# Patient Record
Sex: MALE | Race: OTHER | HISPANIC OR LATINO | Employment: UNEMPLOYED | URBAN - METROPOLITAN AREA
[De-identification: names, ages, dates, MRNs, and addresses within clinical notes are randomized per-mention and may not be internally consistent; named-entity substitution may affect disease eponyms.]

---

## 2023-08-18 ENCOUNTER — APPOINTMENT (OUTPATIENT)
Dept: RADIOLOGY | Facility: MEDICAL CENTER | Age: 73
DRG: 894 | End: 2023-08-18
Attending: EMERGENCY MEDICINE

## 2023-08-18 ENCOUNTER — HOSPITAL ENCOUNTER (INPATIENT)
Facility: MEDICAL CENTER | Age: 73
LOS: 1 days | DRG: 894 | End: 2023-08-18
Attending: EMERGENCY MEDICINE | Admitting: INTERNAL MEDICINE

## 2023-08-18 VITALS
DIASTOLIC BLOOD PRESSURE: 88 MMHG | RESPIRATION RATE: 16 BRPM | WEIGHT: 140 LBS | HEIGHT: 64 IN | SYSTOLIC BLOOD PRESSURE: 128 MMHG | OXYGEN SATURATION: 96 % | TEMPERATURE: 97.5 F | HEART RATE: 149 BPM | BODY MASS INDEX: 23.9 KG/M2

## 2023-08-18 DIAGNOSIS — R00.0 SINUS TACHYCARDIA: Primary | ICD-10-CM

## 2023-08-18 DIAGNOSIS — J18.9 PNEUMONIA OF BOTH LUNGS DUE TO INFECTIOUS ORGANISM, UNSPECIFIED PART OF LUNG: ICD-10-CM

## 2023-08-18 DIAGNOSIS — F10.920 ALCOHOLIC INTOXICATION WITHOUT COMPLICATION (HCC): ICD-10-CM

## 2023-08-18 PROBLEM — R94.31 PROLONGED Q-T INTERVAL ON ECG: Status: ACTIVE | Noted: 2023-08-18

## 2023-08-18 PROBLEM — E87.20 LACTIC ACIDOSIS: Status: ACTIVE | Noted: 2023-08-18

## 2023-08-18 PROBLEM — J96.01 ACUTE RESPIRATORY FAILURE WITH HYPOXIA (HCC): Status: ACTIVE | Noted: 2023-08-18

## 2023-08-18 PROBLEM — R74.01 TRANSAMINITIS: Status: ACTIVE | Noted: 2023-08-18

## 2023-08-18 PROBLEM — A41.9 SEPSIS (HCC): Status: ACTIVE | Noted: 2023-08-18

## 2023-08-18 PROBLEM — R79.89 ELEVATED D-DIMER: Status: ACTIVE | Noted: 2023-08-18

## 2023-08-18 PROBLEM — F10.929 ACUTE ALCOHOL INTOXICATION (HCC): Status: ACTIVE | Noted: 2023-08-18

## 2023-08-18 PROBLEM — R65.10 SIRS (SYSTEMIC INFLAMMATORY RESPONSE SYNDROME) (HCC): Status: ACTIVE | Noted: 2023-08-18

## 2023-08-18 PROBLEM — E83.51 HYPOCALCEMIA: Status: ACTIVE | Noted: 2023-08-18

## 2023-08-18 PROBLEM — E87.6 HYPOKALEMIA: Status: ACTIVE | Noted: 2023-08-18

## 2023-08-18 LAB
ALBUMIN SERPL BCP-MCNC: 3.9 G/DL (ref 3.2–4.9)
ALBUMIN/GLOB SERPL: 1.4 G/DL
ALP SERPL-CCNC: 134 U/L (ref 30–99)
ALT SERPL-CCNC: 116 U/L (ref 2–50)
ANION GAP SERPL CALC-SCNC: 15 MMOL/L (ref 7–16)
AST SERPL-CCNC: 66 U/L (ref 12–45)
BASOPHILS # BLD AUTO: 1.3 % (ref 0–1.8)
BASOPHILS # BLD: 0.09 K/UL (ref 0–0.12)
BILIRUB SERPL-MCNC: 0.2 MG/DL (ref 0.1–1.5)
BUN SERPL-MCNC: 15 MG/DL (ref 8–22)
CALCIUM ALBUM COR SERPL-MCNC: 8 MG/DL (ref 8.5–10.5)
CALCIUM SERPL-MCNC: 7.9 MG/DL (ref 8.5–10.5)
CHLORIDE SERPL-SCNC: 102 MMOL/L (ref 96–112)
CO2 SERPL-SCNC: 21 MMOL/L (ref 20–33)
CREAT SERPL-MCNC: 0.78 MG/DL (ref 0.5–1.4)
D DIMER PPP IA.FEU-MCNC: 0.53 UG/ML (FEU) (ref 0–0.5)
EKG IMPRESSION: NORMAL
EOSINOPHIL # BLD AUTO: 0.36 K/UL (ref 0–0.51)
EOSINOPHIL NFR BLD: 5.2 % (ref 0–6.9)
ERYTHROCYTE [DISTWIDTH] IN BLOOD BY AUTOMATED COUNT: 60.8 FL (ref 35.9–50)
ETHANOL BLD-MCNC: 254.9 MG/DL
FLUAV RNA SPEC QL NAA+PROBE: NEGATIVE
FLUBV RNA SPEC QL NAA+PROBE: NEGATIVE
GFR SERPLBLD CREATININE-BSD FMLA CKD-EPI: 93 ML/MIN/1.73 M 2
GLOBULIN SER CALC-MCNC: 2.8 G/DL (ref 1.9–3.5)
GLUCOSE SERPL-MCNC: 97 MG/DL (ref 65–99)
HCT VFR BLD AUTO: 34.6 % (ref 42–52)
HGB BLD-MCNC: 11.2 G/DL (ref 14–18)
IMM GRANULOCYTES # BLD AUTO: 0.03 K/UL (ref 0–0.11)
IMM GRANULOCYTES NFR BLD AUTO: 0.4 % (ref 0–0.9)
INR PPP: 1.04 (ref 0.87–1.13)
LACTATE SERPL-SCNC: 1.6 MMOL/L (ref 0.5–2)
LACTATE SERPL-SCNC: 2.4 MMOL/L (ref 0.5–2)
LIPASE SERPL-CCNC: 42 U/L (ref 11–82)
LYMPHOCYTES # BLD AUTO: 2.81 K/UL (ref 1–4.8)
LYMPHOCYTES NFR BLD: 40.7 % (ref 22–41)
MCH RBC QN AUTO: 31.1 PG (ref 27–33)
MCHC RBC AUTO-ENTMCNC: 32.4 G/DL (ref 32.3–36.5)
MCV RBC AUTO: 96.1 FL (ref 81.4–97.8)
MONOCYTES # BLD AUTO: 0.5 K/UL (ref 0–0.85)
MONOCYTES NFR BLD AUTO: 7.2 % (ref 0–13.4)
NEUTROPHILS # BLD AUTO: 3.12 K/UL (ref 1.82–7.42)
NEUTROPHILS NFR BLD: 45.2 % (ref 44–72)
NRBC # BLD AUTO: 0 K/UL
NRBC BLD-RTO: 0 /100 WBC (ref 0–0.2)
PLATELET # BLD AUTO: 261 K/UL (ref 164–446)
PMV BLD AUTO: 9.2 FL (ref 9–12.9)
POC BREATHALIZER: 0.16 PERCENT (ref 0–0.01)
POTASSIUM SERPL-SCNC: 3.4 MMOL/L (ref 3.6–5.5)
PROCALCITONIN SERPL-MCNC: 0.32 NG/ML
PROT SERPL-MCNC: 6.7 G/DL (ref 6–8.2)
PROTHROMBIN TIME: 13.5 SEC (ref 12–14.6)
RBC # BLD AUTO: 3.6 M/UL (ref 4.7–6.1)
RSV RNA SPEC QL NAA+PROBE: NEGATIVE
SARS-COV-2 RNA RESP QL NAA+PROBE: NOTDETECTED
SODIUM SERPL-SCNC: 138 MMOL/L (ref 135–145)
SPECIMEN SOURCE: NORMAL
TSH SERPL DL<=0.005 MIU/L-ACNC: 2.73 UIU/ML (ref 0.38–5.33)
WBC # BLD AUTO: 6.9 K/UL (ref 4.8–10.8)

## 2023-08-18 PROCEDURE — 302970 POC BREATHALIZER

## 2023-08-18 PROCEDURE — 85610 PROTHROMBIN TIME: CPT

## 2023-08-18 PROCEDURE — 700105 HCHG RX REV CODE 258: Mod: JZ | Performed by: EMERGENCY MEDICINE

## 2023-08-18 PROCEDURE — 99223 1ST HOSP IP/OBS HIGH 75: CPT | Performed by: INTERNAL MEDICINE

## 2023-08-18 PROCEDURE — 700105 HCHG RX REV CODE 258: Performed by: EMERGENCY MEDICINE

## 2023-08-18 PROCEDURE — 700117 HCHG RX CONTRAST REV CODE 255: Performed by: EMERGENCY MEDICINE

## 2023-08-18 PROCEDURE — 0241U HCHG SARS-COV-2 COVID-19 NFCT DS RESP RNA 4 TRGT MIC: CPT

## 2023-08-18 PROCEDURE — 87040 BLOOD CULTURE FOR BACTERIA: CPT | Mod: 91

## 2023-08-18 PROCEDURE — 96375 TX/PRO/DX INJ NEW DRUG ADDON: CPT

## 2023-08-18 PROCEDURE — 700111 HCHG RX REV CODE 636 W/ 250 OVERRIDE (IP): Performed by: EMERGENCY MEDICINE

## 2023-08-18 PROCEDURE — 84145 PROCALCITONIN (PCT): CPT

## 2023-08-18 PROCEDURE — 770020 HCHG ROOM/CARE - TELE (206)

## 2023-08-18 PROCEDURE — 36415 COLL VENOUS BLD VENIPUNCTURE: CPT

## 2023-08-18 PROCEDURE — 700111 HCHG RX REV CODE 636 W/ 250 OVERRIDE (IP): Mod: JZ | Performed by: EMERGENCY MEDICINE

## 2023-08-18 PROCEDURE — 96376 TX/PRO/DX INJ SAME DRUG ADON: CPT

## 2023-08-18 PROCEDURE — 80053 COMPREHEN METABOLIC PANEL: CPT

## 2023-08-18 PROCEDURE — 84443 ASSAY THYROID STIM HORMONE: CPT

## 2023-08-18 PROCEDURE — 85025 COMPLETE CBC W/AUTO DIFF WBC: CPT

## 2023-08-18 PROCEDURE — 82077 ASSAY SPEC XCP UR&BREATH IA: CPT

## 2023-08-18 PROCEDURE — 71275 CT ANGIOGRAPHY CHEST: CPT

## 2023-08-18 PROCEDURE — 85379 FIBRIN DEGRADATION QUANT: CPT

## 2023-08-18 PROCEDURE — 93005 ELECTROCARDIOGRAM TRACING: CPT | Performed by: EMERGENCY MEDICINE

## 2023-08-18 PROCEDURE — 700101 HCHG RX REV CODE 250: Performed by: INTERNAL MEDICINE

## 2023-08-18 PROCEDURE — 700102 HCHG RX REV CODE 250 W/ 637 OVERRIDE(OP): Performed by: EMERGENCY MEDICINE

## 2023-08-18 PROCEDURE — 83690 ASSAY OF LIPASE: CPT

## 2023-08-18 PROCEDURE — 96365 THER/PROPH/DIAG IV INF INIT: CPT

## 2023-08-18 PROCEDURE — 70450 CT HEAD/BRAIN W/O DYE: CPT

## 2023-08-18 PROCEDURE — 302970 POC BREATHALIZER: Performed by: EMERGENCY MEDICINE

## 2023-08-18 PROCEDURE — 99285 EMERGENCY DEPT VISIT HI MDM: CPT

## 2023-08-18 PROCEDURE — 83605 ASSAY OF LACTIC ACID: CPT

## 2023-08-18 PROCEDURE — C9803 HOPD COVID-19 SPEC COLLECT: HCPCS | Performed by: INTERNAL MEDICINE

## 2023-08-18 PROCEDURE — HZ2ZZZZ DETOXIFICATION SERVICES FOR SUBSTANCE ABUSE TREATMENT: ICD-10-PCS | Performed by: INTERNAL MEDICINE

## 2023-08-18 PROCEDURE — A9270 NON-COVERED ITEM OR SERVICE: HCPCS | Performed by: EMERGENCY MEDICINE

## 2023-08-18 PROCEDURE — 71045 X-RAY EXAM CHEST 1 VIEW: CPT

## 2023-08-18 RX ORDER — DOXYCYCLINE 100 MG/1
100 TABLET ORAL EVERY 12 HOURS
Status: DISCONTINUED | OUTPATIENT
Start: 2023-08-18 | End: 2023-08-18 | Stop reason: HOSPADM

## 2023-08-18 RX ORDER — ONDANSETRON 4 MG/1
4 TABLET, ORALLY DISINTEGRATING ORAL EVERY 4 HOURS PRN
Status: DISCONTINUED | OUTPATIENT
Start: 2023-08-18 | End: 2023-08-18 | Stop reason: HOSPADM

## 2023-08-18 RX ORDER — SODIUM CHLORIDE, SODIUM LACTATE, POTASSIUM CHLORIDE, CALCIUM CHLORIDE 600; 310; 30; 20 MG/100ML; MG/100ML; MG/100ML; MG/100ML
INJECTION, SOLUTION INTRAVENOUS CONTINUOUS
Status: DISCONTINUED | OUTPATIENT
Start: 2023-08-18 | End: 2023-08-18

## 2023-08-18 RX ORDER — LORAZEPAM 2 MG/ML
1 INJECTION INTRAMUSCULAR ONCE
Status: COMPLETED | OUTPATIENT
Start: 2023-08-18 | End: 2023-08-18

## 2023-08-18 RX ORDER — DOXYCYCLINE 100 MG/1
100 TABLET ORAL ONCE
Status: COMPLETED | OUTPATIENT
Start: 2023-08-18 | End: 2023-08-18

## 2023-08-18 RX ORDER — LORAZEPAM 2 MG/ML
1.5 INJECTION INTRAMUSCULAR
Status: DISCONTINUED | OUTPATIENT
Start: 2023-08-18 | End: 2023-08-18 | Stop reason: HOSPADM

## 2023-08-18 RX ORDER — LORAZEPAM 2 MG/ML
0.5 INJECTION INTRAMUSCULAR EVERY 4 HOURS PRN
Status: DISCONTINUED | OUTPATIENT
Start: 2023-08-18 | End: 2023-08-18 | Stop reason: HOSPADM

## 2023-08-18 RX ORDER — ENOXAPARIN SODIUM 100 MG/ML
40 INJECTION SUBCUTANEOUS DAILY
Status: DISCONTINUED | OUTPATIENT
Start: 2023-08-18 | End: 2023-08-18 | Stop reason: HOSPADM

## 2023-08-18 RX ORDER — POLYETHYLENE GLYCOL 3350 17 G/17G
1 POWDER, FOR SOLUTION ORAL
Status: DISCONTINUED | OUTPATIENT
Start: 2023-08-18 | End: 2023-08-18 | Stop reason: HOSPADM

## 2023-08-18 RX ORDER — CALCIUM CARBONATE 500 MG/1
500 TABLET, CHEWABLE ORAL
Status: DISCONTINUED | OUTPATIENT
Start: 2023-08-18 | End: 2023-08-18 | Stop reason: HOSPADM

## 2023-08-18 RX ORDER — AZITHROMYCIN 250 MG/1
500 TABLET, FILM COATED ORAL ONCE
Status: DISCONTINUED | OUTPATIENT
Start: 2023-08-18 | End: 2023-08-18

## 2023-08-18 RX ORDER — LORAZEPAM 2 MG/ML
2 INJECTION INTRAMUSCULAR
Status: DISCONTINUED | OUTPATIENT
Start: 2023-08-18 | End: 2023-08-18 | Stop reason: HOSPADM

## 2023-08-18 RX ORDER — AMOXICILLIN 250 MG
2 CAPSULE ORAL 2 TIMES DAILY PRN
Status: DISCONTINUED | OUTPATIENT
Start: 2023-08-18 | End: 2023-08-18 | Stop reason: HOSPADM

## 2023-08-18 RX ORDER — LORAZEPAM 1 MG/1
1 TABLET ORAL EVERY 4 HOURS PRN
Status: DISCONTINUED | OUTPATIENT
Start: 2023-08-18 | End: 2023-08-18 | Stop reason: HOSPADM

## 2023-08-18 RX ORDER — BISACODYL 10 MG
10 SUPPOSITORY, RECTAL RECTAL
Status: DISCONTINUED | OUTPATIENT
Start: 2023-08-18 | End: 2023-08-18 | Stop reason: HOSPADM

## 2023-08-18 RX ORDER — FOLIC ACID 1 MG/1
1 TABLET ORAL DAILY
Status: DISCONTINUED | OUTPATIENT
Start: 2023-08-19 | End: 2023-08-18 | Stop reason: HOSPADM

## 2023-08-18 RX ORDER — POTASSIUM CHLORIDE 20 MEQ/1
40 TABLET, EXTENDED RELEASE ORAL ONCE
Status: DISCONTINUED | OUTPATIENT
Start: 2023-08-18 | End: 2023-08-18 | Stop reason: HOSPADM

## 2023-08-18 RX ORDER — LORAZEPAM 2 MG/ML
1 INJECTION INTRAMUSCULAR
Status: DISCONTINUED | OUTPATIENT
Start: 2023-08-18 | End: 2023-08-18 | Stop reason: HOSPADM

## 2023-08-18 RX ORDER — ACETAMINOPHEN 325 MG/1
650 TABLET ORAL EVERY 6 HOURS PRN
Status: DISCONTINUED | OUTPATIENT
Start: 2023-08-18 | End: 2023-08-18 | Stop reason: HOSPADM

## 2023-08-18 RX ORDER — SODIUM CHLORIDE, SODIUM LACTATE, POTASSIUM CHLORIDE, AND CALCIUM CHLORIDE .6; .31; .03; .02 G/100ML; G/100ML; G/100ML; G/100ML
2000 INJECTION, SOLUTION INTRAVENOUS ONCE
Status: COMPLETED | OUTPATIENT
Start: 2023-08-18 | End: 2023-08-18

## 2023-08-18 RX ORDER — GAUZE BANDAGE 2" X 2"
100 BANDAGE TOPICAL DAILY
Status: DISCONTINUED | OUTPATIENT
Start: 2023-08-19 | End: 2023-08-18 | Stop reason: HOSPADM

## 2023-08-18 RX ORDER — LORAZEPAM 1 MG/1
0.5 TABLET ORAL EVERY 4 HOURS PRN
Status: DISCONTINUED | OUTPATIENT
Start: 2023-08-18 | End: 2023-08-18 | Stop reason: HOSPADM

## 2023-08-18 RX ORDER — LORAZEPAM 2 MG/1
2 TABLET ORAL
Status: DISCONTINUED | OUTPATIENT
Start: 2023-08-18 | End: 2023-08-18 | Stop reason: HOSPADM

## 2023-08-18 RX ORDER — ONDANSETRON 2 MG/ML
4 INJECTION INTRAMUSCULAR; INTRAVENOUS EVERY 4 HOURS PRN
Status: DISCONTINUED | OUTPATIENT
Start: 2023-08-18 | End: 2023-08-18 | Stop reason: HOSPADM

## 2023-08-18 RX ORDER — LORAZEPAM 2 MG/1
4 TABLET ORAL
Status: DISCONTINUED | OUTPATIENT
Start: 2023-08-18 | End: 2023-08-18 | Stop reason: HOSPADM

## 2023-08-18 RX ORDER — LABETALOL HYDROCHLORIDE 5 MG/ML
10 INJECTION, SOLUTION INTRAVENOUS EVERY 4 HOURS PRN
Status: DISCONTINUED | OUTPATIENT
Start: 2023-08-18 | End: 2023-08-18 | Stop reason: HOSPADM

## 2023-08-18 RX ADMIN — LORAZEPAM 1 MG: 2 INJECTION INTRAMUSCULAR; INTRAVENOUS at 07:35

## 2023-08-18 RX ADMIN — DOXYCYCLINE 100 MG: 100 TABLET, FILM COATED ORAL at 11:59

## 2023-08-18 RX ADMIN — AMPICILLIN AND SULBACTAM 3 G: 1; 2 INJECTION, POWDER, FOR SOLUTION INTRAMUSCULAR; INTRAVENOUS at 11:59

## 2023-08-18 RX ADMIN — SODIUM CHLORIDE, POTASSIUM CHLORIDE, SODIUM LACTATE AND CALCIUM CHLORIDE 2000 ML: 600; 310; 30; 20 INJECTION, SOLUTION INTRAVENOUS at 04:04

## 2023-08-18 RX ADMIN — LORAZEPAM 1 MG: 2 INJECTION INTRAMUSCULAR; INTRAVENOUS at 06:12

## 2023-08-18 RX ADMIN — THIAMINE HYDROCHLORIDE: 100 INJECTION, SOLUTION INTRAMUSCULAR; INTRAVENOUS at 12:00

## 2023-08-18 RX ADMIN — IOHEXOL 90 ML: 350 INJECTION, SOLUTION INTRAVENOUS at 10:22

## 2023-08-18 ASSESSMENT — ENCOUNTER SYMPTOMS
VOMITING: 0
MYALGIAS: 0
COUGH: 1
DEPRESSION: 0
BLURRED VISION: 0
NAUSEA: 0
SHORTNESS OF BREATH: 0
FEVER: 0
CHILLS: 0
HEADACHES: 0
DIZZINESS: 0
NERVOUS/ANXIOUS: 1
ABDOMINAL PAIN: 0

## 2023-08-18 ASSESSMENT — LIFESTYLE VARIABLES: SUBSTANCE_ABUSE: 1

## 2023-08-18 NOTE — ED NOTES
Pt medicated per MAR.  Covid swab collected and sent to lab.     Bedside report given to DANIEL South.  Pt on 2 liters NC and on monitors.    Call light in reach.

## 2023-08-18 NOTE — PROGRESS NOTES
Pt arrived to unit from ED. Pt HR sustaining 150s. Pt is not having symptoms of withdrawal at this time. Pt DO aware. Pt is refusing care at this time and asked to leave AMA. Pt oriented and has capacity and states he understands he has pneumonia and would not like to be treated for that at this facility. Will provide patient AMA resources.

## 2023-08-18 NOTE — ED NOTES
Noted oxygen saturation of 85-88% on room air while pt is sleeping  Applied oxygen inhalation at 2 LPM  Saturation Noted: 92%  ERP notified

## 2023-08-18 NOTE — ASSESSMENT & PLAN NOTE
Alcohol level 254 in the ER  Patient reports he drinks a pint of vodka daily  Patient reports he has been to multiple centers to help with alcohol cessation however none of his inpatient stays have helped he continues to start drinking after he leaves  High risk for severe alcohol withdrawal  Monitor on CIWA  Detox bag ordered, start MVI, thiamine and folate tomorrow  Telemetry monitoring

## 2023-08-18 NOTE — PROGRESS NOTES
Jaime Rangel has chosen to leave the hospital against medical advice. The attending physician has not discharged the patient. The provider is aware that the patient is leaving against medical advice. Patient expresses understanding of the risks of leaving the hospital and benefits of admission including but not limited to, the availability and proximity of nurses, physicians, monitoring, diagnostic testing, treatment, and a safe discharge plan. The patient had the opportunity to ask questions about their medical condition and recommended treatment.  Patient is aware that they may return for care at any time.

## 2023-08-18 NOTE — ASSESSMENT & PLAN NOTE
COVID/flu/RSV pending  Continue IV Unasyn and doxycycline  Respiratory culture pending  Supplemental oxygen as needed

## 2023-08-18 NOTE — ED NOTES
Bedside report given to the next shift RN Jerry for continuity of care and management.  Provided opportunity to asks questions.  Pt connected to monitor  All pt belongings at bedside     On oxygen inhalation at 2 LPM  High risk for fall - intoxicated

## 2023-08-18 NOTE — ED NOTES
Bedside report rec'd from DANIEL Edwards.  Pt on monitor, 2 liters NC, and bed alarm in place.   Call light in reach.  All needs met at this time.

## 2023-08-18 NOTE — ED NOTES
Spoke to ERP concerning pt.  Pt HR remains 148-150 per monitor.  ERP aware.   [place] complaints.  Call light in reach and bed alarm in place.

## 2023-08-18 NOTE — ED TRIAGE NOTES
"Jaime Rangel  63 y.o.  Male  Chief Complaint   Patient presents with    Alcohol Intoxication     Brought in by [facility] ambulance picked up from [facility] in [place]. C/o alcohol intoxication. Unable to ambulate. Patient states \" I drink pint of vodka everyday \"        "

## 2023-08-18 NOTE — ED PROVIDER NOTES
"ED Provider Note    CHIEF COMPLAINT  Chief Complaint   Patient presents with    Alcohol Intoxication     Brought in by [facility] ambulance picked up from [facility] in [place]. C/o alcohol intoxication. Unable to ambulate. Patient states \" I drink pint of vodka everyday \"            MADI/CHELE      Jaime Rangel is a 63 y.o. male who presents via EMS after found intoxicated outside of convenience store. Unable to ambulate. Drinks daily. He denies pain. History limited because of intoxication    PAST MEDICAL HISTORY   He cannot remember name of his medications. \"Little blue pill\"    SURGICAL HISTORY  Unknown, he denies    FAMILY HISTORY  unknown    SOCIAL HISTORY  Social History     Tobacco Use    Smoking status: Never    Smokeless tobacco: Never   Vaping Use    Vaping Use: Never used   Substance and Sexual Activity    Alcohol use: Yes     Comment: Daily vodka    Drug use: Never    Sexual activity: Not on file       CURRENT MEDICATIONS  Home Medications       Reviewed by Casa Carrion (Pharmacy Tech) on 08/18/23 at 0851  Med List Status: Complete     Medication Last Dose Status        Patient Bienvenido Taking any Medications                           ALLERGIES  No Known Allergies    PHYSICAL EXAM  VITAL SIGNS: BP 97/67   Pulse (!) 145   Temp 36.4 °C (97.5 °F) (Oral)   Resp 18   Ht 1.626 m (5' 4\")   Wt 63.5 kg (140 lb)   SpO2 97%   BMI 24.03 kg/m²    Physical Exam  Vitals and nursing note reviewed.   Constitutional:       Comments: Disheveled 63 year old man.    HENT:      Head: Normocephalic.      Comments: Old healing wound at right eyebrow     Mouth/Throat:      Mouth: Mucous membranes are dry.   Cardiovascular:      Rate and Rhythm: Regular rhythm. Tachycardia present.   Pulmonary:      Effort: Pulmonary effort is normal.      Breath sounds: Normal breath sounds.   Abdominal:      Tenderness: There is no abdominal tenderness.   Musculoskeletal:         General: No swelling or deformity.   Neurological:      " Comments: Awake, oriented, speech slurred.            DIAGNOSTIC STUDIES / PROCEDURES  EKG  I have independently interpreted this EKG  Results for orders placed or performed during the hospital encounter of 23   EKG   Result Value Ref Range    Report       [facility] Emergency Dept.    Test Date:  2023  Pt Name:    JENNIFER PAINTING            Department: ER  MRN:        [id number]                      Room:       [facility]  Gender:     Male                         Technician: [id number]  :        1960                   Requested By:MIGUEL MERCER II  Order #:    [id number]                    Reading MD: Miguel Mercer II, MD    Measurements  Intervals                                Axis  Rate:       143                          P:          96  SC:         82                           QRS:        -24  QRSD:       82                           T:          -26  QT:         371  QTc:        572    Interpretive Statements  Sinus tachycardia  Borderline left axis deviation  Normal intervals  No ST elevation or depression. Normal twaves.  Impression: Sinus tachycardia  Baseline wander in lead(s) V4  No previous ECG available for comparison  Electronically Signed On 2023 07:14:45 PDT by Miguel Mercer II, MD            LABS  Results for orders placed or performed during the hospital encounter of 23   CBC WITH DIFFERENTIAL   Result Value Ref Range    WBC 6.9 4.8 - 10.8 K/uL    RBC 3.60 (L) 4.70 - 6.10 M/uL    Hemoglobin 11.2 (L) 14.0 - 18.0 g/dL    Hematocrit 34.6 (L) 42.0 - 52.0 %    MCV 96.1 81.4 - 97.8 fL    MCH 31.1 27.0 - 33.0 pg    MCHC 32.4 32.3 - 36.5 g/dL    RDW 60.8 (H) 35.9 - 50.0 fL    Platelet Count 261 164 - 446 K/uL    MPV 9.2 9.0 - 12.9 fL    Neutrophils-Polys 45.20 44.00 - 72.00 %    Lymphocytes 40.70 22.00 - 41.00 %    Monocytes 7.20 0.00 - 13.40 %    Eosinophils 5.20 0.00 - 6.90 %    Basophils 1.30 0.00 - 1.80 %    Immature Granulocytes 0.40 0.00 - 0.90 %     Nucleated RBC 0.00 0.00 - 0.20 /100 WBC    Neutrophils (Absolute) 3.12 1.82 - 7.42 K/uL    Lymphs (Absolute) 2.81 1.00 - 4.80 K/uL    Monos (Absolute) 0.50 0.00 - 0.85 K/uL    Eos (Absolute) 0.36 0.00 - 0.51 K/uL    Baso (Absolute) 0.09 0.00 - 0.12 K/uL    Immature Granulocytes (abs) 0.03 0.00 - 0.11 K/uL    NRBC (Absolute) 0.00 K/uL   COMP METABOLIC PANEL   Result Value Ref Range    Sodium 138 135 - 145 mmol/L    Potassium 3.4 (L) 3.6 - 5.5 mmol/L    Chloride 102 96 - 112 mmol/L    Co2 21 20 - 33 mmol/L    Anion Gap 15.0 7.0 - 16.0    Glucose 97 65 - 99 mg/dL    Bun 15 8 - 22 mg/dL    Creatinine 0.78 0.50 - 1.40 mg/dL    Calcium 7.9 (L) 8.5 - 10.5 mg/dL    Correct Calcium 8.0 (L) 8.5 - 10.5 mg/dL    AST(SGOT) 66 (H) 12 - 45 U/L    ALT(SGPT) 116 (H) 2 - 50 U/L    Alkaline Phosphatase 134 (H) 30 - 99 U/L    Total Bilirubin 0.2 0.1 - 1.5 mg/dL    Albumin 3.9 3.2 - 4.9 g/dL    Total Protein 6.7 6.0 - 8.2 g/dL    Globulin 2.8 1.9 - 3.5 g/dL    A-G Ratio 1.4 g/dL   DIAGNOSTIC ALCOHOL   Result Value Ref Range    Diagnostic Alcohol 254.9 (H) <10.1 mg/dL   LIPASE   Result Value Ref Range    Lipase 42 11 - 82 U/L   ESTIMATED GFR   Result Value Ref Range    GFR (CKD-EPI) 93 >60 mL/min/1.73 m 2   D-DIMER   Result Value Ref Range    D-Dimer 0.53 (H) 0.00 - 0.50 ug/mL (FEU)   LACTIC ACID   Result Value Ref Range    Lactic Acid 2.4 (H) 0.5 - 2.0 mmol/L   TSH WITH REFLEX TO FT4   Result Value Ref Range    TSH 2.730 0.380 - 5.330 uIU/mL   POC BREATHALIZER   Result Value Ref Range    POC Breathalizer 0.156 (A) 0.00 - 0.01 Percent   EKG   Result Value Ref Range    Report       [facility] Emergency Dept.    Test Date:  2023  Pt Name:    JENNIFER PAINTING            Department: ER  MRN:        [id number]                      Room:       [facility]  Gender:     Male                         Technician: [id number]  :        1960                   Requested By:YOLANDA MERCER II  Order #:    [id number]                     Reading MD: Miguel Tolentino II, MD    Measurements  Intervals                                Axis  Rate:       143                          P:          96  NJ:         82                           QRS:        -24  QRSD:       82                           T:          -26  QT:         371  QTc:        572    Interpretive Statements  Sinus tachycardia  Borderline left axis deviation  Normal intervals  No ST elevation or depression. Normal twaves.  Impression: Sinus tachycardia  Baseline wander in lead(s) V4  No previous ECG available for comparison  Electronically Signed On 08- 07:14:45 PDT by Miguel Tolentino II, MD            RADIOLOGY  Imaging pending    COURSE & MEDICAL DECISION MAKING    ED Observation Status? Yes; I am placing the patient in to an observation status due to a diagnostic uncertainty as well as therapeutic intensity. Patient placed in observation status at 3:54 AM, 8/18/2023.     Observation plan is as follows: serial exams for sobriety, IV fluids for hydration, labs    Remain in observation. Dr. Abrams has assumed care.     INITIAL ASSESSMENT, COURSE AND PLAN  Care Narrative:    3:54 AM   This is an emergent evaluation of a 73-year-old man who was found outside at a local convenience store intoxicated and unable to ambulate.  He was brought by EMS.  On arrival his speech is slurred and he provides minimal history.  Heart rate is noticeably elevated, 145.      6:00 AM  After 2L LR, blood pressure normal but heart rate now in 140s. Will be moved from hallway into a room, placed on monitors, completely undressed. Labs show WBC 6.9. hemoglobin 11.2. AST/ALT/Alkphos mildly elevated. No guarding on abdominal exam.  Lipase normal. Alcohol level 254. Sleeping but will awaken and answer a few questions. Could be alcohol withdrawal and will be given ativan. Will order EKG, lactic acid, TSH.     6:25 AM  He is more alert. EKG sinus tachycardia. He says he was hospitalized recently for 4 days  because he was found dead.     Now  TSH normal. Lactic acid 2.5. Ddimer positive. WIll pursue CTA PE study. Tachycardia still unexplained. He has no tremor or hypertension to further support alcohol withdrawal. Clinically still with slurred speech and ataxia. CT head will also be done.  If underlying injury found, could be old. Signed out to Dr. Abrams.     PROBLEM LIST  #Alcohol intoxication    #Tachycardia      DISPOSITION AND DISCUSSIONS  I have discussed management of the patient with the following physicians and ZACH's:  Aliza (ER physician)    Barriers to care at this time, including but not limited to: Patient does not have established PCP and Patient lacks transportation .       FINAL DIAGNOSIS  1. Sinus tachycardia    2. Alcoholic intoxication without complication (HCC)           Electronically signed by: Miguel Tolentino II, M.D., 8/18/2023 3:49 AM

## 2023-08-18 NOTE — ED NOTES
Pt resting in [facility].  No needs voiced at this time.  Awaiting CT.  Call light in reach.

## 2023-08-18 NOTE — ED NOTES
Break rn note: pt resting, chest rise/fall noted, opens eyes to voice.'s, nad.   2L nc attached to wall oxygen.   Fall risk precaution with bed alarm in place. Provided with blanket for comfort.

## 2023-08-18 NOTE — ED NOTES
Taken patient from triage waiting room, via wheelchair, pt has unsteady gait and drunk, alert.   Verified patient identification.  Assumed patient care.   Placed on patient assigned bed. Connected to cardiac monitor.   Bed on lowest position, side rails up, breaks locked. Awaiting for ERP.

## 2023-08-18 NOTE — ASSESSMENT & PLAN NOTE
Currently requiring 2 L nasal cannula in the ER  Due to multifocal pneumonia  Continue IV antibiotics  Supplemental O2 as needed

## 2023-08-18 NOTE — ASSESSMENT & PLAN NOTE
This is Sepsis Present on admission  SIRS criteria identified on my evaluation include: Tachycardia, with heart rate greater than 90 BPM and Tachypnea, with respirations greater than 20 per minute  Clinical indicators of end organ dysfunction include Lactic Acid greater than 2  Source is pulmonary  Sepsis protocol initiated  Crystalloid Fluid Administration: Fluid resuscitation ordered per standard protocol - 30 mL/kg per current or ideal body weight  IV antibiotics as appropriate for source of sepsis  Reassessment: I have reassessed the patient's hemodynamic status

## 2023-08-18 NOTE — ED NOTES
Checked on bed, connected to monitor,  with unlabored respirations. Vital signs is stable.   Sleeping. No current needs identified.  Gurney in low position, side rail up for pt safety. Call light within reach.

## 2023-08-18 NOTE — H&P
"Hospital Medicine History & Physical Note    Date of Service  8/18/2023    Primary Care Physician  No primary care provider on file.    Consultants  N/A    Code Status  Full Code    Chief Complaint  Chief Complaint   Patient presents with    Alcohol Intoxication     Brought in by [facility] ambulance picked up from [facility] in [place]. C/o alcohol intoxication. Unable to ambulate. Patient states \" I drink pint of vodka everyday \"        History of Presenting Illness  Jaime Rangel is a 73 y.o. male who presented 8/18/2023 with alcohol intoxication.  Patient was found by EMS intoxicated outside of a convenience store unable to ambulate so they brought him to the ER.  At this time patient is alert and oriented he is complaining of cough.  He denies any fevers, chills, chest pain or shortness of breath.  Patient reports that he drinks 1 pint of vodka every day, he is tried multiple inpatient rehabs in the past to help with alcohol cessation however none of these have been effective as he continues to drink.  He reports he becomes extremely tremulous when he attempts to stop drinking and this starts drinking alcohol again.  He denies any previous alcohol withdrawal seizures.  Patient reports that he is visiting [place] and staying with a friend.    In the ER patient persistently tachycardic in the 140s with hypoxia requiring 2 L.  Labs significant for hemoglobin 11.2, potassium 3.4, calcium 8, AST 66, , alkaline phosphatase 134, D-dimer 0.53, lactic 2.4 and alcohol level 254.  CT of the head showed no acute abnormality.  EKG shows sinus tachycardia.  CTA showed no pulmonary embolism however did show multifocal pneumonia with lymphadenopathy.  Patient's main concern is paying for hospitalization, requesting to speak to social work otherwise states he might leave AGAINST MEDICAL ADVICE. He is alert and oriented x3 at time of my evaluation and has been counseled on risks of leaving including worsening respiratory " failure and death.    I discussed the plan of care with patient, bedside RN, and ERP .    Review of Systems  Review of Systems   Constitutional:  Positive for malaise/fatigue. Negative for chills and fever.   HENT:  Negative for congestion.    Eyes:  Negative for blurred vision.   Respiratory:  Positive for cough. Negative for shortness of breath.    Cardiovascular:  Negative for chest pain.   Gastrointestinal:  Negative for abdominal pain, nausea and vomiting.   Genitourinary:  Negative for dysuria.   Musculoskeletal:  Negative for myalgias.   Skin:  Negative for rash.   Neurological:  Negative for dizziness and headaches.   Psychiatric/Behavioral:  Positive for substance abuse. Negative for depression. The patient is nervous/anxious.    All other systems reviewed and are negative.      Past Medical History  Alcoholism     Surgical History  Denies     Family History  Denies   Family history reviewed with patient. There is no family history that is pertinent to the chief complaint.     Social History   reports that he has never smoked. He has never used smokeless tobacco. He reports current alcohol use. He reports that he does not use drugs.    Allergies  No Known Allergies    Medications  None       Physical Exam  Temp:  [36.4 °C (97.5 °F)-36.4 °C (97.6 °F)] 36.4 °C (97.5 °F)  Pulse:  [138-150] 150  Resp:  [14-19] 18  BP: ()/(57-98) 108/71  SpO2:  [91 %-98 %] 95 %  Blood Pressure : (!) 142/98   Temperature: 36.4 °C (97.5 °F)   Pulse: (!) 149   Respiration: 14   Pulse Oximetry: 98 %       Physical Exam  Vitals and nursing note reviewed.   Constitutional:       General: He is not in acute distress.     Appearance: He is ill-appearing (Chronically).      Comments: Disheveled   HENT:      Head: Normocephalic and atraumatic.      Right Ear: External ear normal.      Left Ear: External ear normal.      Nose: Nose normal.      Mouth/Throat:      Mouth: Mucous membranes are dry.      Pharynx: Oropharynx is clear.  "  Eyes:      Conjunctiva/sclera: Conjunctivae normal.      Pupils: Pupils are equal, round, and reactive to light.   Cardiovascular:      Rate and Rhythm: Regular rhythm. Tachycardia present.   Pulmonary:      Effort: Pulmonary effort is normal. No respiratory distress.      Breath sounds: Rhonchi present. No wheezing.      Comments: On NC   Abdominal:      General: Abdomen is flat. There is no distension.      Palpations: Abdomen is soft.      Tenderness: There is no abdominal tenderness.   Musculoskeletal:         General: Normal range of motion.      Cervical back: Normal range of motion and neck supple.      Right lower leg: No edema.      Left lower leg: No edema.   Skin:     General: Skin is warm and dry.      Findings: No rash.   Neurological:      General: No focal deficit present.      Mental Status: He is alert and oriented to person, place, and time.      Cranial Nerves: No cranial nerve deficit.   Psychiatric:         Mood and Affect: Mood is anxious.         Behavior: Behavior normal.         Cognition and Memory: Cognition normal.         Laboratory:  Recent Labs     08/18/23  0359   WBC 6.9   RBC 3.60*   HEMOGLOBIN 11.2*   HEMATOCRIT 34.6*   MCV 96.1   MCH 31.1   MCHC 32.4   RDW 60.8*   PLATELETCT 261   MPV 9.2     Recent Labs     08/18/23  0359   SODIUM 138   POTASSIUM 3.4*   CHLORIDE 102   CO2 21   GLUCOSE 97   BUN 15   CREATININE 0.78   CALCIUM 7.9*     Recent Labs     08/18/23  0359   ALTSGPT 116*   ASTSGOT 66*   ALKPHOSPHAT 134*   TBILIRUBIN 0.2   LIPASE 42   GLUCOSE 97         No results for input(s): \"NTPROBNP\" in the last 72 hours.      No results for input(s): \"TROPONINT\" in the last 72 hours.    Imaging:  DX-CHEST-PORTABLE (1 VIEW)   Final Result      Interstitial pulmonary edema, less likely chronic interstitial lung disease or atypical pneumonia      CT-CTA CHEST PULMONARY ARTERY W/ RECONS   Final Result      1.  No evidence of pulmonary embolism.   2.  Right greater left perihilar " groundglass opacities, likely multifocal pneumonia. Follow-up recommendations below.   3.  Likely reactive multistation mediastinal lymphadenopathy. Attention on follow-up.   4.  Cardiomegaly. Small pericardial effusion.   5.  Hepatic steatosis.   6.  Moderate hiatal hernia.         Less than 6 mm: CT at 3-6 months. If stable, consider CT at 2 and 4 years.      6 mm or greater: CT at 3-6 months. Subsequent management based on the most suspicious nodule(s).      Comments: Multiple less than 6 mm pure ground-glass nodules are usually benign, but consider follow-up in selected patients at high risk at 2 and 4 years.      Low Risk - Minimal or absent history of smoking and of other known risk factors.      High Risk - History of smoking or of other known risk factors.      Note: These recommendations do not apply to lung cancer screening, patients with immunosuppression, or patients with known primary cancer.      Fleischner Society 2017 Guidelines for Management of Incidentally Detected Pulmonary Nodules in Adults      CT-HEAD W/O   Final Result      No acute intracranial abnormality.             X-Ray:  I have personally reviewed the images and compared with prior images.  EKG:  I have personally reviewed the images and compared with prior images.    Assessment/Plan:  Justification for Admission Status  I anticipate this patient will require at least two midnights for appropriate medical management, necessitating inpatient admission because acute hypoxic respiratory failure from multifocal pneumonia needs IV antibiotics and telemetry monitoring.  Patient also with acute alcohol intoxication, likely will have severe withdrawal symptoms monitor on CIWA.      * Acute respiratory failure with hypoxia (HCC)- (present on admission)  Assessment & Plan  Currently requiring 2 L nasal cannula in the ER  Due to multifocal pneumonia  Continue IV antibiotics  Supplemental O2 as needed    Multifocal pneumonia- (present on  admission)  Assessment & Plan  COVID/flu/RSV pending  Continue IV Unasyn and doxycycline  Respiratory culture pending  Supplemental oxygen as needed    Acute alcohol intoxication (HCC)- (present on admission)  Assessment & Plan  Alcohol level 254 in the ER  Patient reports he drinks a pint of vodka daily  Patient reports he has been to multiple centers to help with alcohol cessation however none of his inpatient stays have helped he continues to start drinking after he leaves  High risk for severe alcohol withdrawal  Monitor on CIWA  Detox bag ordered, start MVI, thiamine and folate tomorrow  Telemetry monitoring    Elevated d-dimer- (present on admission)  Assessment & Plan  CTA negative for PE   No clinical signs of DVT on exam     Prolonged Q-T interval on ECG- (present on admission)  Assessment & Plan  EKG on admission QTc 572  Avoid qt prolonging medications     Hypocalcemia- (present on admission)  Assessment & Plan  On admit corrected Ca 8  Start calcium carbonate TID  Repeat in am     Hypokalemia- (present on admission)  Assessment & Plan  Replace as needed    Lactic acidosis- (present on admission)  Assessment & Plan  IVF   trend    Transaminitis- (present on admission)  Assessment & Plan  Due to alcohol abuse  Check hepatitis panel  Trend CMP        VTE prophylaxis: enoxaparin ppx

## 2023-08-18 NOTE — ED NOTES
Pt moved to R6.  Pt attached to cardiac monitor, 3L O2.  Pt tachycardic, MD aware.  Call light in reach, bed in lowest position.  Fall precautions in place including bed alarm and fall risk sign.

## 2023-08-18 NOTE — DISCHARGE SUMMARY
ED Observation Discharge Summary    Patient:Jaime Rangel  Patient : 1950  Patient MRN: [id number]  Patient PCP: No primary care provider on file.    Admit Date: 2023  Discharge Date and Time: 23 7:29 AM  Discharge Diagnosis:   1. Sinus tachycardia        2. Alcoholic intoxication without complication (HCC)        3. Pneumonia of both lungs due to infectious organism, unspecified part of lung          Discharge Attending: Yamilka Abrams D.O.  Discharge Service: ED Observation    ED Course  0729AM Jaime is a 73 y.o. male who was evaluated at [facility], initially by my colleague.  Patient is homeless.  He was found outside of a local business.  He was tachycardic but did not respond well to IV fluid resuscitation.  He had an elevated D-dimer and subsequently, a CTA of his chest and head was ordered which are currently pending.  Patient was intoxicated but has been increasingly more awake, aware and appropriate.  Disposition once imaging is resulted.    11:20 AM patient's evaluated at the bedside.  He remains tachycardic.  He is not tremulous.  Clinically, I do not have suspicion for detox.  He is not significantly hypertensive.  He initially, was somewhat resistant to the hospital admission but I have encouraged him to stay for further evaluation given the fact that he is still on oxygen, still very tachycardic and his CT of the chest does not show PE but does show concerning signs of multifocal pneumonia.  He is started on antibiotics and is agreeable to admission.    11:32 AM discussed with Dr. Ingram, hospitalist regarding patient's presentation, work-up over multiple ED shifts.  Patient remains tachycardic.  CT imaging shows multifocal pneumonia.  He has been started on antibiotics.  After discussion with pharmacy, they are recommending doxycycline as additional coverage.  Patient is on 2 L of nasal cannula oxygen.  He was not requiring oxygen until he was treated prior to  "my involvement, with Ativan.  Though patient somewhat reluctant, he is agreeable.    Discharge Exam:  /88   Pulse (!) 149   Temp 36.4 °C (97.5 °F) (Oral)   Resp 16   Ht 1.626 m (5' 4\")   Wt 63.5 kg (140 lb)   SpO2 96%   BMI 24.03 kg/m² .    Constitutional: Awake and alert. Nontoxic  HENT:  Grossly normal  Eyes: Grossly normal  Neck: Normal range of motion  Cardiovascular: Normal heart rate   Thorax & Lungs: No respiratory distress  Abdomen: Nontender  Skin:  No pathologic rash.   Extremities: Well perfused  Psychiatric: Affect normal    Labs  Results for orders placed or performed during the hospital encounter of 08/18/23   CBC WITH DIFFERENTIAL   Result Value Ref Range    WBC 6.9 4.8 - 10.8 K/uL    RBC 3.60 (L) 4.70 - 6.10 M/uL    Hemoglobin 11.2 (L) 14.0 - 18.0 g/dL    Hematocrit 34.6 (L) 42.0 - 52.0 %    MCV 96.1 81.4 - 97.8 fL    MCH 31.1 27.0 - 33.0 pg    MCHC 32.4 32.3 - 36.5 g/dL    RDW 60.8 (H) 35.9 - 50.0 fL    Platelet Count 261 164 - 446 K/uL    MPV 9.2 9.0 - 12.9 fL    Neutrophils-Polys 45.20 44.00 - 72.00 %    Lymphocytes 40.70 22.00 - 41.00 %    Monocytes 7.20 0.00 - 13.40 %    Eosinophils 5.20 0.00 - 6.90 %    Basophils 1.30 0.00 - 1.80 %    Immature Granulocytes 0.40 0.00 - 0.90 %    Nucleated RBC 0.00 0.00 - 0.20 /100 WBC    Neutrophils (Absolute) 3.12 1.82 - 7.42 K/uL    Lymphs (Absolute) 2.81 1.00 - 4.80 K/uL    Monos (Absolute) 0.50 0.00 - 0.85 K/uL    Eos (Absolute) 0.36 0.00 - 0.51 K/uL    Baso (Absolute) 0.09 0.00 - 0.12 K/uL    Immature Granulocytes (abs) 0.03 0.00 - 0.11 K/uL    NRBC (Absolute) 0.00 K/uL   COMP METABOLIC PANEL   Result Value Ref Range    Sodium 138 135 - 145 mmol/L    Potassium 3.4 (L) 3.6 - 5.5 mmol/L    Chloride 102 96 - 112 mmol/L    Co2 21 20 - 33 mmol/L    Anion Gap 15.0 7.0 - 16.0    Glucose 97 65 - 99 mg/dL    Bun 15 8 - 22 mg/dL    Creatinine 0.78 0.50 - 1.40 mg/dL    Calcium 7.9 (L) 8.5 - 10.5 mg/dL    Correct Calcium 8.0 (L) 8.5 - 10.5 mg/dL    " AST(SGOT) 66 (H) 12 - 45 U/L    ALT(SGPT) 116 (H) 2 - 50 U/L    Alkaline Phosphatase 134 (H) 30 - 99 U/L    Total Bilirubin 0.2 0.1 - 1.5 mg/dL    Albumin 3.9 3.2 - 4.9 g/dL    Total Protein 6.7 6.0 - 8.2 g/dL    Globulin 2.8 1.9 - 3.5 g/dL    A-G Ratio 1.4 g/dL   DIAGNOSTIC ALCOHOL   Result Value Ref Range    Diagnostic Alcohol 254.9 (H) <10.1 mg/dL   LIPASE   Result Value Ref Range    Lipase 42 11 - 82 U/L   ESTIMATED GFR   Result Value Ref Range    GFR (CKD-EPI) 93 >60 mL/min/1.73 m 2   D-DIMER   Result Value Ref Range    D-Dimer 0.53 (H) 0.00 - 0.50 ug/mL (FEU)   LACTIC ACID   Result Value Ref Range    Lactic Acid 2.4 (H) 0.5 - 2.0 mmol/L   TSH WITH REFLEX TO FT4   Result Value Ref Range    TSH 2.730 0.380 - 5.330 uIU/mL   LACTIC ACID   Result Value Ref Range    Lactic Acid 1.6 0.5 - 2.0 mmol/L   CoV-2, Flu A/B, And RSV by PCR ([place])    Specimen: Nasopharyngeal; Respirate   Result Value Ref Range    Influenza virus A RNA Negative Negative    Influenza virus B, PCR Negative Negative    RSV, PCR Negative Negative    SARS-CoV-2 by PCR NotDetected     SARS-CoV-2 Source NP Swab    PROCALCITONIN   Result Value Ref Range    Procalcitonin 0.32 (H) <0.25 ng/mL   Prothrombin Time   Result Value Ref Range    PT 13.5 12.0 - 14.6 sec    INR 1.04 0.87 - 1.13   POC BREATHALIZER   Result Value Ref Range    POC Breathalizer 0.156 (A) 0.00 - 0.01 Percent   EKG   Result Value Ref Range    Report       [facility] Emergency Dept.    Test Date:  2023  Pt Name:    JENNIFER PAINTING            Department: ER  MRN:        [id number]                      Room:       [facility]  Gender:     Male                         Technician: [id number]  :        1960                   Requested By:MIGUEL MERCER II  Order #:    [id number]                    Reading MD: Miguel Mercer II, MD    Measurements  Intervals                                Axis  Rate:       143                          P:          96  HI:          82                           QRS:        -24  QRSD:       82                           T:          -26  QT:         371  QTc:        572    Interpretive Statements  Sinus tachycardia  Borderline left axis deviation  Normal intervals  No ST elevation or depression. Normal twaves.  Impression: Sinus tachycardia  Baseline wander in lead(s) V4  No previous ECG available for comparison  Electronically Signed On 08- 07:14:45 PDT by Miguel Tolentino II, MD          Radiology  DX-CHEST-PORTABLE (1 VIEW)   Final Result      Interstitial pulmonary edema, less likely chronic interstitial lung disease or atypical pneumonia      CT-CTA CHEST PULMONARY ARTERY W/ RECONS   Final Result      1.  No evidence of pulmonary embolism.   2.  Right greater left perihilar groundglass opacities, likely multifocal pneumonia. Follow-up recommendations below.   3.  Likely reactive multistation mediastinal lymphadenopathy. Attention on follow-up.   4.  Cardiomegaly. Small pericardial effusion.   5.  Hepatic steatosis.   6.  Moderate hiatal hernia.         Less than 6 mm: CT at 3-6 months. If stable, consider CT at 2 and 4 years.      6 mm or greater: CT at 3-6 months. Subsequent management based on the most suspicious nodule(s).      Comments: Multiple less than 6 mm pure ground-glass nodules are usually benign, but consider follow-up in selected patients at high risk at 2 and 4 years.      Low Risk - Minimal or absent history of smoking and of other known risk factors.      High Risk - History of smoking or of other known risk factors.      Note: These recommendations do not apply to lung cancer screening, patients with immunosuppression, or patients with known primary cancer.      Fleischner Society 2017 Guidelines for Management of Incidentally Detected Pulmonary Nodules in Adults      CT-HEAD W/O   Final Result      No acute intracranial abnormality.             Medications:   There are no discharge medications for this  patient.      My final assessment includes   1. Sinus tachycardia        2. Alcoholic intoxication without complication (HCC)        3. Pneumonia of both lungs due to infectious organism, unspecified part of lung            Upon Reevaluation, the patient's condition has: not improved; and will be escalated to hospitalization.    Patient discharged from ED Observation status at 1135AM (Time) 8/18/2023 (Date).     Total time spent on this ED Observation discharge encounter is > 30 Minutes    Electronically signed by: Yamilka Abrams D.O., 8/18/2023 7:29 AM

## 2023-08-19 NOTE — DISCHARGE SUMMARY
"Discharge Summary    CHIEF COMPLAINT ON ADMISSION  Chief Complaint   Patient presents with    Alcohol Intoxication     Brought in by [facility] ambulance picked up from [facility] in [place]. C/o alcohol intoxication. Unable to ambulate. Patient states \" I drink pint of vodka everyday \"        Reason for Admission  ETOH     Admission Date  8/18/2023    CODE STATUS  Prior    HPI & HOSPITAL COURSE  This is a 73 y.o. male here with alcohol intoxication    Please see H&P for admission details.  Briefly patient here for alcohol withdrawal, multifocal pneumonia and acute hypoxic respiratory failure.  Patient is alert and oriented x4 and has been told the risks of leaving multiple times.  [facility] stated understanding [facility].  Patient left AGAINST MEDICAL ADVICE.    Therefore, he is discharged in guarded and stable condition against medcial advice.    The patient recovered much more quickly than anticipated on admission.    Discharge Date  8/18/2023    FOLLOW UP ITEMS POST DISCHARGE  As needed    DISCHARGE DIAGNOSES  Principal Problem:    Acute respiratory failure with hypoxia (HCC) (POA: Yes)  Active Problems:    Acute alcohol intoxication (HCC) (POA: Yes)    Multifocal pneumonia (POA: Yes)    Transaminitis (POA: Yes)    Lactic acidosis (POA: Yes)    Hypokalemia (POA: Yes)    Hypocalcemia (POA: Yes)    Prolonged Q-T interval on ECG (POA: Yes)    Elevated d-dimer (POA: Yes)  Resolved Problems:    * No resolved hospital problems. *      FOLLOW UP  No future appointments.  No follow-up provider specified.    MEDICATIONS ON DISCHARGE     Medication List      You have not been prescribed any medications.         Allergies  No Known Allergies    DIET  No orders of the defined types were placed in this encounter.      ACTIVITY  As tolerated.  Weight bearing as tolerated    CONSULTATIONS  N/A    PROCEDURES  N/A    LABORATORY  Lab Results   Component Value Date    " SODIUM 138 08/18/2023    POTASSIUM 3.4 (L) 08/18/2023    CHLORIDE 102 08/18/2023    CO2 21 08/18/2023    GLUCOSE 97 08/18/2023    BUN 15 08/18/2023    CREATININE 0.78 08/18/2023        Lab Results   Component Value Date    WBC 6.9 08/18/2023    HEMOGLOBIN 11.2 (L) 08/18/2023    HEMATOCRIT 34.6 (L) 08/18/2023    PLATELETCT 261 08/18/2023        Total time of the discharge process exceeds 32 minutes.

## 2023-08-23 LAB
BACTERIA BLD CULT: NORMAL
BACTERIA BLD CULT: NORMAL
SIGNIFICANT IND 70042: NORMAL
SIGNIFICANT IND 70042: NORMAL
SITE SITE: NORMAL
SITE SITE: NORMAL
SOURCE SOURCE: NORMAL
SOURCE SOURCE: NORMAL

## 2023-08-29 ENCOUNTER — HOSPITAL ENCOUNTER (INPATIENT)
Facility: MEDICAL CENTER | Age: 73
LOS: 5 days | DRG: 897 | End: 2023-09-03
Attending: EMERGENCY MEDICINE | Admitting: HOSPITALIST

## 2023-08-29 ENCOUNTER — APPOINTMENT (OUTPATIENT)
Dept: RADIOLOGY | Facility: MEDICAL CENTER | Age: 73
DRG: 897 | End: 2023-08-29
Attending: EMERGENCY MEDICINE

## 2023-08-29 DIAGNOSIS — I48.92 ATRIAL FLUTTER WITH RAPID VENTRICULAR RESPONSE (HCC): ICD-10-CM

## 2023-08-29 DIAGNOSIS — F10.930 ALCOHOL WITHDRAWAL SYNDROME WITHOUT COMPLICATION (HCC): ICD-10-CM

## 2023-08-29 DIAGNOSIS — I48.91 ATRIAL FIBRILLATION WITH RVR (HCC): ICD-10-CM

## 2023-08-29 DIAGNOSIS — I48.92 ATRIAL FLUTTER, UNSPECIFIED TYPE (HCC): ICD-10-CM

## 2023-08-29 PROBLEM — F10.239 ALCOHOL DEPENDENCE WITH WITHDRAWAL (HCC): Status: ACTIVE | Noted: 2023-08-29

## 2023-08-29 LAB
ALBUMIN SERPL BCP-MCNC: 4.3 G/DL (ref 3.2–4.9)
ALBUMIN/GLOB SERPL: 1.3 G/DL
ALP SERPL-CCNC: 245 U/L (ref 30–99)
ALT SERPL-CCNC: 176 U/L (ref 2–50)
AMPHET UR QL SCN: NEGATIVE
ANION GAP SERPL CALC-SCNC: 18 MMOL/L (ref 7–16)
AST SERPL-CCNC: 336 U/L (ref 12–45)
BARBITURATES UR QL SCN: NEGATIVE
BASOPHILS # BLD AUTO: 1.2 % (ref 0–1.8)
BASOPHILS # BLD: 0.06 K/UL (ref 0–0.12)
BENZODIAZ UR QL SCN: NEGATIVE
BILIRUB SERPL-MCNC: 0.9 MG/DL (ref 0.1–1.5)
BUN SERPL-MCNC: 15 MG/DL (ref 8–22)
BZE UR QL SCN: NEGATIVE
CALCIUM ALBUM COR SERPL-MCNC: 8.4 MG/DL (ref 8.5–10.5)
CALCIUM SERPL-MCNC: 8.6 MG/DL (ref 8.4–10.2)
CANNABINOIDS UR QL SCN: NEGATIVE
CHLORIDE SERPL-SCNC: 101 MMOL/L (ref 96–112)
CO2 SERPL-SCNC: 22 MMOL/L (ref 20–33)
CREAT SERPL-MCNC: 0.85 MG/DL (ref 0.5–1.4)
EKG IMPRESSION: NORMAL
EOSINOPHIL # BLD AUTO: 0.05 K/UL (ref 0–0.51)
EOSINOPHIL NFR BLD: 1 % (ref 0–6.9)
ERYTHROCYTE [DISTWIDTH] IN BLOOD BY AUTOMATED COUNT: 56.3 FL (ref 35.9–50)
ETHANOL BLD-MCNC: 239 MG/DL
FENTANYL UR QL: NEGATIVE
GFR SERPLBLD CREATININE-BSD FMLA CKD-EPI: 91 ML/MIN/1.73 M 2
GLOBULIN SER CALC-MCNC: 3.3 G/DL (ref 1.9–3.5)
GLUCOSE SERPL-MCNC: 90 MG/DL (ref 65–99)
HCT VFR BLD AUTO: 39.3 % (ref 42–52)
HGB BLD-MCNC: 12.9 G/DL (ref 14–18)
IMM GRANULOCYTES # BLD AUTO: 0.01 K/UL (ref 0–0.11)
IMM GRANULOCYTES NFR BLD AUTO: 0.2 % (ref 0–0.9)
LIPASE SERPL-CCNC: 38 U/L (ref 11–82)
LYMPHOCYTES # BLD AUTO: 1.86 K/UL (ref 1–4.8)
LYMPHOCYTES NFR BLD: 36.3 % (ref 22–41)
MCH RBC QN AUTO: 31.1 PG (ref 27–33)
MCHC RBC AUTO-ENTMCNC: 32.8 G/DL (ref 32.3–36.5)
MCV RBC AUTO: 94.7 FL (ref 81.4–97.8)
METHADONE UR QL SCN: NEGATIVE
MONOCYTES # BLD AUTO: 0.28 K/UL (ref 0–0.85)
MONOCYTES NFR BLD AUTO: 5.5 % (ref 0–13.4)
NEUTROPHILS # BLD AUTO: 2.87 K/UL (ref 1.82–7.42)
NEUTROPHILS NFR BLD: 55.8 % (ref 44–72)
NRBC # BLD AUTO: 0 K/UL
NRBC BLD-RTO: 0 /100 WBC (ref 0–0.2)
OPIATES UR QL SCN: NEGATIVE
OXYCODONE UR QL SCN: NEGATIVE
PCP UR QL SCN: NEGATIVE
PLATELET # BLD AUTO: 148 K/UL (ref 164–446)
PMV BLD AUTO: 9.8 FL (ref 9–12.9)
POTASSIUM SERPL-SCNC: 3.6 MMOL/L (ref 3.6–5.5)
PROPOXYPH UR QL SCN: NEGATIVE
PROT SERPL-MCNC: 7.6 G/DL (ref 6–8.2)
RBC # BLD AUTO: 4.15 M/UL (ref 4.7–6.1)
SODIUM SERPL-SCNC: 141 MMOL/L (ref 135–145)
WBC # BLD AUTO: 5.1 K/UL (ref 4.8–10.8)

## 2023-08-29 PROCEDURE — 700105 HCHG RX REV CODE 258: Performed by: EMERGENCY MEDICINE

## 2023-08-29 PROCEDURE — 82077 ASSAY SPEC XCP UR&BREATH IA: CPT

## 2023-08-29 PROCEDURE — 80053 COMPREHEN METABOLIC PANEL: CPT

## 2023-08-29 PROCEDURE — 93005 ELECTROCARDIOGRAM TRACING: CPT | Performed by: EMERGENCY MEDICINE

## 2023-08-29 PROCEDURE — 770022 HCHG ROOM/CARE - ICU (200)

## 2023-08-29 PROCEDURE — 99291 CRITICAL CARE FIRST HOUR: CPT | Performed by: HOSPITALIST

## 2023-08-29 PROCEDURE — 94760 N-INVAS EAR/PLS OXIMETRY 1: CPT

## 2023-08-29 PROCEDURE — 700102 HCHG RX REV CODE 250 W/ 637 OVERRIDE(OP): Performed by: HOSPITALIST

## 2023-08-29 PROCEDURE — 99291 CRITICAL CARE FIRST HOUR: CPT

## 2023-08-29 PROCEDURE — 700111 HCHG RX REV CODE 636 W/ 250 OVERRIDE (IP): Mod: JZ | Performed by: EMERGENCY MEDICINE

## 2023-08-29 PROCEDURE — 700101 HCHG RX REV CODE 250: Performed by: EMERGENCY MEDICINE

## 2023-08-29 PROCEDURE — 71045 X-RAY EXAM CHEST 1 VIEW: CPT

## 2023-08-29 PROCEDURE — 700105 HCHG RX REV CODE 258: Performed by: HOSPITALIST

## 2023-08-29 PROCEDURE — 96375 TX/PRO/DX INJ NEW DRUG ADDON: CPT

## 2023-08-29 PROCEDURE — 96376 TX/PRO/DX INJ SAME DRUG ADON: CPT

## 2023-08-29 PROCEDURE — A9270 NON-COVERED ITEM OR SERVICE: HCPCS | Performed by: HOSPITALIST

## 2023-08-29 PROCEDURE — 96365 THER/PROPH/DIAG IV INF INIT: CPT

## 2023-08-29 PROCEDURE — 83690 ASSAY OF LIPASE: CPT

## 2023-08-29 PROCEDURE — 85025 COMPLETE CBC W/AUTO DIFF WBC: CPT

## 2023-08-29 PROCEDURE — 96367 TX/PROPH/DG ADDL SEQ IV INF: CPT

## 2023-08-29 PROCEDURE — 36415 COLL VENOUS BLD VENIPUNCTURE: CPT

## 2023-08-29 PROCEDURE — HZ2ZZZZ DETOXIFICATION SERVICES FOR SUBSTANCE ABUSE TREATMENT: ICD-10-PCS | Performed by: HOSPITALIST

## 2023-08-29 PROCEDURE — 80307 DRUG TEST PRSMV CHEM ANLYZR: CPT

## 2023-08-29 PROCEDURE — 700111 HCHG RX REV CODE 636 W/ 250 OVERRIDE (IP): Performed by: HOSPITALIST

## 2023-08-29 RX ORDER — LORAZEPAM 2 MG/ML
1.5 INJECTION INTRAMUSCULAR
Status: DISCONTINUED | OUTPATIENT
Start: 2023-08-29 | End: 2023-09-03 | Stop reason: HOSPADM

## 2023-08-29 RX ORDER — BISACODYL 10 MG
10 SUPPOSITORY, RECTAL RECTAL
Status: DISCONTINUED | OUTPATIENT
Start: 2023-08-29 | End: 2023-09-03 | Stop reason: HOSPADM

## 2023-08-29 RX ORDER — DILTIAZEM HYDROCHLORIDE 5 MG/ML
0.25 INJECTION INTRAVENOUS ONCE
Status: COMPLETED | OUTPATIENT
Start: 2023-08-29 | End: 2023-08-29

## 2023-08-29 RX ORDER — LORAZEPAM 2 MG/ML
1 INJECTION INTRAMUSCULAR ONCE
Status: COMPLETED | OUTPATIENT
Start: 2023-08-29 | End: 2023-08-29

## 2023-08-29 RX ORDER — SODIUM CHLORIDE 9 MG/ML
1000 INJECTION, SOLUTION INTRAVENOUS ONCE
Status: COMPLETED | OUTPATIENT
Start: 2023-08-29 | End: 2023-08-29

## 2023-08-29 RX ORDER — LORAZEPAM 2 MG/ML
1 INJECTION INTRAMUSCULAR
Status: DISCONTINUED | OUTPATIENT
Start: 2023-08-29 | End: 2023-09-03 | Stop reason: HOSPADM

## 2023-08-29 RX ORDER — ADENOSINE 3 MG/ML
12 INJECTION, SOLUTION INTRAVENOUS ONCE
Status: COMPLETED | OUTPATIENT
Start: 2023-08-29 | End: 2023-08-29

## 2023-08-29 RX ORDER — GAUZE BANDAGE 2" X 2"
100 BANDAGE TOPICAL DAILY
Status: DISPENSED | OUTPATIENT
Start: 2023-08-30 | End: 2023-09-03

## 2023-08-29 RX ORDER — SODIUM CHLORIDE 9 MG/ML
1000 INJECTION, SOLUTION INTRAVENOUS ONCE
Status: COMPLETED | OUTPATIENT
Start: 2023-08-29 | End: 2023-08-30

## 2023-08-29 RX ORDER — ADENOSINE 3 MG/ML
6 INJECTION, SOLUTION INTRAVENOUS ONCE
Status: COMPLETED | OUTPATIENT
Start: 2023-08-29 | End: 2023-08-29

## 2023-08-29 RX ORDER — ACETAMINOPHEN 325 MG/1
650 TABLET ORAL EVERY 6 HOURS PRN
Status: DISCONTINUED | OUTPATIENT
Start: 2023-08-29 | End: 2023-09-03 | Stop reason: HOSPADM

## 2023-08-29 RX ORDER — ONDANSETRON 2 MG/ML
4 INJECTION INTRAMUSCULAR; INTRAVENOUS ONCE
Status: COMPLETED | OUTPATIENT
Start: 2023-08-29 | End: 2023-08-29

## 2023-08-29 RX ORDER — LORAZEPAM 1 MG/1
4 TABLET ORAL
Status: DISCONTINUED | OUTPATIENT
Start: 2023-08-29 | End: 2023-09-03 | Stop reason: HOSPADM

## 2023-08-29 RX ORDER — POLYETHYLENE GLYCOL 3350 17 G/17G
1 POWDER, FOR SOLUTION ORAL
Status: DISCONTINUED | OUTPATIENT
Start: 2023-08-29 | End: 2023-09-03 | Stop reason: HOSPADM

## 2023-08-29 RX ORDER — SODIUM CHLORIDE 9 MG/ML
INJECTION, SOLUTION INTRAVENOUS CONTINUOUS
Status: DISCONTINUED | OUTPATIENT
Start: 2023-08-29 | End: 2023-08-30

## 2023-08-29 RX ORDER — HYDROMORPHONE HYDROCHLORIDE 1 MG/ML
0.5 INJECTION, SOLUTION INTRAMUSCULAR; INTRAVENOUS; SUBCUTANEOUS
Status: DISCONTINUED | OUTPATIENT
Start: 2023-08-29 | End: 2023-09-02

## 2023-08-29 RX ORDER — AMOXICILLIN 250 MG
2 CAPSULE ORAL 2 TIMES DAILY
Status: DISCONTINUED | OUTPATIENT
Start: 2023-08-29 | End: 2023-09-03 | Stop reason: HOSPADM

## 2023-08-29 RX ORDER — LORAZEPAM 1 MG/1
1 TABLET ORAL EVERY 4 HOURS PRN
Status: DISCONTINUED | OUTPATIENT
Start: 2023-08-29 | End: 2023-09-03 | Stop reason: HOSPADM

## 2023-08-29 RX ORDER — LORAZEPAM 0.5 MG/1
0.5 TABLET ORAL EVERY 4 HOURS PRN
Status: DISCONTINUED | OUTPATIENT
Start: 2023-08-29 | End: 2023-09-03 | Stop reason: HOSPADM

## 2023-08-29 RX ORDER — LORAZEPAM 2 MG/ML
2 INJECTION INTRAMUSCULAR
Status: DISCONTINUED | OUTPATIENT
Start: 2023-08-29 | End: 2023-09-03 | Stop reason: HOSPADM

## 2023-08-29 RX ORDER — LORAZEPAM 1 MG/1
2 TABLET ORAL
Status: DISCONTINUED | OUTPATIENT
Start: 2023-08-29 | End: 2023-09-03 | Stop reason: HOSPADM

## 2023-08-29 RX ORDER — METOPROLOL TARTRATE 1 MG/ML
5 INJECTION, SOLUTION INTRAVENOUS ONCE
Status: COMPLETED | OUTPATIENT
Start: 2023-08-29 | End: 2023-08-29

## 2023-08-29 RX ORDER — FOLIC ACID 1 MG/1
1 TABLET ORAL DAILY
Status: DISPENSED | OUTPATIENT
Start: 2023-08-30 | End: 2023-09-03

## 2023-08-29 RX ORDER — OXYCODONE HYDROCHLORIDE 10 MG/1
10 TABLET ORAL
Status: DISCONTINUED | OUTPATIENT
Start: 2023-08-29 | End: 2023-09-03 | Stop reason: HOSPADM

## 2023-08-29 RX ORDER — ADENOSINE 3 MG/ML
6 INJECTION, SOLUTION INTRAVENOUS ONCE
Status: DISCONTINUED | OUTPATIENT
Start: 2023-08-29 | End: 2023-08-29

## 2023-08-29 RX ORDER — LORAZEPAM 2 MG/ML
0.5 INJECTION INTRAMUSCULAR EVERY 4 HOURS PRN
Status: DISCONTINUED | OUTPATIENT
Start: 2023-08-29 | End: 2023-09-03 | Stop reason: HOSPADM

## 2023-08-29 RX ORDER — OXYCODONE HYDROCHLORIDE 5 MG/1
5 TABLET ORAL
Status: DISCONTINUED | OUTPATIENT
Start: 2023-08-29 | End: 2023-09-03 | Stop reason: HOSPADM

## 2023-08-29 RX ORDER — LORAZEPAM 1 MG/1
3 TABLET ORAL
Status: DISCONTINUED | OUTPATIENT
Start: 2023-08-29 | End: 2023-09-03 | Stop reason: HOSPADM

## 2023-08-29 RX ORDER — DILTIAZEM HYDROCHLORIDE 5 MG/ML
10 INJECTION INTRAVENOUS ONCE
Status: COMPLETED | OUTPATIENT
Start: 2023-08-29 | End: 2023-08-29

## 2023-08-29 RX ORDER — LORAZEPAM 2 MG/ML
1 INJECTION INTRAMUSCULAR ONCE
Status: DISCONTINUED | OUTPATIENT
Start: 2023-08-29 | End: 2023-08-29

## 2023-08-29 RX ADMIN — LORAZEPAM 2 MG: 1 TABLET ORAL at 21:31

## 2023-08-29 RX ADMIN — SODIUM CHLORIDE 1000 ML: 9 INJECTION, SOLUTION INTRAVENOUS at 21:23

## 2023-08-29 RX ADMIN — RIVAROXABAN 10 MG: 10 TABLET, FILM COATED ORAL at 19:30

## 2023-08-29 RX ADMIN — SODIUM CHLORIDE 1000 ML: 9 INJECTION, SOLUTION INTRAVENOUS at 16:55

## 2023-08-29 RX ADMIN — DILTIAZEM HYDROCHLORIDE 15.65 MG: 5 INJECTION INTRAVENOUS at 17:52

## 2023-08-29 RX ADMIN — SODIUM CHLORIDE: 9 INJECTION, SOLUTION INTRAVENOUS at 19:36

## 2023-08-29 RX ADMIN — DILTIAZEM HYDROCHLORIDE 30 MG: 30 TABLET, FILM COATED ORAL at 23:49

## 2023-08-29 RX ADMIN — ADENOSINE 12 MG: 3 INJECTION INTRAVENOUS at 19:08

## 2023-08-29 RX ADMIN — METOPROLOL TARTRATE 5 MG: 5 INJECTION INTRAVENOUS at 16:55

## 2023-08-29 RX ADMIN — LORAZEPAM 1 MG: 2 INJECTION INTRAMUSCULAR; INTRAVENOUS at 16:16

## 2023-08-29 RX ADMIN — ONDANSETRON 4 MG: 2 INJECTION INTRAMUSCULAR; INTRAVENOUS at 15:21

## 2023-08-29 RX ADMIN — SODIUM CHLORIDE: 9 INJECTION, SOLUTION INTRAVENOUS at 22:58

## 2023-08-29 RX ADMIN — DILTIAZEM HYDROCHLORIDE 10 MG: 5 INJECTION INTRAVENOUS at 18:44

## 2023-08-29 RX ADMIN — DILTIAZEM HYDROCHLORIDE 30 MG: 30 TABLET, FILM COATED ORAL at 20:15

## 2023-08-29 RX ADMIN — FOLIC ACID: 5 INJECTION, SOLUTION INTRAMUSCULAR; INTRAVENOUS; SUBCUTANEOUS at 15:21

## 2023-08-29 RX ADMIN — DILTIAZEM HYDROCHLORIDE 5 MG/HR: 5 INJECTION INTRAVENOUS at 19:39

## 2023-08-29 RX ADMIN — ADENOSINE 6 MG: 3 INJECTION INTRAVENOUS at 18:59

## 2023-08-29 ASSESSMENT — ENCOUNTER SYMPTOMS
PALPITATIONS: 1
EYE DISCHARGE: 0
TREMORS: 1
STRIDOR: 0
MYALGIAS: 0
HEADACHES: 1
NERVOUS/ANXIOUS: 0
VOMITING: 0
ABDOMINAL PAIN: 0
FOCAL WEAKNESS: 0
EYE REDNESS: 0
COUGH: 0
BRUISES/BLEEDS EASILY: 0
DIZZINESS: 1
SHORTNESS OF BREATH: 0
FLANK PAIN: 0
FEVER: 0
CHILLS: 0

## 2023-08-29 ASSESSMENT — PATIENT HEALTH QUESTIONNAIRE - PHQ9
2. FEELING DOWN, DEPRESSED, IRRITABLE, OR HOPELESS: NOT AT ALL
1. LITTLE INTEREST OR PLEASURE IN DOING THINGS: NOT AT ALL
SUM OF ALL RESPONSES TO PHQ9 QUESTIONS 1 AND 2: 0

## 2023-08-29 ASSESSMENT — LIFESTYLE VARIABLES
AGITATION: NORMAL ACTIVITY
TOTAL SCORE: VERY MILD ITCHING, PINS AND NEEDLES SENSATION, BURNING OR NUMBNESS
TREMOR: TREMOR NOT VISIBLE BUT CAN BE FELT, FINGERTIP TO FINGERTIP
HEADACHE, FULLNESS IN HEAD: NOT PRESENT
VISUAL DISTURBANCES: NOT PRESENT
ANXIETY: NO ANXIETY (AT EASE)
PAROXYSMAL SWEATS: BARELY PERCEPTIBLE SWEATING. PALMS MOIST
NAUSEA AND VOMITING: INTERMITTENT NAUSEA WITH DRY HEAVES
HEADACHE, FULLNESS IN HEAD: NOT PRESENT
PAROXYSMAL SWEATS: NO SWEAT VISIBLE
TREMOR: *
VISUAL DISTURBANCES: NOT PRESENT
ANXIETY: *
AUDITORY DISTURBANCES: NOT PRESENT
ORIENTATION AND CLOUDING OF SENSORIUM: ORIENTED AND CAN DO SERIAL ADDITIONS
ORIENTATION AND CLOUDING OF SENSORIUM: ORIENTED AND CAN DO SERIAL ADDITIONS
TOTAL SCORE: 11
TOTAL SCORE: 2
AGITATION: NORMAL ACTIVITY
AUDITORY DISTURBANCES: NOT PRESENT
NAUSEA AND VOMITING: MILD NAUSEA WITH NO VOMITING

## 2023-08-29 ASSESSMENT — FIBROSIS 4 INDEX
FIB4 SCORE: 1.74
FIB4 SCORE: 12.36

## 2023-08-29 ASSESSMENT — PAIN DESCRIPTION - PAIN TYPE
TYPE: ACUTE PAIN
TYPE: ACUTE PAIN

## 2023-08-29 NOTE — ED PROVIDER NOTES
"ED Provider Note    CHIEF COMPLAINT  Chief Complaint   Patient presents with    ETOH Withdrawal    Sent by MD       EXTERNAL RECORDS REVIEWED  Inpatient Notes discharge summary from August 18, 2023: Patient hospitalized secondary to persistent tachycardia, had negative CT scan of the chest for pulmonary embolism, treated at the time for pneumonia, ultimately left AGAINST MEDICAL ADVICE    HPI/ROS  LIMITATION TO HISTORY   Select: Intoxication  OUTSIDE HISTORIAN(S):  EMS gave report, run sheet of presentation and transport    Jaime Rangel is a 73 y.o. male who presents with no acute complaints, [facility] and was told he was too drunk to stay for other detox program.  He last went through detox 4 months ago at [facility].  He denies suicidal ideation.  He states he normally drinks a pint of vodka a day, stating \"today I had 2 pints\".  No chest pain or abdominal pain.  He denies feeling shaky.  Mild nausea this morning now resolved.  He denies difficulty breathing.  Patient states his sister told him to get sober prompting his visit to [facility] today.  Patient had CT angiogram of his chest last admission which was negative for pulmonary embolism.  Patient denies history of cardiac problems    PAST MEDICAL HISTORY   has a past medical history of Alcoholism (HCC).    SURGICAL HISTORY  patient denies any surgical history    FAMILY HISTORY  History reviewed. No pertinent family history.    SOCIAL HISTORY  Social History     Tobacco Use    Smoking status: Never    Smokeless tobacco: Never   Vaping Use    Vaping Use: Never used   Substance and Sexual Activity    Alcohol use: Yes     Comment: Daily vodka    Drug use: Never    Sexual activity: Not on file       CURRENT MEDICATIONS  Home Medications       Reviewed by Casa Muhammad (Pharmacy Tech) on 08/29/23 at 1433  Med List Status: Complete     Medication Last Dose Status   Acetaminophen (TYLENOL PO) > 1 " week Active                    ALLERGIES  No Known Allergies    PHYSICAL EXAM  VITAL SIGNS: BP (!) 123/91   Pulse (!) 130   Temp 36.7 °C (98 °F) (Temporal)   Resp 18   Wt 62.6 kg (138 lb)   SpO2 96%   BMI 23.69 kg/m²    Constitutional: No acute distress  Cardiac: Tachycardic, regular rhythm  Respiratory: Clear lung sounds  GI: Abdomen soft, nontender.  No distention.  Eyes: No scleral icterus  Neurologic: Mildly slurred speech.  Strength and sensation are normal  Psychiatric: Calm and cooperative      DIAGNOSTIC STUDIES / PROCEDURES  EKG  I have independently interpreted this EKG  See below    LABS  Results for orders placed or performed during the hospital encounter of 08/29/23   CBC WITH DIFFERENTIAL   Result Value Ref Range    WBC 5.1 4.8 - 10.8 K/uL    RBC 4.15 (L) 4.70 - 6.10 M/uL    Hemoglobin 12.9 (L) 14.0 - 18.0 g/dL    Hematocrit 39.3 (L) 42.0 - 52.0 %    MCV 94.7 81.4 - 97.8 fL    MCH 31.1 27.0 - 33.0 pg    MCHC 32.8 32.3 - 36.5 g/dL    RDW 56.3 (H) 35.9 - 50.0 fL    Platelet Count 148 (L) 164 - 446 K/uL    MPV 9.8 9.0 - 12.9 fL    Neutrophils-Polys 55.80 44.00 - 72.00 %    Lymphocytes 36.30 22.00 - 41.00 %    Monocytes 5.50 0.00 - 13.40 %    Eosinophils 1.00 0.00 - 6.90 %    Basophils 1.20 0.00 - 1.80 %    Immature Granulocytes 0.20 0.00 - 0.90 %    Nucleated RBC 0.00 0.00 - 0.20 /100 WBC    Neutrophils (Absolute) 2.87 1.82 - 7.42 K/uL    Lymphs (Absolute) 1.86 1.00 - 4.80 K/uL    Monos (Absolute) 0.28 0.00 - 0.85 K/uL    Eos (Absolute) 0.05 0.00 - 0.51 K/uL    Baso (Absolute) 0.06 0.00 - 0.12 K/uL    Immature Granulocytes (abs) 0.01 0.00 - 0.11 K/uL    NRBC (Absolute) 0.00 K/uL   COMP METABOLIC PANEL   Result Value Ref Range    Sodium 141 135 - 145 mmol/L    Potassium 3.6 3.6 - 5.5 mmol/L    Chloride 101 96 - 112 mmol/L    Co2 22 20 - 33 mmol/L    Anion Gap 18.0 (H) 7.0 - 16.0    Glucose 90 65 - 99 mg/dL    Bun 15 8 - 22 mg/dL    Creatinine 0.85 0.50 - 1.40 mg/dL    Calcium 8.6 8.4 - 10.2 mg/dL     Correct Calcium 8.4 (L) 8.5 - 10.5 mg/dL    AST(SGOT) 336 (H) 12 - 45 U/L    ALT(SGPT) 176 (H) 2 - 50 U/L    Alkaline Phosphatase 245 (H) 30 - 99 U/L    Total Bilirubin 0.9 0.1 - 1.5 mg/dL    Albumin 4.3 3.2 - 4.9 g/dL    Total Protein 7.6 6.0 - 8.2 g/dL    Globulin 3.3 1.9 - 3.5 g/dL    A-G Ratio 1.3 g/dL   LIPASE   Result Value Ref Range    Lipase 38 11 - 82 U/L   ETHYL ALCOHOL (BLOOD)   Result Value Ref Range    Diagnostic Alcohol 239.0 (H) <10.1 mg/dL   ESTIMATED GFR   Result Value Ref Range    GFR (CKD-EPI) 91 >60 mL/min/1.73 m 2     EKG Interpretation    Interpreted by emergency department physician    Rhythm: atrial flutter  Rate: 140-150  Axis: normal  Ectopy: none  Conduction: normal  ST Segments: Rate related ST segment depression throughout  T Waves: no acute change  Q Waves: nonspecific    Clinical Impression: atrial flutter (new onset)      RADIOLOGY  I have independently interpreted the diagnostic imaging associated with this visit and am waiting the final reading from the radiologist.   My preliminary interpretation is as follows: Chest x-ray negative for pneumonia  Radiologist interpretation:   DX-CHEST-PORTABLE (1 VIEW)   Final Result      1.  There is no acute cardiopulmonary process.   2.  Stable mildly enlarged cardiac silhouette.            COURSE & MEDICAL DECISION MAKING    ED Observation Status?  No    INITIAL ASSESSMENT, COURSE AND PLAN  Care Narrative: Patient presents tachycardic, last alcoholic drink this morning.  Initial blood alcohol level 230, possibly down from his usual level indicating some degree of alcohol withdrawal.  He was treated with IV detoxification fluid, IV Ativan with no improvement of heart rate.  Patient then given more IV fluid, beta-blocker, additional dose of Ativan without relief of the tachycardia.  EKG obtained appears to demonstrate atrial flutter with 2 1 block, heart rate of 140.  Patient has been given IV diltiazem twice with no change in rhythm.  Patient is  chest pain-free at this time, looks comfortable.  Maintaining good pulse oximetry with nasal cannula oxygen.  Chest x-ray was negative for acute CHF.  Mild anemia likely secondary to chronic alcoholism.  Patient is agreeable to admission to the hospital, he is admitted for ongoing work-up of tachyarrhythmia and treatment of alcohol withdrawal  HYDRATION: Based on the patient's presentation of Tachycardia and Other alcohol withdrawal the patient was given IV fluids. IV Hydration was used because oral hydration was not adequate alone. Upon recheck following hydration, the patient was stable.      ADDITIONAL PROBLEM LIST  Tachycardia: Appears to be new onset atrial flutter with 2 1 block    Alcoholism: Moderately intoxicated at this time, calm and cooperative.  We will continue withdrawal medications, treated with Ativan in the ER    DISPOSITION AND DISCUSSIONS  I have discussed management of the patient with the following physicians and ZACH's: [facility] service      FINAL DIAGNOSIS  1. Alcohol withdrawal syndrome without complication (HCC)    2. Atrial flutter, unspecified type (HCC)         CRITICAL CARE  The very real possibilty of a deterioration of this patient's condition required the highest level of my preparedness for sudden, emergent intervention.  I provided critical care services, which included medication orders, frequent reevaluations of the patient's condition and response to treatment, ordering and reviewing test results, and discussing the case with various consultants.  The critical care time associated with the care of the patient was 35 minutes. Review chart for interventions. This time is exclusive of any other billable procedures.      Electronically signed by: Kiko Sutherland M.D., 8/29/2023 2:37 PM

## 2023-08-29 NOTE — ED TRIAGE NOTES
Edi ELIZONDO from [facility]. Pt went there for ETOH detox and pt was sent here for med clearance because his DELFINO was .354.

## 2023-08-30 ENCOUNTER — APPOINTMENT (OUTPATIENT)
Dept: CARDIOLOGY | Facility: MEDICAL CENTER | Age: 73
DRG: 897 | End: 2023-08-30
Attending: HOSPITALIST

## 2023-08-30 PROBLEM — F10.221 ACUTE ALCOHOL INTOXICATION DELIRIUM WITH MODERATE OR SEVERE USE DISORDER (HCC): Status: ACTIVE | Noted: 2023-08-29

## 2023-08-30 LAB
ALBUMIN SERPL BCP-MCNC: 3.7 G/DL (ref 3.2–4.9)
ALBUMIN/GLOB SERPL: 1.4 G/DL
ALP SERPL-CCNC: 202 U/L (ref 30–99)
ALT SERPL-CCNC: 136 U/L (ref 2–50)
ANION GAP SERPL CALC-SCNC: 13 MMOL/L (ref 7–16)
AST SERPL-CCNC: 224 U/L (ref 12–45)
BILIRUB SERPL-MCNC: 1.3 MG/DL (ref 0.1–1.5)
BUN SERPL-MCNC: 10 MG/DL (ref 8–22)
CALCIUM ALBUM COR SERPL-MCNC: 7.5 MG/DL (ref 8.5–10.5)
CALCIUM SERPL-MCNC: 7.3 MG/DL (ref 8.4–10.2)
CHLORIDE SERPL-SCNC: 99 MMOL/L (ref 96–112)
CO2 SERPL-SCNC: 23 MMOL/L (ref 20–33)
CREAT SERPL-MCNC: 0.64 MG/DL (ref 0.5–1.4)
DIGOXIN SERPL-MCNC: 0.7 NG/ML (ref 0.8–2)
ERYTHROCYTE [DISTWIDTH] IN BLOOD BY AUTOMATED COUNT: 54.2 FL (ref 35.9–50)
GFR SERPLBLD CREATININE-BSD FMLA CKD-EPI: 99 ML/MIN/1.73 M 2
GLOBULIN SER CALC-MCNC: 2.7 G/DL (ref 1.9–3.5)
GLUCOSE SERPL-MCNC: 105 MG/DL (ref 65–99)
HCT VFR BLD AUTO: 34.3 % (ref 42–52)
HGB BLD-MCNC: 11.3 G/DL (ref 14–18)
LV EJECT FRACT  99904: 60
LV EJECT FRACT MOD 2C 99903: 58.16
LV EJECT FRACT MOD 4C 99902: 34.72
LV EJECT FRACT MOD BP 99901: 48.25
MAGNESIUM SERPL-MCNC: 1.6 MG/DL (ref 1.5–2.5)
MCH RBC QN AUTO: 31 PG (ref 27–33)
MCHC RBC AUTO-ENTMCNC: 32.9 G/DL (ref 32.3–36.5)
MCV RBC AUTO: 94.2 FL (ref 81.4–97.8)
PLATELET # BLD AUTO: 115 K/UL (ref 164–446)
PMV BLD AUTO: 9.6 FL (ref 9–12.9)
POTASSIUM SERPL-SCNC: 3.6 MMOL/L (ref 3.6–5.5)
PROT SERPL-MCNC: 6.4 G/DL (ref 6–8.2)
RBC # BLD AUTO: 3.64 M/UL (ref 4.7–6.1)
SODIUM SERPL-SCNC: 135 MMOL/L (ref 135–145)
TSH SERPL DL<=0.005 MIU/L-ACNC: 3.28 UIU/ML (ref 0.38–5.33)
WBC # BLD AUTO: 5.3 K/UL (ref 4.8–10.8)

## 2023-08-30 PROCEDURE — 93306 TTE W/DOPPLER COMPLETE: CPT | Mod: 26 | Performed by: INTERNAL MEDICINE

## 2023-08-30 PROCEDURE — 770022 HCHG ROOM/CARE - ICU (200)

## 2023-08-30 PROCEDURE — 700105 HCHG RX REV CODE 258: Performed by: INTERNAL MEDICINE

## 2023-08-30 PROCEDURE — 84443 ASSAY THYROID STIM HORMONE: CPT

## 2023-08-30 PROCEDURE — A9270 NON-COVERED ITEM OR SERVICE: HCPCS | Performed by: HOSPITALIST

## 2023-08-30 PROCEDURE — 700111 HCHG RX REV CODE 636 W/ 250 OVERRIDE (IP): Mod: JZ | Performed by: INTERNAL MEDICINE

## 2023-08-30 PROCEDURE — 80162 ASSAY OF DIGOXIN TOTAL: CPT

## 2023-08-30 PROCEDURE — 80053 COMPREHEN METABOLIC PANEL: CPT

## 2023-08-30 PROCEDURE — 85027 COMPLETE CBC AUTOMATED: CPT

## 2023-08-30 PROCEDURE — 700111 HCHG RX REV CODE 636 W/ 250 OVERRIDE (IP): Mod: JZ

## 2023-08-30 PROCEDURE — 83735 ASSAY OF MAGNESIUM: CPT

## 2023-08-30 PROCEDURE — 700111 HCHG RX REV CODE 636 W/ 250 OVERRIDE (IP): Performed by: HOSPITALIST

## 2023-08-30 PROCEDURE — 93306 TTE W/DOPPLER COMPLETE: CPT

## 2023-08-30 PROCEDURE — 700105 HCHG RX REV CODE 258: Performed by: HOSPITALIST

## 2023-08-30 PROCEDURE — 700102 HCHG RX REV CODE 250 W/ 637 OVERRIDE(OP): Performed by: HOSPITALIST

## 2023-08-30 PROCEDURE — 99291 CRITICAL CARE FIRST HOUR: CPT | Performed by: INTERNAL MEDICINE

## 2023-08-30 RX ORDER — ONDANSETRON 2 MG/ML
4 INJECTION INTRAMUSCULAR; INTRAVENOUS EVERY 4 HOURS PRN
Status: DISCONTINUED | OUTPATIENT
Start: 2023-08-30 | End: 2023-09-01

## 2023-08-30 RX ORDER — DILTIAZEM HYDROCHLORIDE 5 MG/ML
INJECTION INTRAVENOUS
Status: ACTIVE
Start: 2023-08-30 | End: 2023-08-30

## 2023-08-30 RX ORDER — CALCIUM GLUCONATE 20 MG/ML
1 INJECTION, SOLUTION INTRAVENOUS ONCE
Status: COMPLETED | OUTPATIENT
Start: 2023-08-30 | End: 2023-08-30

## 2023-08-30 RX ORDER — MAGNESIUM SULFATE HEPTAHYDRATE 40 MG/ML
4 INJECTION, SOLUTION INTRAVENOUS ONCE
Status: COMPLETED | OUTPATIENT
Start: 2023-08-30 | End: 2023-08-30

## 2023-08-30 RX ORDER — ENOXAPARIN SODIUM 100 MG/ML
40 INJECTION SUBCUTANEOUS DAILY
Status: DISCONTINUED | OUTPATIENT
Start: 2023-08-30 | End: 2023-08-31

## 2023-08-30 RX ORDER — POTASSIUM CHLORIDE 7.45 MG/ML
10 INJECTION INTRAVENOUS
Status: COMPLETED | OUTPATIENT
Start: 2023-08-30 | End: 2023-08-30

## 2023-08-30 RX ORDER — DIGOXIN 0.25 MG/ML
250 INJECTION INTRAMUSCULAR; INTRAVENOUS ONCE
Status: COMPLETED | OUTPATIENT
Start: 2023-08-30 | End: 2023-08-30

## 2023-08-30 RX ORDER — DEXTROSE MONOHYDRATE 50 MG/ML
INJECTION, SOLUTION INTRAVENOUS CONTINUOUS
Status: DISCONTINUED | OUTPATIENT
Start: 2023-08-30 | End: 2023-08-31

## 2023-08-30 RX ORDER — ONDANSETRON 2 MG/ML
INJECTION INTRAMUSCULAR; INTRAVENOUS
Status: COMPLETED
Start: 2023-08-30 | End: 2023-08-30

## 2023-08-30 RX ORDER — ESMOLOL HYDROCHLORIDE 10 MG/ML
0-200 INJECTION, SOLUTION INTRAVENOUS CONTINUOUS
Status: DISCONTINUED | OUTPATIENT
Start: 2023-08-30 | End: 2023-08-31

## 2023-08-30 RX ADMIN — LORAZEPAM 2 MG: 1 TABLET ORAL at 04:41

## 2023-08-30 RX ADMIN — POTASSIUM CHLORIDE 10 MEQ: 7.46 INJECTION, SOLUTION INTRAVENOUS at 16:32

## 2023-08-30 RX ADMIN — DILTIAZEM HYDROCHLORIDE 20 MG/HR: 5 INJECTION INTRAVENOUS at 01:48

## 2023-08-30 RX ADMIN — DILTIAZEM HYDROCHLORIDE 20 MG/HR: 5 INJECTION INTRAVENOUS at 16:31

## 2023-08-30 RX ADMIN — POTASSIUM CHLORIDE 10 MEQ: 7.46 INJECTION, SOLUTION INTRAVENOUS at 17:33

## 2023-08-30 RX ADMIN — DEXTROSE MONOHYDRATE: 50 INJECTION, SOLUTION INTRAVENOUS at 14:09

## 2023-08-30 RX ADMIN — MAGNESIUM SULFATE HEPTAHYDRATE 4 G: 40 INJECTION, SOLUTION INTRAVENOUS at 14:15

## 2023-08-30 RX ADMIN — ONDANSETRON 4 MG: 2 INJECTION INTRAMUSCULAR; INTRAVENOUS at 18:53

## 2023-08-30 RX ADMIN — SODIUM CHLORIDE: 9 INJECTION, SOLUTION INTRAVENOUS at 05:42

## 2023-08-30 RX ADMIN — AMIODARONE HYDROCHLORIDE 0.5 MG/MIN: 1.8 INJECTION, SOLUTION INTRAVENOUS at 20:19

## 2023-08-30 RX ADMIN — ESMOLOL HYDROCHLORIDE 100 MCG/KG/MIN: 10 INJECTION INTRAVENOUS at 17:11

## 2023-08-30 RX ADMIN — DIGOXIN 250 MCG: 0.25 INJECTION INTRAMUSCULAR; INTRAVENOUS at 02:13

## 2023-08-30 RX ADMIN — OXYCODONE HYDROCHLORIDE 5 MG: 5 TABLET ORAL at 12:24

## 2023-08-30 RX ADMIN — LORAZEPAM 1.5 MG: 2 INJECTION INTRAMUSCULAR; INTRAVENOUS at 17:20

## 2023-08-30 RX ADMIN — LORAZEPAM 0.5 MG: 0.5 TABLET ORAL at 09:43

## 2023-08-30 RX ADMIN — SODIUM CHLORIDE: 9 INJECTION, SOLUTION INTRAVENOUS at 11:56

## 2023-08-30 RX ADMIN — DILTIAZEM HYDROCHLORIDE 30 MG: 30 TABLET, FILM COATED ORAL at 12:21

## 2023-08-30 RX ADMIN — CALCIUM GLUCONATE 1 G: 20 INJECTION, SOLUTION INTRAVENOUS at 14:13

## 2023-08-30 RX ADMIN — ESMOLOL HYDROCHLORIDE 100 MCG/KG/MIN: 10 INJECTION INTRAVENOUS at 17:15

## 2023-08-30 RX ADMIN — THERA TABS 1 TABLET: TAB at 05:27

## 2023-08-30 RX ADMIN — AMIODARONE HYDROCHLORIDE 150 MG: 1.5 INJECTION, SOLUTION INTRAVENOUS at 13:53

## 2023-08-30 RX ADMIN — AMIODARONE HYDROCHLORIDE 150 MG: 1.5 INJECTION, SOLUTION INTRAVENOUS at 17:04

## 2023-08-30 RX ADMIN — POTASSIUM CHLORIDE 10 MEQ: 7.46 INJECTION, SOLUTION INTRAVENOUS at 15:31

## 2023-08-30 RX ADMIN — AMIODARONE HYDROCHLORIDE 1 MG/MIN: 1.8 INJECTION, SOLUTION INTRAVENOUS at 14:03

## 2023-08-30 RX ADMIN — DILTIAZEM HYDROCHLORIDE 5 MG/HR: 5 INJECTION INTRAVENOUS at 09:44

## 2023-08-30 RX ADMIN — FOLIC ACID 1 MG: 1 TABLET ORAL at 05:27

## 2023-08-30 RX ADMIN — ENOXAPARIN SODIUM 40 MG: 100 INJECTION SUBCUTANEOUS at 18:35

## 2023-08-30 RX ADMIN — THIAMINE HCL TAB 100 MG 100 MG: 100 TAB at 05:27

## 2023-08-30 RX ADMIN — POTASSIUM CHLORIDE 10 MEQ: 7.46 INJECTION, SOLUTION INTRAVENOUS at 18:35

## 2023-08-30 RX ADMIN — DILTIAZEM HYDROCHLORIDE 30 MG: 30 TABLET, FILM COATED ORAL at 05:31

## 2023-08-30 ASSESSMENT — LIFESTYLE VARIABLES
AUDITORY DISTURBANCES: NOT PRESENT
AGITATION: NORMAL ACTIVITY
TREMOR: *
ANXIETY: NO ANXIETY (AT EASE)
ANXIETY: NO ANXIETY (AT EASE)
HEADACHE, FULLNESS IN HEAD: NOT PRESENT
TOTAL SCORE: 4
AUDITORY DISTURBANCES: NOT PRESENT
HEADACHE, FULLNESS IN HEAD: MODERATE
ANXIETY: *
NAUSEA AND VOMITING: CONSTANT NAUSEA, FREQUENT DRY HEAVES AND VOMITING
AUDITORY DISTURBANCES: NOT PRESENT
TOTAL SCORE: 9
AGITATION: *
TOTAL SCORE: 5
PAROXYSMAL SWEATS: BARELY PERCEPTIBLE SWEATING. PALMS MOIST
ORIENTATION AND CLOUDING OF SENSORIUM: CANNOT DO SERIAL ADDITIONS OR IS UNCERTAIN ABOUT DATE
TREMOR: *
TOTAL SCORE: 11
PAROXYSMAL SWEATS: BARELY PERCEPTIBLE SWEATING. PALMS MOIST
HAVE PEOPLE ANNOYED YOU BY CRITICIZING YOUR DRINKING: YES
TREMOR: NO TREMOR
VISUAL DISTURBANCES: NOT PRESENT
TOTAL SCORE: MILD ITCHING, PINS AND NEEDLES SENSATION, BURNING OR NUMBNESS
TOTAL SCORE: 2
TOTAL SCORE: 4
VISUAL DISTURBANCES: MODERATELY SEVERE HALLUCINATIONS
ON A TYPICAL DAY WHEN YOU DRINK ALCOHOL HOW MANY DRINKS DO YOU HAVE: 8
HEADACHE, FULLNESS IN HEAD: NOT PRESENT
AGITATION: NORMAL ACTIVITY
PAROXYSMAL SWEATS: NO SWEAT VISIBLE
PAROXYSMAL SWEATS: NO SWEAT VISIBLE
AGITATION: NORMAL ACTIVITY
ANXIETY: NO ANXIETY (AT EASE)
AVERAGE NUMBER OF DAYS PER WEEK YOU HAVE A DRINK CONTAINING ALCOHOL: 7
HEADACHE, FULLNESS IN HEAD: NOT PRESENT
VISUAL DISTURBANCES: NOT PRESENT
TREMOR: *
AUDITORY DISTURBANCES: NOT PRESENT
NAUSEA AND VOMITING: *
PAROXYSMAL SWEATS: NO SWEAT VISIBLE
ANXIETY: MILDLY ANXIOUS
HEADACHE, FULLNESS IN HEAD: NOT PRESENT
ORIENTATION AND CLOUDING OF SENSORIUM: ORIENTED AND CAN DO SERIAL ADDITIONS
VISUAL DISTURBANCES: NOT PRESENT
AGITATION: NORMAL ACTIVITY
PAROXYSMAL SWEATS: BARELY PERCEPTIBLE SWEATING. PALMS MOIST
AUDITORY DISTURBANCES: NOT PRESENT
VISUAL DISTURBANCES: NOT PRESENT
ORIENTATION AND CLOUDING OF SENSORIUM: ORIENTED AND CAN DO SERIAL ADDITIONS
HEADACHE, FULLNESS IN HEAD: NOT PRESENT
TREMOR: *
TREMOR: TREMOR NOT VISIBLE BUT CAN BE FELT, FINGERTIP TO FINGERTIP
ANXIETY: NO ANXIETY (AT EASE)
TOTAL SCORE: 5
AGITATION: NORMAL ACTIVITY
TOTAL SCORE: 4
AUDITORY DISTURBANCES: NOT PRESENT
ALCOHOL_USE: NO
PAROXYSMAL SWEATS: NO SWEAT VISIBLE
ORIENTATION AND CLOUDING OF SENSORIUM: ORIENTED AND CAN DO SERIAL ADDITIONS
ANXIETY: MILDLY ANXIOUS
EVER HAD A DRINK FIRST THING IN THE MORNING TO STEADY YOUR NERVES TO GET RID OF A HANGOVER: YES
AGITATION: NORMAL ACTIVITY
ORIENTATION AND CLOUDING OF SENSORIUM: ORIENTED AND CAN DO SERIAL ADDITIONS
AGITATION: NORMAL ACTIVITY
VISUAL DISTURBANCES: NOT PRESENT
NAUSEA AND VOMITING: *
HEADACHE, FULLNESS IN HEAD: MODERATE
HEADACHE, FULLNESS IN HEAD: NOT PRESENT
EVER FELT BAD OR GUILTY ABOUT YOUR DRINKING: YES
ORIENTATION AND CLOUDING OF SENSORIUM: ORIENTED AND CAN DO SERIAL ADDITIONS
ORIENTATION AND CLOUDING OF SENSORIUM: CANNOT DO SERIAL ADDITIONS OR IS UNCERTAIN ABOUT DATE
VISUAL DISTURBANCES: NOT PRESENT
TREMOR: SEVERE TREMOR, EVEN WITH ARMS NOT EXTENDED
TOTAL SCORE: 0
ORIENTATION AND CLOUDING OF SENSORIUM: ORIENTED AND CAN DO SERIAL ADDITIONS
NAUSEA AND VOMITING: NO NAUSEA AND NO VOMITING
AUDITORY DISTURBANCES: NOT PRESENT
TOTAL SCORE: 1
VISUAL DISTURBANCES: NOT PRESENT
NAUSEA AND VOMITING: NO NAUSEA AND NO VOMITING
PAROXYSMAL SWEATS: BARELY PERCEPTIBLE SWEATING. PALMS MOIST
TREMOR: TREMOR NOT VISIBLE BUT CAN BE FELT, FINGERTIP TO FINGERTIP
ORIENTATION AND CLOUDING OF SENSORIUM: ORIENTED AND CAN DO SERIAL ADDITIONS
HOW MANY TIMES IN THE PAST YEAR HAVE YOU HAD 5 OR MORE DRINKS IN A DAY: 0
NAUSEA AND VOMITING: *
HAVE YOU EVER FELT YOU SHOULD CUT DOWN ON YOUR DRINKING: YES
AUDITORY DISTURBANCES: NOT PRESENT
NAUSEA AND VOMITING: NO NAUSEA AND NO VOMITING
PAROXYSMAL SWEATS: NO SWEAT VISIBLE
ANXIETY: NO ANXIETY (AT EASE)
HEADACHE, FULLNESS IN HEAD: NOT PRESENT
NAUSEA AND VOMITING: NO NAUSEA AND NO VOMITING
VISUAL DISTURBANCES: NOT PRESENT
TREMOR: NO TREMOR
ANXIETY: MILDLY ANXIOUS
NAUSEA AND VOMITING: INTERMITTENT NAUSEA WITH DRY HEAVES
AGITATION: NORMAL ACTIVITY
TOTAL SCORE: 14
CONSUMPTION TOTAL: POSITIVE
TOTAL SCORE: 20
AUDITORY DISTURBANCES: NOT PRESENT

## 2023-08-30 ASSESSMENT — PAIN DESCRIPTION - PAIN TYPE
TYPE: ACUTE PAIN

## 2023-08-30 ASSESSMENT — PATIENT HEALTH QUESTIONNAIRE - PHQ9
SUM OF ALL RESPONSES TO PHQ9 QUESTIONS 1 AND 2: 0
1. LITTLE INTEREST OR PLEASURE IN DOING THINGS: NOT AT ALL
2. FEELING DOWN, DEPRESSED, IRRITABLE, OR HOPELESS: NOT AT ALL

## 2023-08-30 ASSESSMENT — COGNITIVE AND FUNCTIONAL STATUS - GENERAL
DAILY ACTIVITIY SCORE: 23
DRESSING REGULAR LOWER BODY CLOTHING: A LITTLE
HELP NEEDED FOR BATHING: A LITTLE
SUGGESTED CMS G CODE MODIFIER MOBILITY: CH
CLIMB 3 TO 5 STEPS WITH RAILING: A LITTLE
DRESSING REGULAR UPPER BODY CLOTHING: A LITTLE
DRESSING REGULAR UPPER BODY CLOTHING: A LITTLE
MOBILITY SCORE: 24
SUGGESTED CMS G CODE MODIFIER DAILY ACTIVITY: CI
STANDING UP FROM CHAIR USING ARMS: A LITTLE

## 2023-08-30 ASSESSMENT — ENCOUNTER SYMPTOMS
HEADACHES: 1
FEVER: 0
LOSS OF CONSCIOUSNESS: 0
PALPITATIONS: 1

## 2023-08-30 NOTE — WOUND TEAM
[facility]  Inpatient Services  Wound and Skin Care Brief Evaluation    Admission Date: 8/29/2023     Last order of IP CONSULT TO WOUND CARE was found on 8/30/2023 from Hospital Encounter on 8/29/2023     HPI, PMH, SH: Reviewed    Chief Complaint   Patient presents with    ETOH Withdrawal    Sent by MD     Diagnosis: Alcohol dependence with withdrawal (HCC) [F10.239]    Unit where seen by Wound Team: 3319/00     Wound consult placed regarding bilateral feet. Chart and images reviewed. This discussed with bedside RN Charlene. This clinician in to assess patient. Patient pleasant and agreeable. Patient with dirty feet. Non-selectively debrided with No rinse foam soap and Moist warm washcloth.     No pressure injuries or advanced wound care needs identified. Wound consult completed. No further follow up unless indicated and consulted.          PREVENTATIVE INTERVENTIONS:    Q shift Oliverio - performed per nursing policy  Q shift pressure point assessments - performed per nursing policy    .

## 2023-08-30 NOTE — H&P
Hospital Medicine History & Physical Note    Date of Service  8/29/2023    Primary Care Physician  Pcp Pt States None    Consultants  None     Code Status  Full Code    Chief Complaint  Chief Complaint   Patient presents with    ETOH Withdrawal    Sent by MD     History of Presenting Illness  Jaime Rangel is a 73 y.o. male with a past medical history of alcohol dependence who presented 8/29/2023 with generalized weakness, palpitations, headache and tremor.  Patient drinks heavily, about 2 pints of vodka daily.  In the emergency room he was found to be tachycardic with a heart rate of 150.        I discussed the plan of care with emergency department physician, and the patient.    Review of Systems  Review of Systems   Constitutional:  Positive for malaise/fatigue. Negative for chills and fever.   Eyes:  Negative for discharge and redness.   Respiratory:  Negative for cough, shortness of breath and stridor.    Cardiovascular:  Positive for palpitations. Negative for chest pain and leg swelling.   Gastrointestinal:  Negative for abdominal pain and vomiting.   Genitourinary:  Negative for flank pain.   Musculoskeletal:  Negative for myalgias.   Skin: Negative.    Neurological:  Positive for dizziness, tremors and headaches. Negative for focal weakness.   Endo/Heme/Allergies:  Does not bruise/bleed easily.   Psychiatric/Behavioral:  The patient is not nervous/anxious.      Past Medical History   has a past medical history of Alcoholism ([facility]).    Surgical History   has no past surgical history on file.     Family History  family history is not on file.      Social History   reports that he has never smoked. He has never used smokeless tobacco. He reports current alcohol use. He reports that he does not use drugs.    Allergies  No Known Allergies    Medications  Prior to Admission Medications   Prescriptions Last Dose Informant Patient Reported? Taking?   Acetaminophen (TYLENOL PO) > 1 week at Unknown Patient Yes Yes    Sig: Take 2 Tablets by mouth 2 times a day as needed (For headache).      Facility-Administered Medications: None     Physical Exam  Temp:  [36.3 °C (97.4 °F)-36.7 °C (98 °F)] 36.4 °C (97.5 °F)  Pulse:  [130-157] 157  Resp:  [12-21] 17  BP: (107-132)/(65-91) 126/88  SpO2:  [92 %-96 %] 94 %  Blood Pressure : 111/72   Temperature: 36.3 °C (97.4 °F)   Pulse: (!) 151   Respiration: 19   Pulse Oximetry: 96 %     Physical Exam  Constitutional:       General: He is not in acute distress.     Appearance: He is not ill-appearing or diaphoretic.   HENT:      Head: Atraumatic.      Right Ear: External ear normal.      Left Ear: External ear normal.      Nose: No congestion or rhinorrhea.      Mouth/Throat:      Mouth: Mucous membranes are dry.   Eyes:      General: No scleral icterus.        Right eye: No discharge.         Left eye: No discharge.      Pupils: Pupils are equal, round, and reactive to light.   Cardiovascular:      Rate and Rhythm: Regular rhythm. Tachycardia present.   Pulmonary:      Effort: Pulmonary effort is normal.   Abdominal:      General: There is no distension.   Musculoskeletal:      Cervical back: Neck supple. No rigidity. No muscular tenderness.      Right lower leg: No edema.      Left lower leg: No edema.   Skin:     General: Skin is dry.      Capillary Refill: Capillary refill takes 2 to 3 seconds.      Coloration: Skin is not jaundiced or pale.   Neurological:      Mental Status: He is alert and oriented to person, place, and time.      Coordination: Coordination normal.   Psychiatric:         Mood and Affect: Mood normal.         Behavior: Behavior normal.       Laboratory:  Recent Labs     08/29/23  1513   WBC 5.1   RBC 4.15*   HEMOGLOBIN 12.9*   HEMATOCRIT 39.3*   MCV 94.7   MCH 31.1   MCHC 32.8   RDW 56.3*   PLATELETCT 148*   MPV 9.8     Recent Labs     08/29/23  1513   SODIUM 141   POTASSIUM 3.6   CHLORIDE 101   CO2 22   GLUCOSE 90   BUN 15   CREATININE 0.85   CALCIUM 8.6     Recent Labs  "    08/29/23  1513   ALTSGPT 176*   ASTSGOT 336*   ALKPHOSPHAT 245*   TBILIRUBIN 0.9   LIPASE 38   GLUCOSE 90         No results for input(s): \"NTPROBNP\" in the last 72 hours.      No results for input(s): \"TROPONINT\" in the last 72 hours.    Imaging:  DX-CHEST-PORTABLE (1 VIEW)   Final Result      1.  There is no acute cardiopulmonary process.   2.  Stable mildly enlarged cardiac silhouette.      EC-ECHOCARDIOGRAM COMPLETE W/O CONT    (Results Pending)     I personally reviewed patient EKG, it shows atrial flutter with a rate of 150.    Assessment/Plan:  Justification for Admission Status  I anticipate this patient will require at least two midnights for appropriate medical management, necessitating inpatient admission because the patient has alcohol withdrawal in addition to atrial flutter with rapid ventricular response.    Patient will need a ICU (Adult and Pediatrics) bed on CARDIOLOGY service.  The patient needs to be on a titratable diltiazem drip to control his heart rate.    * Alcohol dependence with withdrawal (HCC)- (present on admission)  Assessment & Plan  I will start on CIWA protocol   I will place on continuous cardiac monitoring  I will check K, Mg, Na, Ca. Monitor electrolytes and replace accordingly, particularly magnesium, potassium and phosphorus  I will order fall, seizure, & aspiration precautions.  I will order thiamine folic acid and multivitamin.  Counseling      Atrial flutter with rapid ventricular response (HCC)- (present on admission)  Assessment & Plan  I will start diltiazem drip, titrate to a HR < 120   I will start scheduled diltiazem consider switching to long-acting form according to clinical course.  I will place on continuous cardiac monitoring.     Atrial flutter (HCC)- (present on admission)  Assessment & Plan  Likely related to alcohol   I will check echocardiography.  I will check thyroid function.  I will check Urine drug screen.  I will start diltiazem   I will start " diltiazem drip for sustained HR > 120   I will place on continuous cardiac monitoring.   The patient is refusing therapeutic anticoagulation.  He understands and accepts the risks of his decision    Transaminitis- (present on admission)  Assessment & Plan  Appears to be chronic likely related to alcohol dependence.  Continue to monitor, avoid/minimize hepatotoxins as much as possible.     VTE prophylaxis: SCDs/TEDs and Xarelto 10 mg daily as prophylaxis    Patient is critically ill.   The patient continues to have: Atrial flutter with rapid ventricular rate of 140-150  The vital organ system that is affected is the: Cardiovascular system  If untreated there is a high chance of deterioration into: Shock, multiorgan failure and eventually death.   The critical care that I am providing today is: Attempting slowing the rate with adenosine 6 mg, then adenosine 12 mg, finally I started titratable diltiazem drip.   The critical that has been undertaken is medically complex.   There has been no overlap in critical care time.   Critical Care Time not including procedures: 40 minutes  Critical care start time : 8:48 PM    Critical care end time : 9:28 PM

## 2023-08-30 NOTE — CONSULTS
Critical Care Consultation    Date of consult: 8/30/2023    Referring Physician  Lexi Gómez M.D.    Reason for Consultation  Atrial fibrillation with RVR in the setting of acute alcohol intoxication    History of Presenting Illness  73 y.o. male who [facility].  He initially presented there for alcohol detoxification and was sent to the ER for medical clearance as blood alcohol concentration was 0.354.  In the emergency room he was found to be in SVT versus a flutter with rates in the 150s.  Drinks about 2 pints of vodka a day.  Symptomatically reporting generalized weakness, palpitations, headache and tremor.  No loss of consciousness.  Normotensive in the ED.  Is started on a diltiazem drip and CIWA protocol and admitted to the ICU.  Electrolytes unremarkable.  Expected elevated liver function tests in the setting of alcoholism.  CXR showing slightly enlarged cardiac silhouette, otherwise clear lungs.    Code Status  Full Code    Review of Systems  Review of Systems   Constitutional:  Positive for malaise/fatigue. Negative for fever.   Cardiovascular:  Positive for palpitations. Negative for chest pain.   Neurological:  Positive for headaches. Negative for loss of consciousness.   All other systems reviewed and are negative.    Past Medical History   has a past medical history of Alcoholism (HCC).    Surgical History  Patient denies any prior surgical history    Family History  Family history reviewed and no significant conditions or diseases relevant to the chief complaint were identified    Social History   reports that he has never smoked. He has never used smokeless tobacco. He reports current alcohol use. He reports that he does not use drugs.    Medications  Home Medications       Reviewed by Yamilka Menchaca R.N. (Registered Nurse) on 08/30/23 at 0115  Med List Status: Complete     Medication Last Dose Status   Acetaminophen (TYLENOL PO) > 1 week Active                   Current Facility-Administered Medications   Medication Dose Route Frequency Provider Last Rate Last Admin    DILTIAZEM HCL 25 MG/5ML IV SOLN             enoxaparin (Lovenox) inj 40 mg  40 mg Subcutaneous DAILY AT 1800 Samy Altamirano M.D.        dextrose 5% infusion   Intravenous Continuous Samy Altamirano M.D.        amiodarone (Nexterone) 360 mg/200 mL infusion  1 mg/min Intravenous Once Samy Altamirano M.D.        Followed by    amiodarone (Nexterone) 360 mg/200 mL infusion  0.5 mg/min Intravenous Continuous Samy Altamirano M.D.        amiodarone (Nexterone) IVPB 150 mg  150 mg Intravenous Once Samy Altamirano M.D.        magnesium sulfate IVPB premix 4 g  4 g Intravenous Once Samy Altamirano M.D.        calcium GLUConate 1 g in NaCl IVPB premix  1 g Intravenous Once Samy Altamirano M.D. MD Alert...ICU Electrolyte Replacement per Pharmacy   Other PHARMACY TO DOSE Samy Altamirano M.D.        acetaminophen (Tylenol) tablet 650 mg  650 mg Oral Q6HRS PRN Lexi Gómez M.D.        senna-docusate (Pericolace Or Senokot S) 8.6-50 MG per tablet 2 Tablet  2 Tablet Oral BID Lexi Gómez M.D.        And    polyethylene glycol/lytes (Miralax) PACKET 1 Packet  1 Packet Oral QDAY PRN Lexi Gómez M.D.        And    magnesium hydroxide (Milk Of Magnesia) suspension 30 mL  30 mL Oral QDAY PRN Lexi Gómez M.D.        And    bisacodyl (Dulcolax) suppository 10 mg  10 mg Rectal QDAY PRN Lexi Gómez M.D.        NS infusion   Intravenous Continuous Lexi Gómez M.D. 150 mL/hr at 08/30/23 1156 New Bag at 08/30/23 1156    Pharmacy Consult Request ...Pain Management Review 1 Each  1 Each Other PHARMACY TO DOSE Lexi Gómez M.D.        oxyCODONE immediate-release (Roxicodone) tablet 5 mg  5 mg Oral Q3HRS PRN Lexi Gómez M.D.   5 mg at 08/30/23 1224    Or    oxyCODONE immediate release (Roxicodone) tablet 10 mg  10 mg Oral Q3HRS PRN Lexi Gómez M.D.        Or     HYDROmorphone (Dilaudid) injection 0.5 mg  0.5 mg Intravenous Q3HRS PRN Asem SANGEETA Gómez M.D.        LORazepam (Ativan) tablet 0.5 mg  0.5 mg Oral Q4HRS PRN Aseligia Gómez M.D.   0.5 mg at 08/30/23 0943    LORazepam (Ativan) tablet 1 mg  1 mg Oral Q4HRS PRN Lexi Gómez M.D.        Or    LORazepam (Ativan) injection 0.5 mg  0.5 mg Intravenous Q4HRS PRN Asem SANGEETA Gómez M.D.        LORazepam (Ativan) tablet 2 mg  2 mg Oral Q2HRS PRN Lexi Gómez M.D.   2 mg at 08/30/23 0441    Or    LORazepam (Ativan) injection 1 mg  1 mg Intravenous Q2HRS PRN Asem SANGEETA Gómez M.D.        LORazepam (Ativan) tablet 3 mg  3 mg Oral Q HOUR PRN Lexi Gómez M.D.        Or    LORazepam (Ativan) injection 1.5 mg  1.5 mg Intravenous Q HOUR PRN Asem SANGEETA Gómez M.D.        LORazepam (Ativan) tablet 4 mg  4 mg Oral Q15 MIN PRN Lexi Gómez M.D.        Or    LORazepam (Ativan) injection 2 mg  2 mg Intravenous Q15 MIN PRN Aseligia Gómez M.D.        thiamine (Vitamin B-1) tablet 100 mg  100 mg Oral DAILY Aseligia Gómez M.D.   100 mg at 08/30/23 0527    And    multivitamin tablet 1 Tablet  1 Tablet Oral DAILY Aseligia Gómez M.D.   1 Tablet at 08/30/23 0527    And    folic acid (Folvite) tablet 1 mg  1 mg Oral DAILY Aseligia Gómez M.D.   1 mg at 08/30/23 0527    dilTIAZem (Cardizem) 100 mg in dextrose 5% 100 mL Infusion  0-20 mg/hr Intravenous Continuous Asem SANGEETA Gómez M.D. 20 mL/hr at 08/30/23 1139 20 mg/hr at 08/30/23 1139    dilTIAZem (Cardizem) tablet 30 mg  30 mg Oral Q6HRS Asem A Mutasher, M.D.   30 mg at 08/30/23 1221     Allergies  No Known Allergies    Vital Signs last 24 hours  Temp:  [36.3 °C (97.4 °F)-36.8 °C (98.2 °F)] 36.6 °C (97.8 °F)  Pulse:  [] 71  Resp:  [0-51] 22  BP: ()/(51-91) 102/61  SpO2:  [84 %-100 %] 91 %    Physical Exam  Physical Exam  Vitals and nursing note reviewed.   Constitutional:       General: He is not in acute distress.     Appearance: He is well-developed. He is not  diaphoretic.      Comments: Disheveled  Very pleasant   HENT:      Head: Normocephalic.      Nose: Nose normal.      Mouth/Throat:      Pharynx: No oropharyngeal exudate.   Eyes:      General: No scleral icterus.        Right eye: No discharge.         Left eye: No discharge.      Conjunctiva/sclera: Conjunctivae normal.      Pupils: Pupils are equal, round, and reactive to light.   Neck:      Thyroid: No thyromegaly.      Vascular: No JVD.      Trachea: No tracheal deviation.   Cardiovascular:      Rate and Rhythm: Tachycardia present. Rhythm irregular.      Heart sounds: Normal heart sounds. No murmur heard.  Pulmonary:      Effort: Pulmonary effort is normal. No respiratory distress.      Breath sounds: Normal breath sounds. No stridor. No wheezing or rales.   Abdominal:      General: There is no distension.      Palpations: Abdomen is soft.      Tenderness: There is no abdominal tenderness. There is no guarding.   Musculoskeletal:         General: No tenderness. Normal range of motion.      Cervical back: Neck supple.   Lymphadenopathy:      Cervical: No cervical adenopathy.   Skin:     General: Skin is warm and dry.      Capillary Refill: Capillary refill takes less than 2 seconds.      Coloration: Skin is not pale.      Findings: No erythema.   Neurological:      Mental Status: He is alert and oriented to person, place, and time.      Sensory: No sensory deficit.      Motor: No abnormal muscle tone.      Coordination: Coordination normal.      Deep Tendon Reflexes: Reflexes normal.   Psychiatric:         Behavior: Behavior normal.         Thought Content: Thought content normal.         Judgment: Judgment normal.       Fluids    Intake/Output Summary (Last 24 hours) at 8/30/2023 1323  Last data filed at 8/30/2023 0800  Gross per 24 hour   Intake 186.29 ml   Output 1050 ml   Net -863.71 ml       Laboratory  Recent Results (from the past 48 hour(s))   CBC WITH DIFFERENTIAL    Collection Time: 08/29/23  3:13 PM    Result Value Ref Range    WBC 5.1 4.8 - 10.8 K/uL    RBC 4.15 (L) 4.70 - 6.10 M/uL    Hemoglobin 12.9 (L) 14.0 - 18.0 g/dL    Hematocrit 39.3 (L) 42.0 - 52.0 %    MCV 94.7 81.4 - 97.8 fL    MCH 31.1 27.0 - 33.0 pg    MCHC 32.8 32.3 - 36.5 g/dL    RDW 56.3 (H) 35.9 - 50.0 fL    Platelet Count 148 (L) 164 - 446 K/uL    MPV 9.8 9.0 - 12.9 fL    Neutrophils-Polys 55.80 44.00 - 72.00 %    Lymphocytes 36.30 22.00 - 41.00 %    Monocytes 5.50 0.00 - 13.40 %    Eosinophils 1.00 0.00 - 6.90 %    Basophils 1.20 0.00 - 1.80 %    Immature Granulocytes 0.20 0.00 - 0.90 %    Nucleated RBC 0.00 0.00 - 0.20 /100 WBC    Neutrophils (Absolute) 2.87 1.82 - 7.42 K/uL    Lymphs (Absolute) 1.86 1.00 - 4.80 K/uL    Monos (Absolute) 0.28 0.00 - 0.85 K/uL    Eos (Absolute) 0.05 0.00 - 0.51 K/uL    Baso (Absolute) 0.06 0.00 - 0.12 K/uL    Immature Granulocytes (abs) 0.01 0.00 - 0.11 K/uL    NRBC (Absolute) 0.00 K/uL   COMP METABOLIC PANEL    Collection Time: 08/29/23  3:13 PM   Result Value Ref Range    Sodium 141 135 - 145 mmol/L    Potassium 3.6 3.6 - 5.5 mmol/L    Chloride 101 96 - 112 mmol/L    Co2 22 20 - 33 mmol/L    Anion Gap 18.0 (H) 7.0 - 16.0    Glucose 90 65 - 99 mg/dL    Bun 15 8 - 22 mg/dL    Creatinine 0.85 0.50 - 1.40 mg/dL    Calcium 8.6 8.4 - 10.2 mg/dL    Correct Calcium 8.4 (L) 8.5 - 10.5 mg/dL    AST(SGOT) 336 (H) 12 - 45 U/L    ALT(SGPT) 176 (H) 2 - 50 U/L    Alkaline Phosphatase 245 (H) 30 - 99 U/L    Total Bilirubin 0.9 0.1 - 1.5 mg/dL    Albumin 4.3 3.2 - 4.9 g/dL    Total Protein 7.6 6.0 - 8.2 g/dL    Globulin 3.3 1.9 - 3.5 g/dL    A-G Ratio 1.3 g/dL   LIPASE    Collection Time: 08/29/23  3:13 PM   Result Value Ref Range    Lipase 38 11 - 82 U/L   ETHYL ALCOHOL (BLOOD)    Collection Time: 08/29/23  3:13 PM   Result Value Ref Range    Diagnostic Alcohol 239.0 (H) <10.1 mg/dL   ESTIMATED GFR    Collection Time: 08/29/23  3:13 PM   Result Value Ref Range    GFR (CKD-EPI) 91 >60 mL/min/1.73 m 2   EKG (NOW)    Collection  Time: 23  5:20 PM   Result Value Ref Range    Report       [facility] Emergency Dept[facility]    Test Date:  2023  Pt Name:    JENNIFER PAINTING            Department: [facility]  MRN:        [id number]                      [facility]  Gender:     Male                         Technician: [id number]  :        1950                   Requested By:ROBBY GARY  Order #:    [id number]                    Reading MD:    Measurements  Intervals                                Axis  Rate:       144                          P:          224  AZ:         139                          QRS:        -23  QRSD:       84                           T:          30  QT:         327  QTc:        506    Interpretive Statements  Ectopic atrial tachycardia, unifocal  Borderline left axis deviation  RSR' in V1 or V2, probably normal variant  ST depression, probably rate related  Prolonged QT interval  Compared to ECG 2023 06:19:45  RSR' in V1 or V2 now present  Prolonged QT interval now present  Sinus tachycardia no longer present  S T (T wave) deviation still present     URINE DRUG SCREEN    Collection Time: 23  6:15 PM   Result Value Ref Range    Amphetamines Urine Negative Negative    Barbiturates Negative Negative    Benzodiazepines Negative Negative    Cocaine Metabolite Negative Negative    Fentanyl, Urine Negative Negative    Methadone Negative Negative    Opiates Negative Negative    Oxycodone Negative Negative    Phencyclidine -Pcp Negative Negative    Propoxyphene Negative Negative    Cannabinoid Metab Negative Negative   CBC without Differential    Collection Time: 23  2:11 AM   Result Value Ref Range    WBC 5.3 4.8 - 10.8 K/uL    RBC 3.64 (L) 4.70 - 6.10 M/uL    Hemoglobin 11.3 (L) 14.0 - 18.0 g/dL    Hematocrit 34.3 (L) 42.0 - 52.0 %    MCV 94.2 81.4 - 97.8 fL    MCH 31.0 27.0 - 33.0 pg    MCHC 32.9 32.3 - 36.5 g/dL    RDW 54.2 (H) 35.9 - 50.0 fL    Platelet Count 115 (L) 164  - 446 K/uL    MPV 9.6 9.0 - 12.9 fL   Comp Metabolic Panel (CMP)    Collection Time: 08/30/23  2:11 AM   Result Value Ref Range    Sodium 135 135 - 145 mmol/L    Potassium 3.6 3.6 - 5.5 mmol/L    Chloride 99 96 - 112 mmol/L    Co2 23 20 - 33 mmol/L    Anion Gap 13.0 7.0 - 16.0    Glucose 105 (H) 65 - 99 mg/dL    Bun 10 8 - 22 mg/dL    Creatinine 0.64 0.50 - 1.40 mg/dL    Calcium 7.3 (L) 8.4 - 10.2 mg/dL    Correct Calcium 7.5 (L) 8.5 - 10.5 mg/dL    AST(SGOT) 224 (H) 12 - 45 U/L    ALT(SGPT) 136 (H) 2 - 50 U/L    Alkaline Phosphatase 202 (H) 30 - 99 U/L    Total Bilirubin 1.3 0.1 - 1.5 mg/dL    Albumin 3.7 3.2 - 4.9 g/dL    Total Protein 6.4 6.0 - 8.2 g/dL    Globulin 2.7 1.9 - 3.5 g/dL    A-G Ratio 1.4 g/dL   Magnesium    Collection Time: 08/30/23  2:11 AM   Result Value Ref Range    Magnesium 1.6 1.5 - 2.5 mg/dL   ESTIMATED GFR    Collection Time: 08/30/23  2:11 AM   Result Value Ref Range    GFR (CKD-EPI) 99 >60 mL/min/1.73 m 2   EC-ECHOCARDIOGRAM COMPLETE W/O CONT    Collection Time: 08/30/23  9:34 AM   Result Value Ref Range    Eject.Frac. MOD BP 48.25     Eject.Frac. MOD 4C 34.72     Eject.Frac. MOD 2C 58.16     Left Ventrical Ejection Fraction 60      Imaging    EC-ECHOCARDIOGRAM COMPLETE W/O CONT   Final Result      DX-CHEST-PORTABLE (1 VIEW)   Final Result      1.  There is no acute cardiopulmonary process.   2.  Stable mildly enlarged cardiac silhouette.        TTE 8/30:   Normal LVSF  The left ventricular ejection fraction is visually estimated to be 60%.  Normal inferior vena cava size and inspiratory collapse.  Right ventricular systolic pressure is estimated to be 27 mmHg    Assessment/Plan     * Atrial flutter with rapid ventricular response (HCC)- (present on admission)  Assessment & Plan  Sinus tach < 2 weeks ago on prior hospitalization  No hx of AF, presumably acute symptoms < 2-3 days PTA  Amio load/gtt for rate/rhythm control  Cont dilt IV/PO with holding parameters  Echo reassuring  Electrolyte  replacement, Ca, Mg, K  UDS neg  CIWA for etoh withdrawal  Lovenox for dvt ppx, pt refused therapeutic anticoagulation on admission  Telemetry    Acute alcohol intoxication delirium with moderate or severe use disorder (HCC)- (present on admission)  Assessment & Plan  Last drink 8/28  CIWA  Seizure precautions  cont thiamine, MVI, folic acid    Transaminitis- (present on admission)  Assessment & Plan  From alcoholic hepatitis  Supportive care, CMP and INR daily      Discussed patient condition and risk of morbidity and/or mortality with RN, RT, Pharmacy, and Patient.      The patient remains critically ill.  Critical care time = 50 minutes in directly providing and coordinating critical care and extensive data review.  No time overlap and excludes procedures.    This note was generated using voice recognition software which has a chance of producing errors of grammar and content.  I have made every reasonable attempt to find and correct any errors, but it should be expected that some may not be found prior to finalization of this note.  __________  Samy Altamirano MD  Pulmonary and Critical Care Medicine  [facility]

## 2023-08-30 NOTE — PROGRESS NOTES
4 Eyes Skin Assessment Completed by DANIEL Prather and DANIEL Mejia.    Head WDL  Ears WDL  Nose WDL  Mouth WDL  Neck WDL  Breast/Chest WDL  Shoulder Blades WDL  Spine WDL  (R) Arm/Elbow/Hand WDL  (L) Arm/Elbow/Hand WDL  Abdomen WDL  Groin WDL  Scrotum/Coccyx/Buttocks WDL  (R) Leg WDL  (L) Leg WDL  (R) Heel/Foot/Toe Redness, Discoloration, Ulcer(s), Bruising, and Scab  (L) Heel/Foot/Toe Redness, Discoloration, Ulcer(s), Bruising, and Scab        Devices In Places ECG, Blood Pressure Cuff, Pulse Ox, and Nasal Cannula      Interventions In Place NC W/Ear Foams, Pillows, and Low Air Loss Mattress    Possible Skin Injury Yes    Pictures Uploaded Into Epic No, needs to be completed  Wound Consult Placed Yes  RN Wound Prevention Protocol Ordered Yes    Addended by: EVELIN CHIANG on: 11/14/2022 11:09 AM     Modules accepted: Orders

## 2023-08-30 NOTE — ASSESSMENT & PLAN NOTE
Sinus tach < 2 weeks ago on prior hospitalization  No hx of AF, presumably acute symptoms < 2-3 days PTA  Refractory to amio load/gtt, digoxin, PO and IV dilt, hypotensive with esmolol  Echo reassuring  Electrolyte replacement, Ca, Mg, K  UDS neg  CIWA for etoh withdrawal  Lovenox 60 BID, pt agreeable now  Telemetry  Cardiology consuted for BENJA/cardioversion

## 2023-08-30 NOTE — ASSESSMENT & PLAN NOTE
Appears to be chronic likely related to alcohol dependence.  Continue to monitor, avoid/minimize hepatotoxins as much as possible.

## 2023-08-30 NOTE — ED NOTES
Pt ate 100% of warm meal tray  Pt denies CP and SOB  Pt reports his HR is always elevated after he drinks whiskey  Pt denies drugs

## 2023-08-30 NOTE — PROGRESS NOTES
12-hour chart check complete.    Monitor Summary  Rhythm: a flutter  Rate: 109-155  Ectopy: PVC  Measurements: /0.08/

## 2023-08-30 NOTE — ASSESSMENT & PLAN NOTE
The patient is continued to require CIWA protocol  Ativan will be administered based on serial assessment of symptoms and vital signs, close monitoring for side effects

## 2023-08-30 NOTE — ASSESSMENT & PLAN NOTE
I will start diltiazem drip, titrate to a HR < 120   I will start scheduled diltiazem consider switching to long-acting form according to clinical course.  I will place on continuous cardiac monitoring.

## 2023-08-30 NOTE — CARE PLAN
The patient is Watcher - Medium risk of patient condition declining or worsening    Shift Goals  Clinical Goals: CIWA below 8, hemodynamic stability  Patient Goals: get soem sleep tonight  Family Goals: kahlil    Progress made toward(s) clinical / shift goals:    Problem: Knowledge Deficit - Standard  Goal: Patient and family/care givers will demonstrate understanding of plan of care, disease process/condition, diagnostic tests and medications  Description: Target End Date:  1-3 days or as soon as patient condition allows    Document in Patient Education    1.  Patient and family/caregiver oriented to unit, equipment, visitation policy and means for communicating concern  2.  Complete/review Learning Assessment  3.  Assess knowledge level of disease process/condition, treatment plan, diagnostic tests and medications  4.  Explain disease process/condition, treatment plan, diagnostic tests and medications  Outcome: Progressing     Problem: Optimal Care for Alcohol Withdrawal  Goal: Optimal Care for the alcohol withdrawal patient  Description: Target End Date:  1 to 3 days    1.  Alcohol history screening done on admission  2.  CIWA-AR score assessment (includes assessment of nausea/vomiting, tremor, sweats, anxiety, agitation, tactile, visual and auditory disturbances, headache and orientation/sensorium).  Document on CIWA flowsheet.  3.  Follow CIWA-AR score protocol  4.  Frequent reorientation  5.  Monitor for fluid and electrolyte imbalance.  6.  Assess for respiratory depression due to sedation (pulse ox)  7.  Consider thiamine, multivitamins, folic acid and magnesium sulfate per provider order  8.  Collaborate with Social Workers/Case Management  9.  Collaborate with mental health  Outcome: Progressing     Problem: Seizure Precautions  Goal: Implementation of seizure precautions  Description: Target End Date:  Prior to discharge or change in level of care    1.  Padded side rails up at all times  2.  Suction  equipment and oxygen delivery system at bedside  3.  Continuous pulse oximeter in use  4.  Implement fall precautions, bed alarm on, bed in lowest position  5.  IV access (per order)  6.  Provide low stimulus environment, avoid exposure to triggers  7.  Instruct patient to use call light/seizure button if having warning signs of impending seizure  Outcome: Progressing     Problem: Lifestyle Changes  Goal: Patient's ability to identify lifestyle changes and available resources to help reduce recurrence of condition will improve  Description: Target End Date:  1 to 3 days    1.  Discuss recommended lifestyle changes  2.  Identify available resources and support systems  3.  Consider referral to substance abuse program  Outcome: Progressing     Problem: Psychosocial  Goal: Patient's level of anxiety will decrease  Description: Target End Date:  1-3 days or as soon as patient condition allows    1.  Collaborate with patient and family/caregiver to identify triggers and develop strategies to cope with anxiety  2.  Implement stimuli reduction, calming techniques  3.  Pharmacologic management per provider order  4.  Encourage patient/family/care giver participation  5.  Collaborate with interdisciplinary team including Psychologist or Behavioral Health Team as needed  Outcome: Progressing  Goal: Spiritual and cultural needs incorporated into hospitalization  Description: Target End Date:  End of day 1    1.  Encourage verbalization of feelings, concerns, expectations and needs  2.  Collaborate with Case Management/  3.  Collaborate with Pastoral Care to meet spiritual needs  Outcome: Progressing     Problem: Risk for Aspiration  Goal: Patient's risk for aspiration will be absent or decrease  Description: Target End Date:  Prior to discharge or change in level of care    1.   Complete dysphagia screening on admission  2.   NPO until dysphagia screening complete or medically cleared  3.   Collaborate with  Speech Therapy, Clinical Dietitian and interdisciplinary team  4.   Implement aspiration precautions  5.   Assist patient up to chair for meals  6.   Elevate head of bed 90 degrees if patient is unable to get out of bed  7.   Encourage small bites  8.   Ensure foods/liquids are of appropriate consistency  9.   Assess for any signs/symptoms of aspiration  10. Assess breath sounds and vital signs after oral intake  Outcome: Progressing       Patient is not progressing towards the following goals:

## 2023-08-31 PROBLEM — I48.91 ATRIAL FIBRILLATION WITH RVR (HCC): Status: ACTIVE | Noted: 2023-08-29

## 2023-08-31 LAB
ALBUMIN SERPL BCP-MCNC: 3.4 G/DL (ref 3.2–4.9)
ALBUMIN/GLOB SERPL: 1.2 G/DL
ALP SERPL-CCNC: 176 U/L (ref 30–99)
ALT SERPL-CCNC: 120 U/L (ref 2–50)
ANION GAP SERPL CALC-SCNC: 10 MMOL/L (ref 7–16)
AST SERPL-CCNC: 165 U/L (ref 12–45)
BILIRUB SERPL-MCNC: 0.6 MG/DL (ref 0.1–1.5)
BUN SERPL-MCNC: 11 MG/DL (ref 8–22)
CALCIUM ALBUM COR SERPL-MCNC: 7.8 MG/DL (ref 8.5–10.5)
CALCIUM SERPL-MCNC: 7.3 MG/DL (ref 8.4–10.2)
CHLORIDE SERPL-SCNC: 101 MMOL/L (ref 96–112)
CO2 SERPL-SCNC: 22 MMOL/L (ref 20–33)
CREAT SERPL-MCNC: 0.6 MG/DL (ref 0.5–1.4)
GFR SERPLBLD CREATININE-BSD FMLA CKD-EPI: 101 ML/MIN/1.73 M 2
GLOBULIN SER CALC-MCNC: 2.9 G/DL (ref 1.9–3.5)
GLUCOSE SERPL-MCNC: 141 MG/DL (ref 65–99)
INR PPP: 1.06 (ref 0.87–1.13)
MAGNESIUM SERPL-MCNC: 2.4 MG/DL (ref 1.5–2.5)
PHOSPHATE SERPL-MCNC: 2.4 MG/DL (ref 2.5–4.5)
POTASSIUM SERPL-SCNC: 4 MMOL/L (ref 3.6–5.5)
PROT SERPL-MCNC: 6.3 G/DL (ref 6–8.2)
PROTHROMBIN TIME: 14.3 SEC (ref 12–14.6)
SODIUM SERPL-SCNC: 133 MMOL/L (ref 135–145)

## 2023-08-31 PROCEDURE — 700102 HCHG RX REV CODE 250 W/ 637 OVERRIDE(OP): Performed by: INTERNAL MEDICINE

## 2023-08-31 PROCEDURE — 80053 COMPREHEN METABOLIC PANEL: CPT

## 2023-08-31 PROCEDURE — 700101 HCHG RX REV CODE 250: Performed by: INTERNAL MEDICINE

## 2023-08-31 PROCEDURE — 700111 HCHG RX REV CODE 636 W/ 250 OVERRIDE (IP): Mod: JZ | Performed by: INTERNAL MEDICINE

## 2023-08-31 PROCEDURE — 84100 ASSAY OF PHOSPHORUS: CPT

## 2023-08-31 PROCEDURE — 83735 ASSAY OF MAGNESIUM: CPT

## 2023-08-31 PROCEDURE — 700105 HCHG RX REV CODE 258: Performed by: INTERNAL MEDICINE

## 2023-08-31 PROCEDURE — 700102 HCHG RX REV CODE 250 W/ 637 OVERRIDE(OP): Performed by: HOSPITALIST

## 2023-08-31 PROCEDURE — A9270 NON-COVERED ITEM OR SERVICE: HCPCS | Performed by: HOSPITALIST

## 2023-08-31 PROCEDURE — 99222 1ST HOSP IP/OBS MODERATE 55: CPT | Performed by: INTERNAL MEDICINE

## 2023-08-31 PROCEDURE — A9270 NON-COVERED ITEM OR SERVICE: HCPCS | Performed by: INTERNAL MEDICINE

## 2023-08-31 PROCEDURE — 99291 CRITICAL CARE FIRST HOUR: CPT | Performed by: INTERNAL MEDICINE

## 2023-08-31 PROCEDURE — 700111 HCHG RX REV CODE 636 W/ 250 OVERRIDE (IP): Mod: JZ | Performed by: HOSPITALIST

## 2023-08-31 PROCEDURE — 770022 HCHG ROOM/CARE - ICU (200)

## 2023-08-31 PROCEDURE — 85610 PROTHROMBIN TIME: CPT

## 2023-08-31 RX ORDER — SODIUM CHLORIDE 9 MG/ML
1000 INJECTION, SOLUTION INTRAVENOUS ONCE
Status: COMPLETED | OUTPATIENT
Start: 2023-08-31 | End: 2023-08-31

## 2023-08-31 RX ORDER — DILTIAZEM HYDROCHLORIDE 5 MG/ML
20 INJECTION INTRAVENOUS EVERY 4 HOURS PRN
Status: DISCONTINUED | OUTPATIENT
Start: 2023-08-31 | End: 2023-09-03 | Stop reason: HOSPADM

## 2023-08-31 RX ORDER — ENOXAPARIN SODIUM 100 MG/ML
1 INJECTION SUBCUTANEOUS EVERY 12 HOURS
Status: DISCONTINUED | OUTPATIENT
Start: 2023-08-31 | End: 2023-09-01

## 2023-08-31 RX ADMIN — DEXTROSE MONOHYDRATE 500 ML: 50 INJECTION, SOLUTION INTRAVENOUS at 03:19

## 2023-08-31 RX ADMIN — DILTIAZEM HYDROCHLORIDE 30 MG: 30 TABLET, FILM COATED ORAL at 11:59

## 2023-08-31 RX ADMIN — DILTIAZEM HYDROCHLORIDE 30 MG: 30 TABLET, FILM COATED ORAL at 00:21

## 2023-08-31 RX ADMIN — AMIODARONE HYDROCHLORIDE 0.5 MG/MIN: 1.8 INJECTION, SOLUTION INTRAVENOUS at 06:44

## 2023-08-31 RX ADMIN — SODIUM PHOSPHATE, MONOBASIC, MONOHYDRATE AND SODIUM PHOSPHATE, DIBASIC, ANHYDROUS 15 MMOL: 142; 276 INJECTION, SOLUTION INTRAVENOUS at 10:12

## 2023-08-31 RX ADMIN — THERA TABS 1 TABLET: TAB at 05:39

## 2023-08-31 RX ADMIN — DILTIAZEM HYDROCHLORIDE 90 MG: 30 TABLET, FILM COATED ORAL at 23:38

## 2023-08-31 RX ADMIN — DILTIAZEM HYDROCHLORIDE 30 MG: 30 TABLET, FILM COATED ORAL at 05:39

## 2023-08-31 RX ADMIN — SODIUM CHLORIDE 1000 ML: 9 INJECTION, SOLUTION INTRAVENOUS at 02:33

## 2023-08-31 RX ADMIN — ENOXAPARIN SODIUM 60 MG: 100 INJECTION SUBCUTANEOUS at 20:42

## 2023-08-31 RX ADMIN — DILTIAZEM HYDROCHLORIDE 20 MG: 5 INJECTION INTRAVENOUS at 23:25

## 2023-08-31 RX ADMIN — THIAMINE HCL TAB 100 MG 100 MG: 100 TAB at 05:39

## 2023-08-31 RX ADMIN — LORAZEPAM 0.5 MG: 2 INJECTION INTRAMUSCULAR; INTRAVENOUS at 00:22

## 2023-08-31 RX ADMIN — ENOXAPARIN SODIUM 60 MG: 100 INJECTION SUBCUTANEOUS at 10:13

## 2023-08-31 RX ADMIN — FOLIC ACID 1 MG: 1 TABLET ORAL at 05:39

## 2023-08-31 RX ADMIN — DILTIAZEM HYDROCHLORIDE 30 MG: 30 TABLET, FILM COATED ORAL at 18:41

## 2023-08-31 RX ADMIN — DILTIAZEM HYDROCHLORIDE 60 MG: 30 TABLET, FILM COATED ORAL at 17:04

## 2023-08-31 ASSESSMENT — LIFESTYLE VARIABLES
AUDITORY DISTURBANCES: NOT PRESENT
PAROXYSMAL SWEATS: NO SWEAT VISIBLE
ANXIETY: NO ANXIETY (AT EASE)
ANXIETY: NO ANXIETY (AT EASE)
TREMOR: *
AGITATION: NORMAL ACTIVITY
NAUSEA AND VOMITING: NO NAUSEA AND NO VOMITING
PAROXYSMAL SWEATS: *
NAUSEA AND VOMITING: NO NAUSEA AND NO VOMITING
TREMOR: *
HEADACHE, FULLNESS IN HEAD: NOT PRESENT
ORIENTATION AND CLOUDING OF SENSORIUM: CANNOT DO SERIAL ADDITIONS OR IS UNCERTAIN ABOUT DATE
PAROXYSMAL SWEATS: NO SWEAT VISIBLE
HEADACHE, FULLNESS IN HEAD: NOT PRESENT
NAUSEA AND VOMITING: NO NAUSEA AND NO VOMITING
AUDITORY DISTURBANCES: NOT PRESENT
PAROXYSMAL SWEATS: NO SWEAT VISIBLE
PAROXYSMAL SWEATS: BARELY PERCEPTIBLE SWEATING. PALMS MOIST
NAUSEA AND VOMITING: NO NAUSEA AND NO VOMITING
NAUSEA AND VOMITING: MILD NAUSEA WITH NO VOMITING
HEADACHE, FULLNESS IN HEAD: NOT PRESENT
ANXIETY: NO ANXIETY (AT EASE)
TOTAL SCORE: 4
PAROXYSMAL SWEATS: BARELY PERCEPTIBLE SWEATING. PALMS MOIST
AUDITORY DISTURBANCES: NOT PRESENT
ORIENTATION AND CLOUDING OF SENSORIUM: CANNOT DO SERIAL ADDITIONS OR IS UNCERTAIN ABOUT DATE
AGITATION: NORMAL ACTIVITY
TOTAL SCORE: VERY MILD ITCHING, PINS AND NEEDLES SENSATION, BURNING OR NUMBNESS
ANXIETY: NO ANXIETY (AT EASE)
AUDITORY DISTURBANCES: NOT PRESENT
VISUAL DISTURBANCES: NOT PRESENT
ORIENTATION AND CLOUDING OF SENSORIUM: CANNOT DO SERIAL ADDITIONS OR IS UNCERTAIN ABOUT DATE
TOTAL SCORE: 2
AUDITORY DISTURBANCES: NOT PRESENT
AUDITORY DISTURBANCES: NOT PRESENT
AGITATION: NORMAL ACTIVITY
TREMOR: TREMOR NOT VISIBLE BUT CAN BE FELT, FINGERTIP TO FINGERTIP
AGITATION: NORMAL ACTIVITY
ORIENTATION AND CLOUDING OF SENSORIUM: CANNOT DO SERIAL ADDITIONS OR IS UNCERTAIN ABOUT DATE
AGITATION: NORMAL ACTIVITY
ANXIETY: NO ANXIETY (AT EASE)
VISUAL DISTURBANCES: NOT PRESENT
HEADACHE, FULLNESS IN HEAD: NOT PRESENT
NAUSEA AND VOMITING: *
AGITATION: NORMAL ACTIVITY
VISUAL DISTURBANCES: NOT PRESENT
AUDITORY DISTURBANCES: NOT PRESENT
ORIENTATION AND CLOUDING OF SENSORIUM: CANNOT DO SERIAL ADDITIONS OR IS UNCERTAIN ABOUT DATE
HEADACHE, FULLNESS IN HEAD: NOT PRESENT
TOTAL SCORE: 4
VISUAL DISTURBANCES: NOT PRESENT
HEADACHE, FULLNESS IN HEAD: NOT PRESENT
TOTAL SCORE: 5
ANXIETY: NO ANXIETY (AT EASE)
TOTAL SCORE: 8
ANXIETY: NO ANXIETY (AT EASE)
TREMOR: *
VISUAL DISTURBANCES: NOT PRESENT
AGITATION: NORMAL ACTIVITY
ORIENTATION AND CLOUDING OF SENSORIUM: CANNOT DO SERIAL ADDITIONS OR IS UNCERTAIN ABOUT DATE
VISUAL DISTURBANCES: NOT PRESENT
TREMOR: TREMOR NOT VISIBLE BUT CAN BE FELT, FINGERTIP TO FINGERTIP
TREMOR: *
HEADACHE, FULLNESS IN HEAD: NOT PRESENT
TOTAL SCORE: 4
TOTAL SCORE: 4
VISUAL DISTURBANCES: NOT PRESENT
NAUSEA AND VOMITING: NO NAUSEA AND NO VOMITING
TREMOR: *
ORIENTATION AND CLOUDING OF SENSORIUM: CANNOT DO SERIAL ADDITIONS OR IS UNCERTAIN ABOUT DATE
PAROXYSMAL SWEATS: NO SWEAT VISIBLE

## 2023-08-31 ASSESSMENT — ENCOUNTER SYMPTOMS
PALPITATIONS: 0
WEAKNESS: 1
FATIGUE: 1
CHEST TIGHTNESS: 0
LOSS OF CONSCIOUSNESS: 0
ABDOMINAL PAIN: 0
DIZZINESS: 0
HEADACHES: 1
FEVER: 0
HEADACHES: 0
SHORTNESS OF BREATH: 0
PALPITATIONS: 1
NAUSEA: 0

## 2023-08-31 ASSESSMENT — PAIN DESCRIPTION - PAIN TYPE
TYPE: ACUTE PAIN

## 2023-08-31 NOTE — PROGRESS NOTES
12-hour chart check complete.    Monitor Summary  Rhythm: Afib/flutter  Rate: 103-133  Ectopy: Rpvc  Measurements: -/0.08/-

## 2023-08-31 NOTE — CARE PLAN
Problem: Hemodynamics  Goal: Patient's hemodynamics, fluid balance and neurologic status will be stable or improve  Outcome: Not Progressing     Problem: Optimal Care for Alcohol Withdrawal  Goal: Optimal Care for the alcohol withdrawal patient  Outcome: Progressing   The patient is Watcher - Medium risk of patient condition declining or worsening    Shift Goals  Clinical Goals:   Patient Goals: to stop vomiting  Family Goals: VERO    Progress made toward(s) clinical / shift goals:  Pt not adequately rate controlled, pt refusing cardioversion. Still on amio drip, bps stable. CIWA wdl    Patient is not progressing towards the following goals:      Problem: Hemodynamics  Goal: Patient's hemodynamics, fluid balance and neurologic status will be stable or improve  Outcome: Not Progressing

## 2023-08-31 NOTE — PROGRESS NOTES
Unable to give PO dose of ativan at 1800 for CIWA of 5. Patient is vomiting. IV ativan is not ordered until CIWA of 8. Score will be reassessed at shift change.

## 2023-08-31 NOTE — PROGRESS NOTES
12-hour chart check complete.    Monitor Summary  Rhythm: AFIB / AFlutter  Rate: 80's - 150's  Ectopy: frequent PVC's  Measurements: na / .10 / na

## 2023-08-31 NOTE — CONSULTS
Cardiology Initial Consultation    Date of Service  8/31/2023    Referring Physician  Samy Altamirano M.D.    Reason for Consultation  Management of atrial flutter    History of Presenting Illness  Jaime Rangel is a 73 y.o. male with a past medical history of severe alcohol dependence currently drinking up to 2 pints of vodka a day who was brought in by EMS 8/29/2023 with generalized weakness found to have elevated heart rate with EKG showing atrial flutter rate 150 bpm.    Admitted to the CCU.  Attempts at rhythm control included IV esmolol which resulted in hypotension, digoxin and IV diltiazem infusion all of which were unsuccessful.  Ultimately the patient required IV amiodarone for rate control.  Anticoagulation with subcutaneous Lovenox was started.    Currently is comfortable in his bedside chair though he is groggy he is communicative and is oriented.  States he has not had any prior heart disease.  Currently denies any chest pain or shortness of breath palpitations or lightheadedness.  He admits his alcohol dependence.  Denies any other drug use.  States that he has fallen and hit his head.  Head CT scan on admission was negative.  Chest CTA showed no PE.  Showed normal LVEF.    Review of Systems  Review of Systems   Constitutional:  Positive for fatigue.   Respiratory:  Negative for chest tightness and shortness of breath.    Cardiovascular:  Negative for palpitations.   Gastrointestinal:  Negative for abdominal pain and nausea.   Neurological:  Positive for weakness. Negative for dizziness and headaches.       Past Medical History   has a past medical history of Alcoholism ([facility]).    Surgical History   has no past surgical history on file.    Family History  family history is not on file.    Social History   reports that he has never smoked. He has never used smokeless tobacco. He reports current alcohol use. He reports that he does not use drugs.    Medications  Prior to Admission Medications    Prescriptions Last Dose Informant Patient Reported? Taking?   Acetaminophen (TYLENOL PO) > 1 week at Unknown Patient Yes Yes   Sig: Take 2 Tablets by mouth 2 times a day as needed (For headache).      Facility-Administered Medications: None       Allergies  No Known Allergies    Vital signs in last 24 hours  Temp:  [36 °C (96.8 °F)-37.1 °C (98.7 °F)] 37.1 °C (98.7 °F)  Pulse:  [] 133  Resp:  [9-80] 19  BP: ()/(46-78) 104/62  SpO2:  [85 %-98 %] 92 %    Physical Exam  Physical Exam  Constitutional:       General: He is not in acute distress.     Comments: Disheveled.   HENT:      Head: Normocephalic.   Eyes:      General: No scleral icterus.  Neck:      Comments: Normal jugular venous pressure.  Cardiovascular:      Rate and Rhythm: Normal rate. Rhythm irregular.      Pulses:           Carotid pulses are 1+ on the right side and 1+ on the left side.       Radial pulses are 1+ on the right side and 1+ on the left side.        Posterior tibial pulses are 1+ on the right side.      Heart sounds: S1 normal and S2 normal. No murmur heard.     No friction rub. No gallop.   Pulmonary:      Effort: Pulmonary effort is normal.      Breath sounds: Normal breath sounds. No wheezing, rhonchi or rales.   Abdominal:      General: Bowel sounds are normal.      Palpations: Abdomen is soft.      Tenderness: There is no abdominal tenderness.   Musculoskeletal:      Right lower leg: No edema.      Left lower leg: No edema.   Skin:     General: Skin is warm and dry.      Nails: There is no clubbing.   Neurological:      General: No focal deficit present.      Mental Status: He is alert.      Comments: Oriented to person, place and time   Psychiatric:         Behavior: Behavior normal.         Lab Review  Lab Results   Component Value Date/Time    WBC 5.3 08/30/2023 02:11 AM    RBC 3.64 (L) 08/30/2023 02:11 AM    HEMOGLOBIN 11.3 (L) 08/30/2023 02:11 AM    HEMATOCRIT 34.3 (L) 08/30/2023 02:11 AM    MCV 94.2 08/30/2023 02:11  "AM    MCH 31.0 08/30/2023 02:11 AM    MCHC 32.9 08/30/2023 02:11 AM    MPV 9.6 08/30/2023 02:11 AM      Lab Results   Component Value Date/Time    SODIUM 133 (L) 08/31/2023 04:40 AM    POTASSIUM 4.0 08/31/2023 04:40 AM    CHLORIDE 101 08/31/2023 04:40 AM    CO2 22 08/31/2023 04:40 AM    GLUCOSE 141 (H) 08/31/2023 04:40 AM    BUN 11 08/31/2023 04:40 AM    CREATININE 0.60 08/31/2023 04:40 AM      Lab Results   Component Value Date/Time    ASTSGOT 165 (H) 08/31/2023 04:40 AM    ALTSGPT 120 (H) 08/31/2023 04:40 AM     No results found for: \"CHOLSTRLTOT\", \"LDL\", \"HDL\", \"TRIGLYCERIDE\", \"TROPONINT\"    No results for input(s): \"NTPROBNP\" in the last 72 hours.    Cardiac Imaging and Procedures Review  EKG:  My personal interpretation of the EKG dated 8/29/2023 is atrial flutter, rate 150    Rhythm: My personal interpretation the rhythm dated 8/31/2023 is probable atrial flutter rate 107    Echocardiogram: 8/30/2023  No prior study is available for comparison.   Normal left ventricular chamber size.  The left ventricular ejection fraction is visually estimated to be 60%.  Normal inferior vena cava size and inspiratory collapse.  Right ventricular systolic pressure is estimated to be 27 mmHg.       Imaging  Chest X-Ray: 8/29/2023  1.  There is no acute cardiopulmonary process.  2.  Stable mildly enlarged cardiac silhouette.    CHEST CTA 8/18/2023  1.  No evidence of pulmonary embolism.  2.  Right greater left perihilar groundglass opacities, likely multifocal pneumonia. Follow-up recommendations below.  3.  Likely reactive multistation mediastinal lymphadenopathy. Attention on follow-up.  4.  Cardiomegaly. Small pericardial effusion.  5.  Hepatic steatosis.  6.  Moderate hiatal hernia.      Assessment  Atrial flutter with rapid ventricular response  Alcohol dependence  Alcohol withdrawal syndrome  Alcohol hepatitis  Anemia  Thrombocytopenia  Hypocalcemia    Recommendation Discussion  Atrial flutter, new onset with rapid " ventricular response resistant to standard AV aruna blocking agents requiring IV amiodarone for rate control.  Currently hemodynamically stable with normal LV function.  Under the circumstances the patient would benefit more immediate restoration of sinus rhythm.  I repeatedly reviewed with the patient the nature of his current cardiac condition and recommended proceeding with a BENJA guided DCCV explaining the procedure to the patient over again however after further discussion the patient declined.  Would recommend attempting to transition to standard AV aruna blocking agents and if possible stop IV amiodarone to avoid risk of postconversion thromboembolic event in the meantime continue systemic anticoagulation currently on Lovenox.  Will continue to follow the patient in an discuss with him again concerning BENJA cardioversion  Discussed and reviewed management plan with Samy Altamirano MD, intensivist    Thank you for allowing me to participate in the care of this patient.      Please contact me with any questions.    Tang Rivera M.D.   Cardiologist, [facility]  [phone]

## 2023-08-31 NOTE — PROGRESS NOTES
Critical Care Progress Note    Date of admission  8/29/2023    Chief Complaint  73 y.o. male admitted 8/29/2023 with SVT/AF with RVR in the setting of acute alcohol intoxication    Hospital Course  73 y.o. male who [facility]  He initially presented there for alcohol detoxification and was sent to the ER for medical clearance as blood alcohol concentration was 0.354.  In the emergency room he was found to be in SVT versus a flutter with rates in the 150s.  Drinks about 2 pints of vodka a day.  Symptomatically reporting generalized weakness, palpitations, headache and tremor.  No loss of consciousness.  Normotensive in the ED.  Is started on a diltiazem drip and CIWA protocol and admitted to the ICU.  Electrolytes unremarkable.  Expected elevated liver function tests in the setting of alcoholism.  CXR showing slightly enlarged cardiac silhouette, otherwise clear lungs.    Interval Problem Update  Chart review from the past 24 hours includes imaging, laboratory studies, vital signs and notes available.  Pertinent data for today's visit includes    Emesis overnight refractory to ativan, zofran administered ()  1L bolus administered for hypotension  Otherwise no acute events overnight     Cardiac: remains in AF/Aflutter, rates 100-130s, amio load x 2, refactory to dilt gtt, esmolol dc'ed due to hypotension. Electrolytes corrected. TSH wnl. TTE reassuring  Pulm: oxymask 6L  Neuro: CIWA < 4, 6mg ativan/24', last drink 8/28  Heme: no changes  Renal/volume status: +3.8L.  ID:  no changes, afebrile   GI/endo: regular diet  Labs/Imaging: reviewed  Lines: PIV, no white    Review of Systems  Review of Systems   Constitutional:  Positive for malaise/fatigue. Negative for fever.   Cardiovascular:  Positive for palpitations. Negative for chest pain.   Neurological:  Positive for headaches. Negative for loss of consciousness.   All other systems reviewed and are negative.     Vital  Signs for last 24 hours   Temp:  [36 °C (96.8 °F)-36.8 °C (98.2 °F)] 36.6 °C (97.8 °F)  Pulse:  [] 117  Resp:  [9-80] 15  BP: ()/(46-78) 99/72  SpO2:  [85 %-98 %] 93 %    Physical Exam   Physical Exam  Vitals and nursing note reviewed.   Constitutional:       General: He is not in acute distress.     Appearance: He is well-developed. He is not diaphoretic.      Comments: Disheveled  Very pleasant   HENT:      Head: Normocephalic.      Nose: Nose normal.      Mouth/Throat:      Pharynx: No oropharyngeal exudate.   Eyes:      General: No scleral icterus.        Right eye: No discharge.         Left eye: No discharge.      Conjunctiva/sclera: Conjunctivae normal.      Pupils: Pupils are equal, round, and reactive to light.   Neck:      Thyroid: No thyromegaly.      Vascular: No JVD.      Trachea: No tracheal deviation.   Cardiovascular:      Rate and Rhythm: Tachycardia present. Rhythm irregular.      Heart sounds: Normal heart sounds. No murmur heard.  Pulmonary:      Effort: Pulmonary effort is normal. No respiratory distress.      Breath sounds: Normal breath sounds. No stridor. No wheezing or rales.   Abdominal:      General: There is no distension.      Palpations: Abdomen is soft.      Tenderness: There is no abdominal tenderness. There is no guarding.   Musculoskeletal:         General: No tenderness. Normal range of motion.      Cervical back: Neck supple.   Lymphadenopathy:      Cervical: No cervical adenopathy.   Skin:     General: Skin is warm and dry.      Capillary Refill: Capillary refill takes less than 2 seconds.      Coloration: Skin is not pale.      Findings: No erythema.   Neurological:      Mental Status: He is alert and oriented to person, place, and time.      Sensory: No sensory deficit.      Motor: No abnormal muscle tone.      Coordination: Coordination normal.      Deep Tendon Reflexes: Reflexes normal.   Psychiatric:         Behavior: Behavior normal.         Thought Content:  Thought content normal.         Judgment: Judgment normal.         Medications  Current Facility-Administered Medications   Medication Dose Route Frequency Provider Last Rate Last Admin    sodium phosphate 15 mmol in dextrose 5% 250 mL ivpb  15 mmol Intravenous Once Samy Altamirano M.D.        enoxaparin (Lovenox) inj 40 mg  40 mg Subcutaneous DAILY AT 1800 Samy Altamirano M.D.   40 mg at 08/30/23 1835    dextrose 5% infusion   Intravenous Continuous Samy Altamirano M.D. 30 mL/hr at 08/31/23 0319 500 mL at 08/31/23 0319    amiodarone (Nexterone) 360 mg/200 mL infusion  0.5 mg/min Intravenous Continuous Samy Altamirano M.D. 16.7 mL/hr at 08/31/23 0644 0.5 mg/min at 08/31/23 0644    MD Alert...ICU Electrolyte Replacement per Pharmacy   Other PHARMACY TO DOSE Samy Altamirano M.D.        esmolol (BREVIBLOC) 2.5 g/250 mL NS infusion (PREMIX)  0-200 mcg/kg/min Intravenous Continuous Samy Altamirano M.D.   Paused at 08/30/23 1937    ondansetron (Zofran) syringe/vial injection 4 mg  4 mg Intravenous Q4HRS PRN Samy Altamirano M.D.        acetaminophen (Tylenol) tablet 650 mg  650 mg Oral Q6HRS PRN Lexi Gómez M.D.        senna-docusate (Pericolace Or Senokot S) 8.6-50 MG per tablet 2 Tablet  2 Tablet Oral BID Lexi Gómez M.D.        And    polyethylene glycol/lytes (Miralax) PACKET 1 Packet  1 Packet Oral QDAY PRN Lexi Gómez M.D.        And    magnesium hydroxide (Milk Of Magnesia) suspension 30 mL  30 mL Oral QDAY PRN Lexi Gómez M.D.        And    bisacodyl (Dulcolax) suppository 10 mg  10 mg Rectal QDAY PRN Lexi Gómez M.D.        Pharmacy Consult Request ...Pain Management Review 1 Each  1 Each Other PHARMACY TO DOSE Lexi Gómez M.D.        oxyCODONE immediate-release (Roxicodone) tablet 5 mg  5 mg Oral Q3HRS PRN Lexi Gómez M.D.   5 mg at 08/30/23 1224    Or    oxyCODONE immediate release (Roxicodone) tablet 10 mg  10 mg Oral Q3HRS PRN Lexi Gómez M.D.        Or     HYDROmorphone (Dilaudid) injection 0.5 mg  0.5 mg Intravenous Q3HRS PRN Aseligia Gómez M.D.        LORazepam (Ativan) tablet 0.5 mg  0.5 mg Oral Q4HRS PRN Aseligia Gómez M.D.   0.5 mg at 08/30/23 0943    LORazepam (Ativan) tablet 1 mg  1 mg Oral Q4HRS PRN Lexi Gómez M.D.        Or    LORazepam (Ativan) injection 0.5 mg  0.5 mg Intravenous Q4HRS PRN Aseligia Gómez M.D.   0.5 mg at 08/31/23 0022    LORazepam (Ativan) tablet 2 mg  2 mg Oral Q2HRS PRN Lexi Gómez M.D.   2 mg at 08/30/23 0441    Or    LORazepam (Ativan) injection 1 mg  1 mg Intravenous Q2HRS PRN Lexi Gómez M.D.   1.5 mg at 08/30/23 1720    LORazepam (Ativan) tablet 3 mg  3 mg Oral Q HOUR PRN Lexi Gómez M.D.        Or    LORazepam (Ativan) injection 1.5 mg  1.5 mg Intravenous Q HOUR PRN Aseligia Gómez M.D.        LORazepam (Ativan) tablet 4 mg  4 mg Oral Q15 MIN PRN Lexi Gómez M.D.        Or    LORazepam (Ativan) injection 2 mg  2 mg Intravenous Q15 MIN PRN Aseligia Gómez M.D.        thiamine (Vitamin B-1) tablet 100 mg  100 mg Oral DAILY Lexi Gómez M.D.   100 mg at 08/31/23 0539    And    multivitamin tablet 1 Tablet  1 Tablet Oral DAILY Lexi Gómez M.D.   1 Tablet at 08/31/23 0539    And    folic acid (Folvite) tablet 1 mg  1 mg Oral DAILY Lexi Gómez M.D.   1 mg at 08/31/23 0539    dilTIAZem (Cardizem) tablet 30 mg  30 mg Oral Q6HRS Aseligia Gómez M.D.   30 mg at 08/31/23 0539       Fluids    Intake/Output Summary (Last 24 hours) at 8/31/2023 0843  Last data filed at 8/31/2023 0800  Gross per 24 hour   Intake 4906.97 ml   Output 150 ml   Net 4756.97 ml       Laboratory          Recent Labs     08/29/23  1513 08/30/23  0211 08/31/23  0440   SODIUM 141 135 133*   POTASSIUM 3.6 3.6 4.0   CHLORIDE 101 99 101   CO2 22 23 22   BUN 15 10 11   CREATININE 0.85 0.64 0.60   MAGNESIUM  --  1.6 2.4   PHOSPHORUS  --   --  2.4*   CALCIUM 8.6 7.3* 7.3*     Recent Labs     08/29/23  1513 08/30/23 0211  08/31/23  0440   ALTSGPT 176* 136* 120*   ASTSGOT 336* 224* 165*   ALKPHOSPHAT 245* 202* 176*   TBILIRUBIN 0.9 1.3 0.6   LIPASE 38  --   --    GLUCOSE 90 105* 141*     Recent Labs     08/29/23  1513 08/30/23  0211 08/31/23  0440   WBC 5.1 5.3  --    NEUTSPOLYS 55.80  --   --    LYMPHOCYTES 36.30  --   --    MONOCYTES 5.50  --   --    EOSINOPHILS 1.00  --   --    BASOPHILS 1.20  --   --    ASTSGOT 336* 224* 165*   ALTSGPT 176* 136* 120*   ALKPHOSPHAT 245* 202* 176*   TBILIRUBIN 0.9 1.3 0.6     Recent Labs     08/29/23  1513 08/30/23 0211 08/31/23 0440   RBC 4.15* 3.64*  --    HEMOGLOBIN 12.9* 11.3*  --    HEMATOCRIT 39.3* 34.3*  --    PLATELETCT 148* 115*  --    PROTHROMBTM  --   --  14.3   INR  --   --  1.06     Imaging  X-Ray:  No film today  EKG:  I have personally reviewed the images and compared with prior images.  Echo:   Reviewed  Echo reassuring  ECG with flutter    Assessment/Plan    * Atrial fibrillation with RVR (HCC)- (present on admission)  Assessment & Plan  Sinus tach < 2 weeks ago on prior hospitalization  No hx of AF, presumably acute symptoms < 2-3 days PTA  Refractory to amio load/gtt, digoxin, PO and IV dilt, hypotensive with esmolol  Echo reassuring  Electrolyte replacement, Ca, Mg, K  UDS neg  CIWA for etoh withdrawal  Lovenox 60 BID, pt agreeable now  Telemetry  Cardiology consuted for BENJA/cardioversion    Acute alcohol intoxication delirium with moderate or severe use disorder (HCC)- (present on admission)  Assessment & Plan  Last drink 8/28  CIWA  Seizure precautions  cont thiamine, MVI, folic acid    Transaminitis- (present on admission)  Assessment & Plan  From alcoholic hepatitis  Supportive care, CMP and INR daily       VTE:  Lovenox  Ulcer: Not Indicated  Lines: None    I have performed a physical exam and reviewed and updated ROS and Plan today (8/31/2023). In review of yesterday's note (8/30/2023), there are no changes except as documented above.     Discussed patient condition and  risk of morbidity and/or mortality with RN, Pharmacy, Charge nurse / hot rounds, Patient, and cardiology    The patient remains critically ill.  Critical care time = 50 minutes in directly providing and coordinating critical care and extensive data review.  No time overlap and excludes procedures.    This note was generated using voice recognition software which has a chance of producing errors of grammar and content.  I have made every reasonable attempt to find and correct any errors, but it should be expected that some may not be found prior to finalization of this note.  __________  Samy Altamirano MD  Pulmonary and Critical Care Medicine  [facility]

## 2023-08-31 NOTE — CARE PLAN
The patient is Watcher - Medium risk of patient condition declining or worsening    Shift Goals  Clinical Goals: CIWA, MAP >65, HR <120, control N/V  Patient Goals: to stop vomiting  Family Goals: VERO    Progress made toward(s) clinical / shift goals:    Problem: Seizure Precautions  Goal: Implementation of seizure precautions  Outcome: Progressing     Problem: Psychosocial  Goal: Patient's level of anxiety will decrease  Outcome: Progressing     Problem: Risk for Aspiration  Goal: Patient's risk for aspiration will be absent or decrease  Outcome: Progressing     Problem: Pain - Standard  Goal: Alleviation of pain or a reduction in pain to the patient’s comfort goal  Outcome: Progressing     Problem: Fall Risk  Goal: Patient will remain free from falls  Outcome: Progressing     Problem: Skin Integrity  Goal: Skin integrity is maintained or improved  Outcome: Progressing       Patient is not progressing towards the following goals:

## 2023-09-01 ENCOUNTER — APPOINTMENT (OUTPATIENT)
Dept: CARDIOLOGY | Facility: MEDICAL CENTER | Age: 73
DRG: 897 | End: 2023-09-01
Attending: INTERNAL MEDICINE

## 2023-09-01 LAB
ALBUMIN SERPL BCP-MCNC: 3.5 G/DL (ref 3.2–4.9)
ALBUMIN/GLOB SERPL: 1.2 G/DL
ALP SERPL-CCNC: 179 U/L (ref 30–99)
ALT SERPL-CCNC: 103 U/L (ref 2–50)
ANION GAP SERPL CALC-SCNC: 12 MMOL/L (ref 7–16)
AST SERPL-CCNC: 117 U/L (ref 12–45)
BASOPHILS # BLD AUTO: 0.7 % (ref 0–1.8)
BASOPHILS # BLD: 0.04 K/UL (ref 0–0.12)
BILIRUB SERPL-MCNC: 0.8 MG/DL (ref 0.1–1.5)
BUN SERPL-MCNC: 9 MG/DL (ref 8–22)
CA-I SERPL-SCNC: 1.05 MMOL/L (ref 1.1–1.3)
CALCIUM ALBUM COR SERPL-MCNC: 8.5 MG/DL (ref 8.5–10.5)
CALCIUM SERPL-MCNC: 8.1 MG/DL (ref 8.4–10.2)
CHLORIDE SERPL-SCNC: 100 MMOL/L (ref 96–112)
CO2 SERPL-SCNC: 24 MMOL/L (ref 20–33)
CREAT SERPL-MCNC: 0.57 MG/DL (ref 0.5–1.4)
EKG IMPRESSION: NORMAL
EOSINOPHIL # BLD AUTO: 0.22 K/UL (ref 0–0.51)
EOSINOPHIL NFR BLD: 3.8 % (ref 0–6.9)
ERYTHROCYTE [DISTWIDTH] IN BLOOD BY AUTOMATED COUNT: 53.1 FL (ref 35.9–50)
GFR SERPLBLD CREATININE-BSD FMLA CKD-EPI: 103 ML/MIN/1.73 M 2
GLOBULIN SER CALC-MCNC: 3 G/DL (ref 1.9–3.5)
GLUCOSE SERPL-MCNC: 110 MG/DL (ref 65–99)
HCT VFR BLD AUTO: 35.7 % (ref 42–52)
HGB BLD-MCNC: 11.6 G/DL (ref 14–18)
IMM GRANULOCYTES # BLD AUTO: 0.02 K/UL (ref 0–0.11)
IMM GRANULOCYTES NFR BLD AUTO: 0.3 % (ref 0–0.9)
LYMPHOCYTES # BLD AUTO: 1.38 K/UL (ref 1–4.8)
LYMPHOCYTES NFR BLD: 23.7 % (ref 22–41)
MAGNESIUM SERPL-MCNC: 1.9 MG/DL (ref 1.5–2.5)
MCH RBC QN AUTO: 30.7 PG (ref 27–33)
MCHC RBC AUTO-ENTMCNC: 32.5 G/DL (ref 32.3–36.5)
MCV RBC AUTO: 94.4 FL (ref 81.4–97.8)
MONOCYTES # BLD AUTO: 0.5 K/UL (ref 0–0.85)
MONOCYTES NFR BLD AUTO: 8.6 % (ref 0–13.4)
NEUTROPHILS # BLD AUTO: 3.67 K/UL (ref 1.82–7.42)
NEUTROPHILS NFR BLD: 62.9 % (ref 44–72)
NRBC # BLD AUTO: 0 K/UL
NRBC BLD-RTO: 0 /100 WBC (ref 0–0.2)
PHOSPHATE SERPL-MCNC: 2.7 MG/DL (ref 2.5–4.5)
PLATELET # BLD AUTO: 116 K/UL (ref 164–446)
PMV BLD AUTO: 10.8 FL (ref 9–12.9)
POTASSIUM SERPL-SCNC: 3.9 MMOL/L (ref 3.6–5.5)
PROT SERPL-MCNC: 6.5 G/DL (ref 6–8.2)
RBC # BLD AUTO: 3.78 M/UL (ref 4.7–6.1)
SODIUM SERPL-SCNC: 136 MMOL/L (ref 135–145)
WBC # BLD AUTO: 5.8 K/UL (ref 4.8–10.8)

## 2023-09-01 PROCEDURE — 700111 HCHG RX REV CODE 636 W/ 250 OVERRIDE (IP): Mod: JZ

## 2023-09-01 PROCEDURE — A9270 NON-COVERED ITEM OR SERVICE: HCPCS | Performed by: INTERNAL MEDICINE

## 2023-09-01 PROCEDURE — 700101 HCHG RX REV CODE 250

## 2023-09-01 PROCEDURE — 99232 SBSQ HOSP IP/OBS MODERATE 35: CPT | Mod: 25 | Performed by: INTERNAL MEDICINE

## 2023-09-01 PROCEDURE — 92960 CARDIOVERSION ELECTRIC EXT: CPT | Performed by: INTERNAL MEDICINE

## 2023-09-01 PROCEDURE — 94760 N-INVAS EAR/PLS OXIMETRY 1: CPT

## 2023-09-01 PROCEDURE — 770020 HCHG ROOM/CARE - TELE (206)

## 2023-09-01 PROCEDURE — 93005 ELECTROCARDIOGRAM TRACING: CPT | Performed by: INTERNAL MEDICINE

## 2023-09-01 PROCEDURE — B245ZZ4 ULTRASONOGRAPHY OF LEFT HEART, TRANSESOPHAGEAL: ICD-10-PCS | Performed by: INTERNAL MEDICINE

## 2023-09-01 PROCEDURE — 5A2204Z RESTORATION OF CARDIAC RHYTHM, SINGLE: ICD-10-PCS | Performed by: INTERNAL MEDICINE

## 2023-09-01 PROCEDURE — 80053 COMPREHEN METABOLIC PANEL: CPT

## 2023-09-01 PROCEDURE — 84100 ASSAY OF PHOSPHORUS: CPT

## 2023-09-01 PROCEDURE — 99156 MOD SED OTH PHYS/QHP 5/>YRS: CPT | Performed by: INTERNAL MEDICINE

## 2023-09-01 PROCEDURE — 93312 ECHO TRANSESOPHAGEAL: CPT | Mod: 26 | Performed by: INTERNAL MEDICINE

## 2023-09-01 PROCEDURE — 700102 HCHG RX REV CODE 250 W/ 637 OVERRIDE(OP): Performed by: INTERNAL MEDICINE

## 2023-09-01 PROCEDURE — A9270 NON-COVERED ITEM OR SERVICE: HCPCS | Performed by: HOSPITALIST

## 2023-09-01 PROCEDURE — 82330 ASSAY OF CALCIUM: CPT

## 2023-09-01 PROCEDURE — 93325 DOPPLER ECHO COLOR FLOW MAPG: CPT

## 2023-09-01 PROCEDURE — 99291 CRITICAL CARE FIRST HOUR: CPT | Mod: 25 | Performed by: INTERNAL MEDICINE

## 2023-09-01 PROCEDURE — 85025 COMPLETE CBC W/AUTO DIFF WBC: CPT

## 2023-09-01 PROCEDURE — 83735 ASSAY OF MAGNESIUM: CPT

## 2023-09-01 PROCEDURE — 700102 HCHG RX REV CODE 250 W/ 637 OVERRIDE(OP): Performed by: HOSPITALIST

## 2023-09-01 PROCEDURE — 700111 HCHG RX REV CODE 636 W/ 250 OVERRIDE (IP): Performed by: INTERNAL MEDICINE

## 2023-09-01 PROCEDURE — 93325 DOPPLER ECHO COLOR FLOW MAPG: CPT | Mod: 26 | Performed by: INTERNAL MEDICINE

## 2023-09-01 PROCEDURE — 93010 ELECTROCARDIOGRAM REPORT: CPT | Performed by: STUDENT IN AN ORGANIZED HEALTH CARE EDUCATION/TRAINING PROGRAM

## 2023-09-01 RX ORDER — MIDAZOLAM HYDROCHLORIDE 1 MG/ML
INJECTION INTRAMUSCULAR; INTRAVENOUS
Status: COMPLETED
Start: 2023-09-01 | End: 2023-09-01

## 2023-09-01 RX ORDER — MIDAZOLAM HYDROCHLORIDE 1 MG/ML
2 INJECTION INTRAMUSCULAR; INTRAVENOUS
Status: DISCONTINUED | OUTPATIENT
Start: 2023-09-02 | End: 2023-09-01

## 2023-09-01 RX ORDER — POTASSIUM CHLORIDE 20 MEQ/1
40 TABLET, EXTENDED RELEASE ORAL ONCE
Status: COMPLETED | OUTPATIENT
Start: 2023-09-01 | End: 2023-09-01

## 2023-09-01 RX ORDER — CALCIUM GLUCONATE 20 MG/ML
1 INJECTION, SOLUTION INTRAVENOUS ONCE
Status: COMPLETED | OUTPATIENT
Start: 2023-09-01 | End: 2023-09-01

## 2023-09-01 RX ORDER — MIDAZOLAM HYDROCHLORIDE 1 MG/ML
2 INJECTION INTRAMUSCULAR; INTRAVENOUS
Status: COMPLETED | OUTPATIENT
Start: 2023-09-01 | End: 2023-09-01

## 2023-09-01 RX ORDER — MAGNESIUM SULFATE HEPTAHYDRATE 40 MG/ML
2 INJECTION, SOLUTION INTRAVENOUS ONCE
Status: COMPLETED | OUTPATIENT
Start: 2023-09-01 | End: 2023-09-01

## 2023-09-01 RX ORDER — PROCHLORPERAZINE EDISYLATE 5 MG/ML
10 INJECTION INTRAMUSCULAR; INTRAVENOUS EVERY 6 HOURS PRN
Status: DISCONTINUED | OUTPATIENT
Start: 2023-09-01 | End: 2023-09-03 | Stop reason: HOSPADM

## 2023-09-01 RX ORDER — MIDAZOLAM HYDROCHLORIDE 1 MG/ML
2 INJECTION INTRAMUSCULAR; INTRAVENOUS ONCE
Status: COMPLETED | OUTPATIENT
Start: 2023-09-01 | End: 2023-09-01

## 2023-09-01 RX ORDER — ETOMIDATE 2 MG/ML
20 INJECTION INTRAVENOUS ONCE
Status: COMPLETED | OUTPATIENT
Start: 2023-09-01 | End: 2023-09-01

## 2023-09-01 RX ADMIN — POTASSIUM CHLORIDE 40 MEQ: 1500 TABLET, EXTENDED RELEASE ORAL at 07:43

## 2023-09-01 RX ADMIN — ENOXAPARIN SODIUM 60 MG: 100 INJECTION SUBCUTANEOUS at 16:51

## 2023-09-01 RX ADMIN — MIDAZOLAM HYDROCHLORIDE 2 MG: 1 INJECTION INTRAMUSCULAR; INTRAVENOUS at 12:11

## 2023-09-01 RX ADMIN — CALCIUM GLUCONATE 1 G: 20 INJECTION, SOLUTION INTRAVENOUS at 07:42

## 2023-09-01 RX ADMIN — KETAMINE HYDROCHLORIDE 75 MG: 50 INJECTION INTRAMUSCULAR; INTRAVENOUS at 12:11

## 2023-09-01 RX ADMIN — MIDAZOLAM HYDROCHLORIDE 2 MG: 1 INJECTION INTRAMUSCULAR; INTRAVENOUS at 12:16

## 2023-09-01 RX ADMIN — MIDAZOLAM 2 MG: 1 INJECTION, SOLUTION INTRAMUSCULAR; INTRAVENOUS at 12:16

## 2023-09-01 RX ADMIN — MIDAZOLAM 2 MG: 1 INJECTION, SOLUTION INTRAMUSCULAR; INTRAVENOUS at 12:11

## 2023-09-01 RX ADMIN — FENTANYL CITRATE 100 MCG: 50 INJECTION, SOLUTION INTRAMUSCULAR; INTRAVENOUS at 12:15

## 2023-09-01 RX ADMIN — DILTIAZEM HYDROCHLORIDE 90 MG: 30 TABLET, FILM COATED ORAL at 05:39

## 2023-09-01 RX ADMIN — ETOMIDATE 20 MG: 2 INJECTION INTRAVENOUS at 12:20

## 2023-09-01 RX ADMIN — ENOXAPARIN SODIUM 60 MG: 100 INJECTION SUBCUTANEOUS at 05:39

## 2023-09-01 RX ADMIN — MAGNESIUM SULFATE HEPTAHYDRATE 2 G: 2 INJECTION, SOLUTION INTRAVENOUS at 07:42

## 2023-09-01 RX ADMIN — OXYCODONE HYDROCHLORIDE 5 MG: 5 TABLET ORAL at 20:19

## 2023-09-01 RX ADMIN — DILTIAZEM HYDROCHLORIDE 90 MG: 30 TABLET, FILM COATED ORAL at 16:50

## 2023-09-01 RX ADMIN — FENTANYL CITRATE 50 MCG: 50 INJECTION, SOLUTION INTRAMUSCULAR; INTRAVENOUS at 12:32

## 2023-09-01 ASSESSMENT — LIFESTYLE VARIABLES
ORIENTATION AND CLOUDING OF SENSORIUM: ORIENTED AND CAN DO SERIAL ADDITIONS
HEADACHE, FULLNESS IN HEAD: NOT PRESENT
NAUSEA AND VOMITING: NO NAUSEA AND NO VOMITING
PAROXYSMAL SWEATS: NO SWEAT VISIBLE
VISUAL DISTURBANCES: NOT PRESENT
AUDITORY DISTURBANCES: NOT PRESENT
AUDITORY DISTURBANCES: NOT PRESENT
TREMOR: NO TREMOR
TREMOR: *
TOTAL SCORE: 2
PAROXYSMAL SWEATS: NO SWEAT VISIBLE
ANXIETY: NO ANXIETY (AT EASE)
AGITATION: NORMAL ACTIVITY
HEADACHE, FULLNESS IN HEAD: NOT PRESENT
HEADACHE, FULLNESS IN HEAD: MILD
ANXIETY: NO ANXIETY (AT EASE)
VISUAL DISTURBANCES: NOT PRESENT
ANXIETY: NO ANXIETY (AT EASE)
ORIENTATION AND CLOUDING OF SENSORIUM: CANNOT DO SERIAL ADDITIONS OR IS UNCERTAIN ABOUT DATE
NAUSEA AND VOMITING: *
TOTAL SCORE: 3
ANXIETY: NO ANXIETY (AT EASE)
AGITATION: NORMAL ACTIVITY
ORIENTATION AND CLOUDING OF SENSORIUM: ORIENTED AND CAN DO SERIAL ADDITIONS
TREMOR: NO TREMOR
ORIENTATION AND CLOUDING OF SENSORIUM: ORIENTED AND CAN DO SERIAL ADDITIONS
VISUAL DISTURBANCES: NOT PRESENT
NAUSEA AND VOMITING: NO NAUSEA AND NO VOMITING
ORIENTATION AND CLOUDING OF SENSORIUM: ORIENTED AND CAN DO SERIAL ADDITIONS
HEADACHE, FULLNESS IN HEAD: NOT PRESENT
AGITATION: NORMAL ACTIVITY
TOTAL SCORE: 0
PAROXYSMAL SWEATS: NO SWEAT VISIBLE
TREMOR: *
TREMOR: *
HEADACHE, FULLNESS IN HEAD: NOT PRESENT
TREMOR: *
PAROXYSMAL SWEATS: NO SWEAT VISIBLE
AUDITORY DISTURBANCES: NOT PRESENT
VISUAL DISTURBANCES: NOT PRESENT
TOTAL SCORE: 4
NAUSEA AND VOMITING: NO NAUSEA AND NO VOMITING
ANXIETY: NO ANXIETY (AT EASE)
NAUSEA AND VOMITING: NO NAUSEA AND NO VOMITING
NAUSEA AND VOMITING: NO NAUSEA AND NO VOMITING
TOTAL SCORE: 4
TOTAL SCORE: 3
AGITATION: NORMAL ACTIVITY
AUDITORY DISTURBANCES: NOT PRESENT
AGITATION: NORMAL ACTIVITY
AUDITORY DISTURBANCES: NOT PRESENT
ANXIETY: NO ANXIETY (AT EASE)
ORIENTATION AND CLOUDING OF SENSORIUM: ORIENTED AND CAN DO SERIAL ADDITIONS
AUDITORY DISTURBANCES: NOT PRESENT
HEADACHE, FULLNESS IN HEAD: NOT PRESENT
PAROXYSMAL SWEATS: NO SWEAT VISIBLE
PAROXYSMAL SWEATS: NO SWEAT VISIBLE
AGITATION: NORMAL ACTIVITY
VISUAL DISTURBANCES: NOT PRESENT
VISUAL DISTURBANCES: NOT PRESENT

## 2023-09-01 ASSESSMENT — COGNITIVE AND FUNCTIONAL STATUS - GENERAL
DRESSING REGULAR UPPER BODY CLOTHING: A LITTLE
DAILY ACTIVITIY SCORE: 23
MOBILITY SCORE: 24
SUGGESTED CMS G CODE MODIFIER MOBILITY: CH
SUGGESTED CMS G CODE MODIFIER DAILY ACTIVITY: CI

## 2023-09-01 ASSESSMENT — PAIN DESCRIPTION - PAIN TYPE
TYPE: ACUTE PAIN

## 2023-09-01 ASSESSMENT — ENCOUNTER SYMPTOMS
CHEST TIGHTNESS: 0
DIZZINESS: 0
SHORTNESS OF BREATH: 0
HEADACHES: 0
FEVER: 0
PALPITATIONS: 1
PALPITATIONS: 0
HEADACHES: 1
ABDOMINAL PAIN: 0
NAUSEA: 0
LOSS OF CONSCIOUSNESS: 0

## 2023-09-01 ASSESSMENT — FIBROSIS 4 INDEX: FIB4 SCORE: 7.35

## 2023-09-01 NOTE — PROCEDURES
Preprocedural Diagnosis: Atrial flutter with rapid ventricular response  Postprocedural Diagnosis: Sinus rhythm     Procedure performed: External DC Cardioversion and transesophageal echocardiogram     : Tang Rivera MD     Assistant: None     Anesthesia: Per Samy Altamirano MD, intensivist     Description of procedure:  Informed consent had been obtained in which the indications, risks and benefits of the procedure had been discussed with the patient who expressed understanding and wished to proceed.  The patient was properly prepared for the procedure per protocol.  Defibrillator pads were placed in the anterior and posterior position.   A preprocedure timeout was performed. Adequate sedation provided by Samy Altamirano MD.  A transesophageal probe was passed into the mid esophagus with findings showing no evidence of left atrial or left atrial appendage thrombus.  The transesophageal probe was removed.  The patient received a single synchronized shock of 100 J with restoration of sinus rhythm.  The patient had no immediate post-procedure complications.     Conclusion: Successful DC cardioversion

## 2023-09-01 NOTE — PROGRESS NOTES
Critical Care Progress Note    Date of admission  8/29/2023    Chief Complaint  73 y.o. male admitted 8/29/2023 with SVT/AF with RVR in the setting of acute alcohol intoxication    Hospital Course  73 y.o. male who [facility]  He initially presented there for alcohol detoxification and was sent to the ER for medical clearance as blood alcohol concentration was 0.354.  In the emergency room he was found to be in SVT versus a flutter with rates in the 150s.  Drinks about 2 pints of vodka a day.  Symptomatically reporting generalized weakness, palpitations, headache and tremor.  No loss of consciousness.  Normotensive in the ED.  Is started on a diltiazem drip and CIWA protocol and admitted to the ICU.  Electrolytes unremarkable.  Expected elevated liver function tests in the setting of alcoholism.  CXR showing slightly enlarged cardiac silhouette, otherwise clear lungs.    Interval Problem Update  Chart review from the past 24 hours includes imaging, laboratory studies, vital signs and notes available.  Pertinent data for today's visit includes    Cardiology consulted, pt initially refused cardioversion, however after discussing with family now is in agreement, plan for BENJA/cardioversion today at noon    Cardiac: remains in AF/Aflutter, rates 80-140s, refractory to amio, dilt gtt, esmolol. Electrolytes corrected. On PO dilt 90mg Q6H. TSH wnl. TTE reassuring  Pulm: oxymask 6L -> 3L  Neuro: CIWA < 4, 1mg ativan/24', last drink 8/28  Heme: reviewed, no changes  Renal/volume status: +3.8L.  ID:  no changes, afebrile   GI/endo: NPO for cardioversion, LFTs stabilizing  Labs/Imaging: reviewed  Lines: PIV, no white    Review of Systems  Review of Systems   Constitutional:  Positive for malaise/fatigue. Negative for fever.   Cardiovascular:  Positive for palpitations. Negative for chest pain.   Neurological:  Positive for headaches. Negative for loss of consciousness.   All other systems  reviewed and are negative.     Vital Signs for last 24 hours   Temp:  [36.3 °C (97.4 °F)-37.2 °C (98.9 °F)] 36.7 °C (98 °F)  Pulse:  [] 93  Resp:  [5-22] 5  BP: ()/(54-92) 104/61  SpO2:  [91 %-100 %] 92 %    Physical Exam   Physical Exam  Vitals and nursing note reviewed.   Constitutional:       General: He is not in acute distress.     Appearance: He is well-developed. He is not diaphoretic.      Comments: Disheveled  Very pleasant   HENT:      Head: Normocephalic.      Nose: Nose normal.      Mouth/Throat:      Pharynx: No oropharyngeal exudate.   Eyes:      General: No scleral icterus.        Right eye: No discharge.         Left eye: No discharge.      Conjunctiva/sclera: Conjunctivae normal.      Pupils: Pupils are equal, round, and reactive to light.   Neck:      Thyroid: No thyromegaly.      Vascular: No JVD.      Trachea: No tracheal deviation.   Cardiovascular:      Rate and Rhythm: Tachycardia present. Rhythm irregular.      Heart sounds: Normal heart sounds. No murmur heard.  Pulmonary:      Effort: Pulmonary effort is normal. No respiratory distress.      Breath sounds: Normal breath sounds. No stridor. No wheezing or rales.   Abdominal:      General: There is no distension.      Palpations: Abdomen is soft.      Tenderness: There is no abdominal tenderness. There is no guarding.   Musculoskeletal:         General: No tenderness. Normal range of motion.      Cervical back: Neck supple.   Lymphadenopathy:      Cervical: No cervical adenopathy.   Skin:     General: Skin is warm and dry.      Capillary Refill: Capillary refill takes less than 2 seconds.      Coloration: Skin is not pale.      Findings: No erythema.   Neurological:      Mental Status: He is alert and oriented to person, place, and time.      Sensory: No sensory deficit.      Motor: No abnormal muscle tone.      Coordination: Coordination normal.      Deep Tendon Reflexes: Reflexes normal.   Psychiatric:         Behavior: Behavior  normal.         Thought Content: Thought content normal.         Judgment: Judgment normal.         Medications  Current Facility-Administered Medications   Medication Dose Route Frequency Provider Last Rate Last Admin    magnesium sulfate IVPB premix 2 g  2 g Intravenous Once Samy Altamirano M.D.   Stopped at 09/01/23 0942    enoxaparin (Lovenox) inj 60 mg  1 mg/kg Subcutaneous Q12HRS Samy Altamirano M.D.   60 mg at 09/01/23 0539    dilTIAZem (Cardizem) tablet 90 mg  90 mg Oral Q6HRS Samy Altamirano M.D.   90 mg at 09/01/23 0539    dilTIAZem (Cardizem) injection 20 mg  20 mg Intravenous Q4HRS PRN Samy Altamirano M.D.   20 mg at 08/31/23 9135    MD Alert...ICU Electrolyte Replacement per Pharmacy   Other PHARMACY TO DOSE Samy Altamirano M.D.        ondansetron (Zofran) syringe/vial injection 4 mg  4 mg Intravenous Q4HRS PRN Samy Altamirano M.D.        acetaminophen (Tylenol) tablet 650 mg  650 mg Oral Q6HRS PRN Lexi Gómez M.D.        senna-docusate (Pericolace Or Senokot S) 8.6-50 MG per tablet 2 Tablet  2 Tablet Oral BID Lexi Gómez M.D.        And    polyethylene glycol/lytes (Miralax) PACKET 1 Packet  1 Packet Oral QDAY PRN Lexi Gómez M.D.        And    magnesium hydroxide (Milk Of Magnesia) suspension 30 mL  30 mL Oral QDAY PRN Lexi Gómez M.D.        And    bisacodyl (Dulcolax) suppository 10 mg  10 mg Rectal QDAY PRN Lexi Gómez M.D.        Pharmacy Consult Request ...Pain Management Review 1 Each  1 Each Other PHARMACY TO DOSE Lexi Gómez M.D.        oxyCODONE immediate-release (Roxicodone) tablet 5 mg  5 mg Oral Q3HRS PRN Lexi Gómez M.D.   5 mg at 08/30/23 1224    Or    oxyCODONE immediate release (Roxicodone) tablet 10 mg  10 mg Oral Q3HRS PRN Lexi Gómez M.D.        Or    HYDROmorphone (Dilaudid) injection 0.5 mg  0.5 mg Intravenous Q3HRS PRN Lexi Gómez M.D.        LORazepam (Ativan) tablet 0.5 mg  0.5 mg Oral Q4HRS PRN Lexi Gómez M.D.    0.5 mg at 08/30/23 0943    LORazepam (Ativan) tablet 1 mg  1 mg Oral Q4HRS PRN Lexi Gómez M.D.        Or    LORazepam (Ativan) injection 0.5 mg  0.5 mg Intravenous Q4HRS PRN Lexi Gómez M.D.   0.5 mg at 08/31/23 0022    LORazepam (Ativan) tablet 2 mg  2 mg Oral Q2HRS PRN Lexi Gómez M.D.   2 mg at 08/30/23 0441    Or    LORazepam (Ativan) injection 1 mg  1 mg Intravenous Q2HRS PRN Lexi Gómez M.D.   1.5 mg at 08/30/23 1720    LORazepam (Ativan) tablet 3 mg  3 mg Oral Q HOUR PRN Lexi Gómez M.D.        Or    LORazepam (Ativan) injection 1.5 mg  1.5 mg Intravenous Q HOUR PRN Lexi Gómez M.D.        LORazepam (Ativan) tablet 4 mg  4 mg Oral Q15 MIN PRN Lexi Gómez M.D.        Or    LORazepam (Ativan) injection 2 mg  2 mg Intravenous Q15 MIN PRN Lexi Gómez M.D.        thiamine (Vitamin B-1) tablet 100 mg  100 mg Oral DAILY Lexi Gómez M.D.   100 mg at 08/31/23 0539    And    multivitamin tablet 1 Tablet  1 Tablet Oral DAILY Lexi Gómez M.D.   1 Tablet at 08/31/23 0539    And    folic acid (Folvite) tablet 1 mg  1 mg Oral DAILY Lexi Gómez M.D.   1 mg at 08/31/23 0539       Fluids    Intake/Output Summary (Last 24 hours) at 9/1/2023 0857  Last data filed at 9/1/2023 0800  Gross per 24 hour   Intake 1510.65 ml   Output 1865 ml   Net -354.35 ml         Laboratory          Recent Labs     08/30/23  0211 08/31/23  0440 09/01/23  0352   SODIUM 135 133* 136   POTASSIUM 3.6 4.0 3.9   CHLORIDE 99 101 100   CO2 23 22 24   BUN 10 11 9   CREATININE 0.64 0.60 0.57   MAGNESIUM 1.6 2.4 1.9   PHOSPHORUS  --  2.4* 2.7   CALCIUM 7.3* 7.3* 8.1*       Recent Labs     08/29/23  1513 08/30/23  0211 08/31/23  0440 09/01/23  0352   ALTSGPT 176* 136* 120* 103*   ASTSGOT 336* 224* 165* 117*   ALKPHOSPHAT 245* 202* 176* 179*   TBILIRUBIN 0.9 1.3 0.6 0.8   LIPASE 38  --   --   --    GLUCOSE 90 105* 141* 110*       Recent Labs     08/29/23  1513 08/30/23  0211 08/31/23 0440 09/01/23  0352    WBC 5.1 5.3  --  5.8   NEUTSPOLYS 55.80  --   --  62.90   LYMPHOCYTES 36.30  --   --  23.70   MONOCYTES 5.50  --   --  8.60   EOSINOPHILS 1.00  --   --  3.80   BASOPHILS 1.20  --   --  0.70   ASTSGOT 336* 224* 165* 117*   ALTSGPT 176* 136* 120* 103*   ALKPHOSPHAT 245* 202* 176* 179*   TBILIRUBIN 0.9 1.3 0.6 0.8       Recent Labs     08/29/23  1513 08/30/23  0211 08/31/23  0440 09/01/23  0352   RBC 4.15* 3.64*  --  3.78*   HEMOGLOBIN 12.9* 11.3*  --  11.6*   HEMATOCRIT 39.3* 34.3*  --  35.7*   PLATELETCT 148* 115*  --  116*   PROTHROMBTM  --   --  14.3  --    INR  --   --  1.06  --        Imaging  X-Ray:  No film today      Assessment/Plan    * Atrial fibrillation with RVR (HCC)- (present on admission)  Assessment & Plan  Sinus tach < 2 weeks ago on prior hospitalization  No hx of AF, presumably acute symptoms < 2-3 days PTA  Refractory to amio load/gtt, digoxin, PO and IV dilt, hypotensive with esmolol  Echo reassuring  Electrolyte replacement, Ca, Mg, K  UDS neg  CIWA for etoh withdrawal  Lovenox 60 BID, pt agreeable now  Telemetry  Cardiology consuted for BENJA/cardioversion    Acute alcohol intoxication delirium with moderate or severe use disorder (HCC)- (present on admission)  Assessment & Plan  Last drink 8/28  CIWA  Seizure precautions  cont thiamine, MVI, folic acid    Transaminitis- (present on admission)  Assessment & Plan  From alcoholic hepatitis  Supportive care, CMP and INR daily       VTE:  Lovenox  Ulcer: Not Indicated  Lines: None    I have performed a physical exam and reviewed and updated ROS and Plan today (9/1/2023). In review of yesterday's note (8/31/2023), there are no changes except as documented above.     Discussed patient condition and risk of morbidity and/or mortality with RN, Pharmacy, Charge nurse / hot rounds, Patient, and cardiology    The patient remains critically ill.  Critical care time = 55 minutes in directly providing and coordinating critical care and extensive data review.   No time overlap and excludes procedures.    This note was generated using voice recognition software which has a chance of producing errors of grammar and content.  I have made every reasonable attempt to find and correct any errors, but it should be expected that some may not be found prior to finalization of this note.  __________  Samy Altamirano MD  Pulmonary and Critical Care Medicine  [facility]

## 2023-09-01 NOTE — PROGRESS NOTES
12-hour chart check complete.    Monitor Summary  Rhythm: Afib  Rate:   Ectopy: r-oPVC, rTrigem  Measurements: -/0.08/-

## 2023-09-01 NOTE — PROGRESS NOTES
Cardiology Follow Up Progress Note    Date of Service  9/1/2023    Attending Physician  Samy Altamirano M.D.      MADI Rangel is a 73 y.o. male with a past medical history of severe alcohol dependence currently drinking up to 2 pints of vodka a day who was brought in by EMS 8/29/2023 with generalized weakness found to have elevated heart rate with EKG showing atrial flutter rate 150 bpm.  Neurology consultation was requested.    Interim Events  9/1: /63.  HR 69.  Atrial flutter.  Feeling better.  No specific cardiac symptoms.  Off IV amiodarone now on higher doses of diltiazem.  No overnight cardiac events per RN.    Review of Systems  Review of Systems   Respiratory:  Negative for chest tightness and shortness of breath.    Cardiovascular:  Negative for palpitations.   Gastrointestinal:  Negative for abdominal pain and nausea.   Neurological:  Negative for dizziness and headaches.       Vital signs in last 24 hours  Temp:  [36.3 °C (97.4 °F)-37.2 °C (98.9 °F)] 36.7 °C (98.1 °F)  Pulse:  [] 69  Resp:  [5-22] 15  BP: ()/(55-92) 102/63  SpO2:  [91 %-100 %] 94 %    Physical Exam  Physical Exam  Constitutional:       General: He is not in acute distress.  HENT:      Head: Normocephalic.   Eyes:      General: No scleral icterus.     Extraocular Movements: Extraocular movements intact.   Cardiovascular:      Rate and Rhythm: Normal rate. Rhythm irregular.      Heart sounds: Normal heart sounds, S1 normal and S2 normal. No murmur heard.     No friction rub. No gallop.   Pulmonary:      Effort: Pulmonary effort is normal.      Breath sounds: Normal breath sounds. No wheezing, rhonchi or rales.   Musculoskeletal:      Right lower leg: No edema.      Left lower leg: No edema.   Skin:     General: Skin is warm and dry.   Neurological:      Mental Status: He is alert and oriented to person, place, and time.   Psychiatric:         Behavior: Behavior normal.         Lab Review  Lab Results  "  Component Value Date/Time    WBC 5.8 09/01/2023 03:52 AM    RBC 3.78 (L) 09/01/2023 03:52 AM    HEMOGLOBIN 11.6 (L) 09/01/2023 03:52 AM    HEMATOCRIT 35.7 (L) 09/01/2023 03:52 AM    MCV 94.4 09/01/2023 03:52 AM    MCH 30.7 09/01/2023 03:52 AM    MCHC 32.5 09/01/2023 03:52 AM    MPV 10.8 09/01/2023 03:52 AM      Lab Results   Component Value Date/Time    SODIUM 136 09/01/2023 03:52 AM    POTASSIUM 3.9 09/01/2023 03:52 AM    CHLORIDE 100 09/01/2023 03:52 AM    CO2 24 09/01/2023 03:52 AM    GLUCOSE 110 (H) 09/01/2023 03:52 AM    BUN 9 09/01/2023 03:52 AM    CREATININE 0.57 09/01/2023 03:52 AM      Lab Results   Component Value Date/Time    ASTSGOT 117 (H) 09/01/2023 03:52 AM    ALTSGPT 103 (H) 09/01/2023 03:52 AM     No results found for: \"CHOLSTRLTOT\", \"LDL\", \"HDL\", \"TRIGLYCERIDE\", \"TROPONINT\"    No results for input(s): \"NTPROBNP\" in the last 72 hours.    Cardiac Imaging and Procedures Review  EKG:  My personal interpretation of the EKG dated 8/29/2023 is atrial flutter, rate 150     Rhythm: My personal interpretation the rhythm dated 9/1/2023 is atrial flutter, rate 70s-90s.    Echocardiogram: 8/30/2023  No prior study is available for comparison.   Normal left ventricular chamber size.  The left ventricular ejection fraction is visually estimated to be 60%.  Normal inferior vena cava size and inspiratory collapse.  Right ventricular systolic pressure is estimated to be 27 mmHg.        Imaging  Chest X-Ray: 8/29/2023  1.  There is no acute cardiopulmonary process.  2.  Stable mildly enlarged cardiac silhouette.     CHEST CTA 8/18/2023  1.  No evidence of pulmonary embolism.  2.  Right greater left perihilar groundglass opacities, likely multifocal pneumonia. Follow-up recommendations below.  3.  Likely reactive multistation mediastinal lymphadenopathy. Attention on follow-up.  4.  Cardiomegaly. Small pericardial effusion.  5.  Hepatic steatosis.  6.  Moderate hiatal hernia.     Assessment  Atrial flutter with " rapid ventricular response  Alcohol dependence  Alcohol withdrawal syndrome  Alcohol hepatitis  Anemia  Thrombocytopenia  Hypocalcemia     Recommendation Discussion  Feeling better.  Atrial flutter now rate controlled on higher doses of Cardizem, off IV amiodarone, remains on Lovenox  BENJA/DCCV procedure was again discussed with the patient later yesterday with his sister and he is agreeable to proceed at this point.     Thank you for allowing me to participate in the care of this patient.     Please contact me with any questions.     Tang Rivera M.D.   Cardiologist, [facility]  [phone]

## 2023-09-01 NOTE — CARE PLAN
Problem: Hemodynamics  Goal: Patient's hemodynamics, fluid balance and neurologic status will be stable or improve  Outcome: Progressing   The patient is Stable - Low risk of patient condition declining or worsening    Shift Goals  Clinical Goals: CIWA, HR <140, monitor labs, cardioversion  Patient Goals: Go home  Family Goals: VERO    Progress made toward(s) clinical / shift goals:  Pt was successfully cardioverted to SR 80s this afternoon. Pt tolerated procedure well, still recovering from sedation. Hemodynamically stable.     Patient is not progressing towards the following goals:

## 2023-09-01 NOTE — CARE PLAN
The patient is Watcher - Medium risk of patient condition declining or worsening    Shift Goals  Clinical Goals: CIWA, HR <140, monitor labs, cardioversion  Patient Goals: Go home  Family Goals: VERO    Progress made toward(s) clinical / shift goals:    Problem: Knowledge Deficit - Standard  Goal: Patient and family/care givers will demonstrate understanding of plan of care, disease process/condition, diagnostic tests and medications  Outcome: Progressing     Problem: Optimal Care for Alcohol Withdrawal  Goal: Optimal Care for the alcohol withdrawal patient  Outcome: Progressing     Problem: Psychosocial  Goal: Spiritual and cultural needs incorporated into hospitalization  Outcome: Progressing     Problem: Pain - Standard  Goal: Alleviation of pain or a reduction in pain to the patient’s comfort goal  Outcome: Progressing     Problem: Fall Risk  Goal: Patient will remain free from falls  Outcome: Progressing     Problem: Hemodynamics  Goal: Patient's hemodynamics, fluid balance and neurologic status will be stable or improve  Outcome: Progressing       Patient is not progressing towards the following goals:

## 2023-09-01 NOTE — PROGRESS NOTES
BENJA and synchronized cardioversion at 100 suzie performed in pts room. Dr. Altamirano, Dr. Rivera, this RN, RRT, and RT at bedside. Pt tolerated well still recovering to baseline. See procedure navigator for details.

## 2023-09-01 NOTE — PROGRESS NOTES
Dr. Altamirano notified of pt sustaining HR between 130s-140s. Pt asymptomatic, BP stable.  New order received.

## 2023-09-01 NOTE — PROCEDURES
Procedure note, Procedural Sedation:    Indication for the sedation was BENJA with cardioversion.  Informed consent was obtained prior to the sedation.    The patient's NPO status is confirmed.  The patient has no prior history of complications with anesthesia and no family history of complications with anesthesia.  The patient's ASA class is 2.  The patient's Malampati score is III.  The patient has no apparent difficulty moving the neck or opening the mouth.    The patient was placed on cardiac monitoring and continuous pulse oximetry monitoring.  End tidal capnometry monitoring was utilized.  A nurse was continuously at the patient's bedside.  Respiratory therapy was at the bedside.  The patient was placed on supplemental oxygen.    The patient was given versed, fentanyl, ketamine and etomidate for sedation.  Adequate sedation was achieved.    There were no adverse events.  No apnea, hypoxia, hypotension, or aspiration.    The patient tolerated the procedure well with no apparent complications.    Total bedside time was 20 minutes. Start time 12:04, end time 12:24    __________  Samy Altamirano MD  Pulmonary and Critical Care Medicine  [facility]

## 2023-09-02 LAB
ALBUMIN SERPL BCP-MCNC: 3.5 G/DL (ref 3.2–4.9)
ALBUMIN/GLOB SERPL: 1.1 G/DL
ALP SERPL-CCNC: 176 U/L (ref 30–99)
ALT SERPL-CCNC: 84 U/L (ref 2–50)
ANION GAP SERPL CALC-SCNC: 9 MMOL/L (ref 7–16)
AST SERPL-CCNC: 75 U/L (ref 12–45)
BASOPHILS # BLD AUTO: 0.5 % (ref 0–1.8)
BASOPHILS # BLD: 0.03 K/UL (ref 0–0.12)
BILIRUB SERPL-MCNC: 0.7 MG/DL (ref 0.1–1.5)
BUN SERPL-MCNC: 11 MG/DL (ref 8–22)
CA-I SERPL-SCNC: 1.1 MMOL/L (ref 1.1–1.3)
CALCIUM ALBUM COR SERPL-MCNC: 8.7 MG/DL (ref 8.5–10.5)
CALCIUM SERPL-MCNC: 8.3 MG/DL (ref 8.4–10.2)
CHLORIDE SERPL-SCNC: 97 MMOL/L (ref 96–112)
CO2 SERPL-SCNC: 26 MMOL/L (ref 20–33)
CREAT SERPL-MCNC: 0.59 MG/DL (ref 0.5–1.4)
EOSINOPHIL # BLD AUTO: 0.22 K/UL (ref 0–0.51)
EOSINOPHIL NFR BLD: 3.8 % (ref 0–6.9)
ERYTHROCYTE [DISTWIDTH] IN BLOOD BY AUTOMATED COUNT: 53.7 FL (ref 35.9–50)
GFR SERPLBLD CREATININE-BSD FMLA CKD-EPI: 102 ML/MIN/1.73 M 2
GLOBULIN SER CALC-MCNC: 3.2 G/DL (ref 1.9–3.5)
GLUCOSE SERPL-MCNC: 103 MG/DL (ref 65–99)
HCT VFR BLD AUTO: 35 % (ref 42–52)
HGB BLD-MCNC: 11.3 G/DL (ref 14–18)
IMM GRANULOCYTES # BLD AUTO: 0.01 K/UL (ref 0–0.11)
IMM GRANULOCYTES NFR BLD AUTO: 0.2 % (ref 0–0.9)
LYMPHOCYTES # BLD AUTO: 1.7 K/UL (ref 1–4.8)
LYMPHOCYTES NFR BLD: 29.3 % (ref 22–41)
MAGNESIUM SERPL-MCNC: 1.9 MG/DL (ref 1.5–2.5)
MCH RBC QN AUTO: 30.9 PG (ref 27–33)
MCHC RBC AUTO-ENTMCNC: 32.3 G/DL (ref 32.3–36.5)
MCV RBC AUTO: 95.6 FL (ref 81.4–97.8)
MONOCYTES # BLD AUTO: 0.61 K/UL (ref 0–0.85)
MONOCYTES NFR BLD AUTO: 10.5 % (ref 0–13.4)
NEUTROPHILS # BLD AUTO: 3.24 K/UL (ref 1.82–7.42)
NEUTROPHILS NFR BLD: 55.7 % (ref 44–72)
NRBC # BLD AUTO: 0 K/UL
NRBC BLD-RTO: 0 /100 WBC (ref 0–0.2)
PHOSPHATE SERPL-MCNC: 3.2 MG/DL (ref 2.5–4.5)
PLATELET # BLD AUTO: 120 K/UL (ref 164–446)
PMV BLD AUTO: 10.4 FL (ref 9–12.9)
POTASSIUM SERPL-SCNC: 4.1 MMOL/L (ref 3.6–5.5)
PROT SERPL-MCNC: 6.7 G/DL (ref 6–8.2)
RBC # BLD AUTO: 3.66 M/UL (ref 4.7–6.1)
SODIUM SERPL-SCNC: 132 MMOL/L (ref 135–145)
WBC # BLD AUTO: 5.8 K/UL (ref 4.8–10.8)

## 2023-09-02 PROCEDURE — 700102 HCHG RX REV CODE 250 W/ 637 OVERRIDE(OP): Performed by: INTERNAL MEDICINE

## 2023-09-02 PROCEDURE — 83735 ASSAY OF MAGNESIUM: CPT

## 2023-09-02 PROCEDURE — 700102 HCHG RX REV CODE 250 W/ 637 OVERRIDE(OP): Performed by: HOSPITALIST

## 2023-09-02 PROCEDURE — 84100 ASSAY OF PHOSPHORUS: CPT

## 2023-09-02 PROCEDURE — 770020 HCHG ROOM/CARE - TELE (206)

## 2023-09-02 PROCEDURE — A9270 NON-COVERED ITEM OR SERVICE: HCPCS | Performed by: HOSPITALIST

## 2023-09-02 PROCEDURE — 94760 N-INVAS EAR/PLS OXIMETRY 1: CPT

## 2023-09-02 PROCEDURE — 36415 COLL VENOUS BLD VENIPUNCTURE: CPT

## 2023-09-02 PROCEDURE — A9270 NON-COVERED ITEM OR SERVICE: HCPCS | Performed by: INTERNAL MEDICINE

## 2023-09-02 PROCEDURE — 700111 HCHG RX REV CODE 636 W/ 250 OVERRIDE (IP): Performed by: INTERNAL MEDICINE

## 2023-09-02 PROCEDURE — 80053 COMPREHEN METABOLIC PANEL: CPT

## 2023-09-02 PROCEDURE — 82330 ASSAY OF CALCIUM: CPT

## 2023-09-02 PROCEDURE — 99232 SBSQ HOSP IP/OBS MODERATE 35: CPT | Performed by: HOSPITALIST

## 2023-09-02 PROCEDURE — 85025 COMPLETE CBC W/AUTO DIFF WBC: CPT

## 2023-09-02 RX ORDER — DILTIAZEM HYDROCHLORIDE 120 MG/1
240 CAPSULE, COATED, EXTENDED RELEASE ORAL
Status: DISCONTINUED | OUTPATIENT
Start: 2023-09-02 | End: 2023-09-03 | Stop reason: HOSPADM

## 2023-09-02 RX ORDER — GUAIFENESIN 200 MG/10ML
10 LIQUID ORAL EVERY 4 HOURS PRN
Status: DISCONTINUED | OUTPATIENT
Start: 2023-09-02 | End: 2023-09-03 | Stop reason: HOSPADM

## 2023-09-02 RX ADMIN — DILTIAZEM HYDROCHLORIDE 90 MG: 30 TABLET, FILM COATED ORAL at 05:18

## 2023-09-02 RX ADMIN — DILTIAZEM HYDROCHLORIDE 90 MG: 30 TABLET, FILM COATED ORAL at 11:42

## 2023-09-02 RX ADMIN — THIAMINE HCL TAB 100 MG 100 MG: 100 TAB at 05:18

## 2023-09-02 RX ADMIN — SENNOSIDES AND DOCUSATE SODIUM 2 TABLET: 50; 8.6 TABLET ORAL at 17:44

## 2023-09-02 RX ADMIN — APIXABAN 5 MG: 5 TABLET, FILM COATED ORAL at 17:44

## 2023-09-02 RX ADMIN — PROCHLORPERAZINE EDISYLATE 10 MG: 5 INJECTION INTRAMUSCULAR; INTRAVENOUS at 07:47

## 2023-09-02 RX ADMIN — APIXABAN 5 MG: 5 TABLET, FILM COATED ORAL at 05:18

## 2023-09-02 RX ADMIN — LORAZEPAM 0.5 MG: 0.5 TABLET ORAL at 07:47

## 2023-09-02 RX ADMIN — THERA TABS 1 TABLET: TAB at 05:18

## 2023-09-02 RX ADMIN — DILTIAZEM HYDROCHLORIDE 90 MG: 30 TABLET, FILM COATED ORAL at 00:07

## 2023-09-02 RX ADMIN — FOLIC ACID 1 MG: 1 TABLET ORAL at 05:18

## 2023-09-02 RX ADMIN — DILTIAZEM HYDROCHLORIDE 240 MG: 120 CAPSULE, COATED, EXTENDED RELEASE ORAL at 17:48

## 2023-09-02 RX ADMIN — GUAIFENESIN 200 MG: 100 SOLUTION ORAL at 14:48

## 2023-09-02 RX ADMIN — OXYCODONE HYDROCHLORIDE 10 MG: 10 TABLET ORAL at 05:19

## 2023-09-02 ASSESSMENT — LIFESTYLE VARIABLES
AUDITORY DISTURBANCES: NOT PRESENT
ANXIETY: MILDLY ANXIOUS
TOTAL SCORE: 1
AGITATION: NORMAL ACTIVITY
ORIENTATION AND CLOUDING OF SENSORIUM: ORIENTED AND CAN DO SERIAL ADDITIONS
SUBSTANCE_ABUSE: 1
VISUAL DISTURBANCES: NOT PRESENT
AGITATION: NORMAL ACTIVITY
VISUAL DISTURBANCES: NOT PRESENT
TOTAL SCORE: 5
AGITATION: NORMAL ACTIVITY
TREMOR: *
VISUAL DISTURBANCES: NOT PRESENT
NAUSEA AND VOMITING: NO NAUSEA AND NO VOMITING
ANXIETY: NO ANXIETY (AT EASE)
AGITATION: NORMAL ACTIVITY
AGITATION: NORMAL ACTIVITY
AUDITORY DISTURBANCES: NOT PRESENT
PAROXYSMAL SWEATS: NO SWEAT VISIBLE
ORIENTATION AND CLOUDING OF SENSORIUM: ORIENTED AND CAN DO SERIAL ADDITIONS
VISUAL DISTURBANCES: NOT PRESENT
HEADACHE, FULLNESS IN HEAD: NOT PRESENT
AGITATION: NORMAL ACTIVITY
TOTAL SCORE: 0
PAROXYSMAL SWEATS: NO SWEAT VISIBLE
TREMOR: NO TREMOR
AUDITORY DISTURBANCES: NOT PRESENT
AUDITORY DISTURBANCES: NOT PRESENT
NAUSEA AND VOMITING: *
ORIENTATION AND CLOUDING OF SENSORIUM: ORIENTED AND CAN DO SERIAL ADDITIONS
ANXIETY: NO ANXIETY (AT EASE)
ANXIETY: NO ANXIETY (AT EASE)
TOTAL SCORE: 0
ORIENTATION AND CLOUDING OF SENSORIUM: ORIENTED AND CAN DO SERIAL ADDITIONS
PAROXYSMAL SWEATS: NO SWEAT VISIBLE
NAUSEA AND VOMITING: NO NAUSEA AND NO VOMITING
TREMOR: NO TREMOR
PAROXYSMAL SWEATS: NO SWEAT VISIBLE
HEADACHE, FULLNESS IN HEAD: NOT PRESENT
TOTAL SCORE: 3
AUDITORY DISTURBANCES: NOT PRESENT
TREMOR: NO TREMOR
VISUAL DISTURBANCES: NOT PRESENT
TREMOR: NO TREMOR
NAUSEA AND VOMITING: NO NAUSEA AND NO VOMITING
HEADACHE, FULLNESS IN HEAD: NOT PRESENT
TREMOR: NO TREMOR
ANXIETY: NO ANXIETY (AT EASE)
TOTAL SCORE: 0
HEADACHE, FULLNESS IN HEAD: NOT PRESENT
NAUSEA AND VOMITING: NO NAUSEA AND NO VOMITING
NAUSEA AND VOMITING: NO NAUSEA AND NO VOMITING
PAROXYSMAL SWEATS: NO SWEAT VISIBLE
AUDITORY DISTURBANCES: NOT PRESENT
VISUAL DISTURBANCES: NOT PRESENT
ORIENTATION AND CLOUDING OF SENSORIUM: ORIENTED AND CAN DO SERIAL ADDITIONS
HEADACHE, FULLNESS IN HEAD: MODERATE
ANXIETY: MILDLY ANXIOUS
ORIENTATION AND CLOUDING OF SENSORIUM: ORIENTED AND CAN DO SERIAL ADDITIONS
PAROXYSMAL SWEATS: NO SWEAT VISIBLE
HEADACHE, FULLNESS IN HEAD: NOT PRESENT

## 2023-09-02 ASSESSMENT — ENCOUNTER SYMPTOMS
HEMOPTYSIS: 0
SPUTUM PRODUCTION: 0
INSOMNIA: 1
DIZZINESS: 0
ORTHOPNEA: 0
NAUSEA: 0
PALPITATIONS: 0
COUGH: 0
NERVOUS/ANXIOUS: 1
VOMITING: 0
CHILLS: 0

## 2023-09-02 ASSESSMENT — CHA2DS2 SCORE
CHF OR LEFT VENTRICULAR DYSFUNCTION: NO
CHA2DS2 VASC SCORE: 1
DIABETES: NO
VASCULAR DISEASE: NO
HYPERTENSION: NO
AGE 75 OR GREATER: NO
PRIOR STROKE OR TIA OR THROMBOEMBOLISM: NO
AGE 65 TO 74: YES

## 2023-09-02 ASSESSMENT — PAIN DESCRIPTION - PAIN TYPE
TYPE: ACUTE PAIN

## 2023-09-02 NOTE — HOSPITAL COURSE
Jaime Rangel has past medical history of alcohol abuse, Adex about 2 pints of vodka daily.  He presented to the emergency room on 8/29/2023 with generalized weakness, palpitations, headache and tremor.  Patient was found to be in alcohol withdrawal and in a flutter.  He required a diltiazem drip for rate control and was admitted to the ICU.  On 9/1/2023 he underwent electrical cardioversion..

## 2023-09-02 NOTE — PROGRESS NOTES
4 Eyes Skin Assessment Completed by Divya SANCHEZ and DANIEL Engel.     Head WDL  Ears WDL  Nose WDL  Mouth WDL  Neck WDL  Breast/Chest WDL  Shoulder Blades WDL  Spine WDL  (R) Arm/Elbow/Hand WDL  (L) Arm/Elbow/Hand WDL  Abdomen WDL  Groin WDL  Scrotum/Coccyx/Buttocks WDL  (R) Leg WDL  (L) Leg WDL  (R) Heel/Foot/Toe Redness, Discoloration, Ulcer(s), Bruising, and Scab  (L) Heel/Foot/Toe Redness, Discoloration, Ulcer(s), Bruising, and Scab           Devices In Places ECG, Blood Pressure Cuff, Pulse Ox, and Nasal Cannula        Interventions In Place NC W/Ear Foams, Pillows, and Low Air Loss Mattress     Possible Skin Injury Yes     Pictures Uploaded Into Epic Yes  Wound Consult Placed Yes  RN Wound Prevention Protocol Ordered Yes

## 2023-09-02 NOTE — PROGRESS NOTES
Hospital Medicine Daily Progress Note    Date of Service  9/2/2023    Chief Complaint  Jaime Rangel is a 73 y.o. male admitted 8/29/2023 with alcohol withdrawal    Hospital Course  Jaime Rangel has past medical history of alcohol abuse, Adex about 2 pints of vodka daily.  He presented to the emergency room on 8/29/2023 with generalized weakness, palpitations, headache and tremor.  Patient was found to be in alcohol withdrawal and in a flutter.  He required a diltiazem drip for rate control and was admitted to the ICU.  On 9/1/2023 he underwent electrical cardioversion..    Interval Problem Update  Continues to require ativan per CIWA, complains of anxiety  No seizures  In SR, blood pressure is stable  Tolerating eliquis, no epistaxis, no signs of overt GI bleed    I have discussed this patient's plan of care and discharge plan at IDT rounds today with Case Management, Nursing, Nursing leadership, and other members of the IDT team.    Consultants/Specialty  cardiology and critical care    Code Status  Full Code    Disposition  The patient is not medically cleared for discharge to home or a post-acute facility.  Anticipate discharge to: home with close outpatient follow-up    I have placed the appropriate orders for post-discharge needs.    Review of Systems  Review of Systems   Constitutional:  Negative for chills and malaise/fatigue.   Respiratory:  Negative for cough, hemoptysis and sputum production.    Cardiovascular:  Negative for chest pain, palpitations and orthopnea.   Gastrointestinal:  Negative for nausea and vomiting.   Skin:  Negative for itching and rash.   Neurological:  Negative for dizziness.   Psychiatric/Behavioral:  Positive for substance abuse. The patient is nervous/anxious and has insomnia.    All other systems reviewed and are negative.       Physical Exam  Temp:  [36.5 °C (97.7 °F)-37 °C (98.6 °F)] 36.9 °C (98.4 °F)  Pulse:  [] 82  Resp:  [8-32] 18  BP: ()/()  118/59  SpO2:  [87 %-99 %] 94 %    Physical Exam  Constitutional:       General: He is not in acute distress.     Appearance: He is normal weight.      Comments: Disheveled   HENT:      Head: Normocephalic and atraumatic.      Right Ear: External ear normal.      Left Ear: External ear normal.      Nose: Nose normal.      Mouth/Throat:      Mouth: Mucous membranes are moist.      Pharynx: Oropharynx is clear.   Eyes:      Extraocular Movements: Extraocular movements intact.      Conjunctiva/sclera: Conjunctivae normal.      Pupils: Pupils are equal, round, and reactive to light.   Cardiovascular:      Rate and Rhythm: Normal rate and regular rhythm.      Pulses: Normal pulses.   Pulmonary:      Effort: Pulmonary effort is normal.      Breath sounds: Normal breath sounds.   Abdominal:      General: Abdomen is flat. Bowel sounds are normal.      Palpations: Abdomen is soft.   Musculoskeletal:         General: Normal range of motion.      Cervical back: Normal range of motion and neck supple.   Skin:     General: Skin is warm and dry.      Capillary Refill: Capillary refill takes less than 2 seconds.      Coloration: Skin is not jaundiced.   Neurological:      General: No focal deficit present.      Mental Status: He is alert and oriented to person, place, and time.      Cranial Nerves: No cranial nerve deficit.      Gait: Gait normal.   Psychiatric:         Mood and Affect: Mood normal.         Behavior: Behavior normal.         Fluids    Intake/Output Summary (Last 24 hours) at 9/2/2023 1155  Last data filed at 9/2/2023 0453  Gross per 24 hour   Intake 240 ml   Output 1100 ml   Net -860 ml       Laboratory  Recent Labs     09/01/23  0352 09/02/23  0016   WBC 5.8 5.8   RBC 3.78* 3.66*   HEMOGLOBIN 11.6* 11.3*   HEMATOCRIT 35.7* 35.0*   MCV 94.4 95.6   MCH 30.7 30.9   MCHC 32.5 32.3   RDW 53.1* 53.7*   PLATELETCT 116* 120*   MPV 10.8 10.4     Recent Labs     08/31/23  0440 09/01/23  0352 09/02/23  0016   SODIUM 133*  136 132*   POTASSIUM 4.0 3.9 4.1   CHLORIDE 101 100 97   CO2 22 24 26   GLUCOSE 141* 110* 103*   BUN 11 9 11   CREATININE 0.60 0.57 0.59   CALCIUM 7.3* 8.1* 8.3*     Recent Labs     08/31/23  0440   INR 1.06                 Assessment/Plan  * Acute alcohol intoxication delirium with moderate or severe use disorder (HCC)- (present on admission)  Assessment & Plan  The patient is continued to require CIWA protocol  Ativan will be administered based on serial assessment of symptoms and vital signs, close monitoring for side effects      Atrial flutter (HCC)- (present on admission)  Assessment & Plan  As above    Atrial fibrillation with RVR (HCC)- (present on admission)  Assessment & Plan  Status post cardioversion  Continue diltiazem  Continuous hemodynamic monitoring telemetry  Continue apixaban    Transaminitis- (present on admission)  Assessment & Plan  Appears to be chronic likely related to alcohol dependence.  Continue to monitor, avoid/minimize hepatotoxins as much as possible.          VTE prophylaxis:    therapeutic anticoagulation with eliquis 5 mg BID      I have performed a physical exam and reviewed and updated ROS and Plan today (9/2/2023). In review of yesterday's note (9/1/2023), there are no changes except as documented above.

## 2023-09-02 NOTE — PROGRESS NOTES
Telemetry Shift Summary    Rhythm SR  HR Range 71-95  Ectopy O-F PVC; R-Trig/Big/Coup/PAC  Measurements 0.16/0.08/0.40        Normal Values  Rhythm SR  HR Range    Measurements 0.12-0.20 / 0.06-0.10  / 0.30-0.52

## 2023-09-02 NOTE — PROGRESS NOTES
Assumed care of patient at bedside report from day shift RN. Updated on POC. Patient currently A & O x 4; on 3 L O2 91% via nasal cannula; up in bed; with complaints of acute pain. Patient medicated per MAR. Assessment completed. Call light within reach. Whiteboard updated. Fall precautions in place. Bed locked and in lowest position. All questions answered. No other needs indicated at this time.

## 2023-09-02 NOTE — CARE PLAN
The patient is Stable - Low risk of patient condition declining or worsening    Shift Goals  Clinical Goals: CIWA  Patient Goals: Manage nausea, rest  Family Goals: VERO    Progress made toward(s) clinical / shift goals:    Problem: Optimal Care for Alcohol Withdrawal  Goal: Optimal Care for the alcohol withdrawal patient  Outcome: Progressing     Problem: Lifestyle Changes  Goal: Patient's ability to identify lifestyle changes and available resources to help reduce recurrence of condition will improve  Outcome: Progressing  Note: Would like to return to [facility] for continued ETOH treatment.       Patient is not progressing towards the following goals:

## 2023-09-02 NOTE — CARE PLAN
The patient is Stable - Low risk of patient condition declining or worsening    Shift Goals  Clinical Goals: Monitoring heart rhythm and CIWA  Patient Goals: Managing headache  Family Goals: VERO    Progress made toward(s) clinical / shift goals:    Problem: Knowledge Deficit - Standard  Goal: Patient and family/care givers will demonstrate understanding of plan of care, disease process/condition, diagnostic tests and medications  Outcome: Progressing   Pt verbalizes an understanding of POC and medications ordered. Pt calls to inform when O2 is off and monitoring of I&O's.    Problem: Optimal Care for Alcohol Withdrawal  Goal: Optimal Care for the alcohol withdrawal patient  Outcome: Progressing   Pt CIWA score has improved. CIWA scoring is a 0. No signs or symptoms of declining.     Problem: Pain - Standard  Goal: Alleviation of pain or a reduction in pain to the patient’s comfort goal  Outcome: Progressing   Pt pain was managed by medication per MAR. Reassessment of pain level was scored 0 on a 0-10 scale.

## 2023-09-02 NOTE — PROGRESS NOTES
Telemetry Shift Summary    Rhythm SR-ST  HR Range 80s-110s  Ectopy F PVC/R PAC  Measurements 0.18/0.08/0.40        Normal Values  Rhythm SR  HR Range    Measurements 0.12-0.20 / 0.06-0.10  / 0.30-0.52

## 2023-09-02 NOTE — ASSESSMENT & PLAN NOTE
Status post cardioversion  Continue diltiazem  Continuous hemodynamic monitoring telemetry  Continue apixaban

## 2023-09-02 NOTE — PROGRESS NOTES
Report received from NOC shift RN . Pt alert and oriented x4 at the time of report. No signs of respiratory distress. Chest rise and fall observed.  Plan of care assumed. Safety precautions in place and call light/belongings within reach. Hourly and prn rounding in place. Patient educated to call for assistance.

## 2023-09-03 ENCOUNTER — PHARMACY VISIT (OUTPATIENT)
Dept: PHARMACY | Facility: MEDICAL CENTER | Age: 73
End: 2023-09-03
Payer: COMMERCIAL

## 2023-09-03 VITALS
DIASTOLIC BLOOD PRESSURE: 71 MMHG | BODY MASS INDEX: 23.88 KG/M2 | TEMPERATURE: 98.4 F | HEIGHT: 65 IN | SYSTOLIC BLOOD PRESSURE: 119 MMHG | WEIGHT: 143.3 LBS | RESPIRATION RATE: 18 BRPM | OXYGEN SATURATION: 91 % | HEART RATE: 86 BPM

## 2023-09-03 PROBLEM — F10.221 ACUTE ALCOHOL INTOXICATION DELIRIUM WITH MODERATE OR SEVERE USE DISORDER (HCC): Status: RESOLVED | Noted: 2023-08-29 | Resolved: 2023-09-03

## 2023-09-03 PROBLEM — I48.91 ATRIAL FIBRILLATION WITH RVR (HCC): Status: RESOLVED | Noted: 2023-08-29 | Resolved: 2023-09-03

## 2023-09-03 PROBLEM — I48.92 ATRIAL FLUTTER (HCC): Status: RESOLVED | Noted: 2023-08-29 | Resolved: 2023-09-03

## 2023-09-03 LAB
ALBUMIN SERPL BCP-MCNC: 3.6 G/DL (ref 3.2–4.9)
ALBUMIN/GLOB SERPL: 1.1 G/DL
ALP SERPL-CCNC: 195 U/L (ref 30–99)
ALT SERPL-CCNC: 63 U/L (ref 2–50)
ANION GAP SERPL CALC-SCNC: 15 MMOL/L (ref 7–16)
AST SERPL-CCNC: 45 U/L (ref 12–45)
BASOPHILS # BLD AUTO: 0.8 % (ref 0–1.8)
BASOPHILS # BLD: 0.05 K/UL (ref 0–0.12)
BILIRUB SERPL-MCNC: 0.6 MG/DL (ref 0.1–1.5)
BUN SERPL-MCNC: 12 MG/DL (ref 8–22)
CALCIUM ALBUM COR SERPL-MCNC: 8.9 MG/DL (ref 8.5–10.5)
CALCIUM SERPL-MCNC: 8.6 MG/DL (ref 8.4–10.2)
CHLORIDE SERPL-SCNC: 97 MMOL/L (ref 96–112)
CO2 SERPL-SCNC: 23 MMOL/L (ref 20–33)
CREAT SERPL-MCNC: 0.62 MG/DL (ref 0.5–1.4)
EOSINOPHIL # BLD AUTO: 0.3 K/UL (ref 0–0.51)
EOSINOPHIL NFR BLD: 4.9 % (ref 0–6.9)
ERYTHROCYTE [DISTWIDTH] IN BLOOD BY AUTOMATED COUNT: 52.3 FL (ref 35.9–50)
GFR SERPLBLD CREATININE-BSD FMLA CKD-EPI: 100 ML/MIN/1.73 M 2
GLOBULIN SER CALC-MCNC: 3.3 G/DL (ref 1.9–3.5)
GLUCOSE SERPL-MCNC: 109 MG/DL (ref 65–99)
HCT VFR BLD AUTO: 37.4 % (ref 42–52)
HGB BLD-MCNC: 12.4 G/DL (ref 14–18)
IMM GRANULOCYTES # BLD AUTO: 0.02 K/UL (ref 0–0.11)
IMM GRANULOCYTES NFR BLD AUTO: 0.3 % (ref 0–0.9)
LYMPHOCYTES # BLD AUTO: 2.11 K/UL (ref 1–4.8)
LYMPHOCYTES NFR BLD: 34.6 % (ref 22–41)
MAGNESIUM SERPL-MCNC: 1.8 MG/DL (ref 1.5–2.5)
MCH RBC QN AUTO: 31.2 PG (ref 27–33)
MCHC RBC AUTO-ENTMCNC: 33.2 G/DL (ref 32.3–36.5)
MCV RBC AUTO: 94.2 FL (ref 81.4–97.8)
MONOCYTES # BLD AUTO: 0.76 K/UL (ref 0–0.85)
MONOCYTES NFR BLD AUTO: 12.5 % (ref 0–13.4)
NEUTROPHILS # BLD AUTO: 2.85 K/UL (ref 1.82–7.42)
NEUTROPHILS NFR BLD: 46.9 % (ref 44–72)
NRBC # BLD AUTO: 0 K/UL
NRBC BLD-RTO: 0 /100 WBC (ref 0–0.2)
PLATELET # BLD AUTO: 156 K/UL (ref 164–446)
PMV BLD AUTO: 10.9 FL (ref 9–12.9)
POTASSIUM SERPL-SCNC: 4.1 MMOL/L (ref 3.6–5.5)
PROT SERPL-MCNC: 6.9 G/DL (ref 6–8.2)
RBC # BLD AUTO: 3.97 M/UL (ref 4.7–6.1)
SODIUM SERPL-SCNC: 135 MMOL/L (ref 135–145)
WBC # BLD AUTO: 6.1 K/UL (ref 4.8–10.8)

## 2023-09-03 PROCEDURE — A9270 NON-COVERED ITEM OR SERVICE: HCPCS | Performed by: HOSPITALIST

## 2023-09-03 PROCEDURE — 700102 HCHG RX REV CODE 250 W/ 637 OVERRIDE(OP): Performed by: HOSPITALIST

## 2023-09-03 PROCEDURE — 85025 COMPLETE CBC W/AUTO DIFF WBC: CPT

## 2023-09-03 PROCEDURE — A9270 NON-COVERED ITEM OR SERVICE: HCPCS | Performed by: INTERNAL MEDICINE

## 2023-09-03 PROCEDURE — 36415 COLL VENOUS BLD VENIPUNCTURE: CPT

## 2023-09-03 PROCEDURE — 700102 HCHG RX REV CODE 250 W/ 637 OVERRIDE(OP): Performed by: INTERNAL MEDICINE

## 2023-09-03 PROCEDURE — 80053 COMPREHEN METABOLIC PANEL: CPT

## 2023-09-03 PROCEDURE — RXMED WILLOW AMBULATORY MEDICATION CHARGE: Performed by: HOSPITALIST

## 2023-09-03 PROCEDURE — 83735 ASSAY OF MAGNESIUM: CPT

## 2023-09-03 PROCEDURE — 99239 HOSP IP/OBS DSCHRG MGMT >30: CPT | Performed by: HOSPITALIST

## 2023-09-03 RX ORDER — DILTIAZEM HYDROCHLORIDE 240 MG/1
240 CAPSULE, COATED, EXTENDED RELEASE ORAL DAILY
Qty: 30 CAPSULE | Refills: 0 | Status: SHIPPED | OUTPATIENT
Start: 2023-09-04

## 2023-09-03 RX ADMIN — DILTIAZEM HYDROCHLORIDE 240 MG: 120 CAPSULE, COATED, EXTENDED RELEASE ORAL at 11:40

## 2023-09-03 RX ADMIN — APIXABAN 5 MG: 5 TABLET, FILM COATED ORAL at 04:37

## 2023-09-03 RX ADMIN — OXYCODONE HYDROCHLORIDE 5 MG: 5 TABLET ORAL at 04:47

## 2023-09-03 ASSESSMENT — LIFESTYLE VARIABLES
AGITATION: NORMAL ACTIVITY
TREMOR: NO TREMOR
ORIENTATION AND CLOUDING OF SENSORIUM: ORIENTED AND CAN DO SERIAL ADDITIONS
NAUSEA AND VOMITING: NO NAUSEA AND NO VOMITING
ANXIETY: NO ANXIETY (AT EASE)
TREMOR: NO TREMOR
VISUAL DISTURBANCES: NOT PRESENT
ORIENTATION AND CLOUDING OF SENSORIUM: ORIENTED AND CAN DO SERIAL ADDITIONS
VISUAL DISTURBANCES: NOT PRESENT
AUDITORY DISTURBANCES: NOT PRESENT
HEADACHE, FULLNESS IN HEAD: NOT PRESENT
HEADACHE, FULLNESS IN HEAD: NOT PRESENT
ANXIETY: NO ANXIETY (AT EASE)
NAUSEA AND VOMITING: NO NAUSEA AND NO VOMITING
TREMOR: NO TREMOR
TOTAL SCORE: 0
PAROXYSMAL SWEATS: NO SWEAT VISIBLE
AUDITORY DISTURBANCES: NOT PRESENT
AGITATION: NORMAL ACTIVITY
AUDITORY DISTURBANCES: NOT PRESENT
AGITATION: NORMAL ACTIVITY
VISUAL DISTURBANCES: NOT PRESENT
VISUAL DISTURBANCES: NOT PRESENT
HEADACHE, FULLNESS IN HEAD: NOT PRESENT
TOTAL SCORE: 0
ORIENTATION AND CLOUDING OF SENSORIUM: ORIENTED AND CAN DO SERIAL ADDITIONS
AUDITORY DISTURBANCES: NOT PRESENT
TREMOR: NO TREMOR
PAROXYSMAL SWEATS: NO SWEAT VISIBLE
TOTAL SCORE: 0
HEADACHE, FULLNESS IN HEAD: NOT PRESENT
ANXIETY: NO ANXIETY (AT EASE)
PAROXYSMAL SWEATS: NO SWEAT VISIBLE
TOTAL SCORE: 0
ANXIETY: NO ANXIETY (AT EASE)
NAUSEA AND VOMITING: NO NAUSEA AND NO VOMITING
ORIENTATION AND CLOUDING OF SENSORIUM: ORIENTED AND CAN DO SERIAL ADDITIONS
NAUSEA AND VOMITING: NO NAUSEA AND NO VOMITING
AGITATION: NORMAL ACTIVITY
PAROXYSMAL SWEATS: NO SWEAT VISIBLE

## 2023-09-03 ASSESSMENT — PAIN DESCRIPTION - PAIN TYPE
TYPE: ACUTE PAIN

## 2023-09-03 NOTE — PROGRESS NOTES
Assumed care of patient at bedside report from day shift RN. Updated on POC. Patient currently A & O x 4; on 2 L O2 94%; up in bed; without complaints of acute pain. Assessment completed. Call light within reach. Whiteboard updated. Fall precautions in place. Bed locked and in lowest position. All questions answered. No other needs indicated at this time.

## 2023-09-03 NOTE — CARE PLAN
The patient is Stable - Low risk of patient condition declining or worsening    Shift Goals  Clinical Goals: CIWA, monitor heart rate and rhythm.  Patient Goals: Discharge  Family Goals: Discharge (Sister, Maile called)    Progress made toward(s) clinical / shift goals:    Problem: Knowledge Deficit - Standard  Goal: Patient and family/care givers will demonstrate understanding of plan of care, disease process/condition, diagnostic tests and medications  Outcome: Progressing  Note: Discussed alcohol abuse and psychosocial issues with patient. Patient would like to go spend time with sister prior to returning to [facility] for alcohol treatment.     Problem: Optimal Care for Alcohol Withdrawal  Goal: Optimal Care for the alcohol withdrawal patient  Outcome: Progressing  Note: CIWA = 0 for 24 hours now.       Patient is not progressing towards the following goals:

## 2023-09-03 NOTE — PROGRESS NOTES
Telemetry Shift Summary    Rhythm SR  HR Range 70s-90s  Ectopy R PVC  Measurements 0.16/0.08/0.42        Normal Values  Rhythm SR  HR Range    Measurements 0.12-0.20 / 0.06-0.10  / 0.30-0.52

## 2023-09-03 NOTE — DISCHARGE SUMMARY
Discharge Summary    CHIEF COMPLAINT ON ADMISSION  Chief Complaint   Patient presents with    ETOH Withdrawal    Sent by MD       Reason for Admission  EMS     Admission Date  8/29/2023    CODE STATUS  Full Code    HPI & HOSPITAL COURSE  This is a 73 y.o. male here with alcohol withdrawal     Jaime Rangel has past medical history of alcohol abuse, reports drinking about 2 pints of vodka daily.  He presented to the emergency room on 8/29/2023 with generalized weakness, palpitations, headache and tremor.  Patient was found to be in alcohol withdrawal and in a flutter.  He required a diltiazem drip for rate control and was admitted to the ICU.  On 9/1/2023 he underwent BENJA/cardioversion.    His alcohol withdrawal remains relatively well controlled, he can be discharged from the hospital.  He is in sinus rhythm.  I prescribed diltiazem and apixaban.  Patient plans to follow-up with [facility] for ongoing treatment of his psychiatric and addiction issues    Therefore, he is discharged in fair and stable condition to home with close outpatient follow-up.    The patient met 2-midnight criteria for an inpatient stay at the time of discharge.    Discharge Date  9/3/2023    FOLLOW UP ITEMS POST DISCHARGE  Follow-up with community [facility]  Follow-up with [facility] DISCHARGE DIAGNOSES  Principal Problem (Resolved):    Acute alcohol intoxication delirium with moderate or severe use disorder (HCC) (POA: Yes)  Active Problems:    Transaminitis (POA: Yes)  Resolved Problems:    Atrial fibrillation with RVR (HCC) (POA: Yes)    Atrial flutter (HCC) (POA: Yes)      FOLLOW UP  No future appointments.  No follow-up provider specified.    MEDICATIONS ON DISCHARGE     Medication List        START taking these medications        Instructions   apixaban 5mg Tabs  Commonly known as: Eliquis   Take 1 Tablet by mouth 2 times a day.  Dose: 5 mg     dilTIAZem  MG Cp24  Start taking on: September 4,  2023  Commonly known as: Cardizem CD   Take 1 Capsule by mouth every day.  Dose: 240 mg            CONTINUE taking these medications        Instructions   TYLENOL PO   Take 2 Tablets by mouth 2 times a day as needed (For headache).  Dose: 2 Tablet              Allergies  No Known Allergies    DIET  Orders Placed This Encounter   Procedures    Diet Order Diet: Regular     Standing Status:   Standing     Number of Occurrences:   1     Order Specific Question:   Diet:     Answer:   Regular [1]       ACTIVITY  As tolerated.  Weight bearing as tolerated    CONSULTATIONS  Critical Care  Cardiology    PROCEDURES    Transesophageal Echocardiogram 9/1/2023:  Echocardiography Laboratory  CONCLUSIONS  No thrombus detected in the left atrial appendage.    Transthoracic echocardiogram 8/30/2023:  CONCLUSIONS  No prior study is available for comparison.   Normal left ventricular chamber size.  The left ventricular ejection fraction is visually estimated to be 60%.  Normal inferior vena cava size and inspiratory collapse.  Right ventricular systolic pressure is estimated to be 27 mmHg.       LABORATORY  Lab Results   Component Value Date    SODIUM 135 09/03/2023    POTASSIUM 4.1 09/03/2023    CHLORIDE 97 09/03/2023    CO2 23 09/03/2023    GLUCOSE 109 (H) 09/03/2023    BUN 12 09/03/2023    CREATININE 0.62 09/03/2023        Lab Results   Component Value Date    WBC 6.1 09/03/2023    HEMOGLOBIN 12.4 (L) 09/03/2023    HEMATOCRIT 37.4 (L) 09/03/2023    PLATELETCT 156 (L) 09/03/2023        Total time of the discharge process exceeds 40 minutes.

## 2023-09-03 NOTE — PROGRESS NOTES
Discharge instructions given and discussed, signed copy in chart. Pt verbalized understanding and all questions answered. Home prescriptions delivered by [email or web address]. Pt discharged home with sister, taking the [employer] bus, in stable condition escorted by self/walking. Personal belongings with patient. IV removed and tolerated well. Tele box removed, monitor tech notified.

## 2023-09-03 NOTE — PROGRESS NOTES
Patient's sister, Maile, called for an update on her brother. Sister was updated with plan of care, all questions answered. Pt was informed about sister calling for an update.

## 2023-09-03 NOTE — CARE PLAN
The patient is Stable - Low risk of patient condition declining or worsening    Shift Goals  Clinical Goals: Continue to monitor CIWA and discharge  Patient Goals: Rest and discharge  Family Goals: Discharge (Sister, Maile called)    Progress made toward(s) clinical / shift goals:    Problem: Knowledge Deficit - Standard  Goal: Patient and family/care givers will demonstrate understanding of plan of care, disease process/condition, diagnostic tests and medications  Outcome: Progressing  Pt verbalizes an understanding of POC.      Problem: Optimal Care for Alcohol Withdrawal  Goal: Optimal Care for the alcohol withdrawal patient  Outcome: Progressing   Pt CIWA scores have remained consistent, see flow sheets for scorings.     Problem: Pain - Standard  Goal: Alleviation of pain or a reduction in pain to the patient’s comfort goal  Outcome: Progressing   Pt pain alleviated by medication per MAR.     Problem: Fall Risk  Goal: Patient will remain free from falls  Outcome: Progressing   Pt understands the importance of using the call light for assistance when needed to prevent a fall. All fall precautions are in place.

## 2023-09-07 ENCOUNTER — TELEPHONE (OUTPATIENT)
Dept: SCHEDULING | Facility: IMAGING CENTER | Age: 73
End: 2023-09-07

## 2024-01-19 ENCOUNTER — APPOINTMENT (OUTPATIENT)
Dept: RADIOLOGY | Facility: MEDICAL CENTER | Age: 74
End: 2024-01-19
Attending: EMERGENCY MEDICINE

## 2024-01-19 ENCOUNTER — HOSPITAL ENCOUNTER (EMERGENCY)
Facility: MEDICAL CENTER | Age: 74
End: 2024-01-19
Attending: EMERGENCY MEDICINE

## 2024-01-19 VITALS
HEART RATE: 98 BPM | SYSTOLIC BLOOD PRESSURE: 111 MMHG | RESPIRATION RATE: 18 BRPM | BODY MASS INDEX: 23.82 KG/M2 | OXYGEN SATURATION: 94 % | DIASTOLIC BLOOD PRESSURE: 70 MMHG | HEIGHT: 65 IN | TEMPERATURE: 97 F | WEIGHT: 143 LBS

## 2024-01-19 DIAGNOSIS — F10.920 ALCOHOLIC INTOXICATION WITHOUT COMPLICATION (HCC): ICD-10-CM

## 2024-01-19 DIAGNOSIS — S62.514A NONDISPLACED FRACTURE OF PROXIMAL PHALANX OF RIGHT THUMB, INITIAL ENCOUNTER FOR CLOSED FRACTURE: ICD-10-CM

## 2024-01-19 LAB
ALBUMIN SERPL BCP-MCNC: 3.9 G/DL (ref 3.2–4.9)
ALBUMIN/GLOB SERPL: 1.1 G/DL
ALP SERPL-CCNC: 103 U/L (ref 30–99)
ALT SERPL-CCNC: 31 U/L (ref 2–50)
ANION GAP SERPL CALC-SCNC: 15 MMOL/L (ref 7–16)
AST SERPL-CCNC: 48 U/L (ref 12–45)
BASOPHILS # BLD AUTO: 0.8 % (ref 0–1.8)
BASOPHILS # BLD: 0.06 K/UL (ref 0–0.12)
BILIRUB SERPL-MCNC: 0.2 MG/DL (ref 0.1–1.5)
BUN SERPL-MCNC: 8 MG/DL (ref 8–22)
CALCIUM ALBUM COR SERPL-MCNC: 8.6 MG/DL (ref 8.5–10.5)
CALCIUM SERPL-MCNC: 8.5 MG/DL (ref 8.5–10.5)
CHLORIDE SERPL-SCNC: 103 MMOL/L (ref 96–112)
CO2 SERPL-SCNC: 27 MMOL/L (ref 20–33)
CREAT SERPL-MCNC: 0.79 MG/DL (ref 0.5–1.4)
EOSINOPHIL # BLD AUTO: 0.11 K/UL (ref 0–0.51)
EOSINOPHIL NFR BLD: 1.5 % (ref 0–6.9)
ERYTHROCYTE [DISTWIDTH] IN BLOOD BY AUTOMATED COUNT: 53 FL (ref 35.9–50)
ETHANOL BLD-MCNC: 339.6 MG/DL
GFR SERPLBLD CREATININE-BSD FMLA CKD-EPI: 93 ML/MIN/1.73 M 2
GLOBULIN SER CALC-MCNC: 3.4 G/DL (ref 1.9–3.5)
GLUCOSE SERPL-MCNC: 90 MG/DL (ref 65–99)
HCT VFR BLD AUTO: 40 % (ref 42–52)
HGB BLD-MCNC: 13 G/DL (ref 14–18)
IMM GRANULOCYTES # BLD AUTO: 0.02 K/UL (ref 0–0.11)
IMM GRANULOCYTES NFR BLD AUTO: 0.3 % (ref 0–0.9)
LYMPHOCYTES # BLD AUTO: 2.56 K/UL (ref 1–4.8)
LYMPHOCYTES NFR BLD: 34 % (ref 22–41)
MCH RBC QN AUTO: 30 PG (ref 27–33)
MCHC RBC AUTO-ENTMCNC: 32.5 G/DL (ref 32.3–36.5)
MCV RBC AUTO: 92.4 FL (ref 81.4–97.8)
MONOCYTES # BLD AUTO: 0.27 K/UL (ref 0–0.85)
MONOCYTES NFR BLD AUTO: 3.6 % (ref 0–13.4)
NEUTROPHILS # BLD AUTO: 4.5 K/UL (ref 1.82–7.42)
NEUTROPHILS NFR BLD: 59.8 % (ref 44–72)
NRBC # BLD AUTO: 0 K/UL
NRBC BLD-RTO: 0 /100 WBC (ref 0–0.2)
PLATELET # BLD AUTO: 253 K/UL (ref 164–446)
PMV BLD AUTO: 9.1 FL (ref 9–12.9)
POTASSIUM SERPL-SCNC: 3.7 MMOL/L (ref 3.6–5.5)
PROT SERPL-MCNC: 7.3 G/DL (ref 6–8.2)
RBC # BLD AUTO: 4.33 M/UL (ref 4.7–6.1)
SODIUM SERPL-SCNC: 145 MMOL/L (ref 135–145)
WBC # BLD AUTO: 7.5 K/UL (ref 4.8–10.8)

## 2024-01-19 PROCEDURE — 85025 COMPLETE CBC W/AUTO DIFF WBC: CPT

## 2024-01-19 PROCEDURE — 73130 X-RAY EXAM OF HAND: CPT | Mod: RT

## 2024-01-19 PROCEDURE — 99285 EMERGENCY DEPT VISIT HI MDM: CPT

## 2024-01-19 PROCEDURE — 82077 ASSAY SPEC XCP UR&BREATH IA: CPT

## 2024-01-19 PROCEDURE — 80053 COMPREHEN METABOLIC PANEL: CPT

## 2024-01-19 PROCEDURE — 36415 COLL VENOUS BLD VENIPUNCTURE: CPT

## 2024-01-19 ASSESSMENT — FIBROSIS 4 INDEX: FIB4 SCORE: 2.69

## 2024-01-19 NOTE — ED NOTES
Patient attempting to leave. ERP at bedside. Redirected back to  bed.    Received patient in bed resting with eyes closed. RNCM and spouse Jessica Painting went to kitchen to discuss symptom relief medications and when to use each. Migue Masker understanding and will encourage patient to use as needed for pain or shortness of breath. Upon assessment, patient remains alert and oriented to self, place and situation. Recognizes familiar people. Patient with no complaint of pain at this time. Does endorse a little more shortness of breath with increased ascites to abdomen, measuring 90 cm today. Discussed that ascites is likely to continue to collect and instructed that it will likely cause an increase in shortness of breath and discomfort. Encouraged patient to try the liquid dilaudid even just a half dose to see if it helps with symptoms. Patient is concerned about nausea and vomiting. Encouraged patient to take zofran prior to taking pain medication to prevent nausea. Patient verbalizes understanding and spouse will reinforce. Patient doesn't really want to have paracentesis, and is open to trying medications to help with symptoms. Encouraged family to call hospice if this changes. No refills or supplies requested at this time.

## 2024-01-19 NOTE — ED TRIAGE NOTES
Pt bib ems from a restaurant in [place].  Chief Complaint   Patient presents with    Alcohol Intoxication    Digit Pain     Right thumb +swelling     Pt admits to drinking today. +unsteady gait. Unable to follow a line of questioning.   Pleasant and cooperative.

## 2024-01-19 NOTE — ED PROVIDER NOTES
"ED Provider Note    CHIEF COMPLAINT  Chief Complaint   Patient presents with    Alcohol Intoxication    Digit Pain     Right thumb +swelling       EXTERNAL RECORDS REVIEWED  Reviewed hospitalization records from [facility] in August 2023    HPI/ROS  LIMITATION TO HISTORY   Patient is a poor historian  OUTSIDE HISTORIAN(S):  EMS provided additional history    Jaime Rangel is a 73 y.o. male who presents for evaluation of alcohol intoxication with right hand injury.  The patient was apparently [place] of [place] at a local establishment.  He was determined to be too intoxicated to be released.  He denies any injury to the head neck chest or abdomen or pelvis.  He reported right hand pain after mechanical ground-level fall earlier today.  He was unstable on his feet and was transferred here for higher level of care.    PAST MEDICAL HISTORY   has a past medical history of Alcoholism (HCC).    SURGICAL HISTORY  patient denies any surgical history    FAMILY HISTORY  No family history on file.    SOCIAL HISTORY  Social History     Tobacco Use    Smoking status: Never    Smokeless tobacco: Never   Vaping Use    Vaping Use: Never used   Substance and Sexual Activity    Alcohol use: Yes     Comment: Daily vodka    Drug use: Never    Sexual activity: Not on file       CURRENT MEDICATIONS  Home Medications       Reviewed by Johanna Gomez R.N. (Registered Nurse) on 01/19/24 at [facility]  Med List Status: Partial     Medication Last Dose Status   Acetaminophen (TYLENOL PO)  Active   apixaban (ELIQUIS) 5mg Tab  Active   dilTIAZem CD (CARDIZEM CD) 240 MG CAPSULE SR 24 HR  Active                    ALLERGIES  No Known Allergies    PHYSICAL EXAM  VITAL SIGNS: /78   Pulse (!) 104   Temp 36.1 °C (97 °F) (Temporal)   Resp 16   Ht 1.651 m (5' 5\")   Wt 64.9 kg (143 lb)   SpO2 93%   BMI 23.80 kg/m²    Pulse ox interpretation: I interpret this pulse ox as normal.  Constitutional: Oriented x 4, slightly slurred " speech disheveled smells of alcohol   HEENT: Atraumatic normocephalic, pupils are equal round reactive to light extraocular movements are intact. The nares is clear, external ears are normal, mouth shows moist mucous membranes normal dentition for age  Neck: Supple, no JVD no tracheal deviation  Cardiovascular: Regular rate and rhythm no murmur rub or gallop 2+ pulses peripherally x4  Thorax & Lungs: No respiratory distress, no wheezes rales or rhonchi, No chest tenderness.   GI: Soft nontender nondistended positive bowel sounds, no peritoneal signs  Skin: Warm dry no acute rash or lesion  Musculoskeletal: Right upper extremity is notable for tenderness at the IP joint of the right thumb without gross deformity.  No evidence of open fracture no laceration.    Neurologic: Cranial nerves III through XII are grossly intact no sensory deficit no cerebellar dysfunction   Psychiatric: Appropriate affect for situation at this time          DIAGNOSTIC STUDIES / PROCEDURES    LABS  Results for orders placed or performed during the hospital encounter of 01/19/24   DIAGNOSTIC ALCOHOL   Result Value Ref Range    Diagnostic Alcohol 339.6 (H) <10.1 mg/dL   CBC WITH DIFFERENTIAL   Result Value Ref Range    WBC 7.5 4.8 - 10.8 K/uL    RBC 4.33 (L) 4.70 - 6.10 M/uL    Hemoglobin 13.0 (L) 14.0 - 18.0 g/dL    Hematocrit 40.0 (L) 42.0 - 52.0 %    MCV 92.4 81.4 - 97.8 fL    MCH 30.0 27.0 - 33.0 pg    MCHC 32.5 32.3 - 36.5 g/dL    RDW 53.0 (H) 35.9 - 50.0 fL    Platelet Count 253 164 - 446 K/uL    MPV 9.1 9.0 - 12.9 fL    Neutrophils-Polys 59.80 44.00 - 72.00 %    Lymphocytes 34.00 22.00 - 41.00 %    Monocytes 3.60 0.00 - 13.40 %    Eosinophils 1.50 0.00 - 6.90 %    Basophils 0.80 0.00 - 1.80 %    Immature Granulocytes 0.30 0.00 - 0.90 %    Nucleated RBC 0.00 0.00 - 0.20 /100 WBC    Neutrophils (Absolute) 4.50 1.82 - 7.42 K/uL    Lymphs (Absolute) 2.56 1.00 - 4.80 K/uL    Monos (Absolute) 0.27 0.00 - 0.85 K/uL    Eos (Absolute) 0.11 0.00  - 0.51 K/uL    Baso (Absolute) 0.06 0.00 - 0.12 K/uL    Immature Granulocytes (abs) 0.02 0.00 - 0.11 K/uL    NRBC (Absolute) 0.00 K/uL   Comp Metabolic Panel   Result Value Ref Range    Sodium 145 135 - 145 mmol/L    Potassium 3.7 3.6 - 5.5 mmol/L    Chloride 103 96 - 112 mmol/L    Co2 27 20 - 33 mmol/L    Anion Gap 15.0 7.0 - 16.0    Glucose 90 65 - 99 mg/dL    Bun 8 8 - 22 mg/dL    Creatinine 0.79 0.50 - 1.40 mg/dL    Calcium 8.5 8.5 - 10.5 mg/dL    Correct Calcium 8.6 8.5 - 10.5 mg/dL    AST(SGOT) 48 (H) 12 - 45 U/L    ALT(SGPT) 31 2 - 50 U/L    Alkaline Phosphatase 103 (H) 30 - 99 U/L    Total Bilirubin 0.2 0.1 - 1.5 mg/dL    Albumin 3.9 3.2 - 4.9 g/dL    Total Protein 7.3 6.0 - 8.2 g/dL    Globulin 3.4 1.9 - 3.5 g/dL    A-G Ratio 1.1 g/dL   ESTIMATED GFR   Result Value Ref Range    GFR (CKD-EPI) 93 >60 mL/min/1.73 m 2        RADIOLOGY  I have independently interpreted the diagnostic imaging associated with this visit and am waiting the final reading from the radiologist.   My preliminary interpretation is as follows: Fracture of the right distal phalanx of the thumb  Radiologist interpretation:   DX-HAND 3+ RIGHT   Final Result      Fracture of the distal phalanx of the thumb, possibly intra-articular          COURSE & MEDICAL DECISION MAKING    ED Observation Status? No; Patient does not meet criteria for ED Observation.     INITIAL ASSESSMENT, COURSE AND PLAN  Care Narrative:     This is a very challenging 73-year-old gentleman who presented here for alcohol intoxication and complaint of right thumb injury.  He cannot clearly recall when he actually fell onto his right thumb.  There is no objective evidence of trauma to the head chest abdomen or pelvis.  His laboratory studies demonstrate profound alcohol intoxication.  We placed him in a thumb spica and will refer him to orthopedics.  Patient was still too intoxicated at the time of this dictation will be signed out to the 4 PM physician Dr. LOERA pending sober  reevaluation    ADDITIONAL PROBLEM LIST    DISPOSITION AND DISCUSSIONS  I have discussed management of the patient with the following physicians and ZACH's: None    Discussion of management with other QHP or appropriate source(s): None    Escalation of care considered, and ultimately not performed:blood analysis, diagnostic imaging, and acute inpatient care management, however at this time, the patient is most appropriate for outpatient management    Barriers to care at this time, including but not limited to: Patient does not have established PCP.     Decision tools and prescription drugs considered including, but not limited to: Considered opioid prescription but with patient's poor compliance and alcoholism would not be a good choice.    FINAL DIAGNOSIS  Acute alcohol intoxication  Right thumb closed minimally displaced distal phalanx fracture       Electronically signed by: Darren Landis M.D., 1/19/2024 1:06 PM

## 2024-01-30 ENCOUNTER — HOSPITAL ENCOUNTER (EMERGENCY)
Facility: MEDICAL CENTER | Age: 74
End: 2024-01-30
Attending: EMERGENCY MEDICINE

## 2024-01-30 VITALS
OXYGEN SATURATION: 95 % | WEIGHT: 135 LBS | RESPIRATION RATE: 18 BRPM | DIASTOLIC BLOOD PRESSURE: 80 MMHG | HEART RATE: 100 BPM | HEIGHT: 65 IN | BODY MASS INDEX: 22.49 KG/M2 | TEMPERATURE: 98.2 F | SYSTOLIC BLOOD PRESSURE: 113 MMHG

## 2024-01-30 DIAGNOSIS — F10.921 ALCOHOL INTOXICATION WITH DELIRIUM (HCC): ICD-10-CM

## 2024-01-30 DIAGNOSIS — S62.524A CLOSED NONDISPLACED FRACTURE OF DISTAL PHALANX OF RIGHT THUMB, INITIAL ENCOUNTER: ICD-10-CM

## 2024-01-30 LAB — GLUCOSE BLD STRIP.AUTO-MCNC: 89 MG/DL (ref 65–99)

## 2024-01-30 PROCEDURE — 82962 GLUCOSE BLOOD TEST: CPT

## 2024-01-30 PROCEDURE — 99285 EMERGENCY DEPT VISIT HI MDM: CPT

## 2024-01-30 PROCEDURE — 29125 APPL SHORT ARM SPLINT STATIC: CPT

## 2024-01-30 PROCEDURE — 302874 HCHG BANDAGE ACE 2 OR 3""

## 2024-01-30 ASSESSMENT — FIBROSIS 4 INDEX: FIB4 SCORE: 2.52

## 2024-01-30 NOTE — ED NOTES
Pt discharged, all appropriate hospital equipment removed (IV, monitor, pulse ox, etc.). Pt left unit via walking with slow but stable gait to ED lobby for hospital discharge. Personal belongings with pt when leaving unit. Pt given discharge instructions prior to leaving unit including where to  prescriptions and when to follow-up; verbalizes understanding. Pt informed to return to ED if symptoms worsen/return or altered status develop. Copy of discharge instructions signed and turned into DC basket and copy sent with pt.

## 2024-01-30 NOTE — ED TRIAGE NOTES
Chief Complaint   Patient presents with    Alcohol Intoxication     Patient brought in by . Per report patient was an unwanted subject [place] a local market due to ETOH intoxication. Police tried to return him to his home but family denied to accept him. Patient was also denied admission to local group home due to high blood alcohol level.      Patient is ambulatory with slow and unsteady gait. He is calm and cooperative at this time. [place] and [place] alarm.

## 2024-01-30 NOTE — ED PROVIDER NOTES
"ED Provider Note    CHIEF COMPLAINT  Chief Complaint   Patient presents with    Alcohol Intoxication     Patient brought in by . Per report patient was an unwanted subject at a local market due to ETOH intoxication. Police tried to return him to his home but family denied to accept him. Patient was also denied admission to local [facility] due to high blood alcohol level.        EXTERNAL RECORDS REVIEWED  Reviewed recent ED visit.    HPI/ROS  LIMITATION TO HISTORY   Select: : None  OUTSIDE HISTORIAN(S):  None    Jaime Rangel is a 73 y.o. male who presents to the emergency department for evaluation of alcohol intoxication.  Apparently the patient was found wandering too intoxicated for [facility] brought here.  Patient is assessed when he first got here.  He is resting comfortably denies any complaints.  He states has been drinking.  Denies any injuries to his head neck chest abdomen or pelvis.    =    PAST MEDICAL HISTORY   has a past medical history of Alcoholism (HCC).    SURGICAL HISTORY  patient denies any surgical history    FAMILY HISTORY  No family history on file.    SOCIAL HISTORY  Social History     Tobacco Use    Smoking status: Never    Smokeless tobacco: Never   Vaping Use    Vaping Use: Never used   Substance and Sexual Activity    Alcohol use: Yes     Comment: Daily vodka    Drug use: Never    Sexual activity: Not on file       CURRENT MEDICATIONS  Home Medications       Reviewed by Johnny Lawson R.N. (Registered Nurse) on 01/30/24 at 1301  Med List Status: Partial     Medication Last Dose Status   Acetaminophen (TYLENOL PO)  Active   apixaban (ELIQUIS) 5mg Tab  Active   dilTIAZem CD (CARDIZEM CD) 240 MG CAPSULE SR 24 HR  Active                    ALLERGIES  No Known Allergies    PHYSICAL EXAM  VITAL SIGNS: /80   Pulse 100   Temp 36.8 °C (98.2 °F) (Temporal)   Resp 18   Ht 1.651 m (5' 5\")   Wt 61.2 kg (135 lb)   SpO2 95%   BMI 22.47 kg/m²    Constitutional: Unkempt but in no acute " distress.  No signs of trauma  HENT: Normocephalic, Atraumatic, no signs of head trauma  Eyes: PERRL, EOMI, Conjunctiva normal, No discharge.   Neck: Normal range of motion,  Cardiovascular: Normal heart rate, Normal rhythm, No murmurs, No rubs, No gallops.   Thorax & Lungs: Normal breath sounds, No respiratory distress, No wheezing,  Abdomen: Soft, No tenderness  Musculoskeletal: Good range of motion in all major joints.  Right thumb is swollen compared to the left but neurovascular Arley is normal no signs of infection  Neurologic: Alert, No focal deficits noted.   Psychiatric: Affect normal      DIAGNOSTIC STUDIES / PROCEDURES    Results for orders placed or performed during the hospital encounter of 01/30/24   POCT glucose device results   Result Value Ref Range    POC Glucose, Blood 89 65 - 99 mg/dL        RADIOLOGY  I have independently interpreted the diagnostic imaging associated with this visit and am waiting the final reading from the radiologist.     Radiologist interpretation:   None indicated    COURSE & MEDICAL DECISION MAKING        INITIAL ASSESSMENT, COURSE AND PLAN  Care Narrative:   73-year-old male presents to ED with alcohol intoxication.  Initially he is awake and alert has some slurred speech and is not super cooperative.    He was here last night had recent labs and has had recent imaging from ED visits.  Unlikely labs are indicated.    Vital signs are reassuring.  His blood sugar is normal.  The patient is observed here in the ED and ultimately he is amatory quite quickly and is asking to leave.  We had a talk, and the stain.  Given oral food and fluids.    The patient's x-ray from recent visit was reviewed he has a thumb phalanx.  This is a transversely oriented transverse fracture through the distal phalanx of his right thumb.  It is swollen but not infected.  Will put him in a thumb spica splint.    The patient is asking to go.  His walker on the emergency room is walked out to the nursing  station his gait looked very stable.  I still think is quite intoxic about some to stay a while longer.  The plan will be to keep him here for period of time and let him continue to metabolize his alcohol.  He is agreeable with this.    The plan will be that he can go home once he is a stable gait with vital signs are normal if he is otherwise doing well and has a clear sensorium.  He is agreeable to plan.  May despite the nurses to be discharged after intervention when appropriate.          ADDITIONAL PROBLEM LIST  Alcoholism  DISPOSITION AND DISCUSSIONS    Escalation of care considered, and ultimately not performed:blood analysis    Barriers to care at this time, including but not limited to: Patient does not have established PCP and Patient lacks transportation .     Referral to orthopedics was placed.  He is advised to follow-up with orthopedics for his thumb fracture.    Karl Stanley M.D.  [address] [facility] [place] [address]  [phone]              FINAL DIAGNOSIS  1. Alcohol intoxication with delirium (HCC)    2. Closed nondisplaced fracture of distal phalanx of right thumb, initial encounter           Electronically signed by: Darren Brown M.D., 1/30/2024 1:26 PM

## 2024-01-30 NOTE — ED NOTES
ERP wishes for the patient to rest another 20-30 min before he leave the hospital. Patient wishes to leave immediately, but agreeable.    Lot #: [id number] Additional Area 2 Location: lower lateral vermillion border Lateral Platysmal Bands Units: 0 Consent: Written consent obtained. Risks include but not limited to lid/brow ptosis, bruising, swelling, diplopia, temporary effect, incomplete chemical denervation. Expiration Date (Month Year): 06/21 Additional Area 1 Location: [facility] Dilution (U/0.1 Cc): 4 Post-Care Instructions: Patient instructed to not lie down for 4 hours and limit physical activity for 24 hours. Patient instructed not to travel by airplane for 48 hours. Reconstitution Date (Optional): 12/20/18 Glabellar Complex Units: 20 Detail Level: Detailed

## 2024-01-30 NOTE — ED NOTES
Patient ambulatory to bathroom with slow gait. He is slightly unsteady, but never lost balance. Patient returns to room and wishes to leave. Patient given food and water. ERP notified about desire to leave.

## 2024-01-30 NOTE — DISCHARGE INSTRUCTIONS
Avoid alcohol or minimize your alcohol consumption.  Return  to the ER for any new medical problems or complaints.  Keep your arm in the splint.  Follow-up with the orthopedic doctor

## 2024-04-05 ENCOUNTER — APPOINTMENT (OUTPATIENT)
Dept: RADIOLOGY | Facility: MEDICAL CENTER | Age: 74
End: 2024-04-05
Attending: EMERGENCY MEDICINE

## 2024-04-05 ENCOUNTER — HOSPITAL ENCOUNTER (INPATIENT)
Facility: MEDICAL CENTER | Age: 74
LOS: 2 days | End: 2024-04-07
Attending: EMERGENCY MEDICINE | Admitting: FAMILY MEDICINE

## 2024-04-05 DIAGNOSIS — I48.91 ATRIAL FIBRILLATION WITH RVR (HCC): ICD-10-CM

## 2024-04-05 DIAGNOSIS — I47.19 ATRIAL TACHYCARDIA (HCC): ICD-10-CM

## 2024-04-05 DIAGNOSIS — J81.0 ACUTE PULMONARY EDEMA (HCC): ICD-10-CM

## 2024-04-05 PROBLEM — F10.10 ALCOHOL ABUSE: Status: ACTIVE | Noted: 2024-04-05

## 2024-04-05 PROBLEM — R09.02 HYPOXEMIA: Status: ACTIVE | Noted: 2024-04-05

## 2024-04-05 LAB
ALBUMIN SERPL BCP-MCNC: 3.8 G/DL (ref 3.2–4.9)
ALBUMIN/GLOB SERPL: 1 G/DL
ALP SERPL-CCNC: 174 U/L (ref 30–99)
ALT SERPL-CCNC: 39 U/L (ref 2–50)
AMPHET UR QL SCN: NEGATIVE
ANION GAP SERPL CALC-SCNC: 12 MMOL/L (ref 7–16)
APPEARANCE UR: CLEAR
APTT PPP: 28.8 SEC (ref 24.7–36)
AST SERPL-CCNC: 31 U/L (ref 12–45)
BARBITURATES UR QL SCN: NEGATIVE
BASOPHILS # BLD AUTO: 0.9 % (ref 0–1.8)
BASOPHILS # BLD: 0.07 K/UL (ref 0–0.12)
BENZODIAZ UR QL SCN: NEGATIVE
BILIRUB SERPL-MCNC: 0.4 MG/DL (ref 0.1–1.5)
BILIRUB UR QL STRIP.AUTO: NEGATIVE
BUN SERPL-MCNC: 13 MG/DL (ref 8–22)
BZE UR QL SCN: NEGATIVE
CALCIUM ALBUM COR SERPL-MCNC: 9 MG/DL (ref 8.5–10.5)
CALCIUM SERPL-MCNC: 8.8 MG/DL (ref 8.5–10.5)
CANNABINOIDS UR QL SCN: NEGATIVE
CHLORIDE SERPL-SCNC: 101 MMOL/L (ref 96–112)
CO2 SERPL-SCNC: 21 MMOL/L (ref 20–33)
COLOR UR: YELLOW
CREAT SERPL-MCNC: 0.68 MG/DL (ref 0.5–1.4)
EKG IMPRESSION: NORMAL
EKG IMPRESSION: NORMAL
EOSINOPHIL # BLD AUTO: 0.2 K/UL (ref 0–0.51)
EOSINOPHIL NFR BLD: 2.6 % (ref 0–6.9)
ERYTHROCYTE [DISTWIDTH] IN BLOOD BY AUTOMATED COUNT: 49.9 FL (ref 35.9–50)
EST. AVERAGE GLUCOSE BLD GHB EST-MCNC: 120 MG/DL
ETHANOL BLD-MCNC: <10.1 MG/DL
FENTANYL UR QL: NEGATIVE
GFR SERPLBLD CREATININE-BSD FMLA CKD-EPI: 97 ML/MIN/1.73 M 2
GLOBULIN SER CALC-MCNC: 3.7 G/DL (ref 1.9–3.5)
GLUCOSE SERPL-MCNC: 80 MG/DL (ref 65–99)
GLUCOSE UR STRIP.AUTO-MCNC: NEGATIVE MG/DL
HBA1C MFR BLD: 5.8 % (ref 4–5.6)
HCT VFR BLD AUTO: 34.6 % (ref 42–52)
HGB BLD-MCNC: 11.5 G/DL (ref 14–18)
IMM GRANULOCYTES # BLD AUTO: 0.02 K/UL (ref 0–0.11)
IMM GRANULOCYTES NFR BLD AUTO: 0.3 % (ref 0–0.9)
INR PPP: 1.06 (ref 0.87–1.13)
KETONES UR STRIP.AUTO-MCNC: NEGATIVE MG/DL
LEUKOCYTE ESTERASE UR QL STRIP.AUTO: NEGATIVE
LYMPHOCYTES # BLD AUTO: 1.7 K/UL (ref 1–4.8)
LYMPHOCYTES NFR BLD: 21.9 % (ref 22–41)
MAGNESIUM SERPL-MCNC: 2.2 MG/DL (ref 1.5–2.5)
MCH RBC QN AUTO: 29.9 PG (ref 27–33)
MCHC RBC AUTO-ENTMCNC: 33.2 G/DL (ref 32.3–36.5)
MCV RBC AUTO: 90.1 FL (ref 81.4–97.8)
METHADONE UR QL SCN: NEGATIVE
MICRO URNS: NORMAL
MONOCYTES # BLD AUTO: 0.91 K/UL (ref 0–0.85)
MONOCYTES NFR BLD AUTO: 11.7 % (ref 0–13.4)
NEUTROPHILS # BLD AUTO: 4.86 K/UL (ref 1.82–7.42)
NEUTROPHILS NFR BLD: 62.6 % (ref 44–72)
NITRITE UR QL STRIP.AUTO: NEGATIVE
NRBC # BLD AUTO: 0 K/UL
NRBC BLD-RTO: 0 /100 WBC (ref 0–0.2)
NT-PROBNP SERPL IA-MCNC: 725 PG/ML (ref 0–125)
OPIATES UR QL SCN: NEGATIVE
OXYCODONE UR QL SCN: NEGATIVE
PCP UR QL SCN: NEGATIVE
PH UR STRIP.AUTO: 7.5 [PH] (ref 5–8)
PLATELET # BLD AUTO: 363 K/UL (ref 164–446)
PMV BLD AUTO: 9.3 FL (ref 9–12.9)
POTASSIUM SERPL-SCNC: 4.4 MMOL/L (ref 3.6–5.5)
PROPOXYPH UR QL SCN: NEGATIVE
PROT SERPL-MCNC: 7.5 G/DL (ref 6–8.2)
PROT UR QL STRIP: NEGATIVE MG/DL
PROTHROMBIN TIME: 13.9 SEC (ref 12–14.6)
RBC # BLD AUTO: 3.84 M/UL (ref 4.7–6.1)
RBC UR QL AUTO: NEGATIVE
SODIUM SERPL-SCNC: 134 MMOL/L (ref 135–145)
SP GR UR STRIP.AUTO: 1.01
TROPONIN T SERPL-MCNC: 20 NG/L (ref 6–19)
TROPONIN T SERPL-MCNC: 20 NG/L (ref 6–19)
TSH SERPL DL<=0.005 MIU/L-ACNC: 2.41 UIU/ML (ref 0.38–5.33)
UROBILINOGEN UR STRIP.AUTO-MCNC: 0.2 MG/DL
WBC # BLD AUTO: 7.8 K/UL (ref 4.8–10.8)

## 2024-04-05 PROCEDURE — 83735 ASSAY OF MAGNESIUM: CPT

## 2024-04-05 PROCEDURE — 99254 IP/OBS CNSLTJ NEW/EST MOD 60: CPT | Performed by: INTERNAL MEDICINE

## 2024-04-05 PROCEDURE — A9270 NON-COVERED ITEM OR SERVICE: HCPCS

## 2024-04-05 PROCEDURE — 80307 DRUG TEST PRSMV CHEM ANLYZR: CPT

## 2024-04-05 PROCEDURE — 83880 ASSAY OF NATRIURETIC PEPTIDE: CPT

## 2024-04-05 PROCEDURE — 84443 ASSAY THYROID STIM HORMONE: CPT

## 2024-04-05 PROCEDURE — A9270 NON-COVERED ITEM OR SERVICE: HCPCS | Performed by: FAMILY MEDICINE

## 2024-04-05 PROCEDURE — 99222 1ST HOSP IP/OBS MODERATE 55: CPT | Mod: GC | Performed by: FAMILY MEDICINE

## 2024-04-05 PROCEDURE — 96375 TX/PRO/DX INJ NEW DRUG ADDON: CPT

## 2024-04-05 PROCEDURE — 700102 HCHG RX REV CODE 250 W/ 637 OVERRIDE(OP)

## 2024-04-05 PROCEDURE — 770020 HCHG ROOM/CARE - TELE (206)

## 2024-04-05 PROCEDURE — 82077 ASSAY SPEC XCP UR&BREATH IA: CPT

## 2024-04-05 PROCEDURE — 84484 ASSAY OF TROPONIN QUANT: CPT

## 2024-04-05 PROCEDURE — 93005 ELECTROCARDIOGRAM TRACING: CPT

## 2024-04-05 PROCEDURE — 71045 X-RAY EXAM CHEST 1 VIEW: CPT

## 2024-04-05 PROCEDURE — 96374 THER/PROPH/DIAG INJ IV PUSH: CPT

## 2024-04-05 PROCEDURE — 80053 COMPREHEN METABOLIC PANEL: CPT

## 2024-04-05 PROCEDURE — 700101 HCHG RX REV CODE 250

## 2024-04-05 PROCEDURE — 85730 THROMBOPLASTIN TIME PARTIAL: CPT

## 2024-04-05 PROCEDURE — 85025 COMPLETE CBC W/AUTO DIFF WBC: CPT

## 2024-04-05 PROCEDURE — 99285 EMERGENCY DEPT VISIT HI MDM: CPT

## 2024-04-05 PROCEDURE — 85610 PROTHROMBIN TIME: CPT

## 2024-04-05 PROCEDURE — 93005 ELECTROCARDIOGRAM TRACING: CPT | Performed by: EMERGENCY MEDICINE

## 2024-04-05 PROCEDURE — 700102 HCHG RX REV CODE 250 W/ 637 OVERRIDE(OP): Performed by: EMERGENCY MEDICINE

## 2024-04-05 PROCEDURE — 83036 HEMOGLOBIN GLYCOSYLATED A1C: CPT

## 2024-04-05 PROCEDURE — A9270 NON-COVERED ITEM OR SERVICE: HCPCS | Performed by: EMERGENCY MEDICINE

## 2024-04-05 PROCEDURE — 94760 N-INVAS EAR/PLS OXIMETRY 1: CPT

## 2024-04-05 PROCEDURE — 700102 HCHG RX REV CODE 250 W/ 637 OVERRIDE(OP): Performed by: FAMILY MEDICINE

## 2024-04-05 PROCEDURE — 81003 URINALYSIS AUTO W/O SCOPE: CPT

## 2024-04-05 PROCEDURE — 700111 HCHG RX REV CODE 636 W/ 250 OVERRIDE (IP): Mod: JZ | Performed by: EMERGENCY MEDICINE

## 2024-04-05 PROCEDURE — 36415 COLL VENOUS BLD VENIPUNCTURE: CPT

## 2024-04-05 RX ORDER — METOPROLOL TARTRATE 1 MG/ML
5 INJECTION, SOLUTION INTRAVENOUS
Status: COMPLETED | OUTPATIENT
Start: 2024-04-05 | End: 2024-04-05

## 2024-04-05 RX ORDER — DILTIAZEM HYDROCHLORIDE 5 MG/ML
10 INJECTION INTRAVENOUS ONCE
Status: COMPLETED | OUTPATIENT
Start: 2024-04-05 | End: 2024-04-05

## 2024-04-05 RX ORDER — ACETAMINOPHEN 325 MG/1
650 TABLET ORAL EVERY 6 HOURS PRN
Status: DISCONTINUED | OUTPATIENT
Start: 2024-04-05 | End: 2024-04-07 | Stop reason: HOSPADM

## 2024-04-05 RX ORDER — METOPROLOL TARTRATE 50 MG/1
50 TABLET, FILM COATED ORAL 2 TIMES DAILY
Status: DISCONTINUED | OUTPATIENT
Start: 2024-04-05 | End: 2024-04-05

## 2024-04-05 RX ORDER — DILTIAZEM HYDROCHLORIDE 240 MG/1
240 CAPSULE, COATED, EXTENDED RELEASE ORAL DAILY
Qty: 30 CAPSULE | Refills: 0 | Status: SHIPPED | OUTPATIENT
Start: 2024-04-05 | End: 2024-04-06

## 2024-04-05 RX ORDER — DILTIAZEM HYDROCHLORIDE 5 MG/ML
20 INJECTION INTRAVENOUS ONCE
Status: COMPLETED | OUTPATIENT
Start: 2024-04-05 | End: 2024-04-05

## 2024-04-05 RX ORDER — ENOXAPARIN SODIUM 100 MG/ML
40 INJECTION SUBCUTANEOUS DAILY
Status: DISCONTINUED | OUTPATIENT
Start: 2024-04-06 | End: 2024-04-05

## 2024-04-05 RX ORDER — ENOXAPARIN SODIUM 100 MG/ML
40 INJECTION SUBCUTANEOUS DAILY
Status: DISCONTINUED | OUTPATIENT
Start: 2024-04-05 | End: 2024-04-05

## 2024-04-05 RX ORDER — HEPARIN SODIUM 5000 [USP'U]/ML
5000 INJECTION, SOLUTION INTRAVENOUS; SUBCUTANEOUS EVERY 8 HOURS
Status: DISCONTINUED | OUTPATIENT
Start: 2024-04-05 | End: 2024-04-05

## 2024-04-05 RX ORDER — ACETAMINOPHEN 500 MG
1000 TABLET ORAL EVERY 6 HOURS PRN
COMMUNITY

## 2024-04-05 RX ADMIN — METOPROLOL TARTRATE 5 MG: 5 INJECTION INTRAVENOUS at 14:56

## 2024-04-05 RX ADMIN — METOPROLOL TARTRATE 5 MG: 5 INJECTION INTRAVENOUS at 14:44

## 2024-04-05 RX ADMIN — DILTIAZEM HYDROCHLORIDE 60 MG: 30 TABLET, FILM COATED ORAL at 23:52

## 2024-04-05 RX ADMIN — APIXABAN 5 MG: 5 TABLET, FILM COATED ORAL at 11:14

## 2024-04-05 RX ADMIN — DILTIAZEM HYDROCHLORIDE 10 MG: 5 INJECTION INTRAVENOUS at 11:14

## 2024-04-05 RX ADMIN — DILTIAZEM HYDROCHLORIDE 20 MG: 5 INJECTION INTRAVENOUS at 12:16

## 2024-04-05 RX ADMIN — APIXABAN 5 MG: 5 TABLET, FILM COATED ORAL at 18:40

## 2024-04-05 RX ADMIN — ACETAMINOPHEN 650 MG: 325 TABLET, FILM COATED ORAL at 18:40

## 2024-04-05 RX ADMIN — METOPROLOL TARTRATE 5 MG: 5 INJECTION INTRAVENOUS at 14:05

## 2024-04-05 RX ADMIN — DILTIAZEM HYDROCHLORIDE 60 MG: 30 TABLET, FILM COATED ORAL at 15:11

## 2024-04-05 ASSESSMENT — PAIN DESCRIPTION - PAIN TYPE: TYPE: ACUTE PAIN

## 2024-04-05 ASSESSMENT — CHA2DS2 SCORE
VASCULAR DISEASE: NO
AGE 65 TO 74: YES
PRIOR STROKE OR TIA OR THROMBOEMBOLISM: NO
DIABETES: NO
CHF OR LEFT VENTRICULAR DYSFUNCTION: NO
AGE 75 OR GREATER: NO
HYPERTENSION: YES
CHA2DS2 VASC SCORE: 2
SEX: MALE

## 2024-04-05 ASSESSMENT — COGNITIVE AND FUNCTIONAL STATUS - GENERAL
DAILY ACTIVITIY SCORE: 24
SUGGESTED CMS G CODE MODIFIER MOBILITY: CH
MOBILITY SCORE: 24
SUGGESTED CMS G CODE MODIFIER DAILY ACTIVITY: CH

## 2024-04-05 ASSESSMENT — LIFESTYLE VARIABLES
EVER FELT BAD OR GUILTY ABOUT YOUR DRINKING: NO
TOTAL SCORE: 0
ON A TYPICAL DAY WHEN YOU DRINK ALCOHOL HOW MANY DRINKS DO YOU HAVE: 4
DOES PATIENT WANT TO STOP DRINKING: NO
HOW MANY TIMES IN THE PAST YEAR HAVE YOU HAD 5 OR MORE DRINKS IN A DAY: 365
HAVE YOU EVER FELT YOU SHOULD CUT DOWN ON YOUR DRINKING: NO
HAVE PEOPLE ANNOYED YOU BY CRITICIZING YOUR DRINKING: NO
TOTAL SCORE: 0
TOTAL SCORE: 0
AVERAGE NUMBER OF DAYS PER WEEK YOU HAVE A DRINK CONTAINING ALCOHOL: 7
ALCOHOL_USE: YES
EVER HAD A DRINK FIRST THING IN THE MORNING TO STEADY YOUR NERVES TO GET RID OF A HANGOVER: NO
CONSUMPTION TOTAL: POSITIVE

## 2024-04-05 ASSESSMENT — FIBROSIS 4 INDEX
FIB4 SCORE: 2.52
FIB4 SCORE: 1.01

## 2024-04-05 ASSESSMENT — PAIN DESCRIPTION - DESCRIPTORS: DESCRIPTORS: BURNING

## 2024-04-05 NOTE — ASSESSMENT & PLAN NOTE
New, on 2L in ED. BNP elevated, CXR with concern for edema/infection. ?Pulmonary edema, heart failure, or secondary to arrythmia.   -Provide supplemental O2 to keep sats >88% while asleep and >90% while awake  -TTE wnl

## 2024-04-05 NOTE — ASSESSMENT & PLAN NOTE
History of same, on diltiazem and Eliquis however has been non-compliant with medications x1 month.   EKG in ED showed atrial tachycardia at 150.   S/p 1 dose of Diltiazem 10mg and Diltiazem 20mg in ED without change.   -Cardiology consult placed, appreciate recommendation to continue medical therapy for now per Dr. Hogan  -UDS negative, diagnostic alcohol negative   -Magnesium, UA, TSH, lipid panel wnl  -A1c 5.8     Plan:  - Telemetry  - Diltiazem 60mg q6h  - Metoprolol 5 mg IV pushes x 3 as needed for heart rate greater than 110  - Eliquis 5 mg twice daily  - IV Amiodarone drip  - Appreciate cardiology recommendations

## 2024-04-05 NOTE — ASSESSMENT & PLAN NOTE
Last reported drink 3/24/2024.  History of alcohol abuse, drinks one half a pint of vodka daily.  Quit cold turkey, had shakes.  No history of seizures in the past.  Does not appear to be in active withdrawal on examination.  -UDS negative, diagnostic alcohol negative   - Neurochecks; will defer CIWA at this time

## 2024-04-05 NOTE — ED NOTES
Med Rec complete per patient   Allergies reviewed  Antibiotics in the past 30 days:no  Anticoagulant in past 14 days:no  Pharmacy patient utilizes:[facility]    Pt states it's been a month and a half since he last took his prescribed medications. States he misplaced bottles and unable to find.     Per historical chart review pt was prescribed Eliquis 5 mg BID and Diltiazem 240 mg QD on 01/04/2024 through [facility] last encounter

## 2024-04-05 NOTE — H&P
"[facility] MEDICINE H&P        PATIENT ID:  NAME:  Jaime Rangel  MRN:               [id number]  YOB: 1950    Date of Admission: 4/5/2024     Attending: Beatriz Pineda M.d.    Resident: Britney Cr D.O.    Primary Care Physician:  Pcp Pt States None    CC:  Shortness of breath     HPI: Jaime Rangel is a 73 y.o. male with a history of alcohol abuse and atrial fibrillation who presented with complaints of shortness of breath upon waking this morning. Patient is a poor historian. He states that he woke up feeling short of breath this morning which he has never experienced in the past. It was associated with a dry cough and mild chest pain across the lower chest wall. It does not radiate. He currently has no chest pain and or other symptoms, is asymptomatic. He reports associated mild right sided headache but denies any vision changes, weakness, fever/diaphoresis, jaw pain, back pain, abdominal pain, calf pain or leg swelling, stool changes, or palpitations. No recent illness.     According to chart review, patient was seen at ED on 1/30/24 for alcohol intoxication. He was observed and requested to leave. Seen on 1/19/24 with blood alcohol of 339. August 2023 was admitted for alcohol withdrawal also found to be in atrial flutter with RVR. He required a diltiazem drip for rate control and was admitted to the ICU.  On 9/1/2023 he then underwent BENJA/cardioversion. At that time he was prescribed Eliquis and Diltiazem.     Patient states that he ran out of his medications 1 month ago and has not been taking them since then.  He takes no other medications except for tylenol up to 1g daily. His last drink was on 3/24.  He usually drinks half a pint of vodka per day.  He stopped cold turkey and experienced shakes but had no seizures. States that he stopped drinking because his sister told him that he was going to \"kill himself from drinking\".     ER Course:  In the ED, vitals revealed a " temperature of 99F, HR in the 140s, RR 20, /100, satting 96% on RA. Labs including a CBC and CMP were unremarkable. BNP was 725. Troponin was slightly elevated at 20. EKG showed atrial tachycardia at 150. CXR showed patchy bilateral interstitial opacities, more prominent in the mid and lower lung zones which could not indicate edema and/or infection.     Patient was administered 1 oral dose of Eliquis 5mg. He was administered one dose of IV Diltiazem 10mg, followed by IV Diltiazem 20mg. HR remained 140s, BP 130s/90s-100s.    Patient was started on Metoprolol pushes, admitted for telemetry monitoring and management of atrial tachycardia.     REVIEW OF SYSTEMS:   Ten systems reviewed and were negative except as noted in the HPI.                PAST MEDICAL HISTORY:  Past Medical History:   Diagnosis Date    Alcoholism (HCC)        PAST SURGICAL HISTORY:  History reviewed. No pertinent surgical history.    FAMILY HISTORY:  History reviewed. No pertinent family history.    SOCIAL HISTORY:   Pt lives in [place]   Smoking denies   Etoh use previous; 1/2 pint of vodka daily; last reported drink 3/24   Drug use denies     DIET:   Orders Placed This Encounter   Procedures    Diet NPO Restrict to: Sips with Medications     Standing Status:   Standing     Number of Occurrences:   1     Order Specific Question:   Diet NPO Restrict to:     Answer:   Sips with Medications [3]       ALLERGIES:  No Known Allergies    OUTPATIENT MEDICATIONS:    Current Facility-Administered Medications:     acetaminophen (Tylenol) tablet 650 mg, 650 mg, Oral, Q6HRS PRN, Beatriz Pineda M.D.    Metoprolol Tartrate (Lopressor) injection 5 mg, 5 mg, Intravenous, Q5 MIN PRN, Joeyssa JORY Cr, D.O., 5 mg at 04/05/24 1444    apixaban (Eliquis) tablet 5 mg, 5 mg, Oral, BID, Britney Cr, D.O.    dilTIAZem (Cardizem) tablet 60 mg, 60 mg, Oral, Q6HRS, Britney Cr, D.O.    Current Outpatient Medications:     acetaminophen (TYLENOL) 500  MG Tab, Take 1,000 mg by mouth every 6 hours as needed for Mild Pain. 1,000 mg = 2 tabs, Disp: , Rfl:     ASPIRIN PO, Take 2 Tablets by mouth every day., Disp: , Rfl:     apixaban (ELIQUIS) 5mg Tab, Take 1 Tablet by mouth 2 times a day., Disp: 60 Tablet, Rfl: 0    dilTIAZem CD (CARDIZEM CD) 240 MG CAPSULE SR 24 HR, Take 1 Capsule by mouth every day., Disp: 30 Capsule, Rfl: 0    PHYSICAL EXAM:  Vitals:    24 1230 24 1300 24 1330 24 1400   BP: 133/73 136/70 (!) 138/97 (!) 145/94   Pulse: (!) 132 96 (!) 143 (!) 143   Resp: (!) 33 20 16 16   Temp:       TempSrc:       SpO2: 95% 95% 96% 96%   Weight:       , Temp (24hrs), Av.2 °C (99 °F), Min:37.2 °C (99 °F), Max:37.2 °C (99 °F)  , Pulse Oximetry: 96 %    General: Pt resting in NAD, cooperative, no distress   Skin:  Pink, warm and dry.  No rashes, warm and well perfused   HEENT: NC/AT. PERRL. EOMI. Corneal arcus bilaterally. MMM. No nasal discharge. Oropharynx nonerythematous without exudate/plaques  Neck:  Supple without lymphadenopathy or rigidity.   Lungs:  Symmetrical.  CTAB with no W/R/R.  Good air movement   Cardiovascular:  Irregular rhythm, fast rate (130s-140s) without M/R/G.  Abdomen:  Abdomen is soft NT/ND. +BS. No masses noted. No HPSM  Extremities:  Full range of motion. No gross deformities noted. 2+ pulses in all extremities. No C/C/E. No calf tenderness or edema    Spine:  Straight without vertebral anomalies.  CNS:  Muscle tone is normal. Cranial nerves II-XII grossly intact. 2+ DTRs.         LAB TESTS:   Recent Labs     24  1119   WBC 7.8   RBC 3.84*   HEMOGLOBIN 11.5*   HEMATOCRIT 34.6*   MCV 90.1   MCH 29.9   RDW 49.9   PLATELETCT 363   MPV 9.3   NEUTSPOLYS 62.60   LYMPHOCYTES 21.90*   MONOCYTES 11.70   EOSINOPHILS 2.60   BASOPHILS 0.90         Recent Labs     24  1119 24  1332   SODIUM 134*  --    POTASSIUM 4.4  --    CHLORIDE 101  --    CO2 21  --    BUN 13  --    CREATININE 0.68  --    CALCIUM 8.8  --     MAGNESIUM  --  2.2   ALBUMIN 3.8  --        CULTURES:   Results       Procedure Component Value Units Date/Time    URINALYSIS [[id number]] Collected: 04/05/24 1329    Order Status: Completed Specimen: Urine, Clean Catch Updated: 04/05/24 1359     Color Yellow     Character Clear     Specific Gravity 1.015     Ph 7.5     Glucose Negative mg/dL      Ketones Negative mg/dL      Protein Negative mg/dL      Bilirubin Negative     Urobilinogen, Urine 0.2     Nitrite Negative     Leukocyte Esterase Negative     Occult Blood Negative     Micro Urine Req see below     Comment: Microscopic examination not performed when specimen is clear  and chemically negative for protein, blood, leukocyte esterase  and nitrite.                 IMAGES:  DX-CHEST-PORTABLE (1 VIEW)   Final Result      Patchy bilateral interstitial opacities, more prominent in the mid and lower lung zones which could indicate edema and/or infection.      EC-ECHOCARDIOGRAM COMPLETE W/O CONT    (Results Pending)       CONSULTS:   Cardiology     ASSESSMENT/PLAN: 73 y.o. male admitted for atrial tachycardia.    * Atrial tachycardia (HCC)- (present on admission)  Assessment & Plan  History of same, on diltiazem and Eliquis however has been non-compliant with medications x1 month.   EKG in ED showed atrial tachycardia at 150.   S/p 1 dose of Diltiazem 10mg and Diltiazem 20mg in ED without change.   -Cardiology consult placed, appreciate recommendation to continue medical therapy for now per Dr. Hogan  -UDS negative, diagnostic alcohol negative     Plan:  - Admit to telemetry  - Restart home diltiazem 240mg daily (60mg q6h)  - Metoprolol 5 mg IV pushes x 3 as needed for heart rate greater than 120  - Restart Eliquis 5 mg twice daily  - Labs: Check lipid panel, magnesium, UA, TSH, A1c      Elevated brain natriuretic peptide (BNP) level- (present on admission)  Assessment & Plan  BNP 700s on admission, no prior for comparison. Troponin stable at 20, EKG non-ischemic.  Does not appear fluid overloaded on clinical examination; CXR shows patchy b/l interstitial opacities greater in the mid and lower lung zones suggesting edema or infection.   -Provide supplemental O2 to keep sats >88% while asleep and >90% while awake      Hypoxemia  Assessment & Plan  New, on 2L in ED. BNP elevated, CXR with concern for edema/infection. ?Pulmonary edema, heart failure, or secondary to arrythmia.   -Provide supplemental O2 to keep sats >88% while asleep and >90% while awake  -TTE ordered, pending     Alcohol abuse- (present on admission)  Assessment & Plan  Last reported drink 3/24/2024.  History of alcohol abuse, drinks one half a pint of vodka daily.  Quit cold turkey, had shakes.  No history of seizures in the past.  Does not appear to be in active withdrawal on examination.  -UDS negative, diagnostic alcohol negative   - Neurochecks every 4 hours, will defer CIWA at this time    Prolonged Q-T interval on ECG- (present on admission)  Assessment & Plan  Prolonged Qtc 585 in ED.   -Avoid QT prolonging medications         Core Measures:  Fluids: PO     Lines: PIV   Abx: None   Diet: Cardiac   PPX:  Eliquis     CODE STATUS:  Full code    DISPO:  Inpatient for telemetry monitoring and cardiac work up    Britney Cr D.O. PGY-1  [facility]

## 2024-04-05 NOTE — ED PROVIDER NOTES
ER Provider Note    Scribed for Toño Scott M.D. by Samantha Mackenzie. 4/5/2024   10:24 AM    Primary Care Provider: None noted    CHIEF COMPLAINT  Chief Complaint   Patient presents with    Chest Pain     PT reports he feels burning in his chest since this morning.  Noted to be tachycardic to 140's in triage.       EXTERNAL RECORDS REVIEWED  Outpatient Notes Patient was seen at ED on 1/30/24 for alcohol intoxication. He was observed and requested to leave. Seen on 1/19/24 with blood alcohol of 339. August 2023 was admitted for atrial flutter with RVR. He required an amiodarone drip for rate control and was admitted to the ICU.       HPI/ROS  LIMITATION TO HISTORY   Select: : None  OUTSIDE HISTORIAN(S):  None    Jaime Rangel is a 73 y.o. male who presents to the ED for evaluation of chest pain onset this morning. Per the patient's sister, he was started on blood thinner due to elevated heart rate. Patient reports he has been off his Diltiazem and Eliquis for two months due to alcoholism. Patient states his last drink was one week ago. He reports mild chest pain, describing it as burning in his chest. No known drug allergies.     PAST MEDICAL HISTORY  Past Medical History:   Diagnosis Date    Alcoholism (HCC)      SURGICAL HISTORY  History reviewed. No pertinent surgical history.    FAMILY HISTORY  History reviewed. No pertinent family history.    SOCIAL HISTORY   reports that he has never smoked. He has never used smokeless tobacco. He reports current alcohol use. He reports that he does not use drugs.    CURRENT MEDICATIONS  Previous Medications    ACETAMINOPHEN (TYLENOL) 500 MG TAB    Take 1,000 mg by mouth every 6 hours as needed for Mild Pain. 1,000 mg = 2 tabs    APIXABAN (ELIQUIS) 5MG TAB    Take 1 Tablet by mouth 2 times a day.    ASPIRIN PO    Take 2 Tablets by mouth every day.    DILTIAZEM CD (CARDIZEM CD) 240 MG CAPSULE SR 24 HR    Take 1 Capsule by mouth every day.     ALLERGIES  No Known Allergies      PHYSICAL EXAM  BP (!) 172/100   Pulse (!) 147   Temp 37.2 °C (99 °F) (Temporal)   Resp 20   Wt 67.6 kg (149 lb 0.5 oz)   SpO2 96%   BMI 24.80 kg/m²    Constitutional: Well developed, Well nourished, no acute distress, Disheveled.  HENT: Normocephalic, Atraumatic, Poor dentition  Eyes: PERRLA, EOMI, Conjunctiva normal, No discharge.   Neck: No tenderness, Supple, No stridor.   Cardiovascular: Irregular regular tachycardia heart rate, Normal rhythm.   Thorax & Lungs: Clear to auscultation bilaterally, No respiratory distress. Crackles at bilateral bases.  Abdomen: Soft, No tenderness, No masses.   Skin: Warm, Dry, No rash.    Musculoskeletal: No major deformities noted. 1+ lower extremity edema   Neurologic: Awake, alert. Moves all extremities spontaneously.  Psychiatric: Affect normal, Judgment normal, Mood normal.     DIAGNOSTIC STUDIES  Labs:   Results for orders placed or performed during the hospital encounter of 04/05/24   CBC with Differential   Result Value Ref Range    WBC 7.8 4.8 - 10.8 K/uL    RBC 3.84 (L) 4.70 - 6.10 M/uL    Hemoglobin 11.5 (L) 14.0 - 18.0 g/dL    Hematocrit 34.6 (L) 42.0 - 52.0 %    MCV 90.1 81.4 - 97.8 fL    MCH 29.9 27.0 - 33.0 pg    MCHC 33.2 32.3 - 36.5 g/dL    RDW 49.9 35.9 - 50.0 fL    Platelet Count 363 164 - 446 K/uL    MPV 9.3 9.0 - 12.9 fL    Neutrophils-Polys 62.60 44.00 - 72.00 %    Lymphocytes 21.90 (L) 22.00 - 41.00 %    Monocytes 11.70 0.00 - 13.40 %    Eosinophils 2.60 0.00 - 6.90 %    Basophils 0.90 0.00 - 1.80 %    Immature Granulocytes 0.30 0.00 - 0.90 %    Nucleated RBC 0.00 0.00 - 0.20 /100 WBC    Neutrophils (Absolute) 4.86 1.82 - 7.42 K/uL    Lymphs (Absolute) 1.70 1.00 - 4.80 K/uL    Monos (Absolute) 0.91 (H) 0.00 - 0.85 K/uL    Eos (Absolute) 0.20 0.00 - 0.51 K/uL    Baso (Absolute) 0.07 0.00 - 0.12 K/uL    Immature Granulocytes (abs) 0.02 0.00 - 0.11 K/uL    NRBC (Absolute) 0.00 K/uL   Complete Metabolic Panel (CMP)   Result Value Ref Range    Sodium  134 (L) 135 - 145 mmol/L    Potassium 4.4 3.6 - 5.5 mmol/L    Chloride 101 96 - 112 mmol/L    Co2 21 20 - 33 mmol/L    Anion Gap 12.0 7.0 - 16.0    Glucose 80 65 - 99 mg/dL    Bun 13 8 - 22 mg/dL    Creatinine 0.68 0.50 - 1.40 mg/dL    Calcium 8.8 8.5 - 10.5 mg/dL    Correct Calcium 9.0 8.5 - 10.5 mg/dL    AST(SGOT) 31 12 - 45 U/L    ALT(SGPT) 39 2 - 50 U/L    Alkaline Phosphatase 174 (H) 30 - 99 U/L    Total Bilirubin 0.4 0.1 - 1.5 mg/dL    Albumin 3.8 3.2 - 4.9 g/dL    Total Protein 7.5 6.0 - 8.2 g/dL    Globulin 3.7 (H) 1.9 - 3.5 g/dL    A-G Ratio 1.0 g/dL   Troponins NOW   Result Value Ref Range    Troponin T 20 (H) 6 - 19 ng/L   proBrain Natriuretic Peptide, NT   Result Value Ref Range    NT-proBNP 725 (H) 0 - 125 pg/mL   ESTIMATED GFR   Result Value Ref Range    GFR (CKD-EPI) 97 >60 mL/min/1.73 m 2   EKG   Result Value Ref Range    Report       [facility].    Test Date:  2024  Pt Name:    JENNIFER PAINTING            Department: ER  MRN:        [id number]                      Room:  Gender:     Male                         Technician: [id number]  :        1950                   Requested By:ER TRIAGE PROTOCOL  Order #:    [id number]                    Reading MD: ESTELLE GIBSON MD    Measurements  Intervals                                Axis  Rate:       146                          P:          216  ID:         [id number]                          QRS:        -2  QRSD:       148                          T:          -52  QT:         375  QTc:        585    Interpretive Statements  Ectopic atrial tachycardia, unifocal  IVCD, consider atypical RBBB  Compared to ECG 2023 12:33:41  Sinus rhythm no longer present  Ventricular premature complex(es) no longer present  Electronically Signed On 2024 12:02:38 PDT by ESTELLE GIBSON MD       Radiology:   This attending emergency physician has independently interpreted the diagnostic imaging associated with this visit  and is awaiting the final reading from the radiologist.   Preliminary interpretation is a follows: .NEXRESP   Radiologist interpretation:   DX-CHEST-PORTABLE (1 VIEW)   Final Result      Patchy bilateral interstitial opacities, more prominent in the mid and lower lung zones which could indicate edema and/or infection.         COURSE & MEDICAL DECISION MAKING     Chest Pain: Pt risk-stratified as high risk for MACE in the next 6 weeks by low HEART Score (0-3): 4    HISTORY  Highly suspicious +2  Moderately suspicious +1  Slightly suspicious 0    EKG  Significant ST depression +2  Nonspecific repolarization disturbance +1  Normal 0    AGE  ? 65 +2  45-65 +1  < 45 0    RISK FACTORS  Hypercholesterolemia, HTN, DM, Cigarette smoking, positive family history, obesity  ? 3 risk factors or history of atherosclerotic disease +2  1-2 risk factors +1  No risk factors known 0    TROPONIN  ? 3× normal limit +2  1-3× normal limit +1  ? normal limit 0    INITIAL ASSESSMENT, COURSE AND PLAN  Differential diagnoses include but not limited to: medical non compliance, atrial fibrillation with RVR, pulmonary edema      Care Narrative: Patient with A-fib versus a flutter with RVR, coming in with tachycardia, shortness of breath, pulmonary edema, peripheral edema.  I originally give the patient 10 mg of diltiazem which did not improve the symptoms, got another 20 mg of diltiazem with improved the patient's rate, it appears to be atrial flutter.  Given the patient anticoagulation.  Discussed case with [facility] for hospitalization.    CRITICAL CARE  The very real possibilty of a deterioration of this patient's condition required the highest level of my preparedness for sudden, emergent intervention.  I provided critical care services, which included medication orders, frequent reevaluations of the patient's condition and response to treatment, ordering and reviewing test results, and discussing the case with various consultants.   The critical care time associated with the care of the patient was 33 minutes. Review chart for interventions. This time is exclusive of any other billable procedures.      10:24 AM - Patient was evaluated at bedside. Ordered for DX chest, CBC w diff, CMP, troponin, PNP and EKG to evaluate. The patient will be medicated with Eliquis 5 mg and Cardizem 10 mg for his symptoms. Discussed with patient plan for hospitalization. Patient verbalizes understanding and support with my plan of care.     10:38 AM - Spoke with Pharmacy to discuss plan for medication.     12:02 PM - Treated patient with additional 20 mg of Cardizem.     12:14 PM - Paged Hospitalist.     12:30 PM - Hospitalist responded. I discussed the patient's case and the above findings with [facility] who agrees to evaluate the patient for hospitalization.     DISPOSITION AND DISCUSSIONS    I have discussed management of the patient with the following physicians and ZACH's: [facility]    Discussion of management with other Q or appropriate source(s): Pharmacy discuss medication management      DISPOSITION:  Patient will be hospitalized by [facility] in guarded condition.    FINAL DIAGNOSIS  1. Atrial tachycardia (HCC)    2. Acute pulmonary edema (HCC)       Samantha KING (Eve), am scribing for, and in the presence of, Toño Scott M.D..    Electronically signed by: Samantha Mackenzie (Eve), 4/5/2024    Toño KING M.D. personally performed the services described in this documentation, as scribed by Samantha Mackenzie in my presence, and it is both accurate and complete.      The note accurately reflects work and decisions made by me.  Toño Scott M.D.  4/5/2024  3:59 PM

## 2024-04-05 NOTE — ED TRIAGE NOTES
Chief Complaint   Patient presents with    Chest Pain     PT reports he feels burning in his chest since this morning.  Noted to be tachycardic to 140's in triage.       Pt arrives with brother in law.  HE reports he has been off his diltiazem and elequis for 2 months due to alcoholism.  Last drink was 1 week ago.        Pt is uncertain why he is taking blood thinner.  Per his sister, he was started on blood thinner due to elevated HR about 2 months ago at [facility].

## 2024-04-05 NOTE — CONSULTS
Cardiology Initial Consultation    Date of Service  4/5/2024    Referring Physician  Beatriz Pineda M.D.    Reason for Consultation  Atrial flutter.    History of Presenting Illness  Jaime Rangel is a 73 y.o. male with a past medical history of atrial flutter on chronic anticoagulation therapy who presented 4/5/2024 with alcohol intoxication.     Review of Systems  Review of Systems   Unable to perform ROS: Other       Past Medical History   has a past medical history of Alcoholism (HCC).    Surgical History   has no past surgical history on file.    Family History  family history is not on file.    Social History   reports that he has never smoked. He has never used smokeless tobacco. He reports current alcohol use. He reports that he does not use drugs.    Medications  Prior to Admission Medications   Prescriptions Last Dose Informant Patient Reported? Taking?   ASPIRIN PO 4/5/2024 at AM Patient Yes Yes   Sig: Take 2 Tablets by mouth every day.   acetaminophen (TYLENOL) 500 MG Tab 4/5/2024 at AM Patient Yes Yes   Sig: Take 1,000 mg by mouth every 6 hours as needed for Mild Pain. 1,000 mg = 2 tabs   apixaban (ELIQUIS) 5mg Tab > 1.5 MONTHS AGO at [facility] Historical No No   Sig: Take 1 Tablet by mouth 2 times a day.   dilTIAZem CD (CARDIZEM CD) 240 MG CAPSULE SR 24 HR > 1.5 MONTHS AGO at [facility] Historical, Patient No No   Sig: Take 1 Capsule by mouth every day.      Facility-Administered Medications: None       Allergies  No Known Allergies    Vital signs in last 24 hours  Temp:  [37.2 °C (99 °F)] 37.2 °C (99 °F)  Pulse:  [] 96  Resp:  [12-33] 20  BP: (126-172)/() 136/70  SpO2:  [88 %-96 %] 95 %    Physical Exam  Physical Exam  Constitutional:       Appearance: Normal appearance. He is well-developed and normal weight. He is ill-appearing.   HENT:      Head: Normocephalic and atraumatic.      Mouth/Throat:      Mouth: Mucous membranes are moist.   Eyes:      Extraocular Movements: Extraocular movements  intact.      Conjunctiva/sclera: Conjunctivae normal.   Cardiovascular:      Rate and Rhythm: Regular rhythm. Tachycardia present.      Pulses: Normal pulses.      Heart sounds: Normal heart sounds.   Pulmonary:      Effort: Pulmonary effort is normal.      Breath sounds: Normal breath sounds.   Abdominal:      General: Bowel sounds are normal.      Palpations: Abdomen is soft.   Musculoskeletal:         General: Normal range of motion.      Cervical back: Normal range of motion and neck supple.   Skin:     General: Skin is warm and dry.   Neurological:      General: No focal deficit present.      Mental Status: He is oriented to person, place, and time. Mental status is at baseline.   Psychiatric:         Mood and Affect: Mood normal.         Behavior: Behavior normal.         Thought Content: Thought content normal.         Judgment: Judgment normal.         Lab Review  Lab Results   Component Value Date/Time    WBC 7.8 04/05/2024 11:19 AM    RBC 3.84 (L) 04/05/2024 11:19 AM    HEMOGLOBIN 11.5 (L) 04/05/2024 11:19 AM    HEMATOCRIT 34.6 (L) 04/05/2024 11:19 AM    MCV 90.1 04/05/2024 11:19 AM    MCH 29.9 04/05/2024 11:19 AM    MCHC 33.2 04/05/2024 11:19 AM    MPV 9.3 04/05/2024 11:19 AM      Lab Results   Component Value Date/Time    SODIUM 134 (L) 04/05/2024 11:19 AM    POTASSIUM 4.4 04/05/2024 11:19 AM    CHLORIDE 101 04/05/2024 11:19 AM    CO2 21 04/05/2024 11:19 AM    GLUCOSE 80 04/05/2024 11:19 AM    BUN 13 04/05/2024 11:19 AM    CREATININE 0.68 04/05/2024 11:19 AM      Lab Results   Component Value Date/Time    ASTSGOT 31 04/05/2024 11:19 AM    ALTSGPT 39 04/05/2024 11:19 AM     Lab Results   Component Value Date/Time    TROPONINT 20 (H) 04/05/2024 11:19 AM       Recent Labs     04/05/24  1119   NTPROBNP 725*   (Above labs reviewed.)     Cardiac Imaging and Procedures Review    CARDIAC STUDIES/PROCEDURES:    ECHOCARDIOGRAM CONCLUSIONS (08/30/23)  No prior study is available for comparison.   Normal left  ventricular chamber size.  The left ventricular ejection fraction is visually estimated to be 60%.  Normal inferior vena cava size and inspiratory collapse.  Right ventricular systolic pressure is estimated to be 27 mmHg.  (study result reviewed)     EKG performed on (04/04/24) was reviewed: EKG personally interpreted shows atrial flutter.     Assessment/Plan  Atrial flutter: He is a 73 y.o. male with a past medical history of atrial flutter on chronic anticoagulation therapy who presented 4/5/2024 with alcohol intoxication. We will continue with medical therapy only.        Thank you for allowing me to participate in the care of this patient.    Cardiology will sign off on this patient    Please contact me with any questions.    Lambert Hogan M.D.   Cardiologist, [facility]  [phone]

## 2024-04-05 NOTE — ASSESSMENT & PLAN NOTE
BNP 700s on admission, no prior for comparison. Troponin stable at 20, EKG non-ischemic. Does not appear fluid overloaded on clinical examination; CXR shows patchy b/l interstitial opacities greater in the mid and lower lung zones suggesting edema or infection.     TTE:   EF 55%, unchanged from 08/30/2024  The left atrium is mildly enlarged.  Enlarged right atrium.  Moderate tricuspid regurgitation.  Estimated right ventricular systolic pressure is 48 mmHg.  Mild to moderate mitral regurgitation.

## 2024-04-06 ENCOUNTER — APPOINTMENT (OUTPATIENT)
Dept: CARDIOLOGY | Facility: MEDICAL CENTER | Age: 74
End: 2024-04-06

## 2024-04-06 PROBLEM — R09.02 HYPOXEMIA: Status: RESOLVED | Noted: 2024-04-05 | Resolved: 2024-04-06

## 2024-04-06 LAB
ALBUMIN SERPL BCP-MCNC: 3.8 G/DL (ref 3.2–4.9)
ALBUMIN/GLOB SERPL: 1 G/DL
ALP SERPL-CCNC: 155 U/L (ref 30–99)
ALT SERPL-CCNC: 37 U/L (ref 2–50)
ANION GAP SERPL CALC-SCNC: 11 MMOL/L (ref 7–16)
AST SERPL-CCNC: 29 U/L (ref 12–45)
BASOPHILS # BLD AUTO: 0.6 % (ref 0–1.8)
BASOPHILS # BLD: 0.05 K/UL (ref 0–0.12)
BILIRUB SERPL-MCNC: 0.5 MG/DL (ref 0.1–1.5)
BUN SERPL-MCNC: 12 MG/DL (ref 8–22)
CALCIUM ALBUM COR SERPL-MCNC: 9 MG/DL (ref 8.5–10.5)
CALCIUM SERPL-MCNC: 8.8 MG/DL (ref 8.5–10.5)
CHLORIDE SERPL-SCNC: 100 MMOL/L (ref 96–112)
CHOLEST SERPL-MCNC: 145 MG/DL (ref 100–199)
CO2 SERPL-SCNC: 21 MMOL/L (ref 20–33)
CREAT SERPL-MCNC: 0.71 MG/DL (ref 0.5–1.4)
EOSINOPHIL # BLD AUTO: 0.27 K/UL (ref 0–0.51)
EOSINOPHIL NFR BLD: 3.3 % (ref 0–6.9)
ERYTHROCYTE [DISTWIDTH] IN BLOOD BY AUTOMATED COUNT: 48.6 FL (ref 35.9–50)
GFR SERPLBLD CREATININE-BSD FMLA CKD-EPI: 96 ML/MIN/1.73 M 2
GLOBULIN SER CALC-MCNC: 3.7 G/DL (ref 1.9–3.5)
GLUCOSE SERPL-MCNC: 107 MG/DL (ref 65–99)
HCT VFR BLD AUTO: 36.7 % (ref 42–52)
HDLC SERPL-MCNC: 49 MG/DL
HGB BLD-MCNC: 12.2 G/DL (ref 14–18)
IMM GRANULOCYTES # BLD AUTO: 0.02 K/UL (ref 0–0.11)
IMM GRANULOCYTES NFR BLD AUTO: 0.2 % (ref 0–0.9)
LDLC SERPL CALC-MCNC: 81 MG/DL
LV EJECT FRACT  99904: 55
LV EJECT FRACT MOD 2C 99903: 36.83
LV EJECT FRACT MOD 4C 99902: 40.98
LV EJECT FRACT MOD BP 99901: 37.75
LYMPHOCYTES # BLD AUTO: 1.86 K/UL (ref 1–4.8)
LYMPHOCYTES NFR BLD: 22.9 % (ref 22–41)
MCH RBC QN AUTO: 29.6 PG (ref 27–33)
MCHC RBC AUTO-ENTMCNC: 33.2 G/DL (ref 32.3–36.5)
MCV RBC AUTO: 89.1 FL (ref 81.4–97.8)
MONOCYTES # BLD AUTO: 0.76 K/UL (ref 0–0.85)
MONOCYTES NFR BLD AUTO: 9.4 % (ref 0–13.4)
NEUTROPHILS # BLD AUTO: 5.16 K/UL (ref 1.82–7.42)
NEUTROPHILS NFR BLD: 63.6 % (ref 44–72)
NRBC # BLD AUTO: 0 K/UL
NRBC BLD-RTO: 0 /100 WBC (ref 0–0.2)
PLATELET # BLD AUTO: 400 K/UL (ref 164–446)
PMV BLD AUTO: 8.9 FL (ref 9–12.9)
POTASSIUM SERPL-SCNC: 4.5 MMOL/L (ref 3.6–5.5)
PROT SERPL-MCNC: 7.5 G/DL (ref 6–8.2)
RBC # BLD AUTO: 4.12 M/UL (ref 4.7–6.1)
SODIUM SERPL-SCNC: 132 MMOL/L (ref 135–145)
TRIGL SERPL-MCNC: 77 MG/DL (ref 0–149)
WBC # BLD AUTO: 8.1 K/UL (ref 4.8–10.8)

## 2024-04-06 PROCEDURE — 700101 HCHG RX REV CODE 250

## 2024-04-06 PROCEDURE — 700111 HCHG RX REV CODE 636 W/ 250 OVERRIDE (IP): Mod: JZ

## 2024-04-06 PROCEDURE — 80053 COMPREHEN METABOLIC PANEL: CPT

## 2024-04-06 PROCEDURE — 93306 TTE W/DOPPLER COMPLETE: CPT

## 2024-04-06 PROCEDURE — 700102 HCHG RX REV CODE 250 W/ 637 OVERRIDE(OP): Performed by: FAMILY MEDICINE

## 2024-04-06 PROCEDURE — 93306 TTE W/DOPPLER COMPLETE: CPT | Mod: 26 | Performed by: INTERNAL MEDICINE

## 2024-04-06 PROCEDURE — 80061 LIPID PANEL: CPT

## 2024-04-06 PROCEDURE — 99232 SBSQ HOSP IP/OBS MODERATE 35: CPT | Mod: GC | Performed by: HOSPITALIST

## 2024-04-06 PROCEDURE — 96375 TX/PRO/DX INJ NEW DRUG ADDON: CPT

## 2024-04-06 PROCEDURE — 700105 HCHG RX REV CODE 258

## 2024-04-06 PROCEDURE — RXMED WILLOW AMBULATORY MEDICATION CHARGE

## 2024-04-06 PROCEDURE — 96374 THER/PROPH/DIAG INJ IV PUSH: CPT

## 2024-04-06 PROCEDURE — A9270 NON-COVERED ITEM OR SERVICE: HCPCS

## 2024-04-06 PROCEDURE — 85025 COMPLETE CBC W/AUTO DIFF WBC: CPT

## 2024-04-06 PROCEDURE — 700102 HCHG RX REV CODE 250 W/ 637 OVERRIDE(OP)

## 2024-04-06 PROCEDURE — 36415 COLL VENOUS BLD VENIPUNCTURE: CPT

## 2024-04-06 PROCEDURE — 94760 N-INVAS EAR/PLS OXIMETRY 1: CPT

## 2024-04-06 PROCEDURE — 770020 HCHG ROOM/CARE - TELE (206)

## 2024-04-06 PROCEDURE — 99285 EMERGENCY DEPT VISIT HI MDM: CPT

## 2024-04-06 PROCEDURE — A9270 NON-COVERED ITEM OR SERVICE: HCPCS | Performed by: FAMILY MEDICINE

## 2024-04-06 RX ORDER — DILTIAZEM HYDROCHLORIDE 240 MG/1
240 CAPSULE, COATED, EXTENDED RELEASE ORAL DAILY
Qty: 30 CAPSULE | Refills: 11 | Status: SHIPPED | OUTPATIENT
Start: 2024-04-06

## 2024-04-06 RX ORDER — METOPROLOL TARTRATE 1 MG/ML
5 INJECTION, SOLUTION INTRAVENOUS
Status: COMPLETED | OUTPATIENT
Start: 2024-04-06 | End: 2024-04-06

## 2024-04-06 RX ORDER — DEXTROSE MONOHYDRATE 50 MG/ML
INJECTION, SOLUTION INTRAVENOUS CONTINUOUS
Status: DISCONTINUED | OUTPATIENT
Start: 2024-04-06 | End: 2024-04-07 | Stop reason: HOSPADM

## 2024-04-06 RX ORDER — METOPROLOL TARTRATE 1 MG/ML
5 INJECTION, SOLUTION INTRAVENOUS
Status: COMPLETED | OUTPATIENT
Start: 2024-04-06 | End: 2024-04-07

## 2024-04-06 RX ORDER — DEXTROSE MONOHYDRATE 50 MG/ML
INJECTION, SOLUTION INTRAVENOUS CONTINUOUS
Status: DISCONTINUED | OUTPATIENT
Start: 2024-04-06 | End: 2024-04-06

## 2024-04-06 RX ADMIN — METOPROLOL TARTRATE 5 MG: 5 INJECTION INTRAVENOUS at 19:59

## 2024-04-06 RX ADMIN — AMIODARONE HYDROCHLORIDE 1 MG/MIN: 1.8 INJECTION, SOLUTION INTRAVENOUS at 14:31

## 2024-04-06 RX ADMIN — ACETAMINOPHEN 650 MG: 325 TABLET, FILM COATED ORAL at 05:37

## 2024-04-06 RX ADMIN — METOPROLOL TARTRATE 5 MG: 5 INJECTION INTRAVENOUS at 22:59

## 2024-04-06 RX ADMIN — ACETAMINOPHEN 650 MG: 325 TABLET, FILM COATED ORAL at 17:13

## 2024-04-06 RX ADMIN — APIXABAN 5 MG: 5 TABLET, FILM COATED ORAL at 17:13

## 2024-04-06 RX ADMIN — METOPROLOL TARTRATE 5 MG: 5 INJECTION INTRAVENOUS at 05:33

## 2024-04-06 RX ADMIN — DILTIAZEM HYDROCHLORIDE 60 MG: 30 TABLET, FILM COATED ORAL at 11:24

## 2024-04-06 RX ADMIN — AMIODARONE HYDROCHLORIDE 150 MG: 1.5 INJECTION, SOLUTION INTRAVENOUS at 14:13

## 2024-04-06 RX ADMIN — ACETAMINOPHEN 650 MG: 325 TABLET, FILM COATED ORAL at 23:09

## 2024-04-06 RX ADMIN — DILTIAZEM HYDROCHLORIDE 60 MG: 30 TABLET, FILM COATED ORAL at 05:10

## 2024-04-06 RX ADMIN — METOPROLOL TARTRATE 5 MG: 5 INJECTION INTRAVENOUS at 21:40

## 2024-04-06 RX ADMIN — DEXTROSE MONOHYDRATE: 50 INJECTION, SOLUTION INTRAVENOUS at 14:08

## 2024-04-06 RX ADMIN — AMIODARONE HYDROCHLORIDE 0.5 MG/MIN: 1.8 INJECTION, SOLUTION INTRAVENOUS at 19:43

## 2024-04-06 RX ADMIN — APIXABAN 5 MG: 5 TABLET, FILM COATED ORAL at 05:10

## 2024-04-06 ASSESSMENT — CHA2DS2 SCORE
CHF OR LEFT VENTRICULAR DYSFUNCTION: NO
SEX: MALE
AGE 75 OR GREATER: NO
AGE 65 TO 74: YES
CHA2DS2 VASC SCORE: 2
DIABETES: NO
PRIOR STROKE OR TIA OR THROMBOEMBOLISM: NO
VASCULAR DISEASE: NO
HYPERTENSION: YES

## 2024-04-06 ASSESSMENT — PATIENT HEALTH QUESTIONNAIRE - PHQ9
1. LITTLE INTEREST OR PLEASURE IN DOING THINGS: NOT AT ALL
SUM OF ALL RESPONSES TO PHQ9 QUESTIONS 1 AND 2: 0
2. FEELING DOWN, DEPRESSED, IRRITABLE, OR HOPELESS: NOT AT ALL

## 2024-04-06 ASSESSMENT — PAIN DESCRIPTION - PAIN TYPE: TYPE: ACUTE PAIN

## 2024-04-06 ASSESSMENT — FIBROSIS 4 INDEX: FIB4 SCORE: 1.01

## 2024-04-06 NOTE — PROGRESS NOTES
Patient went back into atrial flutter today. Spoke to [facility]. They will start amio IV. Can switch to oral amio tomorrow if converts. Dr Hogan will re round on patient tomorrow.

## 2024-04-06 NOTE — ED NOTES
Family on phone with pt. RN spoke with [facility] resident telling pt is possibly going to leave AMA. She will try and come to bedside.

## 2024-04-06 NOTE — CARE PLAN
The patient is Stable - Low risk of patient condition declining or worsening    Shift Goals  Clinical Goals: monitor heart R&R  Patient Goals: rest  Family Goals: updates    Progress made toward(s) clinical / shift goals:      Problem: Knowledge Deficit - Standard  Goal: Patient and family/care givers will demonstrate understanding of plan of care, disease process/condition, diagnostic tests and medications  Outcome: Progressing     Problem: Cardiac - Atrial Fibrillation  Goal: Patient will achieve & maintain adequate cardiac output and rate control  Outcome: Progressing       Patient is not progressing towards the following goals:

## 2024-04-06 NOTE — PROGRESS NOTES
Received bedside report from RN, pt care assumed, VSS, pt assessment complete. Pt AAOx4, with no complaints of pain at this time. No signs of acute distress noted at this time. Plan of care discussed with pt and verbalizes no questions. Pt denies any additional needs at this time. Bed locked/in lowest position, pt educated on fall risk and verbalized understanding, call light within reach, hourly rounding initiated.

## 2024-04-06 NOTE — PROGRESS NOTES
[facility]INE PROGRESS NOTE        Attending:   Melida Hogue M.d.    Resident:   Britney Cr D.O.    PATIENT:   Jaime Rangel; [id number]; 1950    ADMIT DATE: 4/5/2024 10:01 AM    ID:   73 y.o. male admitted for atrial flutter    INTERVAL:   Patient briefly converted to SR overnight into the 70s-90s, then back up to the 140s. He received another push of IV Metoprolol 5mg this morning and has been continued on oral Diltiazem 60mg every 6 hours. This morning HR has been in the 90s, now back to 140s. Patient asymptomatic.     SUBJECTIVE:   Patient is feeling well this morning. He denies any symptoms, had a mild headache yesterday which he has had intermittently overnight but tylenol helps. Denies chest pain, SOB, palpitations, leg swelling or calf pain. Feels hungry, requesting breakfast.     OBJECTIVE:  Vitals:    04/06/24 0510 04/06/24 0533 04/06/24 0731 04/06/24 1142   BP: (!) 170/112 (!) 140/96 107/63 127/81   Pulse: (!) 149 (!) 144 95 (!) 149   Resp:   18 18   Temp:   36.9 °C (98.5 °F) 36.9 °C (98.5 °F)   TempSrc:   Temporal Temporal   SpO2:   91% 92%   Weight:       Height:           Intake/Output Summary (Last 24 hours) at 4/6/2024 0646  Last data filed at 4/6/2024 0500  Gross per 24 hour   Intake 0 ml   Output 0 ml   Net 0 ml       PHYSICAL EXAM:  General: No acute distress, afebrile, resting comfortably  HEENT: NC/AT. EOMI.   Cardiovascular: RRR without murmurs. Normal capillary refill   Respiratory: CTAB  Abdomen: soft, nontender, nondistended, no masses  EXT:  HARRISON, no edema  Skin: No erythema/lesions   Neuro: Non-focal    LABS:  Recent Labs     04/05/24  1119 04/06/24  0630   WBC 7.8 8.1   RBC 3.84* 4.12*   HEMOGLOBIN 11.5* 12.2*   HEMATOCRIT 34.6* 36.7*   MCV 90.1 89.1   MCH 29.9 29.6   RDW 49.9 48.6   PLATELETCT 363 400   MPV 9.3 8.9*   NEUTSPOLYS 62.60 63.60   LYMPHOCYTES 21.90* 22.90   MONOCYTES 11.70 9.40   EOSINOPHILS 2.60 3.30   BASOPHILS 0.90 0.60     Recent Labs      04/05/24  1119 04/05/24  1332 04/06/24  0630   SODIUM 134*  --  132*   POTASSIUM 4.4  --  4.5   CHLORIDE 101  --  100   CO2 21  --  21   BUN 13  --  12   CREATININE 0.68  --  0.71   CALCIUM 8.8  --  8.8   MAGNESIUM  --  2.2  --    ALBUMIN 3.8  --  3.8     Estimated GFR/CRCL = Estimated Creatinine Clearance: 79.4 mL/min (by C-G formula based on SCr of 0.71 mg/dL).  Recent Labs     04/05/24  1119 04/06/24  0630   GLUCOSE 80 107*     Recent Labs     04/05/24  1119 04/06/24  0630   ASTSGOT 31 29   ALTSGPT 39 37   TBILIRUBIN 0.4 0.5   ALKPHOSPHAT 174* 155*   GLOBULIN 3.7* 3.7*   INR 1.06  --              Recent Labs     04/05/24  1119   INR 1.06   APTT 28.8         IMAGING:  EC-ECHOCARDIOGRAM COMPLETE W/O CONT         DX-CHEST-PORTABLE (1 VIEW)   Final Result      Patchy bilateral interstitial opacities, more prominent in the mid and lower lung zones which could indicate edema and/or infection.          MEDS:  Current Facility-Administered Medications   Medication Last Admin    Metoprolol Tartrate (Lopressor) injection 5 mg 5 mg at 04/06/24 0533    dextrose 5% infusion      amiodarone (Nexterone) 360 mg/200 mL infusion      Followed by    amiodarone (Nexterone) 360 mg/200 mL infusion      acetaminophen (Tylenol) tablet 650 mg 650 mg at 04/06/24 0537    apixaban (Eliquis) tablet 5 mg 5 mg at 04/06/24 0510       ASSESSMENT/PLAN:    * Atrial tachycardia (HCC)- (present on admission)  Assessment & Plan  History of same, on diltiazem and Eliquis however has been non-compliant with medications x1 month.   EKG in ED showed atrial tachycardia at 150.   S/p 1 dose of Diltiazem 10mg and Diltiazem 20mg in ED without change.   -Cardiology consult placed, appreciate recommendation to continue medical therapy for now per Dr. Hogan  -UDS negative, diagnostic alcohol negative   -Magnesium, UA, TSH, lipid panel wnl  -A1c 5.8     Plan:  - Admit to telemetry  - Restarted on diltiazem 240mg daily (60mg q6h)  - Metoprolol 5 mg IV pushes x 3  as needed for heart rate greater than 110  - Restart Eliquis 5 mg twice daily  - Will start IV amiodarone drip; cardiology to round on patient tomorrow   - TTE pending     Elevated brain natriuretic peptide (BNP) level- (present on admission)  Assessment & Plan  BNP 700s on admission, no prior for comparison. Troponin stable at 20, EKG non-ischemic. Does not appear fluid overloaded on clinical examination; CXR shows patchy b/l interstitial opacities greater in the mid and lower lung zones suggesting edema or infection.   -Provide supplemental O2 to keep sats >88% while asleep and >90% while awake  -TTE ordered, pending       Alcohol abuse- (present on admission)  Assessment & Plan  Last reported drink 3/24/2024.  History of alcohol abuse, drinks one half a pint of vodka daily.  Quit cold turkey, had shakes.  No history of seizures in the past.  Does not appear to be in active withdrawal on examination.  -UDS negative, diagnostic alcohol negative   - Neurochecks every 4 hours, will defer CIWA at this time    Prolonged Q-T interval on ECG- (present on admission)  Assessment & Plan  Prolonged Qtc 585 in ED.   -Avoid QT prolonging medications     Hypoxemia-resolved as of 4/6/2024  Assessment & Plan  New, on 2L in ED. BNP elevated, CXR with concern for edema/infection. ?Pulmonary edema, heart failure, or secondary to arrythmia.   -Provide supplemental O2 to keep sats >88% while asleep and >90% while awake  -TTE ordered, pending           Core Measures:  Fluids: PO   Lines: PIV  Abx: None  Diet: Cardiac  PPX: Eliquis   Wound: None     DISPO: Inpatient       CODE STATUS: Full code       Britney Cr D.O.  PGY-1  [facility]

## 2024-04-06 NOTE — CARE PLAN
The patient is Stable - Low risk of patient condition declining or worsening    Shift Goals  Clinical Goals: HR control  Patient Goals: rest, comfort    Progress made toward(s) clinical / shift goals:      Problem: Cardiac - Atrial Fibrillation  Goal: Patient will achieve & maintain adequate cardiac output and rate control  Outcome: Progressing     Problem: Self Care  Goal: Patient will have the ability to perform ADLs independently or with assistance (bathe, groom, dress, toilet and feed)  Outcome: Progressing       Patient is not progressing towards the following goals:      Problem: Knowledge Deficit - Standard  Goal: Patient and family/care givers will demonstrate understanding of plan of care, disease process/condition, diagnostic tests and medications  Outcome: Not Progressing     Problem: Knowledge Deficit - Atrial Fibrillation  Goal: Patient and family/care givers will demonstrate understanding of atrial fibrillation condition and follow-up  Outcome: Not Progressing     Patient is A&Ox4 and understands tests, medications, and procedures. Patient sustaining tachycardia, treated per MAR. Educated patient on importance of medication adherence. Patient refused AM morning labs, educated on importance of results for the continuum of care. Patient insisted refusal.

## 2024-04-06 NOTE — PROGRESS NOTES
4 Eyes Skin Assessment Completed by DANIEL Liang and WHITNEY Causey.    Head WDL  Ears WDL  Nose WDL  Mouth WDL  Neck WDL  Breast/Chest WDL  Shoulder Blades WDL  Spine WDL  (R) Arm/Elbow/Hand WDL  (L) Arm/Elbow/Hand WDL  Abdomen WDL  Groin WDL  Scrotum/Coccyx/Buttocks redness, blanching  (R) Leg WDL  (L) Leg WDL  (R) Heel/Foot/Toe heels are calloused, dry, flaky  (L) Heel/Foot/Toe heels are calloused, dry, flaky          Devices In Places Tele Box, Blood Pressure Cuff, and Pulse Ox      Interventions In Place Pillows and Pressure Redistribution Mattress    Possible Skin Injury No    Pictures Uploaded Into Epic N/A  Wound Consult Placed N/A  RN Wound Prevention Protocol Ordered No

## 2024-04-06 NOTE — PROGRESS NOTES
[facility] Daily Progress Note    Date of Service  4/6/2024    [facility] Team: [facility]  Attending: Dr. Mykel MD  Resident: Dr. Shaye DO    PATIENT ID:  NAME:             Jaime Rangel  MRN:               [id number]  YOB: 1950  Date of Admission: 4/5/2024     Chief Complaint  72 YO M [facility] for SOB, admitted 4/5 for SOB & Aflutter w RBBB, Tachycardia in ED    Jaime Rangel is a 73 y.o. male with a history of alcohol abuse and atrial fibrillation who presented with complaints of shortness of breath upon waking this morning. Patient is a poor historian. He states that he woke up feeling short of breath this morning which he has never experienced in the past. It was associated with a dry cough and mild chest pain across the lower chest wall. It does not radiate. He currently has no chest pain and or other symptoms, is asymptomatic. He reports associated mild right sided headache but denies any vision changes, weakness, fever/diaphoresis, jaw pain, back pain, abdominal pain, calf pain or leg swelling, stool changes, or palpitations. No recent illness.      According to chart review, patient was seen at ED on 1/30/24 for alcohol intoxication. He was observed and requested to leave. Seen on 1/19/24 with blood alcohol of 339. August 2023 was admitted for alcohol withdrawal also found to be in atrial flutter with RVR. He required a diltiazem drip for rate control and was admitted to the ICU.  On 9/1/2023 he then underwent BENJA/cardioversion. At that time he was prescribed Eliquis and Diltiazem.      Patient states that he ran out of his medications 1 month ago and has not been taking them since then.  He takes no other medications except for tylenol up to 1g daily. His last drink was on 3/24.  He usually drinks half a pint of vodka per day.  He stopped cold turkey and experienced shakes but had no seizures. States that he stopped drinking because his sister told him that he was  "going to \"kill himself from drinking\".        ER Course:  In the ED, vitals revealed a temperature of 99F, HR in the 140s, RR 20, /100, satting 96% on RA. Labs including a CBC and CMP were unremarkable. BNP was 725. Troponin was slightly elevated at 20. EKG showed atrial tachycardia at 150. CXR showed patchy bilateral interstitial opacities, more prominent in the mid and lower lung zones which could not indicate edema and/or infection.      Patient was administered 1 oral dose of Eliquis 5mg. He was administered one dose of IV Diltiazem 10mg, followed by IV Diltiazem 20mg. HR remained 140s, BP 130s/90s-100s.     Patient was started on Metoprolol pushes, admitted for telemetry monitoring and management of atrial tachycardia.       Interval Problem Update  4/6 A&Ox4. O/N Stable. Still HTNive and Tachy overnight, 140s/90s (1 episode of 171/112). Returned WNL this morning (107/63, HR 95). Does not appear in any distress. When answering questions, pt often responds not directly answering the question.   Cardiology following, recs provided, include medical management.  VSS except for O/N tachy/HTN. 95% RA. RR 18 (12-33). BM+. Void +. Ambulating.   CXR showed pulmonary edema vs infection w/ patchy interstitial infiltrates in mid/lower lobes bilat.       Assessment/Plan  62 YO M admitted for A Flutter w/ RBBB & Tachycardia   * Atrial tachycardia (HCC)- (present on admission)  Assessment & Plan  History of same, on diltiazem and Eliquis however has been non-compliant with medications x1 month.   EKG in ED showed atrial tachycardia at 150.   S/p 1 dose of Diltiazem 10mg and Diltiazem 20mg in ED without change.   -Cardiology consult placed, appreciate recommendation to continue medical therapy for now per Dr. Hogan  -UDS negative, diagnostic alcohol negative   -Magnesium, UA, TSH nl  -A1c 5.8     Plan:  - Restart home diltiazem 240mg daily (60mg q6h)  - Metoprolol 5 mg IV pushes x 3 as needed for heart rate greater than " 110  - Restart Eliquis 5 mg twice daily  - Lipid panel pending     Alcohol abuse- (present on admission)  Assessment & Plan  Last reported drink 3/24/2024.  History of alcohol abuse, drinks one half a pint of vodka daily.  Quit cold turkey, had shakes.  No history of seizures in the past.  Does not appear to be in active withdrawal on examination.  -UDS negative, diagnostic alcohol negative   - Neurochecks every 4 hours, will defer CIWA at this time    Elevated brain natriuretic peptide (BNP) level- (present on admission)  Assessment & Plan  BNP 700s on admission, no prior for comparison. Troponin stable at 20, EKG non-ischemic. Does not appear fluid overloaded on clinical examination; CXR shows patchy b/l interstitial opacities greater in the mid and lower lung zones suggesting edema or infection.   -Provide supplemental O2 to keep sats >88% while asleep and >90% while awake  -TTE ordered    Prolonged Q-T interval on ECG- (present on admission)  Assessment & Plan  Prolonged Qtc 585 in ED.   -Avoid QT prolonging medications         Will continue to follow. Thank you for allowing me to participate in their care.     Jeannine Loza, Ph.D. (MSIII)  [facility] MARINA Lopez MD/PhD Program         SmartTabs below:   Vitals:    04/06/24 0500 04/06/24 0510 04/06/24 0533 04/06/24 0731   BP: (!) 141/93 (!) 170/112 (!) 140/96 107/63   Pulse: (!) 139 (!) 149 (!) 144 95   Resp: 18   18   Temp: 37.1 °C (98.7 °F)   36.9 °C (98.5 °F)   TempSrc: Temporal   Temporal   SpO2: 95%   91%   Weight: 67.1 kg (147 lb 14.9 oz)      Height:       Temp:  [36.5 °C (97.7 °F)-37.1 °C (98.7 °F)] 36.9 °C (98.5 °F)  Pulse:  [] 95  Resp:  [12-33] 18  BP: (107-170)/() 107/63  SpO2:  [88 %-96 %] 91 %      Current Facility-Administered Medications:     Metoprolol Tartrate (Lopressor) injection 5 mg, 5 mg, Intravenous, Q5 MIN PRN, Johanna Merritt M.D., 5 mg at 04/06/24 0533    acetaminophen (Tylenol) tablet 650 mg, 650 mg, Oral, Q6HRS PRN, Beatriz Pineda,  M.D., 650 mg at 04/06/24 0537    apixaban (Eliquis) tablet 5 mg, 5 mg, Oral, BID, Nalyssa JORY Cr, D.O., 5 mg at 04/06/24 0510    dilTIAZem (Cardizem) tablet 60 mg, 60 mg, Oral, Q6HRS, Nalyssa JORY Cr, D.O., 60 mg at 04/06/24 1124     Physical Exam    Fluids  Intake/Output Summary (Last 24 hours) at 4/6/2024 1125  Last data filed at 4/6/2024 0957  Gross per 24 hour   Intake 118 ml   Output 0 ml   Net 118 ml       Laboratory  Recent Labs     04/05/24  1119 04/06/24  0630   WBC 7.8 8.1   RBC 3.84* 4.12*   HEMOGLOBIN 11.5* 12.2*   HEMATOCRIT 34.6* 36.7*   MCV 90.1 89.1   MCH 29.9 29.6   MCHC 33.2 33.2   RDW 49.9 48.6   PLATELETCT 363 400   MPV 9.3 8.9*     Recent Labs     04/05/24  1119 04/06/24  0630   SODIUM 134* 132*   POTASSIUM 4.4 4.5   CHLORIDE 101 100   CO2 21 21   GLUCOSE 80 107*   BUN 13 12   CREATININE 0.68 0.71   CALCIUM 8.8 8.8     Recent Labs     04/05/24  1119   APTT 28.8   INR 1.06         Recent Labs     04/06/24  0630   TRIGLYCERIDE 77   HDL 49   LDL 81       Imaging  EC-ECHOCARDIOGRAM COMPLETE W/O CONT         DX-CHEST-PORTABLE (1 VIEW)   Final Result      Patchy bilateral interstitial opacities, more prominent in the mid and lower lung zones which could indicate edema and/or infection.           VTE prophylaxis: therapeutic anticoagulation with Eloquis    I have performed a physical exam and reviewed and updated ROS and Plan today (4/6/2024). In review of yesterday's note (4/5/2024), there are no changes except as documented above.

## 2024-04-06 NOTE — ED NOTES
Pt refusing transfer to tele unit, stating he wants to go home.  Phone provided to pt to call his family.  Pt's family tells him they want him to stay and are not able to pick him up tonight.      This RN spoke with Cely, who will try to coordinate  for pt.  Pt adamantly refuses to stay tonight.

## 2024-04-06 NOTE — DISCHARGE SUMMARY
[facility] Discharge Summary    Attending: Melida Hogue M.d.   Senior Resident: Dr. Pollo Arthur   Intern:  Dr. Britney Cr   Contact Number: [phone]    CHIEF COMPLAINT ON ADMISSION  Chief Complaint   Patient presents with    Chest Pain     PT reports he feels burning in his chest since this morning.  Noted to be tachycardic to 140's in triage.         Reason for Admission  SOB     Admission Date  4/5/2024    CODE STATUS  Full Code    HPI & HOSPITAL COURSE  Jaime Rangel is a 73 y.o. male with a history of alcohol abuse and atrial fibrillation who presented with complaints of shortness of breath, found to be in atrial flutter with a rate in the 140s on admission.  Remote history consistent with medication noncompliance as patient had stopped taking his medications approximately 1 month ago, he is chronically on apixaban and diltiazem and states that he had run out of refills.  In terms of his alcohol abuse, he reported his last drink was 2 weeks prior.  Diagnostic alcohol on admission was negative.    He reported no other associated symptoms other than mild headache which improved with Tylenol.  In the ED, he was afebrile with a heart rate persistently in the 140s and stable blood pressure.  EKG confirmed atrial flutter at 150 bpm.  His labs are overall unremarkable except for slightly elevated BNP into the 700s.  Patient was given 1 push of IV diltiazem 10 mg without any improvement, thus a second push of 20 mg diltiazem was given, still without any improvement.  Patient was then given IV metoprolol pushes and placed on his home diltiazem 60 mg every 6 hours (he is typically on 240 mg sustained-release once daily).  His Eliquis was restarted at 5 mg twice daily.  He was placed on telemetry.  A TTE was ordered on admission secondary to concern for fluid overload given elevation in BNP, potential pulmonary edema seen on chest x-ray, and brief need for oxygen supplementation in the emergency  department.  This came back showing normal E fat 55%, unchanged from 8/2023. Patient has not required any subsequent oxygen here and does not appear to be fluid overloaded on physical examination.    Cardiology was consulted for his atrial flutter and recommended ongoing medical therapy. Patient converted briefly into the 70s to 90s on oral diltiazem with metoprolol pushes, then was back up into the 140s in atrial flutter. I spoke with Cardiology again with recommendations to start IV amiodarone drip, with improvement in HR into the 120s. Cardiology felt that he is not an appropriate candidate for ablation or cardioversion at this time as he has been off of his Eliquis for week, but that he would be stable to discharge with mild elevation in his heart rate as long as he continues to take his home Diltiazem and Eliquis twice daily.     His home diltiazem 240 mg and Eliquis were refilled at [facility].  He was given plenty of refills. He is instructed to follow up in our clinic to establish care for ongoing management. We also have an addiction medicine clinic which he would be a great candidate for given his history of alcohol abuse. Vital signs have been stable, repeat labs within normal limits.     Therefore, he is discharged in good and stable condition to home with close outpatient follow-up.    The patient recovered much more quickly than anticipated on admission.    Discharge Date  04/07/24      Physical Exam on Day of Discharge  Physical Exam  General: Pt resting in NAD, cooperative   Skin:  Pink, warm and dry.  HEENT: NC/AT. EOMI.  Lungs:  Symmetrical.  CTAB, good air movement   Cardiovascular:  S1/S2 RRR, -120s, regular   Extremities:  Full range of motion.  CNS:  Muscle tone is normal. No gross focal neurologic deficits     FOLLOW UP ITEMS POST DISCHARGE  N/a     DISCHARGE DIAGNOSES  Principal Problem:    Atrial flutter (HCC) (POA: Yes)  Active Problems:    Elevated brain natriuretic peptide (BNP) level  (POA: Yes)    Prolonged Q-T interval on ECG (POA: Yes)    Alcohol abuse (POA: Yes)  Resolved Problems:    Hypoxemia (POA: Yes)      FOLLOW UP  Britney Cr D.O.  [address] [place] [id number] [phone]    Schedule an appointment as soon as possible for a visit in 1 week(s)  To establish care    Sylvie Bess M.D.  [address] [place] [id number] [phone]    Schedule an appointment as soon as possible for a visit in 1 week(s)  To establish care - Welsh speaking      MEDICATIONS ON DISCHARGE     Medication List        CONTINUE taking these medications        Instructions   acetaminophen 500 MG Tabs  Commonly known as: Tylenol   Take 1,000 mg by mouth every 6 hours as needed for Mild Pain. 1,000 mg = 2 tabs  Dose: 1,000 mg     apixaban 5mg Tabs  Commonly known as: Eliquis   Take 1 Tablet by mouth 2 times a day.  Dose: 5 mg     ASPIRIN PO   Take 2 Tablets by mouth every day.  Dose: 2 Tablet     dilTIAZem  MG Cp24  Commonly known as: Cardizem CD   Take 1 Capsule by mouth every day.  Dose: 240 mg              Allergies  No Known Allergies    DIET  Orders Placed This Encounter   Procedures    Diet Order Diet: Regular     Standing Status:   Standing     Number of Occurrences:   1     Order Specific Question:   Diet:     Answer:   Regular [1]       ACTIVITY  As tolerated.  Weight bearing as tolerated    CONSULTATIONS  Cardiology     PROCEDURES  None     LABORATORY  Lab Results   Component Value Date    SODIUM 133 (L) 04/07/2024    POTASSIUM 4.3 04/07/2024    CHLORIDE 98 04/07/2024    CO2 22 04/07/2024    GLUCOSE 125 (H) 04/07/2024    BUN 18 04/07/2024    CREATININE 0.91 04/07/2024        Lab Results   Component Value Date    WBC 8.1 04/06/2024    HEMOGLOBIN 12.2 (L) 04/06/2024    HEMATOCRIT 36.7 (L) 04/06/2024    PLATELETCT 400 04/06/2024        Total time of the discharge process exceeds 30 minutes.      Britney Cr DO, PGY-1   [facility]

## 2024-04-06 NOTE — PROGRESS NOTES
Report received from ED RN, pt arrived to floor with belongings, pt care assumed, tele box on, monitor room notified, VSS, pt assessment complete. Pt AAOx4, no signs of distress noted at this time. POC discussed with pt and verbalizes no questions. Pt denies any additional needs at this time. Bed in lowest position, bed alarm on, pt educated on fall risk and verbalized understanding, call light within reach, hourly rounding initiated.

## 2024-04-06 NOTE — ED NOTES
Pt to T7 via ACLS tele tech. Pt has all belongings at time of transfer. No belongings left in room after transfer.

## 2024-04-07 ENCOUNTER — PHARMACY VISIT (OUTPATIENT)
Dept: PHARMACY | Facility: MEDICAL CENTER | Age: 74
End: 2024-04-07
Payer: COMMERCIAL

## 2024-04-07 VITALS
RESPIRATION RATE: 17 BRPM | DIASTOLIC BLOOD PRESSURE: 90 MMHG | SYSTOLIC BLOOD PRESSURE: 130 MMHG | WEIGHT: 147.93 LBS | TEMPERATURE: 97.7 F | OXYGEN SATURATION: 95 % | HEART RATE: 131 BPM | HEIGHT: 65 IN | BODY MASS INDEX: 24.65 KG/M2

## 2024-04-07 PROBLEM — I48.92 ATRIAL FLUTTER (HCC): Status: ACTIVE | Noted: 2024-04-05

## 2024-04-07 LAB
ANION GAP SERPL CALC-SCNC: 13 MMOL/L (ref 7–16)
BUN SERPL-MCNC: 18 MG/DL (ref 8–22)
CALCIUM SERPL-MCNC: 8.8 MG/DL (ref 8.5–10.5)
CHLORIDE SERPL-SCNC: 98 MMOL/L (ref 96–112)
CO2 SERPL-SCNC: 22 MMOL/L (ref 20–33)
CREAT SERPL-MCNC: 0.91 MG/DL (ref 0.5–1.4)
GFR SERPLBLD CREATININE-BSD FMLA CKD-EPI: 88 ML/MIN/1.73 M 2
GLUCOSE SERPL-MCNC: 125 MG/DL (ref 65–99)
POTASSIUM SERPL-SCNC: 4.3 MMOL/L (ref 3.6–5.5)
SODIUM SERPL-SCNC: 133 MMOL/L (ref 135–145)

## 2024-04-07 PROCEDURE — 700102 HCHG RX REV CODE 250 W/ 637 OVERRIDE(OP): Performed by: FAMILY MEDICINE

## 2024-04-07 PROCEDURE — A9270 NON-COVERED ITEM OR SERVICE: HCPCS

## 2024-04-07 PROCEDURE — 700101 HCHG RX REV CODE 250

## 2024-04-07 PROCEDURE — 700111 HCHG RX REV CODE 636 W/ 250 OVERRIDE (IP): Mod: JZ

## 2024-04-07 PROCEDURE — A9270 NON-COVERED ITEM OR SERVICE: HCPCS | Performed by: FAMILY MEDICINE

## 2024-04-07 PROCEDURE — 99232 SBSQ HOSP IP/OBS MODERATE 35: CPT | Performed by: INTERNAL MEDICINE

## 2024-04-07 PROCEDURE — 80048 BASIC METABOLIC PNL TOTAL CA: CPT

## 2024-04-07 PROCEDURE — 99238 HOSP IP/OBS DSCHRG MGMT 30/<: CPT | Mod: GC | Performed by: HOSPITALIST

## 2024-04-07 PROCEDURE — 36415 COLL VENOUS BLD VENIPUNCTURE: CPT

## 2024-04-07 PROCEDURE — 700102 HCHG RX REV CODE 250 W/ 637 OVERRIDE(OP)

## 2024-04-07 RX ORDER — METOPROLOL TARTRATE 1 MG/ML
5 INJECTION, SOLUTION INTRAVENOUS
Status: DISCONTINUED | OUTPATIENT
Start: 2024-04-07 | End: 2024-04-07 | Stop reason: HOSPADM

## 2024-04-07 RX ADMIN — METOPROLOL TARTRATE 5 MG: 5 INJECTION INTRAVENOUS at 01:44

## 2024-04-07 RX ADMIN — APIXABAN 5 MG: 5 TABLET, FILM COATED ORAL at 16:01

## 2024-04-07 RX ADMIN — METOPROLOL TARTRATE 5 MG: 5 INJECTION INTRAVENOUS at 02:58

## 2024-04-07 RX ADMIN — ACETAMINOPHEN 650 MG: 325 TABLET, FILM COATED ORAL at 09:49

## 2024-04-07 RX ADMIN — APIXABAN 5 MG: 5 TABLET, FILM COATED ORAL at 05:08

## 2024-04-07 RX ADMIN — DILTIAZEM HYDROCHLORIDE 60 MG: 30 TABLET, FILM COATED ORAL at 16:01

## 2024-04-07 RX ADMIN — METOPROLOL TARTRATE 5 MG: 5 INJECTION INTRAVENOUS at 05:09

## 2024-04-07 RX ADMIN — DILTIAZEM HYDROCHLORIDE 60 MG: 30 TABLET, FILM COATED ORAL at 10:58

## 2024-04-07 RX ADMIN — AMIODARONE HYDROCHLORIDE 0.5 MG/MIN: 1.8 INJECTION, SOLUTION INTRAVENOUS at 07:49

## 2024-04-07 RX ADMIN — DILTIAZEM HYDROCHLORIDE 60 MG: 30 TABLET, FILM COATED ORAL at 07:43

## 2024-04-07 ASSESSMENT — PAIN DESCRIPTION - PAIN TYPE: TYPE: ACUTE PAIN

## 2024-04-07 NOTE — CARE PLAN
Problem: Pain - Standard  Goal: Alleviation of pain or a reduction in pain to the patient’s comfort goal  Outcome: Progressing     Problem: Knowledge Deficit - Standard  Goal: Patient and family/care givers will demonstrate understanding of plan of care, disease process/condition, diagnostic tests and medications  Outcome: Progressing     Problem: Cardiac - Atrial Fibrillation  Goal: Patient will achieve & maintain adequate cardiac output and rate control  Outcome: Progressing  Goal: Complications related to anticoagulation for unconverted atrial fibrillation will be avoided or minimized  Outcome: Progressing     Problem: Mobility  Goal: Patient's ability to tolerate increased activity will improve  Outcome: Progressing     Problem: Self Care  Goal: Patient will have the ability to perform ADLs independently or with assistance (bathe, groom, dress, toilet and feed)  Outcome: Progressing     Problem: Knowledge Deficit - Atrial Fibrillation  Goal: Patient and family/care givers will demonstrate understanding of atrial fibrillation condition and follow-up  Outcome: Progressing     Problem: Fall Risk  Goal: Patient will remain free from falls  Outcome: Progressing   The patient is Stable - Low risk of patient condition declining or worsening    Shift Goals  Clinical Goals: vitals wnl, monitor afib, safety, amio drip  Patient Goals: rest, get better  Family Goals: kahlil    Progress made toward(s) clinical / shift goals:    Amio drip and metoprolol    Patient is not progressing towards the following goals:

## 2024-04-07 NOTE — PROGRESS NOTES
Pt heart rate sustaining at 128-130.  Pt given metoprolol per MD orders.  Pt asymptomatic at this time.  Amiodarone drip infusing without difficulty, pt iv site patent and intact.  Will continue to monitor.

## 2024-04-07 NOTE — PROGRESS NOTES
Monitor Summary  Rhythm: Afib/Aflutter  Rate: 120-136  Ectopy: none  Measurements: -/0.06/0.30            ---12 hr Chart Review---

## 2024-04-07 NOTE — PROGRESS NOTES
Cardiology Follow Up Progress Note    Date of Service  4/7/2024    Attending Physician  Melida Hogue M.D.    Chief Complaint   Atrial flutter, alcohol dependence presenting with intoxication, noncompliance with medications.    MADI Rangel is a 73 y.o. male admitted 4/5/2024 with above.    Interim Events  No significant changes noted from cardiac standpoint within the past 24 hours.    Review of Systems  Review of Systems   Unable to perform ROS: Other       Vital signs in last 24 hours  Temp:  [36.7 °C (98 °F)-36.9 °C (98.5 °F)] 36.7 °C (98.1 °F)  Pulse:  [] 127  Resp:  [17-19] 18  BP: (107-134)/(63-92) 133/91  SpO2:  [91 %-96 %] 94 %    Physical Exam  Physical Exam  Constitutional:       Appearance: Normal appearance. He is well-developed and normal weight.   HENT:      Head: Normocephalic and atraumatic.      Mouth/Throat:      Mouth: Mucous membranes are moist.   Eyes:      Extraocular Movements: Extraocular movements intact.      Conjunctiva/sclera: Conjunctivae normal.   Cardiovascular:      Rate and Rhythm: Tachycardia present.      Pulses: Normal pulses.      Heart sounds: Normal heart sounds.   Pulmonary:      Effort: Pulmonary effort is normal.      Breath sounds: Normal breath sounds.   Abdominal:      General: Bowel sounds are normal.      Palpations: Abdomen is soft.   Musculoskeletal:         General: Normal range of motion.      Cervical back: Normal range of motion and neck supple.   Skin:     General: Skin is warm and dry.   Neurological:      General: No focal deficit present.      Mental Status: He is alert and oriented to person, place, and time. Mental status is at baseline.   Psychiatric:         Mood and Affect: Mood normal.         Behavior: Behavior normal.         Thought Content: Thought content normal.         Judgment: Judgment normal.         Lab Review  Lab Results   Component Value Date/Time    WBC 8.1 04/06/2024 06:30 AM    RBC 4.12 (L) 04/06/2024 06:30 AM     HEMOGLOBIN 12.2 (L) 04/06/2024 06:30 AM    HEMATOCRIT 36.7 (L) 04/06/2024 06:30 AM    MCV 89.1 04/06/2024 06:30 AM    MCH 29.6 04/06/2024 06:30 AM    MCHC 33.2 04/06/2024 06:30 AM    MPV 8.9 (L) 04/06/2024 06:30 AM      Lab Results   Component Value Date/Time    SODIUM 133 (L) 04/07/2024 01:04 AM    POTASSIUM 4.3 04/07/2024 01:04 AM    CHLORIDE 98 04/07/2024 01:04 AM    CO2 22 04/07/2024 01:04 AM    GLUCOSE 125 (H) 04/07/2024 01:04 AM    BUN 18 04/07/2024 01:04 AM    CREATININE 0.91 04/07/2024 01:04 AM      Lab Results   Component Value Date/Time    ASTSGOT 29 04/06/2024 06:30 AM    ALTSGPT 37 04/06/2024 06:30 AM     Lab Results   Component Value Date/Time    CHOLSTRLTOT 145 04/06/2024 06:30 AM    LDL 81 04/06/2024 06:30 AM    HDL 49 04/06/2024 06:30 AM    TRIGLYCERIDE 77 04/06/2024 06:30 AM    TROPONINT 20 (H) 04/05/2024 01:32 PM       Recent Labs     04/05/24  1119   NTPROBNP 725*       Cardiac Imaging and Procedures Review  CARDIAC STUDIES/PROCEDURES:    ECHOCARDIOGRAM CONCLUSIONS (04/06/24)  Compared to the prior study on 8/30/23 unchanged.  The left ventricle is normal in size.  Low normal left ventricular systolic function.  The left ventricular ejection fraction is visually estimated to be 50 to 55%.  The left atrium is mildly enlarged.  Enlarged right atrium.  Moderate tricuspid regurgitation.  Estimated right ventricular systolic pressure is 48 mmHg.  Mild to moderate mitral regurgitation.  (study result reviewed)      ECHOCARDIOGRAM CONCLUSIONS (08/30/23)  No prior study is available for comparison.   Normal left ventricular chamber size.  The left ventricular ejection fraction is visually estimated to be 60%.  Normal inferior vena cava size and inspiratory collapse.  Right ventricular systolic pressure is estimated to be 27 mmHg.  (study result reviewed)      EKG performed on (04/04/24) was reviewed: EKG personally interpreted shows atrial flutter.  Assessment/Plan  Atrial flutter, alcohol dependence  presenting with intoxication, noncompliance with medication: He is not a candidate for ablation or cardioversion. Again, he needs only to be continued with Eliquis and diltiazem. He maybe discharged on these medications with mildly elevated heart rate.     Thank you for allowing me to participate in the care of this patient.  Cardiology will sign off on this patient    Please contact me with any questions.    Lambert Hogan M.D.   Cardiologist, [facility]  [phone]

## 2024-04-07 NOTE — CARE PLAN
The patient is Stable - Low risk of patient condition declining or worsening    Shift Goals  Clinical Goals: controlled heartrate, convert SR  Patient Goals: comfort  Family Goals: kahlil    Progress made toward(s) clinical / shift goals:      Patient is not progressing towards the following goals:      Problem: Pain - Standard  Goal: Alleviation of pain or a reduction in pain to the patient’s comfort goal  Outcome: Met     Problem: Knowledge Deficit - Standard  Goal: Patient and family/care givers will demonstrate understanding of plan of care, disease process/condition, diagnostic tests and medications  Outcome: Met     Problem: Cardiac - Atrial Fibrillation  Goal: Patient will achieve & maintain adequate cardiac output and rate control  Outcome: Met  Goal: Complications related to anticoagulation for unconverted atrial fibrillation will be avoided or minimized  Outcome: Met     Problem: Mobility  Goal: Patient's ability to tolerate increased activity will improve  Outcome: Met     Problem: Self Care  Goal: Patient will have the ability to perform ADLs independently or with assistance (bathe, groom, dress, toilet and feed)  Outcome: Met     Problem: Knowledge Deficit - Atrial Fibrillation  Goal: Patient and family/care givers will demonstrate understanding of atrial fibrillation condition and follow-up  Outcome: Met     Problem: Fall Risk  Goal: Patient will remain free from falls  Outcome: Met

## 2024-04-07 NOTE — PROGRESS NOTES
MD notified of pt persistent afib greater than 110 despite amiodarone drip and metoprolol 5mg iv x4 doses.  MD stated to continue to give metoprolol as ordered and to put in a new metoprolol 5mg iv order prn for heart rate greater than 110.  MD stated to make it a 5 dose order.  Pt given metoprolol 5mg iv for heart rate sustaining at 125, and new prn metoprolol order placed per telephone orders from MD.  Pt resting with eyes closed, easily aroused and pt denies any symptoms at this time.

## 2024-04-07 NOTE — DISCHARGE INSTRUCTIONS
Please take 1 tablet of Eliquis tonight. Start taking your Diltiazem tomorrow morning.   Take Diltiazem 240mg once daily, either breakfast or dinner.   Take Eliquis 5mg twice daily - at breakfast and dinner.   Please take your medications at the same time every day.     Please call [phone] for hospital follow up and to establish care with us at [facility].     Please call 911 or return to the ED if you have increased shortness of breath that does not improve, chest pain, weakness, vision changes, or any other new/concerning symptoms.

## 2024-04-07 NOTE — PROGRESS NOTES
Pt heart rate sustaining at 127.  MD notified that the metoprolol order was completed.  MD stated to reorder the metoprolol and to give one dose now.  Order is in and pt to be given metoprolol 5mg prn for heart rate of 127. Pt continues to be asymptomatic and is resting comfortably and without complaints.

## 2024-04-07 NOTE — PROGRESS NOTES
Pt heart rate sustaining at 127 consistently at this time.  Pt IV site checked and it andressa back blood and flushed without difficulty.  Second IV was placed just in case the primary IV was leaking, and infusion changed to new IV site.  Pt given metoprolol 5mg IV per MD orders.  Pt resting comfortably without complaints.

## 2024-04-07 NOTE — PROGRESS NOTES
Assumed care of pt. Report received. Pt A/Ox4.  Pt denies pain. Respirations even and unlabored.  Pt denies chest pain or racing heart feeling.  Amiodarone drip infusing.  Pt heart rate sustaining above 120, pt given metoprolol per MD orders, will continue to monitor.  Call light in reach, pt aware to call if his condition changes or for any other reason.  Bed in locked and low position.

## 2024-04-07 NOTE — PROGRESS NOTES
[facility] [place] [facility]ESS [place]        Attending:   Melida Hogue M.d.    Resident:   Britney Cr D.O.    PATIENT:   Jaime Rangel; [id number]; 1950    ADMIT DATE: 4/5/2024 10:01 AM    ID:   73 y.o. male admitted for atrial flutter    INTERVAL:   4/6: Patient briefly converted to SR overnight into the 70s-90s, then back up to the 140s. He received another push of IV Metoprolol 5mg this morning and has been continued on oral Diltiazem 60mg every 6 hours. This morning HR has been in the 90s, now back to 140s. Patient asymptomatic. Rediscussed with Cardiology, started IV amiodarone drip.   4/7: Continues to be in atrial flutter, HR improved into the 120s. BP stable.     SUBJECTIVE:   Pt eating breakfast this morning, feeling well. Had a little bit of SOB overnight and was placed on 1L oxygen by nursing for comfort. No chest pain or leg swelling. Normal appetite, urinating and stooling.     OBJECTIVE:  Vitals:    04/07/24 0144 04/07/24 0258 04/07/24 0509 04/07/24 0710   BP: 120/87 117/86 (!) 134/92 (!) 133/91   Pulse: (!) 127 (!) 125 (!) 124 (!) 127   Resp:    18   Temp:   36.8 °C (98.2 °F) 36.7 °C (98.1 °F)   TempSrc:   Temporal Temporal   SpO2:   94% 94%   Weight:       Height:           Intake/Output Summary (Last 24 hours) at 4/7/2024 0900  Last data filed at 4/7/2024 0851  Gross per 24 hour   Intake 1080 ml   Output 1250 ml   Net -170 ml           PHYSICAL EXAM:  General: No acute distress, afebrile, resting comfortably  HEENT: NC/AT. EOMI.   Cardiovascular: RRR without murmurs. Normal capillary refill   Respiratory: CTAB, good air movement, NC in place   Abdomen: soft, nontender, nondistended, no masses  EXT:  HARRISON, no edema  Skin: No erythema/lesions   Neuro: Non-focal    LABS:  Recent Labs     04/05/24  1119 04/06/24  0630   WBC 7.8 8.1   RBC 3.84* 4.12*   HEMOGLOBIN 11.5* 12.2*   HEMATOCRIT 34.6* 36.7*   MCV 90.1 89.1   MCH 29.9 29.6   RDW 49.9 48.6   PLATELETCT 363 400   MPV 9.3 8.9*    NEUTSPOLYS 62.60 63.60   LYMPHOCYTES 21.90* 22.90   MONOCYTES 11.70 9.40   EOSINOPHILS 2.60 3.30   BASOPHILS 0.90 0.60     Recent Labs     04/05/24  1119 04/05/24  1332 04/06/24  0630 04/07/24  0104   SODIUM 134*  --  132* 133*   POTASSIUM 4.4  --  4.5 4.3   CHLORIDE 101  --  100 98   CO2 21  --  21 22   BUN 13  --  12 18   CREATININE 0.68  --  0.71 0.91   CALCIUM 8.8  --  8.8 8.8   MAGNESIUM  --  2.2  --   --    ALBUMIN 3.8  --  3.8  --      Estimated GFR/CRCL = Estimated Creatinine Clearance: 62 mL/min (by C-G formula based on SCr of 0.91 mg/dL).  Recent Labs     04/05/24  1119 04/06/24  0630 04/07/24  0104   GLUCOSE 80 107* 125*     Recent Labs     04/05/24  1119 04/06/24  0630   ASTSGOT 31 29   ALTSGPT 39 37   TBILIRUBIN 0.4 0.5   ALKPHOSPHAT 174* 155*   GLOBULIN 3.7* 3.7*   INR 1.06  --              Recent Labs     04/05/24  1119   INR 1.06   APTT 28.8         IMAGING:  EC-ECHOCARDIOGRAM COMPLETE W/O CONT   Final Result      DX-CHEST-PORTABLE (1 VIEW)   Final Result      Patchy bilateral interstitial opacities, more prominent in the mid and lower lung zones which could indicate edema and/or infection.          MEDS:  Current Facility-Administered Medications   Medication Last Admin    Metoprolol Tartrate (Lopressor) injection 5 mg 5 mg at 04/07/24 0509    dilTIAZem (Cardizem) tablet 60 mg 60 mg at 04/07/24 0743    dextrose 5% infusion New Bag at 04/06/24 1408    amiodarone (Nexterone) 360 mg/200 mL infusion 0.5 mg/min at 04/07/24 0749    acetaminophen (Tylenol) tablet 650 mg 650 mg at 04/06/24 2309    apixaban (Eliquis) tablet 5 mg 5 mg at 04/07/24 0508       ASSESSMENT/PLAN:    * Atrial flutter (HCC)- (present on admission)  Assessment & Plan  History of same, on diltiazem and Eliquis however has been non-compliant with medications x1 month.   EKG in ED showed atrial tachycardia at 150.   S/p 1 dose of Diltiazem 10mg and Diltiazem 20mg in ED without change.   -Cardiology consult placed, appreciate  recommendation to continue medical therapy for now per Dr. Hogan  -UDS negative, diagnostic alcohol negative   -Magnesium, UA, TSH, lipid panel wnl  -A1c 5.8     Plan:  - Telemetry  - Diltiazem 60mg q6h  - Metoprolol 5 mg IV pushes x 3 as needed for heart rate greater than 110  - Eliquis 5 mg twice daily  - IV Amiodarone drip  - Appreciate cardiology recommendations     Elevated brain natriuretic peptide (BNP) level- (present on admission)  Assessment & Plan  BNP 700s on admission, no prior for comparison. Troponin stable at 20, EKG non-ischemic. Does not appear fluid overloaded on clinical examination; CXR shows patchy b/l interstitial opacities greater in the mid and lower lung zones suggesting edema or infection.     TTE:   EF 55%, unchanged from 08/30/2024  The left atrium is mildly enlarged.  Enlarged right atrium.  Moderate tricuspid regurgitation.  Estimated right ventricular systolic pressure is 48 mmHg.  Mild to moderate mitral regurgitation.      Alcohol abuse- (present on admission)  Assessment & Plan  Last reported drink 3/24/2024.  History of alcohol abuse, drinks one half a pint of vodka daily.  Quit cold turkey, had shakes.  No history of seizures in the past.  Does not appear to be in active withdrawal on examination.  -UDS negative, diagnostic alcohol negative   - Neurochecks; will defer CIWA at this time    Prolonged Q-T interval on ECG- (present on admission)  Assessment & Plan  Prolonged Qtc 585 in ED.   -Avoid QT prolonging medications     Hypoxemia-resolved as of 4/6/2024, (present on admission)  Assessment & Plan  New, on 2L in ED. BNP elevated, CXR with concern for edema/infection. ?Pulmonary edema, heart failure, or secondary to arrythmia.   -Provide supplemental O2 to keep sats >88% while asleep and >90% while awake  -TTE wnl          Core Measures:  Fluids: PO   Lines: PIV  Abx: None  Diet: Regular   PPX: Eliquis   Wound: None     DISPO: Inpatient       CODE STATUS: Full code        Britney Cr D.O.  PGY-1  [facility]

## 2024-04-08 ENCOUNTER — TELEPHONE (OUTPATIENT)
Dept: SCHEDULING | Facility: IMAGING CENTER | Age: 74
End: 2024-04-08

## 2025-05-23 ENCOUNTER — APPOINTMENT (OUTPATIENT)
Dept: RADIOLOGY | Facility: MEDICAL CENTER | Age: 75
DRG: 023 | End: 2025-05-23
Attending: EMERGENCY MEDICINE
Payer: OTHER MISCELLANEOUS

## 2025-05-23 ENCOUNTER — APPOINTMENT (OUTPATIENT)
Dept: RADIOLOGY | Facility: MEDICAL CENTER | Age: 75
DRG: 023 | End: 2025-05-23
Payer: OTHER MISCELLANEOUS

## 2025-05-23 ENCOUNTER — APPOINTMENT (OUTPATIENT)
Dept: CARDIOLOGY | Facility: MEDICAL CENTER | Age: 75
DRG: 023 | End: 2025-05-23
Attending: SURGERY
Payer: OTHER MISCELLANEOUS

## 2025-05-23 ENCOUNTER — APPOINTMENT (OUTPATIENT)
Dept: RADIOLOGY | Facility: MEDICAL CENTER | Age: 75
DRG: 023 | End: 2025-05-23
Attending: SURGERY
Payer: OTHER MISCELLANEOUS

## 2025-05-23 ENCOUNTER — HOSPITAL ENCOUNTER (INPATIENT)
Facility: MEDICAL CENTER | Age: 75
End: 2025-05-23
Attending: EMERGENCY MEDICINE | Admitting: SURGERY
Payer: MEDICARE

## 2025-05-23 DIAGNOSIS — I60.9 SUBARACHNOID HEMORRHAGE (HCC): Primary | ICD-10-CM

## 2025-05-23 DIAGNOSIS — S80.11XA: ICD-10-CM

## 2025-05-23 DIAGNOSIS — S06.5X0A TRAUMATIC SUBDURAL HEMATOMA WITHOUT LOSS OF CONSCIOUSNESS, INITIAL ENCOUNTER (HCC): ICD-10-CM

## 2025-05-23 DIAGNOSIS — S93.104A CLOSED DISLOCATION OF PHALANX OF RIGHT FOOT, INITIAL ENCOUNTER: ICD-10-CM

## 2025-05-23 DIAGNOSIS — E03.9 HYPOTHYROIDISM, UNSPECIFIED TYPE: ICD-10-CM

## 2025-05-23 DIAGNOSIS — I50.20 HEART FAILURE WITH REDUCED EJECTION FRACTION (HCC): ICD-10-CM

## 2025-05-23 DIAGNOSIS — I42.9 CARDIOMYOPATHY, UNSPECIFIED TYPE (HCC): ICD-10-CM

## 2025-05-23 DIAGNOSIS — F10.929 ALCOHOLIC INTOXICATION WITH COMPLICATION (HCC): ICD-10-CM

## 2025-05-23 DIAGNOSIS — I48.92 ATRIAL FLUTTER WITH RAPID VENTRICULAR RESPONSE (HCC): ICD-10-CM

## 2025-05-23 PROBLEM — S93.334A: Status: ACTIVE | Noted: 2025-05-23

## 2025-05-23 PROBLEM — F10.920 ACUTE ALCOHOLIC INTOXICATION WITHOUT COMPLICATION (HCC): Status: ACTIVE | Noted: 2025-05-23

## 2025-05-23 PROBLEM — T14.90XA TRAUMA: Status: ACTIVE | Noted: 2025-05-23

## 2025-05-23 PROBLEM — Z53.09 CONTRAINDICATION TO ANTICOAGULATION THERAPY: Status: ACTIVE | Noted: 2025-05-23

## 2025-05-23 PROBLEM — E87.6 HYPOKALEMIA: Status: ACTIVE | Noted: 2025-05-23

## 2025-05-23 PROBLEM — E83.51 HYPOCALCEMIA: Status: ACTIVE | Noted: 2025-05-23

## 2025-05-23 PROBLEM — S39.91XA BLUNT ABDOMINAL TRAUMA, INITIAL ENCOUNTER: Status: ACTIVE | Noted: 2025-05-23

## 2025-05-23 LAB
ABO + RH BLD: NORMAL
ABO + RH BLD: NORMAL
ABO GROUP BLD: NORMAL
ABO GROUP BLD: NORMAL
ALBUMIN SERPL BCP-MCNC: 3.7 G/DL (ref 3.2–4.9)
ALBUMIN SERPL BCP-MCNC: 3.7 G/DL (ref 3.2–4.9)
ALBUMIN/GLOB SERPL: 1.2 G/DL
ALBUMIN/GLOB SERPL: 1.2 G/DL
ALP SERPL-CCNC: 195 U/L (ref 30–99)
ALP SERPL-CCNC: 195 U/L (ref 30–99)
ALT SERPL-CCNC: 59 U/L (ref 2–50)
ALT SERPL-CCNC: 59 U/L (ref 2–50)
ANION GAP SERPL CALC-SCNC: 18 MMOL/L (ref 7–16)
ANION GAP SERPL CALC-SCNC: 18 MMOL/L (ref 7–16)
APTT PPP: 27.5 SEC (ref 24.7–36)
APTT PPP: 27.5 SEC (ref 24.7–36)
AST SERPL-CCNC: 125 U/L (ref 12–45)
AST SERPL-CCNC: 125 U/L (ref 12–45)
BARCODED ABORH UBTYP: 5100
BARCODED ABORH UBTYP: 5100
BARCODED PRD CODE UBPRD: NORMAL
BARCODED PRD CODE UBPRD: NORMAL
BARCODED UNIT NUM UBUNT: NORMAL
BARCODED UNIT NUM UBUNT: NORMAL
BASOPHILS # BLD AUTO: 1.8 % (ref 0–1.8)
BASOPHILS # BLD AUTO: 1.8 % (ref 0–1.8)
BASOPHILS # BLD: 0.05 K/UL (ref 0–0.12)
BASOPHILS # BLD: 0.05 K/UL (ref 0–0.12)
BILIRUB SERPL-MCNC: 0.6 MG/DL (ref 0.1–1.5)
BILIRUB SERPL-MCNC: 0.6 MG/DL (ref 0.1–1.5)
BLD GP AB SCN SERPL QL: NORMAL
BLD GP AB SCN SERPL QL: NORMAL
BUN SERPL-MCNC: 9 MG/DL (ref 8–22)
BUN SERPL-MCNC: 9 MG/DL (ref 8–22)
CALCIUM ALBUM COR SERPL-MCNC: 8.1 MG/DL (ref 8.5–10.5)
CALCIUM ALBUM COR SERPL-MCNC: 8.1 MG/DL (ref 8.5–10.5)
CALCIUM SERPL-MCNC: 7.9 MG/DL (ref 8.5–10.5)
CALCIUM SERPL-MCNC: 7.9 MG/DL (ref 8.5–10.5)
CFT BLD TEG: 5.1 MIN (ref 4.6–9.1)
CFT BLD TEG: 5.1 MIN (ref 4.6–9.1)
CFT P HPASE BLD TEG: 4.8 MIN (ref 4.3–8.3)
CFT P HPASE BLD TEG: 4.8 MIN (ref 4.3–8.3)
CHLORIDE SERPL-SCNC: 100 MMOL/L (ref 96–112)
CHLORIDE SERPL-SCNC: 100 MMOL/L (ref 96–112)
CLOT ANGLE BLD TEG: 75.3 DEGREES (ref 63–78)
CLOT ANGLE BLD TEG: 75.3 DEGREES (ref 63–78)
CLOT LYSIS 30M P MA LENFR BLD TEG: 0 % (ref 0–2.6)
CLOT LYSIS 30M P MA LENFR BLD TEG: 0 % (ref 0–2.6)
CO2 SERPL-SCNC: 22 MMOL/L (ref 20–33)
CO2 SERPL-SCNC: 22 MMOL/L (ref 20–33)
COMPONENT P 8504P: NORMAL
COMPONENT P 8504P: NORMAL
CREAT SERPL-MCNC: 0.98 MG/DL (ref 0.5–1.4)
CREAT SERPL-MCNC: 0.98 MG/DL (ref 0.5–1.4)
CT.EXTRINSIC BLD ROTEM: 1.3 MIN (ref 0.8–2.1)
CT.EXTRINSIC BLD ROTEM: 1.3 MIN (ref 0.8–2.1)
EKG IMPRESSION: NORMAL
EOSINOPHIL # BLD AUTO: 0.04 K/UL (ref 0–0.51)
EOSINOPHIL # BLD AUTO: 0.04 K/UL (ref 0–0.51)
EOSINOPHIL NFR BLD: 1.4 % (ref 0–6.9)
EOSINOPHIL NFR BLD: 1.4 % (ref 0–6.9)
ERYTHROCYTE [DISTWIDTH] IN BLOOD BY AUTOMATED COUNT: 61 FL (ref 35.9–50)
ERYTHROCYTE [DISTWIDTH] IN BLOOD BY AUTOMATED COUNT: 61 FL (ref 35.9–50)
EST. AVERAGE GLUCOSE BLD GHB EST-MCNC: 126 MG/DL
EST. AVERAGE GLUCOSE BLD GHB EST-MCNC: 126 MG/DL
ETHANOL BLD-MCNC: 212 MG/DL
ETHANOL BLD-MCNC: 212 MG/DL
GFR SERPLBLD CREATININE-BSD FMLA CKD-EPI: 80 ML/MIN/1.73 M 2
GFR SERPLBLD CREATININE-BSD FMLA CKD-EPI: 80 ML/MIN/1.73 M 2
GLOBULIN SER CALC-MCNC: 3.2 G/DL (ref 1.9–3.5)
GLOBULIN SER CALC-MCNC: 3.2 G/DL (ref 1.9–3.5)
GLUCOSE BLD STRIP.AUTO-MCNC: 121 MG/DL (ref 65–99)
GLUCOSE BLD STRIP.AUTO-MCNC: 121 MG/DL (ref 65–99)
GLUCOSE BLD STRIP.AUTO-MCNC: 130 MG/DL (ref 65–99)
GLUCOSE BLD STRIP.AUTO-MCNC: 130 MG/DL (ref 65–99)
GLUCOSE BLD STRIP.AUTO-MCNC: 133 MG/DL (ref 65–99)
GLUCOSE BLD STRIP.AUTO-MCNC: 133 MG/DL (ref 65–99)
GLUCOSE SERPL-MCNC: 117 MG/DL (ref 65–99)
GLUCOSE SERPL-MCNC: 117 MG/DL (ref 65–99)
HBA1C MFR BLD: 6 % (ref 4–5.6)
HBA1C MFR BLD: 6 % (ref 4–5.6)
HCT VFR BLD AUTO: 30.7 % (ref 42–52)
HCT VFR BLD AUTO: 30.7 % (ref 42–52)
HGB BLD-MCNC: 10 G/DL (ref 14–18)
HGB BLD-MCNC: 10 G/DL (ref 14–18)
HGB BLD-MCNC: 9.5 G/DL (ref 14–18)
HGB BLD-MCNC: 9.5 G/DL (ref 14–18)
IMM GRANULOCYTES # BLD AUTO: 0.01 K/UL (ref 0–0.11)
IMM GRANULOCYTES # BLD AUTO: 0.01 K/UL (ref 0–0.11)
IMM GRANULOCYTES NFR BLD AUTO: 0.4 % (ref 0–0.9)
IMM GRANULOCYTES NFR BLD AUTO: 0.4 % (ref 0–0.9)
INR PPP: 1.11 (ref 0.87–1.13)
INR PPP: 1.11 (ref 0.87–1.13)
LIPASE SERPL-CCNC: 66 U/L (ref 11–82)
LIPASE SERPL-CCNC: 66 U/L (ref 11–82)
LV EJECT FRACT  99904: 30
LV EJECT FRACT  99904: 30
LV EJECT FRACT MOD 2C 99903: 42.55
LV EJECT FRACT MOD 2C 99903: 42.55
LV EJECT FRACT MOD 4C 99902: 50.35
LV EJECT FRACT MOD 4C 99902: 50.35
LV EJECT FRACT MOD BP 99901: 45.59
LV EJECT FRACT MOD BP 99901: 45.59
LYMPHOCYTES # BLD AUTO: 0.67 K/UL (ref 1–4.8)
LYMPHOCYTES # BLD AUTO: 0.67 K/UL (ref 1–4.8)
LYMPHOCYTES NFR BLD: 24.1 % (ref 22–41)
LYMPHOCYTES NFR BLD: 24.1 % (ref 22–41)
MAGNESIUM SERPL-MCNC: 1.9 MG/DL (ref 1.5–2.5)
MAGNESIUM SERPL-MCNC: 1.9 MG/DL (ref 1.5–2.5)
MCF BLD TEG: 55.6 MM (ref 52–69)
MCF BLD TEG: 55.6 MM (ref 52–69)
MCF.PLATELET INHIB BLD ROTEM: 19.4 MM (ref 15–32)
MCF.PLATELET INHIB BLD ROTEM: 19.4 MM (ref 15–32)
MCH RBC QN AUTO: 29.9 PG (ref 27–33)
MCH RBC QN AUTO: 29.9 PG (ref 27–33)
MCHC RBC AUTO-ENTMCNC: 32.6 G/DL (ref 32.3–36.5)
MCHC RBC AUTO-ENTMCNC: 32.6 G/DL (ref 32.3–36.5)
MCV RBC AUTO: 91.6 FL (ref 81.4–97.8)
MCV RBC AUTO: 91.6 FL (ref 81.4–97.8)
MONOCYTES # BLD AUTO: 0.25 K/UL (ref 0–0.85)
MONOCYTES # BLD AUTO: 0.25 K/UL (ref 0–0.85)
MONOCYTES NFR BLD AUTO: 9 % (ref 0–13.4)
MONOCYTES NFR BLD AUTO: 9 % (ref 0–13.4)
NEUTROPHILS # BLD AUTO: 1.76 K/UL (ref 1.82–7.42)
NEUTROPHILS # BLD AUTO: 1.76 K/UL (ref 1.82–7.42)
NEUTROPHILS NFR BLD: 63.3 % (ref 44–72)
NEUTROPHILS NFR BLD: 63.3 % (ref 44–72)
NRBC # BLD AUTO: 0 K/UL
NRBC # BLD AUTO: 0 K/UL
NRBC BLD-RTO: 0 /100 WBC (ref 0–0.2)
NRBC BLD-RTO: 0 /100 WBC (ref 0–0.2)
PA AA BLD-ACNC: 31.1 % (ref 0–11)
PA AA BLD-ACNC: 31.1 % (ref 0–11)
PA ADP BLD-ACNC: 47.7 % (ref 0–17)
PA ADP BLD-ACNC: 47.7 % (ref 0–17)
PLATELET # BLD AUTO: 112 K/UL (ref 164–446)
PLATELET # BLD AUTO: 112 K/UL (ref 164–446)
PLATELETS.RETICULATED NFR BLD AUTO: 4.9 % (ref 0.6–13.1)
PLATELETS.RETICULATED NFR BLD AUTO: 4.9 % (ref 0.6–13.1)
PMV BLD AUTO: 9.8 FL (ref 9–12.9)
PMV BLD AUTO: 9.8 FL (ref 9–12.9)
POTASSIUM SERPL-SCNC: 3.2 MMOL/L (ref 3.6–5.5)
POTASSIUM SERPL-SCNC: 3.2 MMOL/L (ref 3.6–5.5)
PROCALCITONIN SERPL-MCNC: 0.17 NG/ML
PROCALCITONIN SERPL-MCNC: 0.17 NG/ML
PRODUCT TYPE UPROD: NORMAL
PRODUCT TYPE UPROD: NORMAL
PROT SERPL-MCNC: 6.9 G/DL (ref 6–8.2)
PROT SERPL-MCNC: 6.9 G/DL (ref 6–8.2)
PROTHROMBIN TIME: 14.4 SEC (ref 12–14.6)
PROTHROMBIN TIME: 14.4 SEC (ref 12–14.6)
RBC # BLD AUTO: 3.35 M/UL (ref 4.7–6.1)
RBC # BLD AUTO: 3.35 M/UL (ref 4.7–6.1)
RH BLD: NORMAL
RH BLD: NORMAL
SODIUM SERPL-SCNC: 140 MMOL/L (ref 135–145)
SODIUM SERPL-SCNC: 140 MMOL/L (ref 135–145)
SP GR UR STRIP.AUTO: 1.01
SP GR UR STRIP.AUTO: 1.01
TEG ALGORITHM TGALG: ABNORMAL
TEG ALGORITHM TGALG: ABNORMAL
TROPONIN T SERPL-MCNC: 41 NG/L (ref 6–19)
TROPONIN T SERPL-MCNC: 41 NG/L (ref 6–19)
UNIT STATUS USTAT: NORMAL
UNIT STATUS USTAT: NORMAL
WBC # BLD AUTO: 2.8 K/UL (ref 4.8–10.8)
WBC # BLD AUTO: 2.8 K/UL (ref 4.8–10.8)

## 2025-05-23 PROCEDURE — 700101 HCHG RX REV CODE 250: Performed by: STUDENT IN AN ORGANIZED HEALTH CARE EDUCATION/TRAINING PROGRAM

## 2025-05-23 PROCEDURE — 700117 HCHG RX CONTRAST REV CODE 255: Performed by: EMERGENCY MEDICINE

## 2025-05-23 PROCEDURE — 86901 BLOOD TYPING SEROLOGIC RH(D): CPT

## 2025-05-23 PROCEDURE — 85730 THROMBOPLASTIN TIME PARTIAL: CPT

## 2025-05-23 PROCEDURE — 700117 HCHG RX CONTRAST REV CODE 255: Performed by: SURGERY

## 2025-05-23 PROCEDURE — 83735 ASSAY OF MAGNESIUM: CPT

## 2025-05-23 PROCEDURE — 85018 HEMOGLOBIN: CPT

## 2025-05-23 PROCEDURE — 86850 RBC ANTIBODY SCREEN: CPT

## 2025-05-23 PROCEDURE — P9034 PLATELETS, PHERESIS: HCPCS

## 2025-05-23 PROCEDURE — A9270 NON-COVERED ITEM OR SERVICE: HCPCS | Performed by: STUDENT IN AN ORGANIZED HEALTH CARE EDUCATION/TRAINING PROGRAM

## 2025-05-23 PROCEDURE — 99291 CRITICAL CARE FIRST HOUR: CPT

## 2025-05-23 PROCEDURE — 86900 BLOOD TYPING SEROLOGIC ABO: CPT

## 2025-05-23 PROCEDURE — A9270 NON-COVERED ITEM OR SERVICE: HCPCS | Performed by: SURGERY

## 2025-05-23 PROCEDURE — 82962 GLUCOSE BLOOD TEST: CPT | Mod: 91

## 2025-05-23 PROCEDURE — 72125 CT NECK SPINE W/O DYE: CPT

## 2025-05-23 PROCEDURE — 700102 HCHG RX REV CODE 250 W/ 637 OVERRIDE(OP): Performed by: SURGERY

## 2025-05-23 PROCEDURE — 93010 ELECTROCARDIOGRAM REPORT: CPT | Mod: 76 | Performed by: INTERNAL MEDICINE

## 2025-05-23 PROCEDURE — 99291 CRITICAL CARE FIRST HOUR: CPT | Performed by: SURGERY

## 2025-05-23 PROCEDURE — 700111 HCHG RX REV CODE 636 W/ 250 OVERRIDE (IP): Mod: JZ | Performed by: SURGERY

## 2025-05-23 PROCEDURE — 99222 1ST HOSP IP/OBS MODERATE 55: CPT | Performed by: STUDENT IN AN ORGANIZED HEALTH CARE EDUCATION/TRAINING PROGRAM

## 2025-05-23 PROCEDURE — 83690 ASSAY OF LIPASE: CPT

## 2025-05-23 PROCEDURE — 84484 ASSAY OF TROPONIN QUANT: CPT

## 2025-05-23 PROCEDURE — 84443 ASSAY THYROID STIM HORMONE: CPT

## 2025-05-23 PROCEDURE — 85384 FIBRINOGEN ACTIVITY: CPT

## 2025-05-23 PROCEDURE — 85025 COMPLETE CBC W/AUTO DIFF WBC: CPT

## 2025-05-23 PROCEDURE — 85055 RETICULATED PLATELET ASSAY: CPT

## 2025-05-23 PROCEDURE — G0390 TRAUMA RESPONS W/HOSP CRITI: HCPCS

## 2025-05-23 PROCEDURE — 93306 TTE W/DOPPLER COMPLETE: CPT

## 2025-05-23 PROCEDURE — 85610 PROTHROMBIN TIME: CPT

## 2025-05-23 PROCEDURE — 82077 ASSAY SPEC XCP UR&BREATH IA: CPT

## 2025-05-23 PROCEDURE — 70450 CT HEAD/BRAIN W/O DYE: CPT

## 2025-05-23 PROCEDURE — 81002 URINALYSIS NONAUTO W/O SCOPE: CPT

## 2025-05-23 PROCEDURE — 85576 BLOOD PLATELET AGGREGATION: CPT | Mod: 91

## 2025-05-23 PROCEDURE — 84439 ASSAY OF FREE THYROXINE: CPT

## 2025-05-23 PROCEDURE — 93005 ELECTROCARDIOGRAM TRACING: CPT | Mod: TC | Performed by: SURGERY

## 2025-05-23 PROCEDURE — 99292 CRITICAL CARE ADDL 30 MIN: CPT | Performed by: SURGERY

## 2025-05-23 PROCEDURE — 84145 PROCALCITONIN (PCT): CPT

## 2025-05-23 PROCEDURE — 700105 HCHG RX REV CODE 258: Performed by: SURGERY

## 2025-05-23 PROCEDURE — 71260 CT THORAX DX C+: CPT

## 2025-05-23 PROCEDURE — 36415 COLL VENOUS BLD VENIPUNCTURE: CPT

## 2025-05-23 PROCEDURE — 36430 TRANSFUSION BLD/BLD COMPNT: CPT

## 2025-05-23 PROCEDURE — 700105 HCHG RX REV CODE 258: Performed by: EMERGENCY MEDICINE

## 2025-05-23 PROCEDURE — 73620 X-RAY EXAM OF FOOT: CPT | Mod: RT

## 2025-05-23 PROCEDURE — 83036 HEMOGLOBIN GLYCOSYLATED A1C: CPT

## 2025-05-23 PROCEDURE — 700102 HCHG RX REV CODE 250 W/ 637 OVERRIDE(OP): Performed by: STUDENT IN AN ORGANIZED HEALTH CARE EDUCATION/TRAINING PROGRAM

## 2025-05-23 PROCEDURE — 85347 COAGULATION TIME ACTIVATED: CPT

## 2025-05-23 PROCEDURE — 80053 COMPREHEN METABOLIC PANEL: CPT

## 2025-05-23 PROCEDURE — 93306 TTE W/DOPPLER COMPLETE: CPT | Mod: 26 | Performed by: INTERNAL MEDICINE

## 2025-05-23 PROCEDURE — 770022 HCHG ROOM/CARE - ICU (200)

## 2025-05-23 PROCEDURE — 71045 X-RAY EXAM CHEST 1 VIEW: CPT

## 2025-05-23 RX ORDER — OXYCODONE HYDROCHLORIDE 5 MG/1
5 TABLET ORAL
Refills: 0 | Status: DISCONTINUED | OUTPATIENT
Start: 2025-05-23 | End: 2025-05-25

## 2025-05-23 RX ORDER — ACETAMINOPHEN 325 MG/1
650 TABLET ORAL EVERY 6 HOURS
Status: DISCONTINUED | OUTPATIENT
Start: 2025-05-23 | End: 2025-05-27

## 2025-05-23 RX ORDER — MAGNESIUM SULFATE HEPTAHYDRATE 40 MG/ML
2 INJECTION, SOLUTION INTRAVENOUS ONCE
Status: COMPLETED | OUTPATIENT
Start: 2025-05-23 | End: 2025-05-24

## 2025-05-23 RX ORDER — AMOXICILLIN 250 MG
1 CAPSULE ORAL NIGHTLY
Status: DISCONTINUED | OUTPATIENT
Start: 2025-05-23 | End: 2025-05-27

## 2025-05-23 RX ORDER — CALCIUM GLUCONATE 98 MG/ML
2 INJECTION, SOLUTION INTRAVENOUS ONCE
Status: DISCONTINUED | OUTPATIENT
Start: 2025-05-23 | End: 2025-05-23

## 2025-05-23 RX ORDER — ACETAMINOPHEN 325 MG/1
650 TABLET ORAL EVERY 6 HOURS PRN
Status: DISCONTINUED | OUTPATIENT
Start: 2025-05-28 | End: 2025-05-27

## 2025-05-23 RX ORDER — MIDAZOLAM HYDROCHLORIDE 1 MG/ML
4 INJECTION INTRAMUSCULAR; INTRAVENOUS
Status: DISCONTINUED | OUTPATIENT
Start: 2025-05-23 | End: 2025-05-25

## 2025-05-23 RX ORDER — POTASSIUM CHLORIDE 1500 MG/1
20 TABLET, EXTENDED RELEASE ORAL 2 TIMES DAILY
Status: DISCONTINUED | OUTPATIENT
Start: 2025-05-23 | End: 2025-05-25

## 2025-05-23 RX ORDER — AMOXICILLIN 250 MG
1 CAPSULE ORAL
Status: DISCONTINUED | OUTPATIENT
Start: 2025-05-23 | End: 2025-05-27

## 2025-05-23 RX ORDER — SODIUM CHLORIDE 9 MG/ML
2000 INJECTION, SOLUTION INTRAVENOUS ONCE
Status: COMPLETED | OUTPATIENT
Start: 2025-05-23 | End: 2025-05-23

## 2025-05-23 RX ORDER — FAMOTIDINE 20 MG/1
20 TABLET, FILM COATED ORAL 2 TIMES DAILY
Status: DISCONTINUED | OUTPATIENT
Start: 2025-05-23 | End: 2025-05-25

## 2025-05-23 RX ORDER — METOPROLOL TARTRATE 1 MG/ML
5 INJECTION, SOLUTION INTRAVENOUS
Status: COMPLETED | OUTPATIENT
Start: 2025-05-23 | End: 2025-05-24

## 2025-05-23 RX ORDER — BISACODYL 10 MG
10 SUPPOSITORY, RECTAL RECTAL
Status: DISCONTINUED | OUTPATIENT
Start: 2025-05-23 | End: 2025-06-03 | Stop reason: HOSPADM

## 2025-05-23 RX ORDER — SODIUM CHLORIDE 9 MG/ML
INJECTION, SOLUTION INTRAVENOUS CONTINUOUS
Status: DISCONTINUED | OUTPATIENT
Start: 2025-05-23 | End: 2025-05-25

## 2025-05-23 RX ORDER — INSULIN LISPRO 100 [IU]/ML
1-6 INJECTION, SOLUTION INTRAVENOUS; SUBCUTANEOUS EVERY 6 HOURS
Status: DISCONTINUED | OUTPATIENT
Start: 2025-05-23 | End: 2025-06-01

## 2025-05-23 RX ORDER — DEXTROSE MONOHYDRATE 25 G/50ML
25 INJECTION, SOLUTION INTRAVENOUS
Status: DISCONTINUED | OUTPATIENT
Start: 2025-05-23 | End: 2025-06-01

## 2025-05-23 RX ORDER — SODIUM PHOSPHATE,MONO-DIBASIC 19G-7G/118
1 ENEMA (ML) RECTAL
Status: DISCONTINUED | OUTPATIENT
Start: 2025-05-23 | End: 2025-06-03 | Stop reason: HOSPADM

## 2025-05-23 RX ORDER — OXYCODONE HYDROCHLORIDE 5 MG/1
2.5 TABLET ORAL
Refills: 0 | Status: DISCONTINUED | OUTPATIENT
Start: 2025-05-23 | End: 2025-05-27

## 2025-05-23 RX ORDER — ONDANSETRON 4 MG/1
4 TABLET, ORALLY DISINTEGRATING ORAL EVERY 4 HOURS PRN
Status: DISCONTINUED | OUTPATIENT
Start: 2025-05-23 | End: 2025-05-27

## 2025-05-23 RX ORDER — ONDANSETRON 2 MG/ML
4 INJECTION INTRAMUSCULAR; INTRAVENOUS EVERY 4 HOURS PRN
Status: DISCONTINUED | OUTPATIENT
Start: 2025-05-23 | End: 2025-05-27

## 2025-05-23 RX ORDER — POTASSIUM CHLORIDE 7.45 MG/ML
10 INJECTION INTRAVENOUS
Status: COMPLETED | OUTPATIENT
Start: 2025-05-23 | End: 2025-05-23

## 2025-05-23 RX ORDER — POTASSIUM CHLORIDE 29.8 MG/ML
40 INJECTION INTRAVENOUS ONCE
Status: DISCONTINUED | OUTPATIENT
Start: 2025-05-23 | End: 2025-05-23

## 2025-05-23 RX ORDER — SODIUM CHLORIDE 9 MG/ML
500 INJECTION, SOLUTION INTRAVENOUS ONCE
Status: COMPLETED | OUTPATIENT
Start: 2025-05-23 | End: 2025-05-24

## 2025-05-23 RX ORDER — GAUZE BANDAGE 2" X 2"
100 BANDAGE TOPICAL DAILY
Status: DISPENSED | OUTPATIENT
Start: 2025-05-23 | End: 2025-05-27

## 2025-05-23 RX ORDER — FOLIC ACID 1 MG/1
1 TABLET ORAL DAILY
Status: DISPENSED | OUTPATIENT
Start: 2025-05-23 | End: 2025-05-27

## 2025-05-23 RX ORDER — CALCIUM GLUCONATE 20 MG/ML
2 INJECTION, SOLUTION INTRAVENOUS ONCE
Status: COMPLETED | OUTPATIENT
Start: 2025-05-23 | End: 2025-05-23

## 2025-05-23 RX ORDER — LEVETIRACETAM 500 MG/1
500 TABLET ORAL EVERY 12 HOURS
Status: DISCONTINUED | OUTPATIENT
Start: 2025-05-23 | End: 2025-05-27

## 2025-05-23 RX ORDER — DOCUSATE SODIUM 100 MG/1
100 CAPSULE, LIQUID FILLED ORAL 2 TIMES DAILY
Status: DISCONTINUED | OUTPATIENT
Start: 2025-05-23 | End: 2025-05-27

## 2025-05-23 RX ORDER — MIDAZOLAM HYDROCHLORIDE 1 MG/ML
1 INJECTION INTRAMUSCULAR; INTRAVENOUS
Status: DISCONTINUED | OUTPATIENT
Start: 2025-05-23 | End: 2025-05-25

## 2025-05-23 RX ORDER — MIDAZOLAM HYDROCHLORIDE 1 MG/ML
2 INJECTION INTRAMUSCULAR; INTRAVENOUS
Status: DISCONTINUED | OUTPATIENT
Start: 2025-05-23 | End: 2025-05-25

## 2025-05-23 RX ORDER — MIDAZOLAM HYDROCHLORIDE 1 MG/ML
5 INJECTION INTRAMUSCULAR; INTRAVENOUS
Status: DISCONTINUED | OUTPATIENT
Start: 2025-05-23 | End: 2025-05-25

## 2025-05-23 RX ORDER — LEVETIRACETAM 500 MG/5ML
500 INJECTION, SOLUTION, CONCENTRATE INTRAVENOUS EVERY 12 HOURS
Status: DISCONTINUED | OUTPATIENT
Start: 2025-05-23 | End: 2025-05-27

## 2025-05-23 RX ORDER — METOPROLOL TARTRATE 25 MG/1
12.5 TABLET, FILM COATED ORAL 2 TIMES DAILY
Status: DISCONTINUED | OUTPATIENT
Start: 2025-05-23 | End: 2025-05-24

## 2025-05-23 RX ORDER — POLYETHYLENE GLYCOL 3350 17 G/17G
1 POWDER, FOR SOLUTION ORAL 2 TIMES DAILY
Status: DISCONTINUED | OUTPATIENT
Start: 2025-05-23 | End: 2025-05-27

## 2025-05-23 RX ORDER — LABETALOL HYDROCHLORIDE 5 MG/ML
10 INJECTION, SOLUTION INTRAVENOUS EVERY 4 HOURS PRN
Status: DISCONTINUED | OUTPATIENT
Start: 2025-05-23 | End: 2025-06-02

## 2025-05-23 RX ADMIN — POTASSIUM CHLORIDE 10 MEQ: 7.46 INJECTION, SOLUTION INTRAVENOUS at 20:29

## 2025-05-23 RX ADMIN — SODIUM CHLORIDE 500 ML: 9 INJECTION, SOLUTION INTRAVENOUS at 18:30

## 2025-05-23 RX ADMIN — POTASSIUM CHLORIDE 10 MEQ: 7.46 INJECTION, SOLUTION INTRAVENOUS at 21:37

## 2025-05-23 RX ADMIN — HUMAN ALBUMIN MICROSPHERES AND PERFLUTREN 3 ML: 10; .22 INJECTION, SOLUTION INTRAVENOUS at 19:15

## 2025-05-23 RX ADMIN — METOPROLOL TARTRATE 12.5 MG: 25 TABLET, FILM COATED ORAL at 23:12

## 2025-05-23 RX ADMIN — POTASSIUM CHLORIDE 10 MEQ: 7.46 INJECTION, SOLUTION INTRAVENOUS at 22:43

## 2025-05-23 RX ADMIN — LABETALOL HYDROCHLORIDE 10 MG: 5 INJECTION, SOLUTION INTRAVENOUS at 17:18

## 2025-05-23 RX ADMIN — MAGNESIUM SULFATE HEPTAHYDRATE 2 G: 2 INJECTION, SOLUTION INTRAVENOUS at 18:22

## 2025-05-23 RX ADMIN — SODIUM CHLORIDE 2000 ML: 9 INJECTION, SOLUTION INTRAVENOUS at 14:29

## 2025-05-23 RX ADMIN — ONDANSETRON 4 MG: 2 INJECTION INTRAMUSCULAR; INTRAVENOUS at 15:27

## 2025-05-23 RX ADMIN — FENTANYL CITRATE 25 MCG: 50 INJECTION, SOLUTION INTRAMUSCULAR; INTRAVENOUS at 22:33

## 2025-05-23 RX ADMIN — Medication 100 MG: at 15:37

## 2025-05-23 RX ADMIN — LEVETIRACETAM 500 MG: 500 TABLET, FILM COATED ORAL at 18:27

## 2025-05-23 RX ADMIN — ACETAMINOPHEN 650 MG: 325 TABLET ORAL at 23:12

## 2025-05-23 RX ADMIN — FOLIC ACID 1 MG: 1 TABLET ORAL at 15:37

## 2025-05-23 RX ADMIN — ACETAMINOPHEN 650 MG: 325 TABLET ORAL at 18:26

## 2025-05-23 RX ADMIN — IOHEXOL 97 ML: 350 INJECTION, SOLUTION INTRAVENOUS at 14:45

## 2025-05-23 RX ADMIN — FAMOTIDINE 20 MG: 20 TABLET, FILM COATED ORAL at 18:26

## 2025-05-23 RX ADMIN — POTASSIUM CHLORIDE 20 MEQ: 1500 TABLET, EXTENDED RELEASE ORAL at 18:27

## 2025-05-23 RX ADMIN — METOPROLOL TARTRATE 5 MG: 5 INJECTION INTRAVENOUS at 23:36

## 2025-05-23 RX ADMIN — METOPROLOL TARTRATE 5 MG: 5 INJECTION INTRAVENOUS at 23:17

## 2025-05-23 RX ADMIN — THERA TABS 1 TABLET: TAB at 15:37

## 2025-05-23 RX ADMIN — SODIUM CHLORIDE: 9 INJECTION, SOLUTION INTRAVENOUS at 20:34

## 2025-05-23 RX ADMIN — POTASSIUM CHLORIDE 10 MEQ: 7.46 INJECTION, SOLUTION INTRAVENOUS at 18:23

## 2025-05-23 RX ADMIN — CALCIUM GLUCONATE 2 G: 20 INJECTION, SOLUTION INTRAVENOUS at 21:26

## 2025-05-23 RX ADMIN — SODIUM CHLORIDE: 9 INJECTION, SOLUTION INTRAVENOUS at 16:42

## 2025-05-23 ASSESSMENT — PAIN DESCRIPTION - PAIN TYPE
TYPE: ACUTE PAIN
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
## 2025-05-23 NOTE — ASSESSMENT & PLAN NOTE
Traumatic right frontal and right parietal subdural hematomas measuring up to 6 mm in thickness.  No significant mass effect.  Traumatic subarachnoid hemorrhage involving multiple right frontal lobe sulci.  Repeat interval CT imaging with increase in right holohemispheric subdural, new right parafalcine subdural hematoma, and new 4.6 right to left midline shift.  5/24 Repeat CT head stable.  5/25 Repeat CT head stable.   5/27 Bilateral MMA embolization.  Post traumatic pharmacologic seizure prophylaxis for 1 week.  Speech Language Pathology cognitive evaluation.

## 2025-05-23 NOTE — ASSESSMENT & PLAN NOTE
VTE prophylaxis initially contraindicated secondary to elevated bleeding risk.  5/24 Trauma screening bilateral lower extremity venous duplex negative for above knee DVT.
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
## 2025-05-23 NOTE — ASSESSMENT & PLAN NOTE
Blunt abdominal trauma.  Admission CT imaging demonstrated right lower quadrant stranding and small bowel thickening with trace free fluid.  Descending colon and hepatic flexure colonic wall thickening with surrounding stranding.  Nonoperative management with serial abdominal exams.  5/30 Interval CT abdomen no bowel injury.

## 2025-05-23 NOTE — ASSESSMENT & PLAN NOTE
>>ASSESSMENT AND PLAN FOR CLOSED DISLOCATION OF METATARSAL JOINT, RIGHT, INITIAL ENCOUNTER WRITTEN ON 5/24/2025  6:33 AM BY ESEQUIEL ROMEROPMELINA.    Right second metatarsal phalangeal joint dislocation. Likely chronic  Non-operative management.  Weight bearing status - Weightbearing as tolerated RLE.  No outpatient follow up needed.  Marco Ribeiro MD. [facility].     >>ASSESSMENT AND PLAN FOR TRAUMATIC ECCHYMOSIS OF HAND, RIGHT, INITIAL ENCOUNTER WRITTEN ON 5/30/2025  1:56 PM BY ESEQUIEL [email or web address].    Right hand ecchymosis, swelling, & pain.   Xrays negative.

## 2025-05-23 NOTE — ED NOTES
"Med rec completed per patient at bedside, and medication dispense history per [facility] on [facility] ([phone]). Patient name (per patient chart marked for merge) is documented as \"Zeus Engelriz,\" however patient provided a name of \"Zeus Fleming.\" [facility] profile for patient under the provided name of \"Zeus Fleming.\" Patient states that he is \"supposed to be on\" medication for high blood pressure, but was unsure of the name. Per [facility], the only medication on file for patient is diltiazem  mg, directions 1 capsule every day, last dispensed 11/8/2024 for #30 capsules (30 day supply). Patient states it has been more than 30 days since he last used this medication; as such, this medication has not been added to the med rec. Patient denies using any other prescription medications at this time. No recent over-the-counter medications, vitamins or supplements.    Allergies reviewed with patient.    Outpatient antibiotics within the last 30 days: NONE.    ANTICOAGULANTS: NONE.  "
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
## 2025-05-23 NOTE — ASSESSMENT & PLAN NOTE
Admission blood alcohol level of 212.  Trauma alcohol withdrawal protocol initiated.  Alcohol withdrawal surveillance.  5/24 SBIRT completed.   for substance abuse cessation resources.
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
## 2025-05-23 NOTE — PROGRESS NOTES
Dariel LUCAS cervical collar delivered to pt bedside. RN placed    Contact traction for any questions or concerns regarding this DME.

## 2025-05-23 NOTE — PROGRESS NOTES
4 Eyes Skin Assessment Completed by DAVID Bauer and Uriel RN.    Skin assessment is primarily focused on high risk bony prominences. Pay special attention to skin beneath and around medical devices, high risk bony prominences, skin to skin areas and areas where the patient lacks sensation to feel pain and areas where the patient previously had breakdown.     Head (Occipital):  Laceration to back of head    Ears (Under Medical Devices): WDL   Nose (Under Medical Devices): WDL   Mouth:  Not intact @ baseline    Neck: WDL   Breast/Chest:  WDL   Shoulder Blades:  WDL   Spine:   WDL   (R) Arm/Elbow/Hand: Abrasion and Skin Tear to right hand and right pinky finger   (L) Arm/Elbow/Hand: WDL   Abdomen: WDL   Pannus/Groin:  Red and Moisture   Sacrum/Coccyx:   WDL   (R) Ischial Tuberosity (Sit Bones):  WDL   (L) Ischial Tuberosity (Sit Bones):  WDL   (R) Leg:  Skin Tear and Rash to right foot ; edema    (L) Leg:  Discoloration; edema    (R) Heel:  Dry    (R) Foot/Toe: Red and Purple/maroon;   (L) Heel: Dry    (L) Foot/Toe:  Dry        DEVICES IN USE:   Respiratory Devices:  Nasal cannula and Pulse ox  Feeding Devices:  N/A   Lines & BP Monitoring Devices:  Peripheral IV and BP cuff    Orthopedic Devices:  C-Collar   Miscellaneous Devices:  Telemetry monitor and SCDs    PROTOCOL INTERVENTIONS:   Standard/Trauma Bed:  Already in place  ICU Low Airloss Bed:  Already in place  Offloading Dressing - Sacrum:  Already in place  Q2 Turns with Pillows:  Already in place  Silicone Nasal Cannula Tubing:  Already in place    WOUND PHOTOS:   Completed and in EPIC     WOUND CONSULT:   N/A, no advanced wound care needs identified

## 2025-05-24 ENCOUNTER — APPOINTMENT (OUTPATIENT)
Dept: RADIOLOGY | Facility: MEDICAL CENTER | Age: 75
DRG: 023 | End: 2025-05-24
Attending: SURGERY
Payer: OTHER MISCELLANEOUS

## 2025-05-24 ENCOUNTER — APPOINTMENT (OUTPATIENT)
Dept: RADIOLOGY | Facility: MEDICAL CENTER | Age: 75
DRG: 023 | End: 2025-05-24
Attending: NURSE PRACTITIONER
Payer: OTHER MISCELLANEOUS

## 2025-05-24 ENCOUNTER — APPOINTMENT (OUTPATIENT)
Dept: RADIOLOGY | Facility: MEDICAL CENTER | Age: 75
End: 2025-05-24
Attending: NEUROLOGICAL SURGERY
Payer: MEDICARE

## 2025-05-24 ENCOUNTER — APPOINTMENT (OUTPATIENT)
Dept: RADIOLOGY | Facility: MEDICAL CENTER | Age: 75
End: 2025-05-24
Attending: NURSE PRACTITIONER
Payer: MEDICARE

## 2025-05-24 PROBLEM — M79.604 ACUTE PAIN OF RIGHT LOWER EXTREMITY: Status: ACTIVE | Noted: 2025-05-24

## 2025-05-24 PROBLEM — E83.42 HYPOMAGNESEMIA: Status: ACTIVE | Noted: 2025-05-24

## 2025-05-24 LAB
ALBUMIN SERPL BCP-MCNC: 3.8 G/DL (ref 3.2–4.9)
ALBUMIN SERPL BCP-MCNC: 3.8 G/DL (ref 3.2–4.9)
ALBUMIN/GLOB SERPL: 1.2 G/DL
ALBUMIN/GLOB SERPL: 1.2 G/DL
ALP SERPL-CCNC: 160 U/L (ref 30–99)
ALP SERPL-CCNC: 160 U/L (ref 30–99)
ALT SERPL-CCNC: 53 U/L (ref 2–50)
ALT SERPL-CCNC: 53 U/L (ref 2–50)
ANION GAP SERPL CALC-SCNC: 18 MMOL/L (ref 7–16)
ANION GAP SERPL CALC-SCNC: 18 MMOL/L (ref 7–16)
AST SERPL-CCNC: 98 U/L (ref 12–45)
AST SERPL-CCNC: 98 U/L (ref 12–45)
BASOPHILS # BLD AUTO: 1.1 % (ref 0–1.8)
BASOPHILS # BLD AUTO: 1.1 % (ref 0–1.8)
BASOPHILS # BLD: 0.06 K/UL (ref 0–0.12)
BASOPHILS # BLD: 0.06 K/UL (ref 0–0.12)
BILIRUB SERPL-MCNC: 1.4 MG/DL (ref 0.1–1.5)
BILIRUB SERPL-MCNC: 1.4 MG/DL (ref 0.1–1.5)
BUN SERPL-MCNC: 6 MG/DL (ref 8–22)
BUN SERPL-MCNC: 6 MG/DL (ref 8–22)
CALCIUM ALBUM COR SERPL-MCNC: 7.7 MG/DL (ref 8.5–10.5)
CALCIUM ALBUM COR SERPL-MCNC: 7.7 MG/DL (ref 8.5–10.5)
CALCIUM SERPL-MCNC: 7.5 MG/DL (ref 8.5–10.5)
CALCIUM SERPL-MCNC: 7.5 MG/DL (ref 8.5–10.5)
CFT BLD TEG: 4.3 MIN (ref 4.6–9.1)
CFT BLD TEG: 4.3 MIN (ref 4.6–9.1)
CFT P HPASE BLD TEG: 4.1 MIN (ref 4.3–8.3)
CFT P HPASE BLD TEG: 4.1 MIN (ref 4.3–8.3)
CHLORIDE SERPL-SCNC: 99 MMOL/L (ref 96–112)
CHLORIDE SERPL-SCNC: 99 MMOL/L (ref 96–112)
CLOT ANGLE BLD TEG: 75.5 DEGREES (ref 63–78)
CLOT ANGLE BLD TEG: 75.5 DEGREES (ref 63–78)
CO2 SERPL-SCNC: 21 MMOL/L (ref 20–33)
CO2 SERPL-SCNC: 21 MMOL/L (ref 20–33)
CREAT SERPL-MCNC: 0.78 MG/DL (ref 0.5–1.4)
CREAT SERPL-MCNC: 0.78 MG/DL (ref 0.5–1.4)
CT.EXTRINSIC BLD ROTEM: 1.2 MIN (ref 0.8–2.1)
CT.EXTRINSIC BLD ROTEM: 1.2 MIN (ref 0.8–2.1)
EOSINOPHIL # BLD AUTO: 0 K/UL (ref 0–0.51)
EOSINOPHIL # BLD AUTO: 0 K/UL (ref 0–0.51)
EOSINOPHIL NFR BLD: 0 % (ref 0–6.9)
EOSINOPHIL NFR BLD: 0 % (ref 0–6.9)
ERYTHROCYTE [DISTWIDTH] IN BLOOD BY AUTOMATED COUNT: 61.1 FL (ref 35.9–50)
ERYTHROCYTE [DISTWIDTH] IN BLOOD BY AUTOMATED COUNT: 61.1 FL (ref 35.9–50)
GFR SERPLBLD CREATININE-BSD FMLA CKD-EPI: 92 ML/MIN/1.73 M 2
GFR SERPLBLD CREATININE-BSD FMLA CKD-EPI: 92 ML/MIN/1.73 M 2
GLOBULIN SER CALC-MCNC: 3.1 G/DL (ref 1.9–3.5)
GLOBULIN SER CALC-MCNC: 3.1 G/DL (ref 1.9–3.5)
GLUCOSE BLD STRIP.AUTO-MCNC: 104 MG/DL (ref 65–99)
GLUCOSE BLD STRIP.AUTO-MCNC: 104 MG/DL (ref 65–99)
GLUCOSE BLD STRIP.AUTO-MCNC: 142 MG/DL (ref 65–99)
GLUCOSE BLD STRIP.AUTO-MCNC: 142 MG/DL (ref 65–99)
GLUCOSE BLD STRIP.AUTO-MCNC: 161 MG/DL (ref 65–99)
GLUCOSE BLD STRIP.AUTO-MCNC: 161 MG/DL (ref 65–99)
GLUCOSE BLD STRIP.AUTO-MCNC: 165 MG/DL (ref 65–99)
GLUCOSE BLD STRIP.AUTO-MCNC: 165 MG/DL (ref 65–99)
GLUCOSE BLD STRIP.AUTO-MCNC: 190 MG/DL (ref 65–99)
GLUCOSE BLD STRIP.AUTO-MCNC: 190 MG/DL (ref 65–99)
GLUCOSE SERPL-MCNC: 113 MG/DL (ref 65–99)
GLUCOSE SERPL-MCNC: 113 MG/DL (ref 65–99)
HCT VFR BLD AUTO: 25.5 % (ref 42–52)
HCT VFR BLD AUTO: 25.5 % (ref 42–52)
HGB BLD-MCNC: 8.4 G/DL (ref 14–18)
IMM GRANULOCYTES # BLD AUTO: 0.03 K/UL (ref 0–0.11)
IMM GRANULOCYTES # BLD AUTO: 0.03 K/UL (ref 0–0.11)
IMM GRANULOCYTES NFR BLD AUTO: 0.5 % (ref 0–0.9)
IMM GRANULOCYTES NFR BLD AUTO: 0.5 % (ref 0–0.9)
LYMPHOCYTES # BLD AUTO: 0.86 K/UL (ref 1–4.8)
LYMPHOCYTES # BLD AUTO: 0.86 K/UL (ref 1–4.8)
LYMPHOCYTES NFR BLD: 15.4 % (ref 22–41)
LYMPHOCYTES NFR BLD: 15.4 % (ref 22–41)
MAGNESIUM SERPL-MCNC: 1.6 MG/DL (ref 1.5–2.5)
MAGNESIUM SERPL-MCNC: 1.6 MG/DL (ref 1.5–2.5)
MCF BLD TEG: 59.8 MM (ref 52–69)
MCF BLD TEG: 59.8 MM (ref 52–69)
MCF.PLATELET INHIB BLD ROTEM: 19.7 MM (ref 15–32)
MCF.PLATELET INHIB BLD ROTEM: 19.7 MM (ref 15–32)
MCH RBC QN AUTO: 30.2 PG (ref 27–33)
MCH RBC QN AUTO: 30.2 PG (ref 27–33)
MCHC RBC AUTO-ENTMCNC: 32.5 G/DL (ref 32.3–36.5)
MCHC RBC AUTO-ENTMCNC: 32.5 G/DL (ref 32.3–36.5)
MCV RBC AUTO: 92.7 FL (ref 81.4–97.8)
MCV RBC AUTO: 92.7 FL (ref 81.4–97.8)
MONOCYTES # BLD AUTO: 0.52 K/UL (ref 0–0.85)
MONOCYTES # BLD AUTO: 0.52 K/UL (ref 0–0.85)
MONOCYTES NFR BLD AUTO: 9.3 % (ref 0–13.4)
MONOCYTES NFR BLD AUTO: 9.3 % (ref 0–13.4)
NEUTROPHILS # BLD AUTO: 4.1 K/UL (ref 1.82–7.42)
NEUTROPHILS # BLD AUTO: 4.1 K/UL (ref 1.82–7.42)
NEUTROPHILS NFR BLD: 73.7 % (ref 44–72)
NEUTROPHILS NFR BLD: 73.7 % (ref 44–72)
NRBC # BLD AUTO: 0 K/UL
NRBC # BLD AUTO: 0 K/UL
NRBC BLD-RTO: 0 /100 WBC (ref 0–0.2)
NRBC BLD-RTO: 0 /100 WBC (ref 0–0.2)
PA AA BLD-ACNC: 23.6 % (ref 0–11)
PA AA BLD-ACNC: 23.6 % (ref 0–11)
PA ADP BLD-ACNC: 23.8 % (ref 0–17)
PA ADP BLD-ACNC: 23.8 % (ref 0–17)
PHOSPHATE SERPL-MCNC: 2.9 MG/DL (ref 2.5–4.5)
PHOSPHATE SERPL-MCNC: 2.9 MG/DL (ref 2.5–4.5)
PLATELET # BLD AUTO: 157 K/UL (ref 164–446)
PLATELET # BLD AUTO: 157 K/UL (ref 164–446)
PMV BLD AUTO: 9.5 FL (ref 9–12.9)
PMV BLD AUTO: 9.5 FL (ref 9–12.9)
POTASSIUM SERPL-SCNC: 3.8 MMOL/L (ref 3.6–5.5)
POTASSIUM SERPL-SCNC: 3.8 MMOL/L (ref 3.6–5.5)
PROT SERPL-MCNC: 6.9 G/DL (ref 6–8.2)
PROT SERPL-MCNC: 6.9 G/DL (ref 6–8.2)
RBC # BLD AUTO: 2.75 M/UL (ref 4.7–6.1)
RBC # BLD AUTO: 2.75 M/UL (ref 4.7–6.1)
SODIUM SERPL-SCNC: 138 MMOL/L (ref 135–145)
SODIUM SERPL-SCNC: 138 MMOL/L (ref 135–145)
T4 FREE SERPL-MCNC: 0.28 NG/DL (ref 0.93–1.7)
T4 FREE SERPL-MCNC: 0.28 NG/DL (ref 0.93–1.7)
TEG ALGORITHM TGALG: ABNORMAL
TEG ALGORITHM TGALG: ABNORMAL
TROPONIN T SERPL-MCNC: 194 NG/L (ref 6–19)
TROPONIN T SERPL-MCNC: 194 NG/L (ref 6–19)
TROPONIN T SERPL-MCNC: 39 NG/L (ref 6–19)
TROPONIN T SERPL-MCNC: 39 NG/L (ref 6–19)
TSH SERPL DL<=0.005 MIU/L-ACNC: 49.1 UIU/ML (ref 0.38–5.33)
TSH SERPL DL<=0.005 MIU/L-ACNC: 49.1 UIU/ML (ref 0.38–5.33)
WBC # BLD AUTO: 5.6 K/UL (ref 4.8–10.8)
WBC # BLD AUTO: 5.6 K/UL (ref 4.8–10.8)

## 2025-05-24 PROCEDURE — 700111 HCHG RX REV CODE 636 W/ 250 OVERRIDE (IP): Mod: JZ | Performed by: NEUROLOGICAL SURGERY

## 2025-05-24 PROCEDURE — A9270 NON-COVERED ITEM OR SERVICE: HCPCS | Performed by: STUDENT IN AN ORGANIZED HEALTH CARE EDUCATION/TRAINING PROGRAM

## 2025-05-24 PROCEDURE — 700105 HCHG RX REV CODE 258: Performed by: NURSE PRACTITIONER

## 2025-05-24 PROCEDURE — 700111 HCHG RX REV CODE 636 W/ 250 OVERRIDE (IP): Performed by: NURSE PRACTITIONER

## 2025-05-24 PROCEDURE — 700111 HCHG RX REV CODE 636 W/ 250 OVERRIDE (IP): Mod: JZ | Performed by: SURGERY

## 2025-05-24 PROCEDURE — 770022 HCHG ROOM/CARE - ICU (200)

## 2025-05-24 PROCEDURE — 73590 X-RAY EXAM OF LOWER LEG: CPT | Mod: RT

## 2025-05-24 PROCEDURE — 85025 COMPLETE CBC W/AUTO DIFF WBC: CPT

## 2025-05-24 PROCEDURE — 83735 ASSAY OF MAGNESIUM: CPT

## 2025-05-24 PROCEDURE — 700105 HCHG RX REV CODE 258: Performed by: SURGERY

## 2025-05-24 PROCEDURE — 700111 HCHG RX REV CODE 636 W/ 250 OVERRIDE (IP): Performed by: STUDENT IN AN ORGANIZED HEALTH CARE EDUCATION/TRAINING PROGRAM

## 2025-05-24 PROCEDURE — A9270 NON-COVERED ITEM OR SERVICE: HCPCS | Performed by: SURGERY

## 2025-05-24 PROCEDURE — 700102 HCHG RX REV CODE 250 W/ 637 OVERRIDE(OP): Performed by: STUDENT IN AN ORGANIZED HEALTH CARE EDUCATION/TRAINING PROGRAM

## 2025-05-24 PROCEDURE — 700102 HCHG RX REV CODE 250 W/ 637 OVERRIDE(OP): Performed by: SURGERY

## 2025-05-24 PROCEDURE — 700111 HCHG RX REV CODE 636 W/ 250 OVERRIDE (IP): Mod: JZ | Performed by: NURSE PRACTITIONER

## 2025-05-24 PROCEDURE — 84484 ASSAY OF TROPONIN QUANT: CPT | Mod: 91

## 2025-05-24 PROCEDURE — 84100 ASSAY OF PHOSPHORUS: CPT

## 2025-05-24 PROCEDURE — 85347 COAGULATION TIME ACTIVATED: CPT

## 2025-05-24 PROCEDURE — 85576 BLOOD PLATELET AGGREGATION: CPT | Mod: 91

## 2025-05-24 PROCEDURE — 85018 HEMOGLOBIN: CPT

## 2025-05-24 PROCEDURE — 85384 FIBRINOGEN ACTIVITY: CPT | Mod: 91

## 2025-05-24 PROCEDURE — 80053 COMPREHEN METABOLIC PANEL: CPT

## 2025-05-24 PROCEDURE — 71045 X-RAY EXAM CHEST 1 VIEW: CPT

## 2025-05-24 PROCEDURE — 93970 EXTREMITY STUDY: CPT

## 2025-05-24 PROCEDURE — 82962 GLUCOSE BLOOD TEST: CPT | Mod: 91

## 2025-05-24 PROCEDURE — 85384 FIBRINOGEN ACTIVITY: CPT

## 2025-05-24 PROCEDURE — 70450 CT HEAD/BRAIN W/O DYE: CPT

## 2025-05-24 PROCEDURE — 99254 IP/OBS CNSLTJ NEW/EST MOD 60: CPT | Performed by: INTERNAL MEDICINE

## 2025-05-24 PROCEDURE — 99233 SBSQ HOSP IP/OBS HIGH 50: CPT | Performed by: NURSE PRACTITIONER

## 2025-05-24 PROCEDURE — 700101 HCHG RX REV CODE 250: Performed by: STUDENT IN AN ORGANIZED HEALTH CARE EDUCATION/TRAINING PROGRAM

## 2025-05-24 PROCEDURE — 700101 HCHG RX REV CODE 250: Performed by: NURSE PRACTITIONER

## 2025-05-24 RX ORDER — METOPROLOL TARTRATE 25 MG/1
25 TABLET, FILM COATED ORAL ONCE
Status: COMPLETED | OUTPATIENT
Start: 2025-05-24 | End: 2025-05-24

## 2025-05-24 RX ORDER — METOPROLOL TARTRATE 25 MG/1
12.5 TABLET, FILM COATED ORAL ONCE
Status: COMPLETED | OUTPATIENT
Start: 2025-05-24 | End: 2025-05-24

## 2025-05-24 RX ORDER — METOPROLOL TARTRATE 25 MG/1
25 TABLET, FILM COATED ORAL 2 TIMES DAILY
Status: DISCONTINUED | OUTPATIENT
Start: 2025-05-24 | End: 2025-05-25

## 2025-05-24 RX ORDER — ONDANSETRON 2 MG/ML
4 INJECTION INTRAMUSCULAR; INTRAVENOUS ONCE
Status: COMPLETED | OUTPATIENT
Start: 2025-05-24 | End: 2025-05-24

## 2025-05-24 RX ORDER — METOPROLOL TARTRATE 1 MG/ML
5 INJECTION, SOLUTION INTRAVENOUS EVERY 4 HOURS PRN
Status: COMPLETED | OUTPATIENT
Start: 2025-05-24 | End: 2025-05-25

## 2025-05-24 RX ORDER — MAGNESIUM SULFATE HEPTAHYDRATE 40 MG/ML
2 INJECTION, SOLUTION INTRAVENOUS ONCE
Status: COMPLETED | OUTPATIENT
Start: 2025-05-24 | End: 2025-05-24

## 2025-05-24 RX ORDER — CALCIUM GLUCONATE 20 MG/ML
2 INJECTION, SOLUTION INTRAVENOUS ONCE
Status: COMPLETED | OUTPATIENT
Start: 2025-05-24 | End: 2025-05-24

## 2025-05-24 RX ORDER — DEXAMETHASONE SODIUM PHOSPHATE 4 MG/ML
4 INJECTION, SOLUTION INTRA-ARTICULAR; INTRALESIONAL; INTRAMUSCULAR; INTRAVENOUS; SOFT TISSUE 3 TIMES DAILY
Status: DISCONTINUED | OUTPATIENT
Start: 2025-05-24 | End: 2025-05-30

## 2025-05-24 RX ADMIN — INSULIN LISPRO 1 UNITS: 100 INJECTION, SOLUTION INTRAVENOUS; SUBCUTANEOUS at 23:23

## 2025-05-24 RX ADMIN — METOPROLOL TARTRATE 12.5 MG: 25 TABLET, FILM COATED ORAL at 01:11

## 2025-05-24 RX ADMIN — DOCUSATE SODIUM 50 MG AND SENNOSIDES 8.6 MG 1 TABLET: 8.6; 5 TABLET, FILM COATED ORAL at 20:05

## 2025-05-24 RX ADMIN — POTASSIUM PHOSPHATE, MONOBASIC POTASSIUM PHOSPHATE, DIBASIC INJECTION, 30 MMOL: 236; 224 SOLUTION, CONCENTRATE INTRAVENOUS at 08:48

## 2025-05-24 RX ADMIN — DEXAMETHASONE SODIUM PHOSPHATE 4 MG: 4 INJECTION, SOLUTION INTRAMUSCULAR; INTRAVENOUS at 21:22

## 2025-05-24 RX ADMIN — POTASSIUM CHLORIDE 20 MEQ: 1500 TABLET, EXTENDED RELEASE ORAL at 17:29

## 2025-05-24 RX ADMIN — DEXAMETHASONE SODIUM PHOSPHATE 4 MG: 4 INJECTION, SOLUTION INTRAMUSCULAR; INTRAVENOUS at 08:51

## 2025-05-24 RX ADMIN — ACETAMINOPHEN 650 MG: 325 TABLET ORAL at 11:33

## 2025-05-24 RX ADMIN — LEVETIRACETAM 500 MG: 100 INJECTION, SOLUTION INTRAVENOUS at 05:11

## 2025-05-24 RX ADMIN — ONDANSETRON 4 MG: 2 INJECTION INTRAMUSCULAR; INTRAVENOUS at 14:00

## 2025-05-24 RX ADMIN — FENTANYL CITRATE 25 MCG: 50 INJECTION, SOLUTION INTRAMUSCULAR; INTRAVENOUS at 16:15

## 2025-05-24 RX ADMIN — OXYCODONE 5 MG: 5 TABLET ORAL at 15:04

## 2025-05-24 RX ADMIN — SODIUM CHLORIDE: 9 INJECTION, SOLUTION INTRAVENOUS at 04:17

## 2025-05-24 RX ADMIN — LEVETIRACETAM 500 MG: 500 TABLET, FILM COATED ORAL at 17:30

## 2025-05-24 RX ADMIN — MAGNESIUM HYDROXIDE 30 ML: 2400 SUSPENSION ORAL at 17:27

## 2025-05-24 RX ADMIN — INSULIN LISPRO 1 UNITS: 100 INJECTION, SOLUTION INTRAVENOUS; SUBCUTANEOUS at 17:40

## 2025-05-24 RX ADMIN — OXYCODONE 5 MG: 5 TABLET ORAL at 11:32

## 2025-05-24 RX ADMIN — DEXAMETHASONE SODIUM PHOSPHATE 4 MG: 4 INJECTION, SOLUTION INTRAMUSCULAR; INTRAVENOUS at 15:05

## 2025-05-24 RX ADMIN — POLYETHYLENE GLYCOL 3350 1 PACKET: 17 POWDER, FOR SOLUTION ORAL at 17:27

## 2025-05-24 RX ADMIN — OXYCODONE 5 MG: 5 TABLET ORAL at 02:50

## 2025-05-24 RX ADMIN — ACETAMINOPHEN 650 MG: 325 TABLET ORAL at 17:30

## 2025-05-24 RX ADMIN — CALCIUM CHLORIDE 1000 MG: 100 INJECTION, SOLUTION INTRAVENOUS at 22:19

## 2025-05-24 RX ADMIN — AMIODARONE HYDROCHLORIDE 150 MG: 1.5 INJECTION, SOLUTION INTRAVENOUS at 04:13

## 2025-05-24 RX ADMIN — FAMOTIDINE 20 MG: 10 INJECTION, SOLUTION INTRAVENOUS at 05:10

## 2025-05-24 RX ADMIN — ONDANSETRON 4 MG: 2 INJECTION INTRAMUSCULAR; INTRAVENOUS at 05:24

## 2025-05-24 RX ADMIN — ONDANSETRON 4 MG: 2 INJECTION INTRAMUSCULAR; INTRAVENOUS at 09:52

## 2025-05-24 RX ADMIN — SODIUM CHLORIDE: 9 INJECTION, SOLUTION INTRAVENOUS at 15:32

## 2025-05-24 RX ADMIN — OXYCODONE 5 MG: 5 TABLET ORAL at 18:33

## 2025-05-24 RX ADMIN — ONDANSETRON 4 MG: 2 INJECTION INTRAMUSCULAR; INTRAVENOUS at 20:05

## 2025-05-24 RX ADMIN — ONDANSETRON 4 MG: 2 INJECTION INTRAMUSCULAR; INTRAVENOUS at 16:14

## 2025-05-24 RX ADMIN — FAMOTIDINE 20 MG: 20 TABLET, FILM COATED ORAL at 17:29

## 2025-05-24 RX ADMIN — ACETAMINOPHEN 650 MG: 325 TABLET ORAL at 23:25

## 2025-05-24 RX ADMIN — METOPROLOL TARTRATE 25 MG: 25 TABLET, FILM COATED ORAL at 05:10

## 2025-05-24 RX ADMIN — METOPROLOL TARTRATE 5 MG: 5 INJECTION INTRAVENOUS at 00:05

## 2025-05-24 RX ADMIN — INSULIN LISPRO 1 UNITS: 100 INJECTION, SOLUTION INTRAVENOUS; SUBCUTANEOUS at 11:38

## 2025-05-24 RX ADMIN — MAGNESIUM SULFATE HEPTAHYDRATE 2 G: 2 INJECTION, SOLUTION INTRAVENOUS at 08:40

## 2025-05-24 RX ADMIN — POTASSIUM CHLORIDE 20 MEQ: 1500 TABLET, EXTENDED RELEASE ORAL at 05:12

## 2025-05-24 RX ADMIN — SODIUM CHLORIDE: 9 INJECTION, SOLUTION INTRAVENOUS at 22:21

## 2025-05-24 RX ADMIN — METOPROLOL TARTRATE 25 MG: 25 TABLET, FILM COATED ORAL at 17:30

## 2025-05-24 RX ADMIN — DOCUSATE SODIUM 100 MG: 100 CAPSULE, LIQUID FILLED ORAL at 17:28

## 2025-05-24 RX ADMIN — METOPROLOL TARTRATE 25 MG: 25 TABLET, FILM COATED ORAL at 21:22

## 2025-05-24 RX ADMIN — CALCIUM GLUCONATE 2 G: 20 INJECTION, SOLUTION INTRAVENOUS at 08:56

## 2025-05-24 ASSESSMENT — PAIN DESCRIPTION - PAIN TYPE
TYPE: ACUTE PAIN

## 2025-05-24 ASSESSMENT — ENCOUNTER SYMPTOMS
VOMITING: 0
SHORTNESS OF BREATH: 0
PHOTOPHOBIA: 1
BACK PAIN: 0
DIZZINESS: 1
MYALGIAS: 1
ABDOMINAL PAIN: 0
NAUSEA: 0
NECK PAIN: 0
CONSTITUTIONAL NEGATIVE: 1
HEADACHES: 1

## 2025-05-24 ASSESSMENT — COPD QUESTIONNAIRES
HAVE YOU SMOKED AT LEAST 100 CIGARETTES IN YOUR ENTIRE LIFE: NO/DON'T KNOW
DO YOU EVER COUGH UP ANY MUCUS OR PHLEGM?: NO/ONLY WITH OCCASIONAL COLDS OR INFECTIONS
COPD SCREENING SCORE: 2
DURING THE PAST 4 WEEKS HOW MUCH DID YOU FEEL SHORT OF BREATH: NONE/LITTLE OF THE TIME

## 2025-05-24 ASSESSMENT — LIFESTYLE VARIABLES: SUBSTANCE_ABUSE: 1

## 2025-05-24 NOTE — PROGRESS NOTES
Pt seen/examined/consult dictated.  CT # 2 shows nlargement SDH including interhemispheric.  TEG:  inhibited platelets.  Verbal order given to RN for 10 pack platelets.  Continue q 1 hr neuro checks.  Repeat CT 6 am 5/24.

## 2025-05-24 NOTE — PROGRESS NOTES
Lab called with critical result of Troponin 194 at 0328. Critical lab result read back to Lab.   Dr. Nova notified of critical lab result at 0333.  Critical lab result read back by Dr. Nova.

## 2025-05-24 NOTE — PROGRESS NOTES
Assumed care from Claudine HERNANDEZ, report received and patient assessed. No needs at this time, call light within reach.

## 2025-05-24 NOTE — PROGRESS NOTES
Patient's heart rate sustaining in the 130's post Metoprolol IV x3 administration. Dr. Nova notified. Order received for 12.5mg oral Metoprolol x1 now.

## 2025-05-24 NOTE — CARE PLAN
Problem: Pain - Standard  Goal: Alleviation of pain or a reduction in pain to the patient’s comfort goal  Description: Target End Date:  Prior to discharge or change in level of careDocument on Vitals flowsheet1.  Document pain using the appropriate pain scale per order or unit policy2.  Educate and implement non-pharmacologic comfort measures (i.e. relaxation, distraction, massage, cold/heat therapy, etc.)3.  Pain management medications as ordered4.  Reassess pain after pain med administration per policy5.  If opiods administered assess patient's response to pain medication is appropriate per POSS sedation scale6.  Follow pain management plan developed in collaboration with patient and interdisciplinary team (including palliative care or pain specialists if applicable)  Outcome: Progressing     Problem: Knowledge Deficit - Standard  Goal: Patient and family/care givers will demonstrate understanding of plan of care, disease process/condition, diagnostic tests and medications  Description: Target End Date:  1-3 days or as soon as patient condition allowsDocument in Patient Education1.  Patient and family/caregiver oriented to unit, equipment, visitation policy and means for communicating concern2.  Complete/review Learning Assessment3.  Assess knowledge level of disease process/condition, treatment plan, diagnostic tests and medications4.  Explain disease process/condition, treatment plan, diagnostic tests and medications  Outcome: Progressing     Problem: Fall Risk  Goal: Patient will remain free from falls  Description: Target End Date:  Prior to discharge or change in level of careDocument interventions on the Basia Gordon Fall Risk Assessment1.  Assess for fall risk factors2.  Implement fall precautions  Outcome: Progressing   The patient is Watcher - Medium risk of patient condition declining or worsening    Shift Goals  Clinical Goals: hemodynamic stability, neurovascular checks  Patient Goals: sleep  Family  Goals: n/a
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
## 2025-05-24 NOTE — PROGRESS NOTES
Patient's Troponin result 194 and heart rate sustaining in the 130's. Patient also with 2 episodes of tachycardia in the 180's sustaining for 30 seconds. Dr. Nova notified. Order received for Amiodarone IV 150mg bolus stat.
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
## 2025-05-24 NOTE — ASSESSMENT & PLAN NOTE
Atrial flutter and variable atrial tachyarrhythmias. 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 Mild mitral regurgitation. Mild tricuspid regurgitation. Estimated right ventricular systolic pressure is 25 mmHg.  5/24 Cardiology recs not a candidate for ablation until anticoagulation possible. Continue rate control.  5/27 Emergent cardioversion 5/27 for hypotension in IR. Start IV amiodarone.  5/28 Transition to PO amiodarone. Change to toprol XL 25 mg daily.  Cardiology recs start anticoagulation once cleared by neurosurgery.  5/30 Discussed with neurosurgery who recs patient will need a repeat head CT before discharge & no anticoagulation before SDH resolved.  Continuous cardiac monitoring.   5/31 Repeat EKG for QTc monitoring per cardiology, , prior 500   Lillian Nelson M.D., Cardiology (sign off).

## 2025-05-24 NOTE — PROGRESS NOTES
Dr. Coelho at bedside reviewing CT head. Order received for 1 unit of platelets and repeat head CT at 0600.

## 2025-05-24 NOTE — ASSESSMENT & PLAN NOTE
Hypokalemia in the setting of atrial tachyarrhythmias.  Replete potassium. Empiric magnesium replacement.

## 2025-05-24 NOTE — ASSESSMENT & PLAN NOTE
Hypocalcemia in the setting of atrial tachyarrhythmias.  Replete with calcium gluconate and trend.
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
## 2025-05-24 NOTE — PROGRESS NOTES
Sustained HR of 170-180's, 2x bout of emesis and defecation with patient at edge of bed. Vagal maneuvers attempted, pads placed on patient. STAT EKG ordered. Conversion to A-fib/flutter at 120's. Dr. Pinzon updated. New orders received and implemented.

## 2025-05-24 NOTE — CARE PLAN
The patient is Watcher - Medium risk of patient condition declining or worsening    Shift Goals  Clinical Goals: Stable neruo checks  Patient Goals: no goals identified at this time  Family Goals: no family present    Progress made toward(s) clinical / shift goals:      Problem: Pain - Standard  Goal: Alleviation of pain or a reduction in pain to the patient’s comfort goal  Outcome: Progressing     Problem: Knowledge Deficit - Standard  Goal: Patient and family/care givers will demonstrate understanding of plan of care, disease process/condition, diagnostic tests and medications  Outcome: Progressing
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
## 2025-05-24 NOTE — PROGRESS NOTES
Neurosurgery Progress Note    Subjective:  Mild HA.  CT #3 stable vs 2nd scan.    Exam:  A and A.  PERRL.  GCS 14.  No focal weakness    BP  Min: 119/80  Max: 173/113  Pulse  Av.4  Min: 114  Max: 169  Resp  Av.5  Min: 14  Max: 63  Temp  Av.5 °C (97.7 °F)  Min: 36.3 °C (97.3 °F)  Max: 36.8 °C (98.2 °F)  SpO2  Av.1 %  Min: 93 %  Max: 100 %    No data recorded    Recent Labs     25  1445 25  2100 25  0223 25  0845   WBC 2.8*  --  5.6  --    RBC 3.35*  --  2.75*  --    HEMOGLOBIN 10.0* 9.5* 8.4*  8.4* 8.4*   HEMATOCRIT 30.7*  --  25.5*  --    MCV 91.6  --  92.7  --    MCH 29.9  --  30.2  --    MCHC 32.6  --  32.5  --    RDW 61.0*  --  61.1*  --    PLATELETCT 112*  --  157*  --    MPV 9.8  --  9.5  --      Recent Labs     25  14425  0223   SODIUM 140 138   POTASSIUM 3.2* 3.8   CHLORIDE 100 99   CO2 22 21   GLUCOSE 117* 113*   BUN 9 6*   CREATININE 0.98 0.78   CALCIUM 7.9* 7.5*     Recent Labs     25   APTT 27.5   INR 1.11     Recent Labs     25  1445 25  0845   REACTMIN 5.1 4.3*   CLOTKINET 1.3 1.2   CLOTANGL 75.3 75.5   MAXCLOTS 55.6 59.8   [id number] 0.0  --    PRCINADP 47.7* 23.8*   PRCINAA 31.1* 23.6*       Intake/Output                         25 0700 - 25 0659 25 07 - 25 0659     4156-2382 6949-1468 Total 7180-6192 2116-3681 Total                 Intake    P.O.  --  100 100  --  -- --    P.O. -- 100 100 -- -- --    I.V.  500  1827.6 2327.6  --  -- --    Pre-Hospital Volume 500 -- 500 -- -- --    Trauma Resuscitation Volume 0 -- 0 -- -- --    Magnesium Sulfate Volume -- 49.9 49.9 -- -- --    Volume (mL) (NS infusion) -- 1777.7 1777.7 -- -- --    Blood  0  344.4 344.4  --  -- --    PRBC Total Volume (Non-Barcoded) 0 -- 0 -- -- --    FFP Total Volume (Non-Barcoded) 0 -- 0 -- -- --    Platelets Total Volume (Non-Barcoded) 0 -- 0 -- -- --    Cryoprecipitate (Pooled) Total Volume (Non-Barcoded) 0 -- 0 -- -- --     Volume (RELEASE PLATELET PHERESIS) -- 344.4 344.4 -- -- --    IV Piggyback  --  196.3 196.3  --  -- --    Volume (mL) (potassium chloride (KCL) ivpb 10 mEq) -- 100 100 -- -- --    Volume (mL) (calcium GLUConate 2 g in NaCl IVPB premix) -- 96.3 96.3 -- -- --    Total Intake 500 2468.3 2968.3 -- -- --       Output    Urine  1600  2750 4350  --  -- --    Number of Times Voided 1 x -- 1 x -- -- --    Urine Void (mL) 1600 2750 4350 -- -- --    Other  0  -- 0  --  -- --    Pre-Hospital Output 0 -- 0 -- -- --    Trauma Resuscitation Output 0 -- 0 -- -- --    Stool  --  -- --  --  -- --    Number of Times Stooled 0 x 1 x 1 x -- -- --    Blood  0  -- 0  --  -- --    Est. Blood Loss 0 -- 0 -- -- --    Total Output 1600 2750 4350 -- -- --       Net I/O     -1100 -281.7 -1381.7 -- -- --              Intake/Output Summary (Last 24 hours) at 5/24/2025 1033  Last data filed at 5/24/2025 0600  Gross per 24 hour   Intake 2968.33 ml   Output 4350 ml   Net -1381.67 ml             metoprolol tartrate  25 mg BID    magnesium sulfate  2 g Once    potassium phosphate  30 mmol Once    dexamethasone  4 mg TID    Respiratory Therapy Consult   Continuous RT    ondansetron  4 mg Q4HRS PRN    Or    ondansetron  4 mg Q4HRS PRN    docusate sodium  100 mg BID    senna-docusate  1 Tablet Nightly    senna-docusate  1 Tablet Q24HRS PRN    polyethylene glycol/lytes  1 Packet BID    magnesium hydroxide  30 mL DAILY AT 1800    bisacodyl  10 mg Q24HRS PRN    sodium phosphate  1 Enema Once PRN    NS   Continuous    acetaminophen  650 mg Q6HRS    Followed by    [START ON 5/28/2025] acetaminophen  650 mg Q6HRS PRN    oxyCODONE immediate-release  2.5 mg Q3HRS PRN    Or    oxyCODONE immediate-release  5 mg Q3HRS PRN    Or    fentaNYL  25 mcg Q3HRS PRN    labetalol  10 mg Q4HRS PRN    levETIRAcetam  500 mg Q12HRS    Or    levETIRAcetam (Keppra) IV  500 mg Q12HRS    famotidine  20 mg BID    Or    famotidine  20 mg BID    insulin lispro  1-6 Units Q6HRS    And     dextrose bolus  25 g Q15 MIN PRN    midazolam  1 mg Q HOUR PRN    Or    midazolam  2 mg Q HOUR PRN    Or    midazolam  4 mg Q HOUR PRN    Or    midazolam  5 mg Q HOUR PRN    thiamine  100 mg DAILY    And    multivitamin  1 Tablet DAILY    And    folic acid  1 mg DAILY    potassium chloride SA  20 mEq BID       Assessment and Plan:  Hospital day #2  POD #na  Prophylactic anticoagulation: no         Start date/time: never   IV Decadron started.  Q2 neuro checks.  Repeat CT am 5/25.  MMAE next week.  I believe pt at high risk for complications doing a large craniotomy. Will Tx with decadron/MMAE and follow SD hematoma.  Better to drain, if needed, with small crani/Brisa hole when collection turns chronic.  Brain Compression: Yes Traumatic

## 2025-05-24 NOTE — PROGRESS NOTES
Patient's heart rate sustaining in the 140's. Dr. Nova notified. Orders received for Metoprolol IV 5mg Q5 minutes PRN for heart rate >120 and 12.5mg oral Metoprolol now.

## 2025-05-25 ENCOUNTER — APPOINTMENT (OUTPATIENT)
Dept: RADIOLOGY | Facility: MEDICAL CENTER | Age: 75
DRG: 023 | End: 2025-05-25
Attending: SURGERY
Payer: OTHER MISCELLANEOUS

## 2025-05-25 ENCOUNTER — HOSPITAL ENCOUNTER (OUTPATIENT)
Dept: RADIOLOGY | Facility: MEDICAL CENTER | Age: 75
End: 2025-05-25
Attending: NEUROLOGICAL SURGERY
Payer: MEDICARE

## 2025-05-25 PROBLEM — E83.51 HYPOCALCEMIA: Status: RESOLVED | Noted: 2025-05-23 | Resolved: 2025-05-25

## 2025-05-25 PROBLEM — D64.9 ANEMIA: Status: ACTIVE | Noted: 2025-05-25

## 2025-05-25 PROBLEM — I50.20 SYSTOLIC HEART FAILURE (HCC): Status: ACTIVE | Noted: 2025-05-25

## 2025-05-25 PROBLEM — E83.42 HYPOMAGNESEMIA: Status: RESOLVED | Noted: 2025-05-24 | Resolved: 2025-05-25

## 2025-05-25 PROBLEM — E87.6 HYPOKALEMIA: Status: RESOLVED | Noted: 2025-05-23 | Resolved: 2025-05-25

## 2025-05-25 PROBLEM — M79.604 ACUTE PAIN OF RIGHT LOWER EXTREMITY: Status: RESOLVED | Noted: 2025-05-24 | Resolved: 2025-05-25

## 2025-05-25 PROBLEM — E03.9 HYPOTHYROIDISM: Status: ACTIVE | Noted: 2025-05-25

## 2025-05-25 PROBLEM — R74.8 ELEVATED LIVER ENZYMES: Status: ACTIVE | Noted: 2025-05-25

## 2025-05-25 PROBLEM — F10.20 ALCOHOLISM (HCC): Status: ACTIVE | Noted: 2025-05-25

## 2025-05-25 PROBLEM — Z01.89 ENCOUNTER FOR GERIATRIC ASSESSMENT: Status: ACTIVE | Noted: 2025-05-25

## 2025-05-25 LAB
ALBUMIN SERPL BCP-MCNC: 3.5 G/DL (ref 3.2–4.9)
ALBUMIN SERPL BCP-MCNC: 3.5 G/DL (ref 3.2–4.9)
ALBUMIN/GLOB SERPL: 1.2 G/DL
ALBUMIN/GLOB SERPL: 1.2 G/DL
ALP SERPL-CCNC: 125 U/L (ref 30–99)
ALP SERPL-CCNC: 125 U/L (ref 30–99)
ALT SERPL-CCNC: 48 U/L (ref 2–50)
ALT SERPL-CCNC: 48 U/L (ref 2–50)
ANION GAP SERPL CALC-SCNC: 13 MMOL/L (ref 7–16)
ANION GAP SERPL CALC-SCNC: 13 MMOL/L (ref 7–16)
AST SERPL-CCNC: 89 U/L (ref 12–45)
AST SERPL-CCNC: 89 U/L (ref 12–45)
BASOPHILS # BLD AUTO: 0.2 % (ref 0–1.8)
BASOPHILS # BLD AUTO: 0.2 % (ref 0–1.8)
BASOPHILS # BLD: 0.01 K/UL (ref 0–0.12)
BASOPHILS # BLD: 0.01 K/UL (ref 0–0.12)
BILIRUB SERPL-MCNC: 1.2 MG/DL (ref 0.1–1.5)
BILIRUB SERPL-MCNC: 1.2 MG/DL (ref 0.1–1.5)
BUN SERPL-MCNC: 14 MG/DL (ref 8–22)
BUN SERPL-MCNC: 14 MG/DL (ref 8–22)
CALCIUM ALBUM COR SERPL-MCNC: 8.4 MG/DL (ref 8.5–10.5)
CALCIUM ALBUM COR SERPL-MCNC: 8.4 MG/DL (ref 8.5–10.5)
CALCIUM SERPL-MCNC: 8 MG/DL (ref 8.5–10.5)
CALCIUM SERPL-MCNC: 8 MG/DL (ref 8.5–10.5)
CHLORIDE SERPL-SCNC: 100 MMOL/L (ref 96–112)
CHLORIDE SERPL-SCNC: 100 MMOL/L (ref 96–112)
CO2 SERPL-SCNC: 22 MMOL/L (ref 20–33)
CO2 SERPL-SCNC: 22 MMOL/L (ref 20–33)
CREAT SERPL-MCNC: 0.98 MG/DL (ref 0.5–1.4)
CREAT SERPL-MCNC: 0.98 MG/DL (ref 0.5–1.4)
EOSINOPHIL # BLD AUTO: 0 K/UL (ref 0–0.51)
EOSINOPHIL # BLD AUTO: 0 K/UL (ref 0–0.51)
EOSINOPHIL NFR BLD: 0 % (ref 0–6.9)
EOSINOPHIL NFR BLD: 0 % (ref 0–6.9)
ERYTHROCYTE [DISTWIDTH] IN BLOOD BY AUTOMATED COUNT: 58.9 FL (ref 35.9–50)
ERYTHROCYTE [DISTWIDTH] IN BLOOD BY AUTOMATED COUNT: 58.9 FL (ref 35.9–50)
GFR SERPLBLD CREATININE-BSD FMLA CKD-EPI: 80 ML/MIN/1.73 M 2
GFR SERPLBLD CREATININE-BSD FMLA CKD-EPI: 80 ML/MIN/1.73 M 2
GLOBULIN SER CALC-MCNC: 2.9 G/DL (ref 1.9–3.5)
GLOBULIN SER CALC-MCNC: 2.9 G/DL (ref 1.9–3.5)
GLUCOSE BLD STRIP.AUTO-MCNC: 150 MG/DL (ref 65–99)
GLUCOSE BLD STRIP.AUTO-MCNC: 150 MG/DL (ref 65–99)
GLUCOSE BLD STRIP.AUTO-MCNC: 179 MG/DL (ref 65–99)
GLUCOSE BLD STRIP.AUTO-MCNC: 179 MG/DL (ref 65–99)
GLUCOSE BLD STRIP.AUTO-MCNC: 210 MG/DL (ref 65–99)
GLUCOSE BLD STRIP.AUTO-MCNC: 210 MG/DL (ref 65–99)
GLUCOSE SERPL-MCNC: 151 MG/DL (ref 65–99)
GLUCOSE SERPL-MCNC: 151 MG/DL (ref 65–99)
HCT VFR BLD AUTO: 26.6 % (ref 42–52)
HCT VFR BLD AUTO: 26.6 % (ref 42–52)
HGB BLD-MCNC: 8.5 G/DL (ref 14–18)
HGB BLD-MCNC: 8.5 G/DL (ref 14–18)
IMM GRANULOCYTES # BLD AUTO: 0.02 K/UL (ref 0–0.11)
IMM GRANULOCYTES # BLD AUTO: 0.02 K/UL (ref 0–0.11)
IMM GRANULOCYTES NFR BLD AUTO: 0.3 % (ref 0–0.9)
IMM GRANULOCYTES NFR BLD AUTO: 0.3 % (ref 0–0.9)
LYMPHOCYTES # BLD AUTO: 0.67 K/UL (ref 1–4.8)
LYMPHOCYTES # BLD AUTO: 0.67 K/UL (ref 1–4.8)
LYMPHOCYTES NFR BLD: 11.3 % (ref 22–41)
LYMPHOCYTES NFR BLD: 11.3 % (ref 22–41)
MCH RBC QN AUTO: 29.9 PG (ref 27–33)
MCH RBC QN AUTO: 29.9 PG (ref 27–33)
MCHC RBC AUTO-ENTMCNC: 32 G/DL (ref 32.3–36.5)
MCHC RBC AUTO-ENTMCNC: 32 G/DL (ref 32.3–36.5)
MCV RBC AUTO: 93.7 FL (ref 81.4–97.8)
MCV RBC AUTO: 93.7 FL (ref 81.4–97.8)
MONOCYTES # BLD AUTO: 0.4 K/UL (ref 0–0.85)
MONOCYTES # BLD AUTO: 0.4 K/UL (ref 0–0.85)
MONOCYTES NFR BLD AUTO: 6.8 % (ref 0–13.4)
MONOCYTES NFR BLD AUTO: 6.8 % (ref 0–13.4)
NEUTROPHILS # BLD AUTO: 4.81 K/UL (ref 1.82–7.42)
NEUTROPHILS # BLD AUTO: 4.81 K/UL (ref 1.82–7.42)
NEUTROPHILS NFR BLD: 81.4 % (ref 44–72)
NEUTROPHILS NFR BLD: 81.4 % (ref 44–72)
NRBC # BLD AUTO: 0.06 K/UL
NRBC # BLD AUTO: 0.06 K/UL
NRBC BLD-RTO: 1 /100 WBC (ref 0–0.2)
NRBC BLD-RTO: 1 /100 WBC (ref 0–0.2)
PLATELET # BLD AUTO: 170 K/UL (ref 164–446)
PLATELET # BLD AUTO: 170 K/UL (ref 164–446)
PMV BLD AUTO: 10.7 FL (ref 9–12.9)
PMV BLD AUTO: 10.7 FL (ref 9–12.9)
POTASSIUM SERPL-SCNC: 4.7 MMOL/L (ref 3.6–5.5)
POTASSIUM SERPL-SCNC: 4.7 MMOL/L (ref 3.6–5.5)
PROT SERPL-MCNC: 6.4 G/DL (ref 6–8.2)
PROT SERPL-MCNC: 6.4 G/DL (ref 6–8.2)
RBC # BLD AUTO: 2.84 M/UL (ref 4.7–6.1)
RBC # BLD AUTO: 2.84 M/UL (ref 4.7–6.1)
SODIUM SERPL-SCNC: 135 MMOL/L (ref 135–145)
SODIUM SERPL-SCNC: 135 MMOL/L (ref 135–145)
WBC # BLD AUTO: 5.9 K/UL (ref 4.8–10.8)
WBC # BLD AUTO: 5.9 K/UL (ref 4.8–10.8)

## 2025-05-25 PROCEDURE — 700111 HCHG RX REV CODE 636 W/ 250 OVERRIDE (IP): Mod: JZ | Performed by: SURGERY

## 2025-05-25 PROCEDURE — 700102 HCHG RX REV CODE 250 W/ 637 OVERRIDE(OP): Performed by: SURGERY

## 2025-05-25 PROCEDURE — A9270 NON-COVERED ITEM OR SERVICE: HCPCS | Performed by: STUDENT IN AN ORGANIZED HEALTH CARE EDUCATION/TRAINING PROGRAM

## 2025-05-25 PROCEDURE — 99255 IP/OBS CONSLTJ NEW/EST HI 80: CPT | Performed by: INTERNAL MEDICINE

## 2025-05-25 PROCEDURE — 85025 COMPLETE CBC W/AUTO DIFF WBC: CPT

## 2025-05-25 PROCEDURE — 80053 COMPREHEN METABOLIC PANEL: CPT

## 2025-05-25 PROCEDURE — 770001 HCHG ROOM/CARE - MED/SURG/GYN PRIV*

## 2025-05-25 PROCEDURE — 82962 GLUCOSE BLOOD TEST: CPT

## 2025-05-25 PROCEDURE — 700102 HCHG RX REV CODE 250 W/ 637 OVERRIDE(OP): Performed by: STUDENT IN AN ORGANIZED HEALTH CARE EDUCATION/TRAINING PROGRAM

## 2025-05-25 PROCEDURE — 700101 HCHG RX REV CODE 250: Performed by: SURGERY

## 2025-05-25 PROCEDURE — 700111 HCHG RX REV CODE 636 W/ 250 OVERRIDE (IP): Mod: JZ | Performed by: NEUROLOGICAL SURGERY

## 2025-05-25 PROCEDURE — 93970 EXTREMITY STUDY: CPT | Mod: 26 | Performed by: INTERNAL MEDICINE

## 2025-05-25 PROCEDURE — A9270 NON-COVERED ITEM OR SERVICE: HCPCS | Performed by: SURGERY

## 2025-05-25 PROCEDURE — 700111 HCHG RX REV CODE 636 W/ 250 OVERRIDE (IP): Performed by: NEUROLOGICAL SURGERY

## 2025-05-25 PROCEDURE — 71045 X-RAY EXAM CHEST 1 VIEW: CPT

## 2025-05-25 PROCEDURE — 70450 CT HEAD/BRAIN W/O DYE: CPT

## 2025-05-25 PROCEDURE — 99233 SBSQ HOSP IP/OBS HIGH 50: CPT | Performed by: NURSE PRACTITIONER

## 2025-05-25 RX ORDER — METOPROLOL TARTRATE 25 MG/1
25 TABLET, FILM COATED ORAL EVERY 6 HOURS
Status: DISCONTINUED | OUTPATIENT
Start: 2025-05-25 | End: 2025-05-25

## 2025-05-25 RX ORDER — CALCIUM GLUCONATE 20 MG/ML
1 INJECTION, SOLUTION INTRAVENOUS ONCE
Status: COMPLETED | OUTPATIENT
Start: 2025-05-25 | End: 2025-05-25

## 2025-05-25 RX ORDER — METOPROLOL TARTRATE 50 MG
50 TABLET ORAL 2 TIMES DAILY
Status: DISCONTINUED | OUTPATIENT
Start: 2025-05-25 | End: 2025-05-27

## 2025-05-25 RX ORDER — METOPROLOL TARTRATE 25 MG/1
25 TABLET, FILM COATED ORAL ONCE
Status: COMPLETED | OUTPATIENT
Start: 2025-05-25 | End: 2025-05-25

## 2025-05-25 RX ADMIN — FOLIC ACID 1 MG: 1 TABLET ORAL at 05:00

## 2025-05-25 RX ADMIN — ACETAMINOPHEN 650 MG: 325 TABLET ORAL at 05:00

## 2025-05-25 RX ADMIN — METOPROLOL TARTRATE 25 MG: 25 TABLET, FILM COATED ORAL at 09:50

## 2025-05-25 RX ADMIN — ACETAMINOPHEN 650 MG: 325 TABLET ORAL at 11:16

## 2025-05-25 RX ADMIN — THERA TABS 1 TABLET: TAB at 05:00

## 2025-05-25 RX ADMIN — FAMOTIDINE 20 MG: 10 INJECTION, SOLUTION INTRAVENOUS at 05:00

## 2025-05-25 RX ADMIN — METOPROLOL TARTRATE 50 MG: 50 TABLET, FILM COATED ORAL at 18:16

## 2025-05-25 RX ADMIN — POTASSIUM CHLORIDE 20 MEQ: 1500 TABLET, EXTENDED RELEASE ORAL at 05:00

## 2025-05-25 RX ADMIN — DEXAMETHASONE SODIUM PHOSPHATE 4 MG: 4 INJECTION, SOLUTION INTRAMUSCULAR; INTRAVENOUS at 21:06

## 2025-05-25 RX ADMIN — CALCIUM GLUCONATE 1 G: 20 INJECTION, SOLUTION INTRAVENOUS at 09:53

## 2025-05-25 RX ADMIN — METOPROLOL TARTRATE 25 MG: 25 TABLET, FILM COATED ORAL at 05:00

## 2025-05-25 RX ADMIN — OXYCODONE 2.5 MG: 5 TABLET ORAL at 18:20

## 2025-05-25 RX ADMIN — DEXAMETHASONE SODIUM PHOSPHATE 4 MG: 4 INJECTION, SOLUTION INTRAMUSCULAR; INTRAVENOUS at 14:26

## 2025-05-25 RX ADMIN — LEVETIRACETAM 500 MG: 500 TABLET, FILM COATED ORAL at 18:16

## 2025-05-25 RX ADMIN — INSULIN LISPRO 2 UNITS: 100 INJECTION, SOLUTION INTRAVENOUS; SUBCUTANEOUS at 11:18

## 2025-05-25 RX ADMIN — Medication 100 MG: at 05:00

## 2025-05-25 RX ADMIN — METOPROLOL TARTRATE 5 MG: 5 INJECTION INTRAVENOUS at 01:54

## 2025-05-25 RX ADMIN — METOPROLOL TARTRATE 5 MG: 5 INJECTION INTRAVENOUS at 16:47

## 2025-05-25 RX ADMIN — POLYETHYLENE GLYCOL 3350 1 PACKET: 17 POWDER, FOR SOLUTION ORAL at 05:00

## 2025-05-25 RX ADMIN — INSULIN LISPRO 1 UNITS: 100 INJECTION, SOLUTION INTRAVENOUS; SUBCUTANEOUS at 16:53

## 2025-05-25 RX ADMIN — LEVETIRACETAM 500 MG: 100 INJECTION, SOLUTION INTRAVENOUS at 05:00

## 2025-05-25 RX ADMIN — DOCUSATE SODIUM 100 MG: 100 CAPSULE, LIQUID FILLED ORAL at 05:00

## 2025-05-25 RX ADMIN — ACETAMINOPHEN 650 MG: 325 TABLET ORAL at 18:16

## 2025-05-25 RX ADMIN — OXYCODONE 5 MG: 5 TABLET ORAL at 06:43

## 2025-05-25 RX ADMIN — DEXAMETHASONE SODIUM PHOSPHATE 4 MG: 4 INJECTION, SOLUTION INTRAMUSCULAR; INTRAVENOUS at 08:52

## 2025-05-25 SDOH — ECONOMIC STABILITY: TRANSPORTATION INSECURITY
IN THE PAST 12 MONTHS, HAS LACK OF RELIABLE TRANSPORTATION KEPT YOU FROM MEDICAL APPOINTMENTS, MEETINGS, WORK OR FROM GETTING THINGS NEEDED FOR DAILY LIVING?: NO

## 2025-05-25 SDOH — ECONOMIC STABILITY: TRANSPORTATION INSECURITY
IN THE PAST 12 MONTHS, HAS THE LACK OF TRANSPORTATION KEPT YOU FROM MEDICAL APPOINTMENTS OR FROM GETTING MEDICATIONS?: NO

## 2025-05-25 ASSESSMENT — ENCOUNTER SYMPTOMS
NAUSEA: 0
BACK PAIN: 0
CONSTITUTIONAL NEGATIVE: 1
FALLS: 1
COUGH: 0
BLURRED VISION: 0
WEIGHT LOSS: 0
SHORTNESS OF BREATH: 0
ORTHOPNEA: 0
PHOTOPHOBIA: 0
CONSTIPATION: 0
DIZZINESS: 0
DOUBLE VISION: 0
CLAUDICATION: 0
CHILLS: 0
MYALGIAS: 1
FEVER: 0
WEAKNESS: 0
HEMOPTYSIS: 0
MYALGIAS: 0
DIARRHEA: 0
ABDOMINAL PAIN: 0
EYES NEGATIVE: 1
VOMITING: 0
HEADACHES: 1
NECK PAIN: 0
SPEECH CHANGE: 0
PALPITATIONS: 0

## 2025-05-25 ASSESSMENT — PAIN DESCRIPTION - PAIN TYPE
TYPE: ACUTE PAIN

## 2025-05-25 ASSESSMENT — LIFESTYLE VARIABLES
ON A TYPICAL DAY WHEN YOU DRINK ALCOHOL HOW MANY DRINKS DO YOU HAVE: 5
HAVE PEOPLE ANNOYED YOU BY CRITICIZING YOUR DRINKING: YES
ALCOHOL_USE: YES
TOTAL SCORE: 4
DOES PATIENT WANT TO TALK TO SOMEONE ABOUT QUITTING: YES
HOW MANY TIMES IN THE PAST YEAR HAVE YOU HAD 5 OR MORE DRINKS IN A DAY: 20
CONSUMPTION TOTAL: POSITIVE
EVER HAD A DRINK FIRST THING IN THE MORNING TO STEADY YOUR NERVES TO GET RID OF A HANGOVER: YES
EVER FELT BAD OR GUILTY ABOUT YOUR DRINKING: YES
SUBSTANCE_ABUSE: 1
HAVE YOU EVER FELT YOU SHOULD CUT DOWN ON YOUR DRINKING: YES
AVERAGE NUMBER OF DAYS PER WEEK YOU HAVE A DRINK CONTAINING ALCOHOL: 7
DOES PATIENT WANT TO STOP DRINKING: YES

## 2025-05-25 ASSESSMENT — SOCIAL DETERMINANTS OF HEALTH (SDOH)
WITHIN THE LAST YEAR, HAVE YOU BEEN KICKED, HIT, SLAPPED, OR OTHERWISE PHYSICALLY HURT BY YOUR PARTNER OR EX-PARTNER?: NO
WITHIN THE PAST 12 MONTHS, THE FOOD YOU BOUGHT JUST DIDN'T LAST AND YOU DIDN'T HAVE MONEY TO GET MORE: NEVER TRUE
WITHIN THE PAST 12 MONTHS, YOU WORRIED THAT YOUR FOOD WOULD RUN OUT BEFORE YOU GOT THE MONEY TO BUY MORE: NEVER TRUE
WITHIN THE LAST YEAR, HAVE YOU BEEN HUMILIATED OR EMOTIONALLY ABUSED IN OTHER WAYS BY YOUR PARTNER OR EX-PARTNER?: NO
IN THE PAST 12 MONTHS, HAS THE ELECTRIC, GAS, OIL, OR WATER COMPANY THREATENED TO SHUT OFF SERVICE IN YOUR HOME?: NO
WITHIN THE LAST YEAR, HAVE TO BEEN RAPED OR FORCED TO HAVE ANY KIND OF SEXUAL ACTIVITY BY YOUR PARTNER OR EX-PARTNER?: NO
WITHIN THE LAST YEAR, HAVE YOU BEEN AFRAID OF YOUR PARTNER OR EX-PARTNER?: NO

## 2025-05-25 ASSESSMENT — COGNITIVE AND FUNCTIONAL STATUS - GENERAL
TURNING FROM BACK TO SIDE WHILE IN FLAT BAD: A LITTLE
PERSONAL GROOMING: A LITTLE
STANDING UP FROM CHAIR USING ARMS: A LITTLE
MOVING FROM LYING ON BACK TO SITTING ON SIDE OF FLAT BED: A LITTLE
EATING MEALS: A LITTLE
MOBILITY SCORE: 17
DAILY ACTIVITIY SCORE: 18
MOVING TO AND FROM BED TO CHAIR: A LITTLE
CLIMB 3 TO 5 STEPS WITH RAILING: A LOT
DRESSING REGULAR UPPER BODY CLOTHING: A LITTLE
WALKING IN HOSPITAL ROOM: A LITTLE
SUGGESTED CMS G CODE MODIFIER MOBILITY: CK
SUGGESTED CMS G CODE MODIFIER DAILY ACTIVITY: CK
TOILETING: A LITTLE
HELP NEEDED FOR BATHING: A LITTLE
DRESSING REGULAR LOWER BODY CLOTHING: A LITTLE

## 2025-05-25 ASSESSMENT — FIBROSIS 4 INDEX: FIB4 SCORE: 6.52

## 2025-05-25 NOTE — CARE PLAN
Problem: Pain - Standard  Goal: Alleviation of pain or a reduction in pain to the patient’s comfort goal  Description: Target End Date:  Prior to discharge or change in level of careDocument on Vitals flowsheet1.  Document pain using the appropriate pain scale per order or unit policy2.  Educate and implement non-pharmacologic comfort measures (i.e. relaxation, distraction, massage, cold/heat therapy, etc.)3.  Pain management medications as ordered4.  Reassess pain after pain med administration per policy5.  If opiods administered assess patient's response to pain medication is appropriate per POSS sedation scale6.  Follow pain management plan developed in collaboration with patient and interdisciplinary team (including palliative care or pain specialists if applicable)  Outcome: Progressing     Problem: Knowledge Deficit - Standard  Goal: Patient and family/care givers will demonstrate understanding of plan of care, disease process/condition, diagnostic tests and medications  Description: Target End Date:  1-3 days or as soon as patient condition allowsDocument in Patient Education1.  Patient and family/caregiver oriented to unit, equipment, visitation policy and means for communicating concern2.  Complete/review Learning Assessment3.  Assess knowledge level of disease process/condition, treatment plan, diagnostic tests and medications4.  Explain disease process/condition, treatment plan, diagnostic tests and medications  Outcome: Progressing     Problem: Fall Risk  Goal: Patient will remain free from falls  Description: Target End Date:  Prior to discharge or change in level of careDocument interventions on the Basia Gordon Fall Risk Assessment1.  Assess for fall risk factors2.  Implement fall precautions  Outcome: Progressing   The patient is Watcher - Medium risk of patient condition declining or worsening    Shift Goals  Clinical Goals: hemodynamic stability, neuro checks, repeat CT 0600  Patient Goals: pain  management  Family Goals: n/a

## 2025-05-25 NOTE — PROGRESS NOTES
Neurosurgery Progress Note    Subjective:  Mild HA.  CT head this morning #4 stable overall with convexity subdural hematoma parafalcine subdural hematoma    Exam:  A and A.  PERRL.  GCS 14.  No focal weakness  No change in exam    BP  Min: 107/79  Max: 143/90  Pulse  Av.5  Min: 126  Max: 157  Resp  Av.3  Min: 11  Max: 52  Temp  Av.3 °C (97.4 °F)  Min: 35.9 °C (96.7 °F)  Max: 36.7 °C (98.1 °F)  SpO2  Av.5 %  Min: 93 %  Max: 100 %    No data recorded    Recent Labs     25  14425  2100 25  0845 25  0326   WBC 2.8*  --  5.6  --  5.9   RBC 3.35*  --  2.75*  --  2.84*   HEMOGLOBIN 10.0*   < > 8.4*  8.4* 8.4* 8.5*   HEMATOCRIT 30.7*  --  25.5*  --  26.6*   MCV 91.6  --  92.7  --  93.7   MCH 29.9  --  30.2  --  29.9   MCHC 32.6  --  32.5  --  32.0*   RDW 61.0*  --  61.1*  --  58.9*   PLATELETCT 112*  --  157*  --  170   MPV 9.8  --  9.5  --  10.7    < > = values in this interval not displayed.     Recent Labs     25  14425  0326   SODIUM 140 138 135   POTASSIUM 3.2* 3.8 4.7   CHLORIDE 100 99 100   CO2 22 21 22   GLUCOSE 117* 113* 151*   BUN 9 6* 14   CREATININE 0.98 0.78 0.98   CALCIUM 7.9* 7.5* 8.0*     Recent Labs     25   APTT 27.5   INR 1.11     Recent Labs     25  0845   REACTMIN 5.1 4.3*   CLOTKINET 1.3 1.2   CLOTANGL 75.3 75.5   MAXCLOTS 55.6 59.8   UAL78YPQ 0.0  --    PRCINADP 47.7* 23.8*   PRCINAA 31.1* 23.6*       Intake/Output                         25 07 - 25 0659 25 - 25 0659      Total  Total                 Intake    P.O.  200  200 400  --  -- --    P.O. 200 200 400 -- -- --    I.V.  1223.5  837.4 2060.8  94.1  -- 94.1    Magnesium Sulfate Volume 106 -- 106 -- -- --    Volume (mL) (NS infusion) 1117.5 837.4 1954.8 94.1 -- 94.1    IV Piggyback  1169.3  -- 1169.3  --  -- --    Volume (mL) (potassium phosphate IVPB 30  mmol in 500 mL D5W (premix)) 988.3 -- 988.3 -- -- --    Volume (mL) (calcium GLUConate 2 g in NaCl IVPB premix) 181 -- 181 -- -- --    Total Intake 2592.8 1037.4 3630.1 94.1 -- 94.1       Output    Urine  --  550 550  --  -- --    Number of Times Voided 1 x -- 1 x -- -- --    Number of Times Incontinent of Urine -- 2 x 2 x -- -- --    Straight Catheter -- 425 425 -- -- --    Urine Void (mL) -- 125 125 -- -- --    Stool  --  -- --  --  -- --    Number of Times Stooled 1 x -- 1 x 1 x -- 1 x    Total Output -- 550 550 -- -- --       Net I/O     2592.8 487.4 3080.1 94.1 -- 94.1              Intake/Output Summary (Last 24 hours) at 5/25/2025 0940  Last data filed at 5/25/2025 0800  Gross per 24 hour   Intake 3074.25 ml   Output 550 ml   Net 2524.25 ml             metoprolol tartrate  25 mg Q6HRS    dexamethasone  4 mg TID    Metoprolol Tartrate  5 mg Q4HRS PRN    Respiratory Therapy Consult   Continuous RT    ondansetron  4 mg Q4HRS PRN    Or    ondansetron  4 mg Q4HRS PRN    docusate sodium  100 mg BID    senna-docusate  1 Tablet Nightly    senna-docusate  1 Tablet Q24HRS PRN    polyethylene glycol/lytes  1 Packet BID    magnesium hydroxide  30 mL DAILY AT 1800    bisacodyl  10 mg Q24HRS PRN    sodium phosphate  1 Enema Once PRN    NS   Continuous    acetaminophen  650 mg Q6HRS    Followed by    [START ON 5/28/2025] acetaminophen  650 mg Q6HRS PRN    oxyCODONE immediate-release  2.5 mg Q3HRS PRN    Or    oxyCODONE immediate-release  5 mg Q3HRS PRN    Or    fentaNYL  25 mcg Q3HRS PRN    labetalol  10 mg Q4HRS PRN    levETIRAcetam  500 mg Q12HRS    Or    levETIRAcetam (Keppra) IV  500 mg Q12HRS    famotidine  20 mg BID    Or    famotidine  20 mg BID    insulin lispro  1-6 Units Q6HRS    And    dextrose bolus  25 g Q15 MIN PRN    midazolam  1 mg Q HOUR PRN    Or    midazolam  2 mg Q HOUR PRN    Or    midazolam  4 mg Q HOUR PRN    Or    midazolam  5 mg Q HOUR PRN    thiamine  100 mg DAILY    And    multivitamin  1 Tablet  DAILY    And    folic acid  1 mg DAILY    potassium chloride SA  20 mEq BID       Assessment and Plan:  Hospital day #3  POD #na  Prophylactic anticoagulation: no         Start date/time: never   Brain Compression: Yes Traumatic    Dr. Coelho would prefer not to start DVT prophylaxis on this individual given his high risk for recurrent subdurals he is also not a good candidate per Dr. Coelho for cyst removal of blood by craniotomy given his relatively good exam we recommend the following  Holding DVT prophylaxis and mobilization instead  MMA embolization per IR  Keppra 500 twice daily  Every 4 hours neurochecks  CT head this morning stable no repeat CT head unless there is a decline in exam.    Will continue to follow  Total of 35 minutes in direct patient care coordination consultation valuation

## 2025-05-25 NOTE — PROGRESS NOTES
5/25 6 am CT reviewed:  stable findings.  OK to floor if ok with TS.  Neuro checks q 4hr.  IR consult for Right MMA embolization ordered for 5/27.
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
## 2025-05-25 NOTE — CARE PLAN
The patient is Watcher - Medium risk of patient condition declining or worsening    Shift Goals  Clinical Goals: hemodynamic stability,safety  Patient Goals: rest, pain control  Family Goals: no family present    Progress made toward(s) clinical / shift goals:    Problem: Pain - Standard  Goal: Alleviation of pain or a reduction in pain to the patient’s comfort goal  Description: Target End Date:  Prior to discharge or change in level of careDocument on Vitals flowsheet1.  Document pain using the appropriate pain scale per order or unit policy2.  Educate and implement non-pharmacologic comfort measures (i.e. relaxation, distraction, massage, cold/heat therapy, etc.)3.  Pain management medications as ordered4.  Reassess pain after pain med administration per policy5.  If opiods administered assess patient's response to pain medication is appropriate per POSS sedation scale6.  Follow pain management plan developed in collaboration with patient and interdisciplinary team (including palliative care or pain specialists if applicable)  Outcome: Progressing  Note: Pain assessed, interventions given as indicated and interventions reassessed.       Problem: Knowledge Deficit - Standard  Goal: Patient and family/care givers will demonstrate understanding of plan of care, disease process/condition, diagnostic tests and medications  Description: Target End Date:  1-3 days or as soon as patient condition allowsDocument in Patient Education1.  Patient and family/caregiver oriented to unit, equipment, visitation policy and means for communicating concern2.  Complete/review Learning Assessment3.  Assess knowledge level of disease process/condition, treatment plan, diagnostic tests and medications4.  Explain disease process/condition, treatment plan, diagnostic tests and medications  Outcome: Progressing  Note: Patient educated on interventions and plan of care. All questions answered         Patient is not progressing towards the  following goals:

## 2025-05-25 NOTE — ASSESSMENT & PLAN NOTE
Metoprolol  Currently not candidate for anticoagulation  History of noncompliance  Follow-up with cardiology

## 2025-05-25 NOTE — ASSESSMENT & PLAN NOTE
5/25 The patient is 74 years old or older and a geriatrics consult is indicated  Imtiaz White MD, Geriatric Hospitalist.  5/31 Updated Geriatrician that patient is extubated and now on pena, pending consult, no coverage over weekend, will see Monday if needed.

## 2025-05-25 NOTE — CARE PLAN
The patient is Stable - Low risk of patient condition declining or worsening    Shift Goals  Clinical Goals: hemodynamics, stable neuros  Patient Goals: pain control  Family Goals: ANDREEA    Progress made toward(s) clinical / shift goals:    Problem: Pain - Standard  Goal: Alleviation of pain or a reduction in pain to the patient’s comfort goal  Description: Target End Date:  Prior to discharge or change in level of careDocument on Vitals flowsheet1.  Document pain using the appropriate pain scale per order or unit policy2.  Educate and implement non-pharmacologic comfort measures (i.e. relaxation, distraction, massage, cold/heat therapy, etc.)3.  Pain management medications as ordered4.  Reassess pain after pain med administration per policy5.  If opiods administered assess patient's response to pain medication is appropriate per POSS sedation scale6.  Follow pain management plan developed in collaboration with patient and interdisciplinary team (including palliative care or pain specialists if applicable)  Outcome: Progressing     Problem: Knowledge Deficit - Standard  Goal: Patient and family/care givers will demonstrate understanding of plan of care, disease process/condition, diagnostic tests and medications  Description: Target End Date:  1-3 days or as soon as patient condition allowsDocument in Patient Education1.  Patient and family/caregiver oriented to unit, equipment, visitation policy and means for communicating concern2.  Complete/review Learning Assessment3.  Assess knowledge level of disease process/condition, treatment plan, diagnostic tests and medications4.  Explain disease process/condition, treatment plan, diagnostic tests and medications  Outcome: Progressing     Problem: Fall Risk  Goal: Patient will remain free from falls  Description: Target End Date:  Prior to discharge or change in level of careDocument interventions on the Flor Evan Fall Risk Assessment1.  Assess for fall risk factors2.   Implement fall precautions  Outcome: Progressing
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
## 2025-05-26 PROBLEM — E87.1 HYPONATREMIA: Status: ACTIVE | Noted: 2025-05-26

## 2025-05-26 LAB
ALBUMIN SERPL BCP-MCNC: 3.4 G/DL (ref 3.2–4.9)
ALBUMIN SERPL BCP-MCNC: 3.4 G/DL (ref 3.2–4.9)
ALBUMIN/GLOB SERPL: 1.1 G/DL
ALBUMIN/GLOB SERPL: 1.1 G/DL
ALP SERPL-CCNC: 127 U/L (ref 30–99)
ALP SERPL-CCNC: 127 U/L (ref 30–99)
ALT SERPL-CCNC: 51 U/L (ref 2–50)
ALT SERPL-CCNC: 51 U/L (ref 2–50)
ANION GAP SERPL CALC-SCNC: 10 MMOL/L (ref 7–16)
ANION GAP SERPL CALC-SCNC: 10 MMOL/L (ref 7–16)
ANION GAP SERPL CALC-SCNC: 13 MMOL/L (ref 7–16)
ANION GAP SERPL CALC-SCNC: 13 MMOL/L (ref 7–16)
ANION GAP SERPL CALC-SCNC: 9 MMOL/L (ref 7–16)
ANION GAP SERPL CALC-SCNC: 9 MMOL/L (ref 7–16)
AST SERPL-CCNC: 87 U/L (ref 12–45)
AST SERPL-CCNC: 87 U/L (ref 12–45)
BASOPHILS # BLD AUTO: 0.1 % (ref 0–1.8)
BASOPHILS # BLD AUTO: 0.1 % (ref 0–1.8)
BASOPHILS # BLD: 0.01 K/UL (ref 0–0.12)
BASOPHILS # BLD: 0.01 K/UL (ref 0–0.12)
BILIRUB SERPL-MCNC: 0.8 MG/DL (ref 0.1–1.5)
BILIRUB SERPL-MCNC: 0.8 MG/DL (ref 0.1–1.5)
BUN SERPL-MCNC: 21 MG/DL (ref 8–22)
CALCIUM ALBUM COR SERPL-MCNC: 8.4 MG/DL (ref 8.5–10.5)
CALCIUM ALBUM COR SERPL-MCNC: 8.4 MG/DL (ref 8.5–10.5)
CALCIUM SERPL-MCNC: 7.9 MG/DL (ref 8.5–10.5)
CALCIUM SERPL-MCNC: 8 MG/DL (ref 8.5–10.5)
CALCIUM SERPL-MCNC: 8 MG/DL (ref 8.5–10.5)
CHLORIDE SERPL-SCNC: 97 MMOL/L (ref 96–112)
CHLORIDE SERPL-SCNC: 97 MMOL/L (ref 96–112)
CHLORIDE SERPL-SCNC: 98 MMOL/L (ref 96–112)
CO2 SERPL-SCNC: 20 MMOL/L (ref 20–33)
CO2 SERPL-SCNC: 20 MMOL/L (ref 20–33)
CO2 SERPL-SCNC: 23 MMOL/L (ref 20–33)
CREAT SERPL-MCNC: 1.03 MG/DL (ref 0.5–1.4)
CREAT SERPL-MCNC: 1.03 MG/DL (ref 0.5–1.4)
CREAT SERPL-MCNC: 1.07 MG/DL (ref 0.5–1.4)
CREAT SERPL-MCNC: 1.07 MG/DL (ref 0.5–1.4)
CREAT SERPL-MCNC: 1.09 MG/DL (ref 0.5–1.4)
CREAT SERPL-MCNC: 1.09 MG/DL (ref 0.5–1.4)
EOSINOPHIL # BLD AUTO: 0 K/UL (ref 0–0.51)
EOSINOPHIL # BLD AUTO: 0 K/UL (ref 0–0.51)
EOSINOPHIL NFR BLD: 0 % (ref 0–6.9)
EOSINOPHIL NFR BLD: 0 % (ref 0–6.9)
ERYTHROCYTE [DISTWIDTH] IN BLOOD BY AUTOMATED COUNT: 59.7 FL (ref 35.9–50)
ERYTHROCYTE [DISTWIDTH] IN BLOOD BY AUTOMATED COUNT: 59.7 FL (ref 35.9–50)
GFR SERPLBLD CREATININE-BSD FMLA CKD-EPI: 70 ML/MIN/1.73 M 2
GFR SERPLBLD CREATININE-BSD FMLA CKD-EPI: 70 ML/MIN/1.73 M 2
GFR SERPLBLD CREATININE-BSD FMLA CKD-EPI: 72 ML/MIN/1.73 M 2
GFR SERPLBLD CREATININE-BSD FMLA CKD-EPI: 72 ML/MIN/1.73 M 2
GFR SERPLBLD CREATININE-BSD FMLA CKD-EPI: 75 ML/MIN/1.73 M 2
GFR SERPLBLD CREATININE-BSD FMLA CKD-EPI: 75 ML/MIN/1.73 M 2
GLOBULIN SER CALC-MCNC: 3.1 G/DL (ref 1.9–3.5)
GLOBULIN SER CALC-MCNC: 3.1 G/DL (ref 1.9–3.5)
GLUCOSE BLD STRIP.AUTO-MCNC: 158 MG/DL (ref 65–99)
GLUCOSE BLD STRIP.AUTO-MCNC: 158 MG/DL (ref 65–99)
GLUCOSE BLD STRIP.AUTO-MCNC: 169 MG/DL (ref 65–99)
GLUCOSE BLD STRIP.AUTO-MCNC: 169 MG/DL (ref 65–99)
GLUCOSE BLD STRIP.AUTO-MCNC: 177 MG/DL (ref 65–99)
GLUCOSE BLD STRIP.AUTO-MCNC: 177 MG/DL (ref 65–99)
GLUCOSE BLD STRIP.AUTO-MCNC: 241 MG/DL (ref 65–99)
GLUCOSE BLD STRIP.AUTO-MCNC: 241 MG/DL (ref 65–99)
GLUCOSE SERPL-MCNC: 153 MG/DL (ref 65–99)
GLUCOSE SERPL-MCNC: 153 MG/DL (ref 65–99)
GLUCOSE SERPL-MCNC: 180 MG/DL (ref 65–99)
GLUCOSE SERPL-MCNC: 180 MG/DL (ref 65–99)
GLUCOSE SERPL-MCNC: 183 MG/DL (ref 65–99)
GLUCOSE SERPL-MCNC: 183 MG/DL (ref 65–99)
HCT VFR BLD AUTO: 26.1 % (ref 42–52)
HCT VFR BLD AUTO: 26.1 % (ref 42–52)
HGB BLD-MCNC: 8.2 G/DL (ref 14–18)
HGB BLD-MCNC: 8.2 G/DL (ref 14–18)
IMM GRANULOCYTES # BLD AUTO: 0.06 K/UL (ref 0–0.11)
IMM GRANULOCYTES # BLD AUTO: 0.06 K/UL (ref 0–0.11)
IMM GRANULOCYTES NFR BLD AUTO: 0.7 % (ref 0–0.9)
IMM GRANULOCYTES NFR BLD AUTO: 0.7 % (ref 0–0.9)
LYMPHOCYTES # BLD AUTO: 0.84 K/UL (ref 1–4.8)
LYMPHOCYTES # BLD AUTO: 0.84 K/UL (ref 1–4.8)
LYMPHOCYTES NFR BLD: 10.2 % (ref 22–41)
LYMPHOCYTES NFR BLD: 10.2 % (ref 22–41)
MAGNESIUM SERPL-MCNC: 1.8 MG/DL (ref 1.5–2.5)
MAGNESIUM SERPL-MCNC: 1.8 MG/DL (ref 1.5–2.5)
MCH RBC QN AUTO: 29.7 PG (ref 27–33)
MCH RBC QN AUTO: 29.7 PG (ref 27–33)
MCHC RBC AUTO-ENTMCNC: 31.4 G/DL (ref 32.3–36.5)
MCHC RBC AUTO-ENTMCNC: 31.4 G/DL (ref 32.3–36.5)
MCV RBC AUTO: 94.6 FL (ref 81.4–97.8)
MCV RBC AUTO: 94.6 FL (ref 81.4–97.8)
MONOCYTES # BLD AUTO: 0.48 K/UL (ref 0–0.85)
MONOCYTES # BLD AUTO: 0.48 K/UL (ref 0–0.85)
MONOCYTES NFR BLD AUTO: 5.8 % (ref 0–13.4)
MONOCYTES NFR BLD AUTO: 5.8 % (ref 0–13.4)
NEUTROPHILS # BLD AUTO: 6.84 K/UL (ref 1.82–7.42)
NEUTROPHILS # BLD AUTO: 6.84 K/UL (ref 1.82–7.42)
NEUTROPHILS NFR BLD: 83.2 % (ref 44–72)
NEUTROPHILS NFR BLD: 83.2 % (ref 44–72)
NRBC # BLD AUTO: 0.06 K/UL
NRBC # BLD AUTO: 0.06 K/UL
NRBC BLD-RTO: 0.7 /100 WBC (ref 0–0.2)
NRBC BLD-RTO: 0.7 /100 WBC (ref 0–0.2)
PHOSPHATE SERPL-MCNC: 2.3 MG/DL (ref 2.5–4.5)
PHOSPHATE SERPL-MCNC: 2.3 MG/DL (ref 2.5–4.5)
PLATELET # BLD AUTO: 183 K/UL (ref 164–446)
PLATELET # BLD AUTO: 183 K/UL (ref 164–446)
PMV BLD AUTO: 10.1 FL (ref 9–12.9)
PMV BLD AUTO: 10.1 FL (ref 9–12.9)
POTASSIUM SERPL-SCNC: 4.6 MMOL/L (ref 3.6–5.5)
POTASSIUM SERPL-SCNC: 4.6 MMOL/L (ref 3.6–5.5)
POTASSIUM SERPL-SCNC: 4.8 MMOL/L (ref 3.6–5.5)
POTASSIUM SERPL-SCNC: 4.8 MMOL/L (ref 3.6–5.5)
POTASSIUM SERPL-SCNC: 5 MMOL/L (ref 3.6–5.5)
POTASSIUM SERPL-SCNC: 5 MMOL/L (ref 3.6–5.5)
PROT SERPL-MCNC: 6.5 G/DL (ref 6–8.2)
PROT SERPL-MCNC: 6.5 G/DL (ref 6–8.2)
RBC # BLD AUTO: 2.76 M/UL (ref 4.7–6.1)
RBC # BLD AUTO: 2.76 M/UL (ref 4.7–6.1)
SODIUM SERPL-SCNC: 130 MMOL/L (ref 135–145)
SODIUM SERPL-SCNC: 131 MMOL/L (ref 135–145)
SODIUM SERPL-SCNC: 131 MMOL/L (ref 135–145)
WBC # BLD AUTO: 8.2 K/UL (ref 4.8–10.8)
WBC # BLD AUTO: 8.2 K/UL (ref 4.8–10.8)

## 2025-05-26 PROCEDURE — 80048 BASIC METABOLIC PNL TOTAL CA: CPT

## 2025-05-26 PROCEDURE — A9270 NON-COVERED ITEM OR SERVICE: HCPCS | Performed by: SURGERY

## 2025-05-26 PROCEDURE — 99233 SBSQ HOSP IP/OBS HIGH 50: CPT | Performed by: INTERNAL MEDICINE

## 2025-05-26 PROCEDURE — 700102 HCHG RX REV CODE 250 W/ 637 OVERRIDE(OP): Performed by: SURGERY

## 2025-05-26 PROCEDURE — 84100 ASSAY OF PHOSPHORUS: CPT

## 2025-05-26 PROCEDURE — 700102 HCHG RX REV CODE 250 W/ 637 OVERRIDE(OP): Performed by: NURSE PRACTITIONER

## 2025-05-26 PROCEDURE — 80053 COMPREHEN METABOLIC PANEL: CPT

## 2025-05-26 PROCEDURE — 83735 ASSAY OF MAGNESIUM: CPT

## 2025-05-26 PROCEDURE — 700105 HCHG RX REV CODE 258: Performed by: NURSE PRACTITIONER

## 2025-05-26 PROCEDURE — 770020 HCHG ROOM/CARE - TELE (206)

## 2025-05-26 PROCEDURE — 82962 GLUCOSE BLOOD TEST: CPT

## 2025-05-26 PROCEDURE — 85025 COMPLETE CBC W/AUTO DIFF WBC: CPT

## 2025-05-26 PROCEDURE — A9270 NON-COVERED ITEM OR SERVICE: HCPCS | Performed by: NURSE PRACTITIONER

## 2025-05-26 PROCEDURE — 700111 HCHG RX REV CODE 636 W/ 250 OVERRIDE (IP): Performed by: NEUROLOGICAL SURGERY

## 2025-05-26 PROCEDURE — 99233 SBSQ HOSP IP/OBS HIGH 50: CPT | Performed by: NURSE PRACTITIONER

## 2025-05-26 RX ORDER — SODIUM CHLORIDE 1 G/1
1 TABLET ORAL
Status: DISCONTINUED | OUTPATIENT
Start: 2025-05-26 | End: 2025-05-26

## 2025-05-26 RX ORDER — GAUZE BANDAGE 2" X 2"
100 BANDAGE TOPICAL DAILY
Status: DISCONTINUED | OUTPATIENT
Start: 2025-05-27 | End: 2025-05-27

## 2025-05-26 RX ORDER — SODIUM CHLORIDE 9 MG/ML
INJECTION, SOLUTION INTRAVENOUS CONTINUOUS
Status: DISCONTINUED | OUTPATIENT
Start: 2025-05-26 | End: 2025-05-30

## 2025-05-26 RX ADMIN — INSULIN LISPRO 2 UNITS: 100 INJECTION, SOLUTION INTRAVENOUS; SUBCUTANEOUS at 23:31

## 2025-05-26 RX ADMIN — METOPROLOL TARTRATE 50 MG: 50 TABLET, FILM COATED ORAL at 17:34

## 2025-05-26 RX ADMIN — Medication 100 MG: at 05:25

## 2025-05-26 RX ADMIN — LEVETIRACETAM 500 MG: 500 TABLET, FILM COATED ORAL at 17:34

## 2025-05-26 RX ADMIN — ACETAMINOPHEN 650 MG: 325 TABLET ORAL at 06:16

## 2025-05-26 RX ADMIN — OXYCODONE 2.5 MG: 5 TABLET ORAL at 17:35

## 2025-05-26 RX ADMIN — SODIUM CHLORIDE: 9 INJECTION, SOLUTION INTRAVENOUS at 08:11

## 2025-05-26 RX ADMIN — DEXAMETHASONE SODIUM PHOSPHATE 4 MG: 4 INJECTION, SOLUTION INTRAMUSCULAR; INTRAVENOUS at 14:29

## 2025-05-26 RX ADMIN — POLYETHYLENE GLYCOL 3350 1 PACKET: 17 POWDER, FOR SOLUTION ORAL at 17:33

## 2025-05-26 RX ADMIN — ACETAMINOPHEN 650 MG: 325 TABLET ORAL at 23:29

## 2025-05-26 RX ADMIN — INSULIN LISPRO 1 UNITS: 100 INJECTION, SOLUTION INTRAVENOUS; SUBCUTANEOUS at 00:17

## 2025-05-26 RX ADMIN — FOLIC ACID 1 MG: 1 TABLET ORAL at 05:25

## 2025-05-26 RX ADMIN — SODIUM CHLORIDE: 9 INJECTION, SOLUTION INTRAVENOUS at 20:08

## 2025-05-26 RX ADMIN — LEVETIRACETAM 500 MG: 500 TABLET, FILM COATED ORAL at 05:25

## 2025-05-26 RX ADMIN — DOCUSATE SODIUM 100 MG: 100 CAPSULE, LIQUID FILLED ORAL at 17:33

## 2025-05-26 RX ADMIN — INSULIN LISPRO 1 UNITS: 100 INJECTION, SOLUTION INTRAVENOUS; SUBCUTANEOUS at 18:06

## 2025-05-26 RX ADMIN — METOPROLOL TARTRATE 50 MG: 50 TABLET, FILM COATED ORAL at 05:25

## 2025-05-26 RX ADMIN — Medication 1 G: at 08:02

## 2025-05-26 RX ADMIN — INSULIN LISPRO 1 UNITS: 100 INJECTION, SOLUTION INTRAVENOUS; SUBCUTANEOUS at 06:18

## 2025-05-26 RX ADMIN — DEXAMETHASONE SODIUM PHOSPHATE 4 MG: 4 INJECTION, SOLUTION INTRAMUSCULAR; INTRAVENOUS at 08:02

## 2025-05-26 RX ADMIN — MAGNESIUM HYDROXIDE 30 ML: 2400 SUSPENSION ORAL at 17:33

## 2025-05-26 RX ADMIN — ACETAMINOPHEN 650 MG: 325 TABLET ORAL at 00:11

## 2025-05-26 RX ADMIN — DEXAMETHASONE SODIUM PHOSPHATE 4 MG: 4 INJECTION, SOLUTION INTRAMUSCULAR; INTRAVENOUS at 20:12

## 2025-05-26 RX ADMIN — THERA TABS 1 TABLET: TAB at 05:25

## 2025-05-26 RX ADMIN — INSULIN LISPRO 2 UNITS: 100 INJECTION, SOLUTION INTRAVENOUS; SUBCUTANEOUS at 11:32

## 2025-05-26 RX ADMIN — ACETAMINOPHEN 650 MG: 325 TABLET ORAL at 17:33

## 2025-05-26 RX ADMIN — ACETAMINOPHEN 650 MG: 325 TABLET ORAL at 11:31

## 2025-05-26 ASSESSMENT — ENCOUNTER SYMPTOMS
MYALGIAS: 1
FEVER: 0
NAUSEA: 0
SHORTNESS OF BREATH: 0
WEAKNESS: 0
CONSTITUTIONAL NEGATIVE: 1
ORTHOPNEA: 0
BACK PAIN: 0
EYES NEGATIVE: 1
SPEECH CHANGE: 0
PHOTOPHOBIA: 0
CLAUDICATION: 0
HEADACHES: 1
FALLS: 1
DIARRHEA: 0
CONSTIPATION: 0
DOUBLE VISION: 0
HEMOPTYSIS: 0
NECK PAIN: 0
WEIGHT LOSS: 0
DIZZINESS: 0
MYALGIAS: 0
VOMITING: 0
ABDOMINAL PAIN: 0
CHILLS: 0
COUGH: 0
PALPITATIONS: 0
BLURRED VISION: 0

## 2025-05-26 ASSESSMENT — PAIN DESCRIPTION - PAIN TYPE
TYPE: ACUTE PAIN

## 2025-05-26 ASSESSMENT — LIFESTYLE VARIABLES: SUBSTANCE_ABUSE: 1

## 2025-05-26 ASSESSMENT — FIBROSIS 4 INDEX: FIB4 SCORE: 5.06

## 2025-05-26 NOTE — CARE PLAN
The patient is Watcher - Medium risk of patient condition declining or worsening    Shift Goals  Clinical Goals: hemodynamic stability, stable neuro, rest  Patient Goals: pain management, rest  Family Goals: ANDREEA    Progress made toward(s) clinical / shift goals:  stable neurological assessments with memory loss and confusion intermittently but pt able to become re-oriented   Problem: Pain - Standard  Goal: Alleviation of pain or a reduction in pain to the patient’s comfort goal  Outcome: Progressing  Note: Patient reported a tolerable pain level throughout the night.        Problem: Knowledge Deficit - Standard  Goal: Patient and family/care givers will demonstrate understanding of plan of care, disease process/condition, diagnostic tests and medications  Outcome: Progressing  Note: Patient educated on: prescribed medication, the need to call for assistance before getting up, and the need for frequent neurological assessments. pt verbalized understanding        Problem: Seizure Precautions  Goal: Implementation of seizure precautions  Outcome: Progressing     Problem: Lifestyle Changes  Goal: Patient's ability to identify lifestyle changes and available resources to help reduce recurrence of condition will improve  Outcome: Progressing  Note: Pt encouraged to quit drinking, pt voiced interest in attending AA meetings.      Problem: Psychosocial  Goal: Patient's level of anxiety will decrease  Outcome: Progressing     Problem: Fall Risk  Goal: Patient will remain free from falls  Outcome: Progressing  Note: Patient educated on fall interventions. Fall interventions/precautions in place including the use of a telesitter. Patient remained free from falls.         Patient is not progressing towards the following goals: pt tachycardic with HR ranging from 138-190 throughout the night, pt asymptomatic.

## 2025-05-26 NOTE — DISCHARGE PLANNING
[facility] Transitional Care Coordination     Referral from: GREGORIA De La Torre  Insurance Provider on Facesheet: Uninsured/TBD  Potential Rehab Diagnosis: TBI/trauma     Chart review indicates patient may have on going medical management and may have therapy needs to possibly meet inpatient rehab facility criteria with the goal of returning to community.     D/C support: TBD     Physiatry consultation pended per protocol. Awaiting therapy evals, TCC will follow.     Thank you for the referral.
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
## 2025-05-26 NOTE — ASSESSMENT & PLAN NOTE
5/26 Serum sodium 130. Hyponatremia likely secondary to heart failure.    2000 cc fluid restriction.  Monitor serum sodium.

## 2025-05-26 NOTE — CARE PLAN
The patient is Stable - Low risk of patient condition declining or worsening    Shift Goals  Clinical Goals: VSS, safety  Patient Goals: discharge  Family Goals: ANDREEA- family not present    Progress made toward(s) clinical / shift goals:    Problem: Pain - Standard  Goal: Alleviation of pain or a reduction in pain to the patient’s comfort goal  Outcome: Progressing     Problem: Knowledge Deficit - Standard  Goal: Patient and family/care givers will demonstrate understanding of plan of care, disease process/condition, diagnostic tests and medications  Outcome: Progressing     Problem: Optimal Care for Alcohol Withdrawal  Goal: Optimal Care for the alcohol withdrawal patient  Outcome: Progressing     Problem: Psychosocial  Goal: Patient's level of anxiety will decrease  Outcome: Progressing     Problem: Fall Risk  Goal: Patient will remain free from falls  Outcome: Progressing       Patient is not progressing towards the following goals:

## 2025-05-27 ENCOUNTER — APPOINTMENT (OUTPATIENT)
Dept: RADIOLOGY | Facility: MEDICAL CENTER | Age: 75
DRG: 023 | End: 2025-05-27
Attending: SURGERY
Payer: OTHER MISCELLANEOUS

## 2025-05-27 ENCOUNTER — APPOINTMENT (OUTPATIENT)
Dept: CARDIOLOGY | Facility: MEDICAL CENTER | Age: 75
End: 2025-05-27
Attending: NURSE PRACTITIONER
Payer: MEDICARE

## 2025-05-27 ENCOUNTER — ANESTHESIA EVENT (OUTPATIENT)
Dept: RADIOLOGY | Facility: MEDICAL CENTER | Age: 75
End: 2025-05-27
Payer: MEDICARE

## 2025-05-27 ENCOUNTER — ANESTHESIA (OUTPATIENT)
Dept: RADIOLOGY | Facility: MEDICAL CENTER | Age: 75
End: 2025-05-27
Payer: MEDICARE

## 2025-05-27 ENCOUNTER — APPOINTMENT (OUTPATIENT)
Dept: RADIOLOGY | Facility: MEDICAL CENTER | Age: 75
DRG: 023 | End: 2025-05-27
Attending: NEUROLOGICAL SURGERY
Payer: OTHER MISCELLANEOUS

## 2025-05-27 PROBLEM — Z91.199 NONCOMPLIANCE: Status: ACTIVE | Noted: 2025-05-27

## 2025-05-27 PROBLEM — D50.0 ANEMIA, BLOOD LOSS: Status: ACTIVE | Noted: 2025-05-25

## 2025-05-27 PROBLEM — Z91.148 NONCOMPLIANCE WITH MEDICATION REGIMEN: Status: ACTIVE | Noted: 2025-05-27

## 2025-05-27 PROBLEM — R54 FRAILTY SYNDROME IN GERIATRIC PATIENT: Status: ACTIVE | Noted: 2025-05-27

## 2025-05-27 PROBLEM — I42.9 CARDIOMYOPATHY (HCC): Status: ACTIVE | Noted: 2025-05-27

## 2025-05-27 PROBLEM — E83.39 HYPOPHOSPHATEMIA: Status: ACTIVE | Noted: 2025-05-27

## 2025-05-27 PROBLEM — R73.03 PREDIABETES: Status: ACTIVE | Noted: 2025-05-27

## 2025-05-27 LAB
ALBUMIN SERPL BCP-MCNC: 3.2 G/DL (ref 3.2–4.9)
ALBUMIN SERPL BCP-MCNC: 3.2 G/DL (ref 3.2–4.9)
ALBUMIN/GLOB SERPL: 1.1 G/DL
ALBUMIN/GLOB SERPL: 1.1 G/DL
ALP SERPL-CCNC: 133 U/L (ref 30–99)
ALP SERPL-CCNC: 133 U/L (ref 30–99)
ALT SERPL-CCNC: 52 U/L (ref 2–50)
ALT SERPL-CCNC: 52 U/L (ref 2–50)
ANION GAP SERPL CALC-SCNC: 11 MMOL/L (ref 7–16)
ANION GAP SERPL CALC-SCNC: 11 MMOL/L (ref 7–16)
AST SERPL-CCNC: 83 U/L (ref 12–45)
AST SERPL-CCNC: 83 U/L (ref 12–45)
BASOPHILS # BLD AUTO: 0 % (ref 0–1.8)
BASOPHILS # BLD AUTO: 0 % (ref 0–1.8)
BASOPHILS # BLD: 0 K/UL (ref 0–0.12)
BASOPHILS # BLD: 0 K/UL (ref 0–0.12)
BILIRUB SERPL-MCNC: 1.1 MG/DL (ref 0.1–1.5)
BILIRUB SERPL-MCNC: 1.1 MG/DL (ref 0.1–1.5)
BUN SERPL-MCNC: 21 MG/DL (ref 8–22)
BUN SERPL-MCNC: 21 MG/DL (ref 8–22)
CALCIUM ALBUM COR SERPL-MCNC: 7.9 MG/DL (ref 8.5–10.5)
CALCIUM ALBUM COR SERPL-MCNC: 7.9 MG/DL (ref 8.5–10.5)
CALCIUM SERPL-MCNC: 7.3 MG/DL (ref 8.5–10.5)
CALCIUM SERPL-MCNC: 7.3 MG/DL (ref 8.5–10.5)
CHLORIDE SERPL-SCNC: 100 MMOL/L (ref 96–112)
CHLORIDE SERPL-SCNC: 100 MMOL/L (ref 96–112)
CO2 SERPL-SCNC: 20 MMOL/L (ref 20–33)
CO2 SERPL-SCNC: 20 MMOL/L (ref 20–33)
CREAT SERPL-MCNC: 0.88 MG/DL (ref 0.5–1.4)
CREAT SERPL-MCNC: 0.88 MG/DL (ref 0.5–1.4)
EKG IMPRESSION: NORMAL
EOSINOPHIL # BLD AUTO: 0 K/UL (ref 0–0.51)
EOSINOPHIL # BLD AUTO: 0 K/UL (ref 0–0.51)
EOSINOPHIL NFR BLD: 0 % (ref 0–6.9)
EOSINOPHIL NFR BLD: 0 % (ref 0–6.9)
ERYTHROCYTE [DISTWIDTH] IN BLOOD BY AUTOMATED COUNT: 59.4 FL (ref 35.9–50)
ERYTHROCYTE [DISTWIDTH] IN BLOOD BY AUTOMATED COUNT: 59.4 FL (ref 35.9–50)
FERRITIN SERPL-MCNC: 59.7 NG/ML (ref 22–322)
FERRITIN SERPL-MCNC: 59.7 NG/ML (ref 22–322)
GFR SERPLBLD CREATININE-BSD FMLA CKD-EPI: 89 ML/MIN/1.73 M 2
GFR SERPLBLD CREATININE-BSD FMLA CKD-EPI: 89 ML/MIN/1.73 M 2
GLOBULIN SER CALC-MCNC: 2.8 G/DL (ref 1.9–3.5)
GLOBULIN SER CALC-MCNC: 2.8 G/DL (ref 1.9–3.5)
GLUCOSE BLD STRIP.AUTO-MCNC: 154 MG/DL (ref 65–99)
GLUCOSE BLD STRIP.AUTO-MCNC: 154 MG/DL (ref 65–99)
GLUCOSE BLD STRIP.AUTO-MCNC: 214 MG/DL (ref 65–99)
GLUCOSE BLD STRIP.AUTO-MCNC: 214 MG/DL (ref 65–99)
GLUCOSE SERPL-MCNC: 219 MG/DL (ref 65–99)
GLUCOSE SERPL-MCNC: 219 MG/DL (ref 65–99)
HCT VFR BLD AUTO: 23.1 % (ref 42–52)
HCT VFR BLD AUTO: 23.1 % (ref 42–52)
HGB BLD-MCNC: 7.3 G/DL (ref 14–18)
HGB BLD-MCNC: 7.3 G/DL (ref 14–18)
IMM GRANULOCYTES # BLD AUTO: 0.07 K/UL (ref 0–0.11)
IMM GRANULOCYTES # BLD AUTO: 0.07 K/UL (ref 0–0.11)
IMM GRANULOCYTES NFR BLD AUTO: 0.8 % (ref 0–0.9)
IMM GRANULOCYTES NFR BLD AUTO: 0.8 % (ref 0–0.9)
IRON SATN MFR SERPL: 4 % (ref 15–55)
IRON SATN MFR SERPL: 4 % (ref 15–55)
IRON SERPL-MCNC: 12 UG/DL (ref 50–180)
IRON SERPL-MCNC: 12 UG/DL (ref 50–180)
LV EJECT FRACT  99904: 30
LV EJECT FRACT  99904: 30
LV EJECT FRACT MOD 2C 99903: 24.12
LV EJECT FRACT MOD 2C 99903: 24.12
LV EJECT FRACT MOD 4C 99902: 36.73
LV EJECT FRACT MOD 4C 99902: 36.73
LV EJECT FRACT MOD BP 99901: 31.57
LV EJECT FRACT MOD BP 99901: 31.57
LYMPHOCYTES # BLD AUTO: 0.44 K/UL (ref 1–4.8)
LYMPHOCYTES # BLD AUTO: 0.44 K/UL (ref 1–4.8)
LYMPHOCYTES NFR BLD: 5.1 % (ref 22–41)
LYMPHOCYTES NFR BLD: 5.1 % (ref 22–41)
MAGNESIUM SERPL-MCNC: 1.8 MG/DL (ref 1.5–2.5)
MAGNESIUM SERPL-MCNC: 1.8 MG/DL (ref 1.5–2.5)
MCH RBC QN AUTO: 30.3 PG (ref 27–33)
MCH RBC QN AUTO: 30.3 PG (ref 27–33)
MCHC RBC AUTO-ENTMCNC: 31.6 G/DL (ref 32.3–36.5)
MCHC RBC AUTO-ENTMCNC: 31.6 G/DL (ref 32.3–36.5)
MCV RBC AUTO: 95.9 FL (ref 81.4–97.8)
MCV RBC AUTO: 95.9 FL (ref 81.4–97.8)
MONOCYTES # BLD AUTO: 0.66 K/UL (ref 0–0.85)
MONOCYTES # BLD AUTO: 0.66 K/UL (ref 0–0.85)
MONOCYTES NFR BLD AUTO: 7.7 % (ref 0–13.4)
MONOCYTES NFR BLD AUTO: 7.7 % (ref 0–13.4)
NEUTROPHILS # BLD AUTO: 7.41 K/UL (ref 1.82–7.42)
NEUTROPHILS # BLD AUTO: 7.41 K/UL (ref 1.82–7.42)
NEUTROPHILS NFR BLD: 86.4 % (ref 44–72)
NEUTROPHILS NFR BLD: 86.4 % (ref 44–72)
NRBC # BLD AUTO: 0.08 K/UL
NRBC # BLD AUTO: 0.08 K/UL
NRBC BLD-RTO: 0.9 /100 WBC (ref 0–0.2)
NRBC BLD-RTO: 0.9 /100 WBC (ref 0–0.2)
PHOSPHATE SERPL-MCNC: 4.1 MG/DL (ref 2.5–4.5)
PHOSPHATE SERPL-MCNC: 4.1 MG/DL (ref 2.5–4.5)
PLATELET # BLD AUTO: 189 K/UL (ref 164–446)
PLATELET # BLD AUTO: 189 K/UL (ref 164–446)
PMV BLD AUTO: 10 FL (ref 9–12.9)
PMV BLD AUTO: 10 FL (ref 9–12.9)
POTASSIUM SERPL-SCNC: 4.3 MMOL/L (ref 3.6–5.5)
POTASSIUM SERPL-SCNC: 4.3 MMOL/L (ref 3.6–5.5)
PROT SERPL-MCNC: 6 G/DL (ref 6–8.2)
PROT SERPL-MCNC: 6 G/DL (ref 6–8.2)
RBC # BLD AUTO: 2.41 M/UL (ref 4.7–6.1)
RBC # BLD AUTO: 2.41 M/UL (ref 4.7–6.1)
SODIUM SERPL-SCNC: 131 MMOL/L (ref 135–145)
SODIUM SERPL-SCNC: 131 MMOL/L (ref 135–145)
TIBC SERPL-MCNC: 325 UG/DL (ref 250–450)
TIBC SERPL-MCNC: 325 UG/DL (ref 250–450)
TROPONIN T SERPL-MCNC: 74 NG/L (ref 6–19)
TROPONIN T SERPL-MCNC: 74 NG/L (ref 6–19)
UIBC SERPL-MCNC: 313 UG/DL (ref 110–370)
UIBC SERPL-MCNC: 313 UG/DL (ref 110–370)
VIT B12 SERPL-MCNC: 714 PG/ML (ref 211–911)
VIT B12 SERPL-MCNC: 714 PG/ML (ref 211–911)
WBC # BLD AUTO: 8.6 K/UL (ref 4.8–10.8)
WBC # BLD AUTO: 8.6 K/UL (ref 4.8–10.8)

## 2025-05-27 PROCEDURE — 83605 ASSAY OF LACTIC ACID: CPT

## 2025-05-27 PROCEDURE — 700111 HCHG RX REV CODE 636 W/ 250 OVERRIDE (IP): Mod: JZ | Performed by: NURSE PRACTITIONER

## 2025-05-27 PROCEDURE — 94799 UNLISTED PULMONARY SVC/PX: CPT

## 2025-05-27 PROCEDURE — 93306 TTE W/DOPPLER COMPLETE: CPT

## 2025-05-27 PROCEDURE — 94003 VENT MGMT INPAT SUBQ DAY: CPT

## 2025-05-27 PROCEDURE — 700101 HCHG RX REV CODE 250: Performed by: RADIOLOGY

## 2025-05-27 PROCEDURE — 700101 HCHG RX REV CODE 250: Performed by: STUDENT IN AN ORGANIZED HEALTH CARE EDUCATION/TRAINING PROGRAM

## 2025-05-27 PROCEDURE — 700105 HCHG RX REV CODE 258: Performed by: SURGERY

## 2025-05-27 PROCEDURE — 700111 HCHG RX REV CODE 636 W/ 250 OVERRIDE (IP): Performed by: STUDENT IN AN ORGANIZED HEALTH CARE EDUCATION/TRAINING PROGRAM

## 2025-05-27 PROCEDURE — 61624 TCAT PERM OCCLS/EMBOLJ CNS: CPT

## 2025-05-27 PROCEDURE — A9270 NON-COVERED ITEM OR SERVICE: HCPCS | Performed by: INTERNAL MEDICINE

## 2025-05-27 PROCEDURE — 93005 ELECTROCARDIOGRAM TRACING: CPT | Mod: TC | Performed by: NURSE PRACTITIONER

## 2025-05-27 PROCEDURE — 82962 GLUCOSE BLOOD TEST: CPT

## 2025-05-27 PROCEDURE — 700102 HCHG RX REV CODE 250 W/ 637 OVERRIDE(OP): Performed by: SURGERY

## 2025-05-27 PROCEDURE — 80053 COMPREHEN METABOLIC PANEL: CPT

## 2025-05-27 PROCEDURE — 85025 COMPLETE CBC W/AUTO DIFF WBC: CPT

## 2025-05-27 PROCEDURE — 5A1935Z RESPIRATORY VENTILATION, LESS THAN 24 CONSECUTIVE HOURS: ICD-10-PCS | Performed by: RADIOLOGY

## 2025-05-27 PROCEDURE — B3151ZZ FLUOROSCOPY OF BILATERAL COMMON CAROTID ARTERIES USING LOW OSMOLAR CONTRAST: ICD-10-PCS | Performed by: RADIOLOGY

## 2025-05-27 PROCEDURE — 700111 HCHG RX REV CODE 636 W/ 250 OVERRIDE (IP): Mod: JZ | Performed by: STUDENT IN AN ORGANIZED HEALTH CARE EDUCATION/TRAINING PROGRAM

## 2025-05-27 PROCEDURE — 82803 BLOOD GASES ANY COMBINATION: CPT

## 2025-05-27 PROCEDURE — 83550 IRON BINDING TEST: CPT

## 2025-05-27 PROCEDURE — 83540 ASSAY OF IRON: CPT

## 2025-05-27 PROCEDURE — A9270 NON-COVERED ITEM OR SERVICE: HCPCS | Performed by: SURGERY

## 2025-05-27 PROCEDURE — 94002 VENT MGMT INPAT INIT DAY: CPT

## 2025-05-27 PROCEDURE — 75894 X-RAYS TRANSCATH THERAPY: CPT

## 2025-05-27 PROCEDURE — A9270 NON-COVERED ITEM OR SERVICE: HCPCS

## 2025-05-27 PROCEDURE — 75898 FOLLOW-UP ANGIOGRAPHY: CPT

## 2025-05-27 PROCEDURE — 700117 HCHG RX CONTRAST REV CODE 255: Performed by: NURSE PRACTITIONER

## 2025-05-27 PROCEDURE — 82962 GLUCOSE BLOOD TEST: CPT | Mod: 91

## 2025-05-27 PROCEDURE — 82607 VITAMIN B-12: CPT

## 2025-05-27 PROCEDURE — 700102 HCHG RX REV CODE 250 W/ 637 OVERRIDE(OP): Performed by: NURSE PRACTITIONER

## 2025-05-27 PROCEDURE — 770022 HCHG ROOM/CARE - ICU (200)

## 2025-05-27 PROCEDURE — C1757 CATH, THROMBECTOMY/EMBOLECT: HCPCS

## 2025-05-27 PROCEDURE — 93010 ELECTROCARDIOGRAM REPORT: CPT | Performed by: INTERNAL MEDICINE

## 2025-05-27 PROCEDURE — 700117 HCHG RX CONTRAST REV CODE 255: Performed by: RADIOLOGY

## 2025-05-27 PROCEDURE — 71045 X-RAY EXAM CHEST 1 VIEW: CPT

## 2025-05-27 PROCEDURE — 82728 ASSAY OF FERRITIN: CPT

## 2025-05-27 PROCEDURE — 700111 HCHG RX REV CODE 636 W/ 250 OVERRIDE (IP): Performed by: NEUROLOGICAL SURGERY

## 2025-05-27 PROCEDURE — 99233 SBSQ HOSP IP/OBS HIGH 50: CPT | Mod: FS | Performed by: INTERNAL MEDICINE

## 2025-05-27 PROCEDURE — 84484 ASSAY OF TROPONIN QUANT: CPT

## 2025-05-27 PROCEDURE — 700111 HCHG RX REV CODE 636 W/ 250 OVERRIDE (IP): Performed by: SURGERY

## 2025-05-27 PROCEDURE — 700105 HCHG RX REV CODE 258: Performed by: RADIOLOGY

## 2025-05-27 PROCEDURE — 93306 TTE W/DOPPLER COMPLETE: CPT | Mod: 26 | Performed by: INTERNAL MEDICINE

## 2025-05-27 PROCEDURE — 700102 HCHG RX REV CODE 250 W/ 637 OVERRIDE(OP)

## 2025-05-27 PROCEDURE — 700105 HCHG RX REV CODE 258: Performed by: STUDENT IN AN ORGANIZED HEALTH CARE EDUCATION/TRAINING PROGRAM

## 2025-05-27 PROCEDURE — 84100 ASSAY OF PHOSPHORUS: CPT

## 2025-05-27 PROCEDURE — 03LG3DZ OCCLUSION OF INTRACRANIAL ARTERY WITH INTRALUMINAL DEVICE, PERCUTANEOUS APPROACH: ICD-10-PCS | Performed by: RADIOLOGY

## 2025-05-27 PROCEDURE — 700111 HCHG RX REV CODE 636 W/ 250 OVERRIDE (IP): Mod: JZ | Performed by: SURGERY

## 2025-05-27 PROCEDURE — 99291 CRITICAL CARE FIRST HOUR: CPT | Performed by: SURGERY

## 2025-05-27 PROCEDURE — 700102 HCHG RX REV CODE 250 W/ 637 OVERRIDE(OP): Performed by: INTERNAL MEDICINE

## 2025-05-27 PROCEDURE — 700101 HCHG RX REV CODE 250: Performed by: SURGERY

## 2025-05-27 PROCEDURE — 83735 ASSAY OF MAGNESIUM: CPT

## 2025-05-27 PROCEDURE — 93005 ELECTROCARDIOGRAM TRACING: CPT | Mod: TC | Performed by: RADIOLOGY

## 2025-05-27 PROCEDURE — A9270 NON-COVERED ITEM OR SERVICE: HCPCS | Performed by: NURSE PRACTITIONER

## 2025-05-27 PROCEDURE — 93010 ELECTROCARDIOGRAM REPORT: CPT | Mod: 76 | Performed by: INTERNAL MEDICINE

## 2025-05-27 PROCEDURE — 4410588 IR-EMBOLIZE-NEURO-EXTRACRANIAL

## 2025-05-27 PROCEDURE — 36227 PLACE CATH XTRNL CAROTID: CPT

## 2025-05-27 RX ORDER — SODIUM CHLORIDE 9 MG/ML
500 INJECTION, SOLUTION INTRAVENOUS ONCE
Status: COMPLETED | OUTPATIENT
Start: 2025-05-27 | End: 2025-05-27

## 2025-05-27 RX ORDER — ONDANSETRON 4 MG/1
4 TABLET, ORALLY DISINTEGRATING ORAL EVERY 4 HOURS PRN
Status: DISCONTINUED | OUTPATIENT
Start: 2025-05-27 | End: 2025-05-30

## 2025-05-27 RX ORDER — SODIUM CHLORIDE, SODIUM LACTATE, POTASSIUM CHLORIDE, CALCIUM CHLORIDE 600; 310; 30; 20 MG/100ML; MG/100ML; MG/100ML; MG/100ML
INJECTION, SOLUTION INTRAVENOUS
Status: DISCONTINUED | OUTPATIENT
Start: 2025-05-27 | End: 2025-05-27 | Stop reason: SURG

## 2025-05-27 RX ORDER — DOCUSATE SODIUM 50 MG/5ML
100 LIQUID ORAL 2 TIMES DAILY
Status: DISCONTINUED | OUTPATIENT
Start: 2025-05-27 | End: 2025-05-30

## 2025-05-27 RX ORDER — FAMOTIDINE 20 MG/1
20 TABLET, FILM COATED ORAL 2 TIMES DAILY
Status: DISCONTINUED | OUTPATIENT
Start: 2025-05-27 | End: 2025-05-30

## 2025-05-27 RX ORDER — NOREPINEPHRINE BITARTRATE 0.03 MG/ML
0-1 INJECTION, SOLUTION INTRAVENOUS CONTINUOUS
Status: DISCONTINUED | OUTPATIENT
Start: 2025-05-27 | End: 2025-05-30

## 2025-05-27 RX ORDER — HEPARIN SODIUM,PORCINE 1000/ML
VIAL (ML) INJECTION PRN
Status: DISCONTINUED | OUTPATIENT
Start: 2025-05-27 | End: 2025-05-27 | Stop reason: SURG

## 2025-05-27 RX ORDER — METOPROLOL TARTRATE 25 MG/1
25 TABLET, FILM COATED ORAL 2 TIMES DAILY
Status: DISCONTINUED | OUTPATIENT
Start: 2025-05-27 | End: 2025-05-28

## 2025-05-27 RX ORDER — OXYCODONE HYDROCHLORIDE 5 MG/1
2.5 TABLET ORAL
Refills: 0 | Status: DISCONTINUED | OUTPATIENT
Start: 2025-05-27 | End: 2025-05-30

## 2025-05-27 RX ORDER — ONDANSETRON 2 MG/ML
4 INJECTION INTRAMUSCULAR; INTRAVENOUS EVERY 4 HOURS PRN
Status: DISCONTINUED | OUTPATIENT
Start: 2025-05-27 | End: 2025-05-30

## 2025-05-27 RX ORDER — ESMOLOL HYDROCHLORIDE 10 MG/ML
INJECTION INTRAVENOUS PRN
Status: DISCONTINUED | OUTPATIENT
Start: 2025-05-27 | End: 2025-05-27 | Stop reason: SURG

## 2025-05-27 RX ORDER — POLYETHYLENE GLYCOL 3350 17 G/17G
1 POWDER, FOR SOLUTION ORAL 2 TIMES DAILY
Status: DISCONTINUED | OUTPATIENT
Start: 2025-05-27 | End: 2025-05-30

## 2025-05-27 RX ORDER — CALCIUM GLUCONATE 20 MG/ML
1 INJECTION, SOLUTION INTRAVENOUS ONCE
Status: COMPLETED | OUTPATIENT
Start: 2025-05-27 | End: 2025-05-27

## 2025-05-27 RX ORDER — DEXMEDETOMIDINE HYDROCHLORIDE 100 UG/ML
INJECTION, SOLUTION INTRAVENOUS PRN
Status: DISCONTINUED | OUTPATIENT
Start: 2025-05-27 | End: 2025-05-27 | Stop reason: SURG

## 2025-05-27 RX ORDER — MAGNESIUM SULFATE HEPTAHYDRATE 40 MG/ML
2 INJECTION, SOLUTION INTRAVENOUS ONCE
Status: COMPLETED | OUTPATIENT
Start: 2025-05-27 | End: 2025-05-27

## 2025-05-27 RX ORDER — HEPARIN SODIUM 1000 [USP'U]/ML
INJECTION, SOLUTION INTRAVENOUS; SUBCUTANEOUS
Status: COMPLETED
Start: 2025-05-27 | End: 2025-05-27

## 2025-05-27 RX ORDER — LIDOCAINE HYDROCHLORIDE 20 MG/ML
INJECTION, SOLUTION EPIDURAL; INFILTRATION; INTRACAUDAL; PERINEURAL PRN
Status: DISCONTINUED | OUTPATIENT
Start: 2025-05-27 | End: 2025-05-27 | Stop reason: SURG

## 2025-05-27 RX ORDER — AMIODARONE HYDROCHLORIDE 150 MG/3ML
INJECTION, SOLUTION INTRAVENOUS PRN
Status: DISCONTINUED | OUTPATIENT
Start: 2025-05-27 | End: 2025-05-27 | Stop reason: SURG

## 2025-05-27 RX ORDER — ACETAMINOPHEN 325 MG/1
650 TABLET ORAL EVERY 6 HOURS
Status: COMPLETED | OUTPATIENT
Start: 2025-05-27 | End: 2025-05-28

## 2025-05-27 RX ORDER — LEVOTHYROXINE SODIUM 50 UG/1
50 TABLET ORAL
Status: DISCONTINUED | OUTPATIENT
Start: 2025-05-27 | End: 2025-05-30

## 2025-05-27 RX ORDER — ACETAMINOPHEN 325 MG/1
650 TABLET ORAL EVERY 6 HOURS PRN
Status: DISCONTINUED | OUTPATIENT
Start: 2025-05-28 | End: 2025-05-30

## 2025-05-27 RX ORDER — AMOXICILLIN 250 MG
1 CAPSULE ORAL NIGHTLY
Status: DISCONTINUED | OUTPATIENT
Start: 2025-05-27 | End: 2025-05-30

## 2025-05-27 RX ORDER — LEVETIRACETAM 500 MG/1
500 TABLET ORAL EVERY 12 HOURS
Status: COMPLETED | OUTPATIENT
Start: 2025-05-27 | End: 2025-05-30

## 2025-05-27 RX ORDER — GAUZE BANDAGE 2" X 2"
100 BANDAGE TOPICAL DAILY
Status: DISCONTINUED | OUTPATIENT
Start: 2025-05-28 | End: 2025-05-30

## 2025-05-27 RX ORDER — DEXTROSE MONOHYDRATE 50 MG/ML
INJECTION, SOLUTION INTRAVENOUS CONTINUOUS
Status: DISCONTINUED | OUTPATIENT
Start: 2025-05-27 | End: 2025-05-29

## 2025-05-27 RX ORDER — AMOXICILLIN 250 MG
1 CAPSULE ORAL
Status: DISCONTINUED | OUTPATIENT
Start: 2025-05-27 | End: 2025-06-03 | Stop reason: HOSPADM

## 2025-05-27 RX ORDER — CEFAZOLIN SODIUM 1 G/3ML
INJECTION, POWDER, FOR SOLUTION INTRAMUSCULAR; INTRAVENOUS PRN
Status: DISCONTINUED | OUTPATIENT
Start: 2025-05-27 | End: 2025-05-27 | Stop reason: SURG

## 2025-05-27 RX ADMIN — Medication 100 MG: at 04:21

## 2025-05-27 RX ADMIN — LEVOTHYROXINE SODIUM 50 MCG: 0.05 TABLET ORAL at 17:20

## 2025-05-27 RX ADMIN — PROPOFOL 50 MG: 10 INJECTION, EMULSION INTRAVENOUS at 10:36

## 2025-05-27 RX ADMIN — DEXMEDETOMIDINE 4 MCG: 100 INJECTION, SOLUTION INTRAVENOUS at 10:40

## 2025-05-27 RX ADMIN — PROPOFOL 50 MG: 10 INJECTION, EMULSION INTRAVENOUS at 10:34

## 2025-05-27 RX ADMIN — ESMOLOL HYDROCHLORIDE 30 MG: 100 INJECTION, SOLUTION INTRAVENOUS at 10:34

## 2025-05-27 RX ADMIN — DEXMEDETOMIDINE 4 MCG: 100 INJECTION, SOLUTION INTRAVENOUS at 10:41

## 2025-05-27 RX ADMIN — SODIUM PHOSPHATE, MONOBASIC, MONOHYDRATE AND SODIUM PHOSPHATE, DIBASIC, ANHYDROUS 15 MMOL: 276; 142 INJECTION, SOLUTION INTRAVENOUS at 14:48

## 2025-05-27 RX ADMIN — HUMAN ALBUMIN MICROSPHERES AND PERFLUTREN 3 ML: 10; .22 INJECTION, SOLUTION INTRAVENOUS at 14:45

## 2025-05-27 RX ADMIN — PHENYLEPHRINE HYDROCHLORIDE 0.02 MCG/KG/MIN: 10 INJECTION INTRAVENOUS at 10:34

## 2025-05-27 RX ADMIN — FAMOTIDINE 20 MG: 20 TABLET, FILM COATED ORAL at 17:21

## 2025-05-27 RX ADMIN — SODIUM CHLORIDE 500 ML: 9 INJECTION, SOLUTION INTRAVENOUS at 14:49

## 2025-05-27 RX ADMIN — ACETAMINOPHEN 650 MG: 325 TABLET ORAL at 04:21

## 2025-05-27 RX ADMIN — DEXMEDETOMIDINE 4 MCG: 100 INJECTION, SOLUTION INTRAVENOUS at 10:34

## 2025-05-27 RX ADMIN — CALCIUM GLUCONATE 1 G: 20 INJECTION, SOLUTION INTRAVENOUS at 14:45

## 2025-05-27 RX ADMIN — OXYCODONE 2.5 MG: 5 TABLET ORAL at 08:28

## 2025-05-27 RX ADMIN — AMIODARONE HYDROCHLORIDE 150 MG: 50 INJECTION, SOLUTION INTRAVENOUS at 10:43

## 2025-05-27 RX ADMIN — DEXAMETHASONE SODIUM PHOSPHATE 4 MG: 4 INJECTION, SOLUTION INTRAMUSCULAR; INTRAVENOUS at 14:51

## 2025-05-27 RX ADMIN — Medication 70 MG: at 10:34

## 2025-05-27 RX ADMIN — CEFAZOLIN 2 G: 1 INJECTION, POWDER, FOR SOLUTION INTRAMUSCULAR; INTRAVENOUS at 11:10

## 2025-05-27 RX ADMIN — SODIUM CHLORIDE 5 MG/HR: 9 INJECTION, SOLUTION INTRAVENOUS at 14:03

## 2025-05-27 RX ADMIN — PROPOFOL 25 MCG/KG/MIN: 10 INJECTION, EMULSION INTRAVENOUS at 22:01

## 2025-05-27 RX ADMIN — SODIUM CHLORIDE: 9 INJECTION, SOLUTION INTRAVENOUS at 14:48

## 2025-05-27 RX ADMIN — METOPROLOL TARTRATE 50 MG: 50 TABLET, FILM COATED ORAL at 04:21

## 2025-05-27 RX ADMIN — DEXTROSE MONOHYDRATE: 50 INJECTION, SOLUTION INTRAVENOUS at 18:22

## 2025-05-27 RX ADMIN — HEPARIN SODIUM 5000 UNITS: 1000 INJECTION, SOLUTION INTRAVENOUS; SUBCUTANEOUS at 11:22

## 2025-05-27 RX ADMIN — ROCURONIUM BROMIDE 50 MG: 10 INJECTION INTRAVENOUS at 10:41

## 2025-05-27 RX ADMIN — DEXTROSE MONOHYDRATE: 50 INJECTION, SOLUTION INTRAVENOUS at 11:01

## 2025-05-27 RX ADMIN — ESMOLOL HYDROCHLORIDE 20 MG: 100 INJECTION, SOLUTION INTRAVENOUS at 10:39

## 2025-05-27 RX ADMIN — LEVETIRACETAM 500 MG: 500 TABLET, FILM COATED ORAL at 04:21

## 2025-05-27 RX ADMIN — SODIUM CHLORIDE: 9 INJECTION, SOLUTION INTRAVENOUS at 23:11

## 2025-05-27 RX ADMIN — DEXAMETHASONE SODIUM PHOSPHATE 4 MG: 4 INJECTION, SOLUTION INTRAMUSCULAR; INTRAVENOUS at 08:28

## 2025-05-27 RX ADMIN — ESMOLOL HYDROCHLORIDE 50 MG: 100 INJECTION, SOLUTION INTRAVENOUS at 10:41

## 2025-05-27 RX ADMIN — LABETALOL HYDROCHLORIDE 10 MG: 5 INJECTION, SOLUTION INTRAVENOUS at 14:10

## 2025-05-27 RX ADMIN — SODIUM CHLORIDE, POTASSIUM CHLORIDE, SODIUM LACTATE AND CALCIUM CHLORIDE: 600; 310; 30; 20 INJECTION, SOLUTION INTRAVENOUS at 10:15

## 2025-05-27 RX ADMIN — LIDOCAINE HYDROCHLORIDE 100 MG: 20 INJECTION, SOLUTION EPIDURAL; INFILTRATION; INTRACAUDAL; PERINEURAL at 10:34

## 2025-05-27 RX ADMIN — ACETAMINOPHEN 650 MG: 325 TABLET ORAL at 17:21

## 2025-05-27 RX ADMIN — DEXMEDETOMIDINE 10 MCG: 100 INJECTION, SOLUTION INTRAVENOUS at 10:42

## 2025-05-27 RX ADMIN — PROPOFOL 35 MCG/KG/MIN: 10 INJECTION, EMULSION INTRAVENOUS at 12:37

## 2025-05-27 RX ADMIN — AMIODARONE HYDROCHLORIDE 1 MG/MIN: 1.8 INJECTION, SOLUTION INTRAVENOUS at 11:02

## 2025-05-27 RX ADMIN — INSULIN LISPRO 2 UNITS: 100 INJECTION, SOLUTION INTRAVENOUS; SUBCUTANEOUS at 16:58

## 2025-05-27 RX ADMIN — MAGNESIUM SULFATE HEPTAHYDRATE 2 G: 2 INJECTION, SOLUTION INTRAVENOUS at 14:44

## 2025-05-27 RX ADMIN — INSULIN LISPRO 2 UNITS: 100 INJECTION, SOLUTION INTRAVENOUS; SUBCUTANEOUS at 13:32

## 2025-05-27 RX ADMIN — LEVETIRACETAM 500 MG: 500 TABLET, FILM COATED ORAL at 17:21

## 2025-05-27 RX ADMIN — AMIODARONE HYDROCHLORIDE 0.5 MG/MIN: 1.8 INJECTION, SOLUTION INTRAVENOUS at 16:38

## 2025-05-27 RX ADMIN — PROPOFOL 5 MCG/KG/MIN: 10 INJECTION, EMULSION INTRAVENOUS at 13:39

## 2025-05-27 RX ADMIN — IOHEXOL 81 ML: 300 INJECTION, SOLUTION INTRAVENOUS at 13:15

## 2025-05-27 RX ADMIN — DEXAMETHASONE SODIUM PHOSPHATE 4 MG: 4 INJECTION, SOLUTION INTRAMUSCULAR; INTRAVENOUS at 21:23

## 2025-05-27 ASSESSMENT — PAIN DESCRIPTION - PAIN TYPE
TYPE: ACUTE PAIN

## 2025-05-27 NOTE — PROGRESS NOTES
Monitor Summary: aflutter 138-140, HI -, QRS 0.08, QT 0.31, with occasional/rare PVCs per strip from monitor room.

## 2025-05-27 NOTE — ANESTHESIA PREPROCEDURE EVALUATION
Date/Time: 05/27/25 1030    Scheduled providers: Steffany Fall M.D.    Procedure: IR-EMBOLIZE-NEURO-EXTRACRANIAL    Indications: Other - Please Comment    Location: [facility] - [facility]            Relevant Problems   ANESTHESIA (within normal limits)      PULMONARY (within normal limits)      NEURO (within normal limits)      CARDIAC   (positive) Atrial flutter with rapid ventricular response (HCC)      GI (within normal limits)       (within normal limits)      ENDO   (positive) Hypothyroidism      Other   (positive) Contraindication to deep vein thrombosis (DVT) prophylaxis   (positive) Heart failure with reduced ejection fraction (HCC)   (positive) Traumatic subdural hematoma without loss of consciousness (HCC)     75M here for urgent MMA embolization,  subdural hematoma, slight midline shift. Good neuro exam. Hx alcoholic cardiomyopathy, EF 30-35%, no major valve dz, RV ok. In chronic atrial  flutter, anticoagulation contraindicated, rhythm control contraindicated, rate controlled with metoprolol however HR still in 130-140s, Bps stable. Pt also declines blood transfusion under any circumstances. Complains of some nausea now, no withdrawal symptoms, denies chest pain, sob, gerd sx.       Physical Exam    Airway   Mallampati: III  TM distance: >3 FB  Neck ROM: full       Cardiovascular - normal exam  Rhythm: regular  Rate: normal    (-) murmur     Dental   Comments: Multiple teeth missing      Very poor dentition     Pulmonary - normal examBreath sounds clear to auscultation     Abdominal    Neurological - normal exam                   Anesthesia Plan    ASA 4   ASA physical status 4 criteria: other (comment)    Plan - general       Airway plan will be ETT          Induction: intravenous    Postoperative Plan: Postoperative administration of opioids is intended.    Pertinent diagnostic labs and testing reviewed    Informed Consent:    Anesthetic plan and risks discussed  with patient.

## 2025-05-27 NOTE — ANESTHESIA TIME REPORT
Anesthesia Start and Stop Event Times       Date Time Event    5/27/2025 1015 Anesthesia Start     1305 Anesthesia Stop          Responsible Staff  05/27/25      Name Role Begin End    Marisol Multani D.O. Anesth 1015 1305          Overtime Reason:  no overtime (within assigned shift)    Comments:

## 2025-05-27 NOTE — ANESTHESIA PROCEDURE NOTES
Arterial Line    Performed by: Marisol Multani D.O.  Authorized by: Marisol Multani D.O.    Start Time:  5/27/2025 10:28 AM  End Time:  5/27/2025 10:31 AM  Localization: ultrasound guidance and surface landmarks    Patient Location:  OR  Indication: continuous blood pressure monitoring        Catheter Size:  20 G  Seldinger Technique?: Yes    Laterality:  Left  Site:  Radial artery  Line Secured:  Antimicrobial disc, tape and transparent dressing  Events: patient tolerated procedure well with no complications

## 2025-05-27 NOTE — PROGRESS NOTES
Patient refusing lab draw at this time,explained to him the importance of lab draw with the phlebotomist but he still refuse,reschedule lab draw by MD Alicia or family may able to convince him.

## 2025-05-27 NOTE — PROGRESS NOTES
Patient underwent IR-bilateral MMA embolization, known history of atrial flutter, had tachycardia and hypotension, cardioverted, amiodarone started, remains intubated.  Geriatric medicine will resume following when patient is extubated.

## 2025-05-27 NOTE — OR SURGEON
Immediate Post- Operative Note        Findings: Bilateral MMA embolized with Carrington. Final angiogram shows occlusion of the bilateral MMAs      Procedure(s): MMA embolization      Estimated Blood Loss: ~25  ml        Complications: None

## 2025-05-27 NOTE — PROGRESS NOTES
Resumed intra-procedural care from Mary Ann AGUIAR        Left MMA embolized by Dr. Fall.    EV3 Juan 18  REF# [id number]  LOT# [id number]   Exp. Date 3/23/2028    Intra-procedural care handed back over to Mary Ann AGUIAR at 1227

## 2025-05-27 NOTE — PROGRESS NOTES
4 Eyes Skin Assessment Completed by DAVID Turcios and DAVID Chow.    Skin assessment is primarily focused on high risk bony prominences. Pay special attention to skin beneath and around medical devices, high risk bony prominences, skin to skin areas and areas where the patient lacks sensation to feel pain and areas where the patient previously had breakdown.     Head (Occipital):  WDL   Ears (Under Medical Devices): WDL   Nose (Under Medical Devices): WDL   Mouth:  WDL   Neck: WDL   Breast/Chest:  WDL   Shoulder Blades:  WDL   Spine:   WDL   (R) Arm/Elbow/Hand: Red and Edema   (L) Arm/Elbow/Hand: WDL   Abdomen: WDL   Pannus/Groin:  Incision, soft no hematoma   Sacrum/Coccyx:   WDL   (R) Ischial Tuberosity (Sit Bones):  WDL   (L) Ischial Tuberosity (Sit Bones):  WDL   (R) Leg:  Red, Blanching, Bruising, and Edema   (L) Leg:  WDL   (R) Heel:  Red and Blanchingv dry/cracked   (R) Foot/Toe: Red and Blanching   (L) Heel: Red and Blanching dry/ cracked   (L) Foot/Toe:  WDL discoloration to top of middle toe       DEVICES IN USE:   Respiratory Devices:  ET Tube  Feeding Devices:  N/A   Lines & BP Monitoring Devices:  Peripheral IV, Arterial line, BP cuff, and Pulse ox    Orthopedic Devices:  N/A  Miscellaneous Devices:  SCDs and Soft restraints    PROTOCOL INTERVENTIONS:   ICU Low Airloss Bed:  Already in place  Offloading Dressing - Sacrum:  Applied this assessment  Float Heels with Pillows:  Applied this assessment  Q2 Turns with Wedges:  Applied this assessment  Glide Sheet:  Already in place  Dri-Thong/Micro Climate Pads:  Already in place    WOUND PHOTOS:   Completed and in EPIC     WOUND CONSULT:   N/A, no advanced wound care needs identified     Belongings  - No belongings with patient from IR

## 2025-05-27 NOTE — ANESTHESIA POSTPROCEDURE EVALUATION
Patient: Zeus Glover    Procedure Summary       Date: 05/27/25 Room / Location: [facility] Imaging - Interventional - [facility]    Anesthesia Start: 1015 Anesthesia Stop: 1305    Procedure: IR-EMBOLIZE-NEURO-EXTRACRANIAL Diagnosis: (Other - Please Comment)    Scheduled Providers: Steffany Fall M.D.; Gayathri Castellanos M.D. Responsible Provider: Marisol Multani D.O.    Anesthesia Type: general ASA Status: 4            Final Anesthesia Type: general  Last vitals  BP   Blood Pressure : 134/89    Temp   36.6 °C (97.9 °F)    Pulse   (!) 140   Resp   18    SpO2   96 %      Anesthesia Post Evaluation    Patient location during evaluation: ICU  Patient participation: complete - patient cannot participate  Level of consciousness: obtunded/minimal responses    Airway patency: patent  Anesthetic complications: no  Cardiovascular status: hemodynamically stable  Respiratory status: acceptable and ETT  Hydration status: euvolemic  Comments: Handoff in ICU, all questions answered. Pt stable on minimal phenylephrine, on amio gtt.     PONV: none  patient was unable to participate        No notable events documented.     Nurse Pain Score: 0 (NPRS)

## 2025-05-27 NOTE — PROGRESS NOTES
4 Eyes Skin Assessment Completed by DAVID Chen and DAVID Choi    Skin assessment is primarily focused on high risk bony prominences. Pay special attention to skin beneath and around medical devices, high risk bony prominences, skin to skin areas and areas where the patient lacks sensation to feel pain and areas where the patient previously had breakdown.     HIGH RISK PRESSURE POINTS -  Sacrum/Coccyx red, blanching, discolored.   Right ischial tuberosity (Sit Bones) WDL  Left Ischial Tuberosity (Sit Bones) WDL  Right Heel Red and Blanching  Left Heel Red and Blanching    HEAD & NECK DEVICES:  Occipital Region red, scabbed  Right Ear WDL  Left Ear WDL  Chin WDL  Neck WDL  Sternum WDL  NA, Intact with no device in use    RESPIRATORY DEVICES:  Lips WDL  Tongue WDL  Right Cheek WDL  Left Cheek WDL  Bridge of Nose WDL  Respiratory Devices (Wound care): Pulse Ox    FEEDING DEVICES:  Nasal Septum WDL  Feeding Devices (Wound Care): NA    TORSO DEVICES:  Scapula WDL  Spine WDL  Back WDL  Abdomen WDL  Right Flank WDL  Left Flank NA, Not assessed at this time  Torso Devices (Wound Care): Tele Box    LINES, BP MONITORING DEVICES IN USE:  Right Elbow, Forearm, Wrist, Hand Red, Blanching, and Bruising discoloration   Left Elbow, Forearm, Wrist, Hand Red, Blanching, and Bruising discoloration   Lines & BP Monitoring Devices (Wound Care): Peripheral IV and Pulse Ox    ORTHOPEDIC DEVICES:  Right Hip WDL  Left Hip WDL  Right Thigh WDL  Left Thigh Bruising  Right Leg Bruising, swelling   Left Leg Bruising  Right Achilles swelling  Left Achilles swelling  Right Foot swelling, red, blanching, dry/flaky. Scabs, discoloration   Left Foot swelling, red, blanching, scabs, discoloration  Orthopedic Devices (Wound Care): NA    MISCELLANEOUS:  Axilla WDL  Groin WDL  Perineum WDL  Buttocks/Chaparrita-rectal Red, Blanching, and Excoriation/Scratched  Miscellaneous (Wound Care): NA    PROTOCOL INTERVENTIONS:   Standard/Trauma Bed Ordered via RN Wound  Protocol  Float Heels with Pillows Already in Place  Q2 Turns with Pillows Already in Place  Q2 Turns with Wedges Already in Place    WOUND PHOTOS:   To be completed     WOUND CONSULT:   N/A, no advanced wound care needs identified

## 2025-05-27 NOTE — CARE PLAN
Problem: Pain - Standard  Goal: Alleviation of pain or a reduction in pain to the patient’s comfort goal  Description: Target End Date:  Prior to discharge or change in level of careDocument on Vitals flowsheet1.  Document pain using the appropriate pain scale per order or unit policy2.  Educate and implement non-pharmacologic comfort measures (i.e. relaxation, distraction, massage, cold/heat therapy, etc.)3.  Pain management medications as ordered4.  Reassess pain after pain med administration per policy5.  If opiods administered assess patient's response to pain medication is appropriate per POSS sedation scale6.  Follow pain management plan developed in collaboration with patient and interdisciplinary team (including palliative care or pain specialists if applicable)  Outcome: Progressing     Problem: Optimal Care for Alcohol Withdrawal  Goal: Optimal Care for the alcohol withdrawal patient  Description: Target End Date:  1 to 3 days1.  Alcohol history screening done on admission2.  CIWA-AR score assessment (includes assessment of nausea/vomiting, tremor, sweats, anxiety, agitation, tactile, visual and auditory disturbances, headache and orientation/sensorium).  Document on CIWA flowsheet.3.  Follow CIWA-AR score protocol4.  Frequent reorientation5.  Monitor for fluid and electrolyte imbalance.6.  Assess for respiratory depression due to sedation (pulse ox)7.  Consider thiamine, multivitamins, folic acid and magnesium sulfate per provider order8.  Collaborate with Social Workers/Case Management9.  Collaborate with mental health  Outcome: Progressing     Problem: Seizure Precautions  Goal: Implementation of seizure precautions  Description: Target End Date:  Prior to discharge or change in level of care1.  Padded side rails up at all times2.  Suction equipment and oxygen delivery system at bedside3.  Continuous pulse oximeter in use4.  Implement fall precautions, bed alarm on, bed in lowest position5.  IV access  (per order)6.  Provide low stimulus environment, avoid exposure to triggers7.  Instruct patient to use call light/seizure button if having warning signs of impending seizure  Outcome: Progressing     Problem: Safety - Medical Restraint  Goal: Remains free of injury from restraints (Restraint for Interference with Medical Device)  Description: INTERVENTIONS:1. Determine that other, less restrictive measures have been tried or would not be effective before applying the restraint2. Evaluate the patient's condition at the time of restraint application3. Educate patient/family regarding the reason for restraint4. Q2H: Monitor safety, psychosocial status, comfort, circulation, respiratory status, LOC, nutrition and hydration  Outcome: Progressing  Goal: Free from restraint(s) (Restraint for Interference with Medical Device)  Description: INTERVENTIONS:1.  ONCE/SHIFT or MINIMUM Q12H: Assess and document the continuing need for restraints2.  Q24H: Continued use of restraint requires LIP to perform face to face examination and written order3.  Identify and implement measures to help patient regain control4.  Educate patient/family on discontinuation criteria 5.  Assess patient's understanding and retention of education provided6.  Assess readiness for release & initiate progressive release per protocol7.  Identify and document criteria for restraints  Outcome: Progressing   The patient is Unstable - High likelihood or risk of patient condition declining or worsening    Shift Goals  Clinical Goals: MMA embolization  Patient Goals: go home  Family Goals: ANDREEA- family not present    Progress made toward(s) clinical / shift goals:  hemodynamic stability    Patient is not progressing towards the following goals:

## 2025-05-27 NOTE — PROGRESS NOTES
RR called for tachycardia heart rate 230s-260s    Amiodarone bolus administered by anesthesia.   Heart rate 230s-260s  Sync Cardioversion Shock delivered at 1047 120 jules for heart rate 250s, art line reading 50s/30s, pulse present on art line  Heart rate converted to 110s, art line reading 160s systolic    CODE BLUE activated. 1050  CODE BLUE canceled.

## 2025-05-27 NOTE — THERAPY
Physical Therapy Contact Note    Patient Name: Zeus Glover  Age:  75 y.o., Sex:  male  Medical Record #: [id number]  Today's Date: 5/27/2025 05/27/25 1311   Initial Contact Note    Initial Contact Note Order Received and Verified, Physical Therapy Evaluation in Progress with Full Report to Follow.   Interdisciplinary Plan of Care Collaboration   IDT Collaboration with  Nursing;Occupational Therapist   Collaboration Comments PT Consult received. Per review of medical records and discussion with RN, patient is scheduled for MMA embolization today. Patient Tx to TICU following the procedure. PT to complete evaluation at a later date as able & appropriate.   Session Information   Date / Session Number  5/27-Hold (EVAL)

## 2025-05-27 NOTE — THERAPY
Occupational Therapy Contact Note    Patient Name: Zeus Glover  Age:  75 y.o., Sex:  male  Medical Record #: [id number]  Today's Date: 5/27/2025 05/27/25 0806   Treatment Variance   Reason For Missed Therapy Medical - Other (Please Comment)   Interdisciplinary Plan of Care Collaboration   IDT Collaboration with  Other (See Comments)  (EMR)   Collaboration Comments OT consult rec'd. Pt tentatively scheduled for MMA embolization today, will hold pending appropriateness for therapy.   Session Information   Date / Session Number  5/27, HOLD

## 2025-05-27 NOTE — CARE PLAN
The patient is Watcher - Medium risk of patient condition declining or worsening    Shift Goals  Clinical Goals: stable neuro checks,safety,  Patient Goals: sleep  Family Goals: ANDREEA- family not present    Progress made toward(s) clinical / shift goals:  stable neuro status,ambulated,able to sleep  Problem: Knowledge Deficit - Standard  Goal: Patient and family/care givers will demonstrate understanding of plan of care, disease process/condition, diagnostic tests and medications  Outcome: Progressing     Problem: Optimal Care for Alcohol Withdrawal  Goal: Optimal Care for the alcohol withdrawal patient  Outcome: Progressing     Problem: Seizure Precautions  Goal: Implementation of seizure precautions  Outcome: Progressing     Problem: Fall Risk  Goal: Patient will remain free from falls  Outcome: Progressing       Patient is not progressing towards the following goals:

## 2025-05-27 NOTE — PROGRESS NOTES
Neurosurgery Progress Note    Subjective:  Patient anxious wanting to leave, family at bedside in agreement with plan    Exam:  AAOx4, tongue ML, no drift, FCx4    BP  Min: 107/80  Max: 135/89  Pulse  Av.2  Min: 132  Max: 144  Resp  Av.1  Min: 14  Max: 27  Temp  Av.4 °C (97.6 °F)  Min: 36.1 °C (97 °F)  Max: 36.6 °C (97.9 °F)  Monitored Temp 2  Av.6 °C (97.9 °F)  Min: 36.6 °C (97.9 °F)  Max: 36.6 °C (97.9 °F)  SpO2  Av.8 %  Min: 90 %  Max: 96 %    No data recorded    Recent Labs     25  0326 25   WBC 5.9 8.2   RBC 2.84* 2.76*   HEMOGLOBIN 8.5* 8.2*   HEMATOCRIT 26.6* 26.1*   MCV 93.7 94.6   MCH 29.9 29.7   MCHC 32.0* 31.4*   RDW 58.9* 59.7*   PLATELETCT 170 183   MPV 10.7 10.1     Recent Labs     25  0352 25  1435 25   SODIUM 130* 130* 131*   POTASSIUM 4.6 5.0 4.8   CHLORIDE 97 98 98   CO2 20 23 23   GLUCOSE 153* 180* 183*   BUN 21 21 21   CREATININE 1.07 1.09 1.03   CALCIUM 7.9* 8.0* 7.9*                   Intake/Output                         25 07 - 25 0659 25 07 - 25 0659      Total  Total                 Intake    P.O.  240  -- 240  --  -- --    P.O. 240 -- 240 -- -- --    I.V.  190.3  -- 190.3  --  -- --    Volume (mL) (NS infusion) 190.3 -- 190.3 -- -- --    Total Intake 430.3 -- 430.3 -- -- --       Output    Urine  --  -- --  --  -- --    Number of Times Voided 4 x 2 x 6 x -- -- --    Number of Times Incontinent of Urine 2 x -- 2 x -- -- --    Stool  --  -- --  --  -- --    Number of Times Stooled 1 x 5 x 6 x -- -- --    Total Output -- -- -- -- -- --       Net I/O     430.3 -- 430.3 -- -- --              Intake/Output Summary (Last 24 hours) at 2025 0900  Last data filed at 2025 1200  Gross per 24 hour   Intake 310.34 ml   Output --   Net 310.34 ml             NS   Continuous    thiamine  100 mg DAILY    metoprolol tartrate  50 mg BID    dexamethasone  4 mg TID     Respiratory Therapy Consult   Continuous RT    ondansetron  4 mg Q4HRS PRN    Or    ondansetron  4 mg Q4HRS PRN    docusate sodium  100 mg BID    senna-docusate  1 Tablet Nightly    senna-docusate  1 Tablet Q24HRS PRN    polyethylene glycol/lytes  1 Packet BID    magnesium hydroxide  30 mL DAILY AT 1800    bisacodyl  10 mg Q24HRS PRN    sodium phosphate  1 Enema Once PRN    acetaminophen  650 mg Q6HRS    Followed by    [START ON 5/28/2025] acetaminophen  650 mg Q6HRS PRN    oxyCODONE immediate-release  2.5 mg Q3HRS PRN    labetalol  10 mg Q4HRS PRN    levETIRAcetam  500 mg Q12HRS    Or    levETIRAcetam (Keppra) IV  500 mg Q12HRS    insulin lispro  1-6 Units Q6HRS    And    dextrose bolus  25 g Q15 MIN PRN       Assessment and Plan:  Hospital day #5  POD #na  Prophylactic anticoagulation: no         Start date/time: never   Brain Compression: Yes Traumatic      Holding DVT prophylaxis and mobilization instead  MMA embolization per IR- explained to patient and family  Keppra 500 twice daily  Dex 4 tid  Every 4 hours neurochecks  CT 5/25- stable

## 2025-05-27 NOTE — ANESTHESIA PROCEDURE NOTES
Airway    Date/Time: 5/27/2025 10:39 AM    Performed by: Marisol Multani D.O.  Authorized by: Marisol Multani D.O.    Location:  OR  Urgency:  Elective  Indications for Airway Management:  Anesthesia      Spontaneous Ventilation: absent    Sedation Level:  Deep  Preoxygenated: Yes    Patient Position:  Sniffing  Mask Difficulty Assessment:  0 - not attempted  Final Airway Type:  Endotracheal airway  Final Endotracheal Airway:  ETT  Cuffed: Yes    Technique Used for Successful ETT Placement:  Video laryngoscopy    Insertion Site:  Oral  Blade Type:  Glide  Laryngoscope Blade/Videolaryngoscope Blade Size:  4  ETT Size (mm):  7.0  Measured from:  Teeth  ETT to Teeth (cm):  24  Placement Verified by: auscultation and capnometry    Cormack-Lehane Classification:  Grade I - full view of glottis  Number of Attempts at Approach:  1

## 2025-05-27 NOTE — THERAPY
Speech Language Therapy Contact Note    Patient Name: Zeus Glover  Age:  75 y.o., Sex:  male  Medical Record #: [id number]  Today's Date: 5/27/2025    Hold pino/chloe mae. Pt to have MMA embolization per RN.
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
## 2025-05-27 NOTE — PROGRESS NOTES
Pt presents to IR 2. Patient was consented by MD at bedside, confirmed by this RN. Anesthesia consent confirmed. Anesthesia care provided by .Pt transferred to IR 2 table in Supine position. Patient underwent a MMA embolization by Dr. Fall. Procedure site was marked by MD and verified using imaging guidance. Pt placed on monitor, prepped and draped in a sterile fashion. All vital signs monitoring and medication administration by anesthesia services - See anesthesia flowsheet for details.   Intra procedure handoff to Robert HERNANDEZ 1150          Right Side MMA   Juan 18    REF: [id number]  LOT: [id number]  EXP: 3/7/2028
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
## 2025-05-27 NOTE — PROGRESS NOTES
All vital signs monitoring and medication administration by anesthesia services - See anesthesia flowsheet for details. Report called to Tam HERNANDEZ. Pt transported by franklin with RN to [id number].       Perclose deployed at 1230  Right femoral artery  REF: [id number]  LOT: [id number]  EXP: 2/28/2027

## 2025-05-28 ENCOUNTER — APPOINTMENT (OUTPATIENT)
Dept: RADIOLOGY | Facility: MEDICAL CENTER | Age: 75
DRG: 023 | End: 2025-05-28
Attending: STUDENT IN AN ORGANIZED HEALTH CARE EDUCATION/TRAINING PROGRAM
Payer: OTHER MISCELLANEOUS

## 2025-05-28 LAB
ALBUMIN SERPL BCP-MCNC: 3.2 G/DL (ref 3.2–4.9)
ALBUMIN SERPL BCP-MCNC: 3.2 G/DL (ref 3.2–4.9)
ALBUMIN/GLOB SERPL: 1.1 G/DL
ALBUMIN/GLOB SERPL: 1.1 G/DL
ALP SERPL-CCNC: 123 U/L (ref 30–99)
ALP SERPL-CCNC: 123 U/L (ref 30–99)
ALT SERPL-CCNC: 57 U/L (ref 2–50)
ALT SERPL-CCNC: 57 U/L (ref 2–50)
ANION GAP SERPL CALC-SCNC: 12 MMOL/L (ref 7–16)
ANION GAP SERPL CALC-SCNC: 12 MMOL/L (ref 7–16)
AST SERPL-CCNC: 80 U/L (ref 12–45)
AST SERPL-CCNC: 80 U/L (ref 12–45)
BASE EXCESS BLDA CALC-SCNC: -3 MMOL/L (ref -4–3)
BASE EXCESS BLDA CALC-SCNC: -3 MMOL/L (ref -4–3)
BASE EXCESS BLDA CALC-SCNC: -5 MMOL/L (ref -4–3)
BASE EXCESS BLDA CALC-SCNC: -5 MMOL/L (ref -4–3)
BASOPHILS # BLD AUTO: 0 % (ref 0–1.8)
BASOPHILS # BLD AUTO: 0 % (ref 0–1.8)
BASOPHILS # BLD: 0 K/UL (ref 0–0.12)
BASOPHILS # BLD: 0 K/UL (ref 0–0.12)
BILIRUB SERPL-MCNC: 0.9 MG/DL (ref 0.1–1.5)
BILIRUB SERPL-MCNC: 0.9 MG/DL (ref 0.1–1.5)
BODY TEMPERATURE: ABNORMAL DEGREES
BREATHS SETTING VENT: 16
BUN SERPL-MCNC: 20 MG/DL (ref 8–22)
BUN SERPL-MCNC: 20 MG/DL (ref 8–22)
CALCIUM ALBUM COR SERPL-MCNC: 7.9 MG/DL (ref 8.5–10.5)
CALCIUM ALBUM COR SERPL-MCNC: 7.9 MG/DL (ref 8.5–10.5)
CALCIUM SERPL-MCNC: 7.3 MG/DL (ref 8.5–10.5)
CALCIUM SERPL-MCNC: 7.3 MG/DL (ref 8.5–10.5)
CHLORIDE SERPL-SCNC: 101 MMOL/L (ref 96–112)
CHLORIDE SERPL-SCNC: 101 MMOL/L (ref 96–112)
CO2 BLDA-SCNC: 20 MMOL/L (ref 32–48)
CO2 BLDA-SCNC: 20 MMOL/L (ref 32–48)
CO2 BLDA-SCNC: 21 MMOL/L (ref 32–48)
CO2 BLDA-SCNC: 21 MMOL/L (ref 32–48)
CO2 SERPL-SCNC: 19 MMOL/L (ref 20–33)
CO2 SERPL-SCNC: 19 MMOL/L (ref 20–33)
CREAT SERPL-MCNC: 1.1 MG/DL (ref 0.5–1.4)
CREAT SERPL-MCNC: 1.1 MG/DL (ref 0.5–1.4)
DELSYS IDSYS: ABNORMAL
EKG IMPRESSION: NORMAL
EKG IMPRESSION: NORMAL
END TIDAL CARBON DIOXIDE IECO2: 28 MMHG
END TIDAL CARBON DIOXIDE IECO2: 28 MMHG
EOSINOPHIL # BLD AUTO: 0 K/UL (ref 0–0.51)
EOSINOPHIL # BLD AUTO: 0 K/UL (ref 0–0.51)
EOSINOPHIL NFR BLD: 0 % (ref 0–6.9)
EOSINOPHIL NFR BLD: 0 % (ref 0–6.9)
ERYTHROCYTE [DISTWIDTH] IN BLOOD BY AUTOMATED COUNT: 63.9 FL (ref 35.9–50)
ERYTHROCYTE [DISTWIDTH] IN BLOOD BY AUTOMATED COUNT: 63.9 FL (ref 35.9–50)
GFR SERPLBLD CREATININE-BSD FMLA CKD-EPI: 70 ML/MIN/1.73 M 2
GFR SERPLBLD CREATININE-BSD FMLA CKD-EPI: 70 ML/MIN/1.73 M 2
GLOBULIN SER CALC-MCNC: 2.9 G/DL (ref 1.9–3.5)
GLOBULIN SER CALC-MCNC: 2.9 G/DL (ref 1.9–3.5)
GLUCOSE BLD STRIP.AUTO-MCNC: 153 MG/DL (ref 65–99)
GLUCOSE BLD STRIP.AUTO-MCNC: 153 MG/DL (ref 65–99)
GLUCOSE BLD STRIP.AUTO-MCNC: 172 MG/DL (ref 65–99)
GLUCOSE BLD STRIP.AUTO-MCNC: 172 MG/DL (ref 65–99)
GLUCOSE BLD STRIP.AUTO-MCNC: 173 MG/DL (ref 65–99)
GLUCOSE BLD STRIP.AUTO-MCNC: 173 MG/DL (ref 65–99)
GLUCOSE BLD STRIP.AUTO-MCNC: 211 MG/DL (ref 65–99)
GLUCOSE BLD STRIP.AUTO-MCNC: 211 MG/DL (ref 65–99)
GLUCOSE BLD STRIP.AUTO-MCNC: 229 MG/DL (ref 65–99)
GLUCOSE BLD STRIP.AUTO-MCNC: 229 MG/DL (ref 65–99)
GLUCOSE BLD STRIP.AUTO-MCNC: 233 MG/DL (ref 65–99)
GLUCOSE BLD STRIP.AUTO-MCNC: 233 MG/DL (ref 65–99)
GLUCOSE SERPL-MCNC: 175 MG/DL (ref 65–99)
GLUCOSE SERPL-MCNC: 175 MG/DL (ref 65–99)
HCO3 BLDA-SCNC: 19 MMOL/L (ref 21–28)
HCO3 BLDA-SCNC: 19 MMOL/L (ref 21–28)
HCO3 BLDA-SCNC: 19.9 MMOL/L (ref 21–28)
HCO3 BLDA-SCNC: 19.9 MMOL/L (ref 21–28)
HCT VFR BLD AUTO: 23.4 % (ref 42–52)
HCT VFR BLD AUTO: 23.4 % (ref 42–52)
HGB BLD-MCNC: 7.3 G/DL (ref 14–18)
HGB BLD-MCNC: 7.3 G/DL (ref 14–18)
HOROWITZ INDEX BLDA+IHG-RTO: 150 MM[HG]
HOROWITZ INDEX BLDA+IHG-RTO: 150 MM[HG]
HOROWITZ INDEX BLDA+IHG-RTO: 210 MM[HG]
HOROWITZ INDEX BLDA+IHG-RTO: 210 MM[HG]
IMM GRANULOCYTES # BLD AUTO: 0.05 K/UL (ref 0–0.11)
IMM GRANULOCYTES # BLD AUTO: 0.05 K/UL (ref 0–0.11)
IMM GRANULOCYTES NFR BLD AUTO: 0.8 % (ref 0–0.9)
IMM GRANULOCYTES NFR BLD AUTO: 0.8 % (ref 0–0.9)
LACTATE BLD-SCNC: 2.3 MMOL/L (ref 0.5–2)
LACTATE BLD-SCNC: 2.3 MMOL/L (ref 0.5–2)
LYMPHOCYTES # BLD AUTO: 0.61 K/UL (ref 1–4.8)
LYMPHOCYTES # BLD AUTO: 0.61 K/UL (ref 1–4.8)
LYMPHOCYTES NFR BLD: 9.8 % (ref 22–41)
LYMPHOCYTES NFR BLD: 9.8 % (ref 22–41)
MCH RBC QN AUTO: 30.4 PG (ref 27–33)
MCH RBC QN AUTO: 30.4 PG (ref 27–33)
MCHC RBC AUTO-ENTMCNC: 31.2 G/DL (ref 32.3–36.5)
MCHC RBC AUTO-ENTMCNC: 31.2 G/DL (ref 32.3–36.5)
MCV RBC AUTO: 97.5 FL (ref 81.4–97.8)
MCV RBC AUTO: 97.5 FL (ref 81.4–97.8)
MODE IMODE: ABNORMAL
MONOCYTES # BLD AUTO: 0.48 K/UL (ref 0–0.85)
MONOCYTES # BLD AUTO: 0.48 K/UL (ref 0–0.85)
MONOCYTES NFR BLD AUTO: 7.7 % (ref 0–13.4)
MONOCYTES NFR BLD AUTO: 7.7 % (ref 0–13.4)
NEUTROPHILS # BLD AUTO: 5.1 K/UL (ref 1.82–7.42)
NEUTROPHILS # BLD AUTO: 5.1 K/UL (ref 1.82–7.42)
NEUTROPHILS NFR BLD: 81.7 % (ref 44–72)
NEUTROPHILS NFR BLD: 81.7 % (ref 44–72)
NRBC # BLD AUTO: 0.14 K/UL
NRBC # BLD AUTO: 0.14 K/UL
NRBC BLD-RTO: 2.2 /100 WBC (ref 0–0.2)
NRBC BLD-RTO: 2.2 /100 WBC (ref 0–0.2)
O2/TOTAL GAS SETTING VFR VENT: 100 %
O2/TOTAL GAS SETTING VFR VENT: 100 %
O2/TOTAL GAS SETTING VFR VENT: 40 %
O2/TOTAL GAS SETTING VFR VENT: 40 %
PCO2 BLDA: 27.3 MMHG (ref 32–48)
PCO2 BLDA: 27.3 MMHG (ref 32–48)
PCO2 BLDA: 30.5 MMHG (ref 32–48)
PCO2 BLDA: 30.5 MMHG (ref 32–48)
PCO2 TEMP ADJ BLDA: 27 MMHG (ref 32–48)
PCO2 TEMP ADJ BLDA: 27 MMHG (ref 32–48)
PCO2 TEMP ADJ BLDA: 27.8 MMHG (ref 32–48)
PCO2 TEMP ADJ BLDA: 27.8 MMHG (ref 32–48)
PEEP END EXPIRATORY PRESSURE IPEEP: 10 CMH20
PEEP END EXPIRATORY PRESSURE IPEEP: 10 CMH20
PEEP END EXPIRATORY PRESSURE IPEEP: 8 CMH20
PEEP END EXPIRATORY PRESSURE IPEEP: 8 CMH20
PH BLDA: 7.4 [PH] (ref 7.35–7.45)
PH BLDA: 7.4 [PH] (ref 7.35–7.45)
PH BLDA: 7.47 [PH] (ref 7.35–7.45)
PH BLDA: 7.47 [PH] (ref 7.35–7.45)
PH TEMP ADJ BLDA: 7.43 [PH] (ref 7.35–7.45)
PH TEMP ADJ BLDA: 7.43 [PH] (ref 7.35–7.45)
PH TEMP ADJ BLDA: 7.47 [PH] (ref 7.35–7.45)
PH TEMP ADJ BLDA: 7.47 [PH] (ref 7.35–7.45)
PLATELET # BLD AUTO: 175 K/UL (ref 164–446)
PLATELET # BLD AUTO: 175 K/UL (ref 164–446)
PMV BLD AUTO: 10.6 FL (ref 9–12.9)
PMV BLD AUTO: 10.6 FL (ref 9–12.9)
PO2 BLDA: 150 MMHG (ref 83–108)
PO2 BLDA: 150 MMHG (ref 83–108)
PO2 BLDA: 84 MMHG (ref 83–108)
PO2 BLDA: 84 MMHG (ref 83–108)
PO2 TEMP ADJ BLDA: 137 MMHG (ref 83–108)
PO2 TEMP ADJ BLDA: 137 MMHG (ref 83–108)
PO2 TEMP ADJ BLDA: 83 MMHG (ref 83–108)
PO2 TEMP ADJ BLDA: 83 MMHG (ref 83–108)
POTASSIUM SERPL-SCNC: 4.2 MMOL/L (ref 3.6–5.5)
POTASSIUM SERPL-SCNC: 4.2 MMOL/L (ref 3.6–5.5)
PROT SERPL-MCNC: 6.1 G/DL (ref 6–8.2)
PROT SERPL-MCNC: 6.1 G/DL (ref 6–8.2)
RBC # BLD AUTO: 2.4 M/UL (ref 4.7–6.1)
RBC # BLD AUTO: 2.4 M/UL (ref 4.7–6.1)
SAO2 % BLDA: 97 % (ref 93–99)
SAO2 % BLDA: 97 % (ref 93–99)
SAO2 % BLDA: 99 % (ref 93–99)
SAO2 % BLDA: 99 % (ref 93–99)
SODIUM SERPL-SCNC: 132 MMOL/L (ref 135–145)
SODIUM SERPL-SCNC: 132 MMOL/L (ref 135–145)
SPECIMEN DRAWN FROM PATIENT: ABNORMAL
TIDAL VOLUME IVT: 370 ML
TRIGL SERPL-MCNC: 112 MG/DL (ref 0–149)
TRIGL SERPL-MCNC: 112 MG/DL (ref 0–149)
WBC # BLD AUTO: 6.2 K/UL (ref 4.8–10.8)
WBC # BLD AUTO: 6.2 K/UL (ref 4.8–10.8)

## 2025-05-28 PROCEDURE — 93005 ELECTROCARDIOGRAM TRACING: CPT | Mod: TC

## 2025-05-28 PROCEDURE — 700105 HCHG RX REV CODE 258: Performed by: SURGERY

## 2025-05-28 PROCEDURE — 97166 OT EVAL MOD COMPLEX 45 MIN: CPT

## 2025-05-28 PROCEDURE — 82803 BLOOD GASES ANY COMBINATION: CPT

## 2025-05-28 PROCEDURE — 93010 ELECTROCARDIOGRAM REPORT: CPT | Performed by: INTERNAL MEDICINE

## 2025-05-28 PROCEDURE — 700111 HCHG RX REV CODE 636 W/ 250 OVERRIDE (IP): Mod: JZ | Performed by: SURGERY

## 2025-05-28 PROCEDURE — A9270 NON-COVERED ITEM OR SERVICE: HCPCS | Performed by: INTERNAL MEDICINE

## 2025-05-28 PROCEDURE — 700102 HCHG RX REV CODE 250 W/ 637 OVERRIDE(OP): Performed by: INTERNAL MEDICINE

## 2025-05-28 PROCEDURE — A9270 NON-COVERED ITEM OR SERVICE: HCPCS | Performed by: SURGERY

## 2025-05-28 PROCEDURE — 97535 SELF CARE MNGMENT TRAINING: CPT

## 2025-05-28 PROCEDURE — 700111 HCHG RX REV CODE 636 W/ 250 OVERRIDE (IP): Performed by: SURGERY

## 2025-05-28 PROCEDURE — 97163 PT EVAL HIGH COMPLEX 45 MIN: CPT

## 2025-05-28 PROCEDURE — 36600 WITHDRAWAL OF ARTERIAL BLOOD: CPT

## 2025-05-28 PROCEDURE — 84478 ASSAY OF TRIGLYCERIDES: CPT

## 2025-05-28 PROCEDURE — 700102 HCHG RX REV CODE 250 W/ 637 OVERRIDE(OP): Performed by: SURGERY

## 2025-05-28 PROCEDURE — 94799 UNLISTED PULMONARY SVC/PX: CPT

## 2025-05-28 PROCEDURE — A9270 NON-COVERED ITEM OR SERVICE: HCPCS

## 2025-05-28 PROCEDURE — 700102 HCHG RX REV CODE 250 W/ 637 OVERRIDE(OP)

## 2025-05-28 PROCEDURE — 700111 HCHG RX REV CODE 636 W/ 250 OVERRIDE (IP): Mod: JZ | Performed by: STUDENT IN AN ORGANIZED HEALTH CARE EDUCATION/TRAINING PROGRAM

## 2025-05-28 PROCEDURE — 85025 COMPLETE CBC W/AUTO DIFF WBC: CPT

## 2025-05-28 PROCEDURE — 94150 VITAL CAPACITY TEST: CPT

## 2025-05-28 PROCEDURE — A9270 NON-COVERED ITEM OR SERVICE: HCPCS | Performed by: NURSE PRACTITIONER

## 2025-05-28 PROCEDURE — 700111 HCHG RX REV CODE 636 W/ 250 OVERRIDE (IP): Mod: JZ | Performed by: NEUROLOGICAL SURGERY

## 2025-05-28 PROCEDURE — 82962 GLUCOSE BLOOD TEST: CPT

## 2025-05-28 PROCEDURE — 80053 COMPREHEN METABOLIC PANEL: CPT

## 2025-05-28 PROCEDURE — 94003 VENT MGMT INPAT SUBQ DAY: CPT

## 2025-05-28 PROCEDURE — 99291 CRITICAL CARE FIRST HOUR: CPT | Performed by: SURGERY

## 2025-05-28 PROCEDURE — 700111 HCHG RX REV CODE 636 W/ 250 OVERRIDE (IP): Mod: JZ | Performed by: NURSE PRACTITIONER

## 2025-05-28 PROCEDURE — 71045 X-RAY EXAM CHEST 1 VIEW: CPT

## 2025-05-28 PROCEDURE — 99232 SBSQ HOSP IP/OBS MODERATE 35: CPT | Mod: FS | Performed by: INTERNAL MEDICINE

## 2025-05-28 PROCEDURE — 700102 HCHG RX REV CODE 250 W/ 637 OVERRIDE(OP): Performed by: NURSE PRACTITIONER

## 2025-05-28 PROCEDURE — 770022 HCHG ROOM/CARE - ICU (200)

## 2025-05-28 RX ORDER — LOSARTAN POTASSIUM 50 MG/1
25 TABLET ORAL EVERY EVENING
Status: DISCONTINUED | OUTPATIENT
Start: 2025-05-28 | End: 2025-05-30

## 2025-05-28 RX ORDER — SODIUM CHLORIDE 9 MG/ML
500 INJECTION, SOLUTION INTRAVENOUS ONCE
Status: COMPLETED | OUTPATIENT
Start: 2025-05-28 | End: 2025-05-28

## 2025-05-28 RX ORDER — METOPROLOL SUCCINATE 50 MG/1
25 TABLET, EXTENDED RELEASE ORAL EVERY MORNING
Status: DISCONTINUED | OUTPATIENT
Start: 2025-05-29 | End: 2025-05-29

## 2025-05-28 RX ORDER — AMIODARONE HYDROCHLORIDE 200 MG/1
400 TABLET ORAL TWICE DAILY
Status: DISCONTINUED | OUTPATIENT
Start: 2025-05-28 | End: 2025-05-30

## 2025-05-28 RX ORDER — AMIODARONE HYDROCHLORIDE 200 MG/1
400 TABLET ORAL DAILY
Status: DISCONTINUED | OUTPATIENT
Start: 2025-05-31 | End: 2025-05-30

## 2025-05-28 RX ORDER — AMIODARONE HYDROCHLORIDE 200 MG/1
200 TABLET ORAL DAILY
Status: DISCONTINUED | OUTPATIENT
Start: 2025-06-14 | End: 2025-05-30

## 2025-05-28 RX ADMIN — ONDANSETRON 4 MG: 4 TABLET, ORALLY DISINTEGRATING ORAL at 11:51

## 2025-05-28 RX ADMIN — DEXAMETHASONE SODIUM PHOSPHATE 4 MG: 4 INJECTION, SOLUTION INTRAMUSCULAR; INTRAVENOUS at 20:24

## 2025-05-28 RX ADMIN — SODIUM CHLORIDE 500 ML: 9 INJECTION, SOLUTION INTRAVENOUS at 10:45

## 2025-05-28 RX ADMIN — LEVOTHYROXINE SODIUM 50 MCG: 0.05 TABLET ORAL at 05:44

## 2025-05-28 RX ADMIN — ONDANSETRON 4 MG: 2 INJECTION INTRAMUSCULAR; INTRAVENOUS at 20:24

## 2025-05-28 RX ADMIN — INSULIN LISPRO 1 UNITS: 100 INJECTION, SOLUTION INTRAVENOUS; SUBCUTANEOUS at 12:01

## 2025-05-28 RX ADMIN — LEVETIRACETAM 500 MG: 500 TABLET, FILM COATED ORAL at 18:22

## 2025-05-28 RX ADMIN — AMIODARONE HYDROCHLORIDE 400 MG: 200 TABLET ORAL at 11:54

## 2025-05-28 RX ADMIN — DEXAMETHASONE SODIUM PHOSPHATE 4 MG: 4 INJECTION, SOLUTION INTRAMUSCULAR; INTRAVENOUS at 09:48

## 2025-05-28 RX ADMIN — BISACODYL 10 MG: 10 SUPPOSITORY RECTAL at 20:48

## 2025-05-28 RX ADMIN — ACETAMINOPHEN 650 MG: 325 TABLET ORAL at 00:28

## 2025-05-28 RX ADMIN — ACETAMINOPHEN 650 MG: 325 TABLET ORAL at 05:44

## 2025-05-28 RX ADMIN — SODIUM CHLORIDE: 9 INJECTION, SOLUTION INTRAVENOUS at 20:43

## 2025-05-28 RX ADMIN — Medication 100 MG: at 05:44

## 2025-05-28 RX ADMIN — AMIODARONE HYDROCHLORIDE 0.5 MG/MIN: 1.8 INJECTION, SOLUTION INTRAVENOUS at 04:40

## 2025-05-28 RX ADMIN — INSULIN LISPRO 1 UNITS: 100 INJECTION, SOLUTION INTRAVENOUS; SUBCUTANEOUS at 00:28

## 2025-05-28 RX ADMIN — INSULIN LISPRO 1 UNITS: 100 INJECTION, SOLUTION INTRAVENOUS; SUBCUTANEOUS at 06:34

## 2025-05-28 RX ADMIN — SODIUM CHLORIDE: 9 INJECTION, SOLUTION INTRAVENOUS at 11:53

## 2025-05-28 RX ADMIN — AMIODARONE HYDROCHLORIDE 400 MG: 200 TABLET ORAL at 18:22

## 2025-05-28 RX ADMIN — POLYETHYLENE GLYCOL 3350 1 PACKET: 17 POWDER, FOR SOLUTION ORAL at 05:44

## 2025-05-28 RX ADMIN — LEVETIRACETAM 500 MG: 500 TABLET, FILM COATED ORAL at 05:44

## 2025-05-28 RX ADMIN — FAMOTIDINE 20 MG: 20 TABLET, FILM COATED ORAL at 18:22

## 2025-05-28 RX ADMIN — FAMOTIDINE 20 MG: 10 INJECTION, SOLUTION INTRAVENOUS at 05:44

## 2025-05-28 RX ADMIN — LOSARTAN POTASSIUM 25 MG: 50 TABLET, FILM COATED ORAL at 18:22

## 2025-05-28 RX ADMIN — INSULIN LISPRO 2 UNITS: 100 INJECTION, SOLUTION INTRAVENOUS; SUBCUTANEOUS at 18:16

## 2025-05-28 RX ADMIN — METOPROLOL TARTRATE 25 MG: 25 TABLET, FILM COATED ORAL at 05:44

## 2025-05-28 RX ADMIN — ACETAMINOPHEN 650 MG: 325 TABLET ORAL at 11:53

## 2025-05-28 RX ADMIN — SODIUM CHLORIDE: 9 INJECTION, SOLUTION INTRAVENOUS at 07:19

## 2025-05-28 RX ADMIN — DEXAMETHASONE SODIUM PHOSPHATE 4 MG: 4 INJECTION, SOLUTION INTRAMUSCULAR; INTRAVENOUS at 15:02

## 2025-05-28 RX ADMIN — PROPOFOL 30 MCG/KG/MIN: 10 INJECTION, EMULSION INTRAVENOUS at 05:46

## 2025-05-28 RX ADMIN — DOCUSATE SODIUM 50 MG AND SENNOSIDES 8.6 MG 1 TABLET: 8.6; 5 TABLET, FILM COATED ORAL at 21:00

## 2025-05-28 ASSESSMENT — COGNITIVE AND FUNCTIONAL STATUS - GENERAL
PERSONAL GROOMING: A LITTLE
TOILETING: A LOT
TURNING FROM BACK TO SIDE WHILE IN FLAT BAD: A LOT
DAILY ACTIVITIY SCORE: 15
DRESSING REGULAR UPPER BODY CLOTHING: A LITTLE
WALKING IN HOSPITAL ROOM: A LOT
CLIMB 3 TO 5 STEPS WITH RAILING: TOTAL
EATING MEALS: A LITTLE
MOVING TO AND FROM BED TO CHAIR: A LOT
SUGGESTED CMS G CODE MODIFIER DAILY ACTIVITY: CK
MOVING FROM LYING ON BACK TO SITTING ON SIDE OF FLAT BED: A LOT
DRESSING REGULAR LOWER BODY CLOTHING: A LOT
STANDING UP FROM CHAIR USING ARMS: A LOT
MOBILITY SCORE: 11
HELP NEEDED FOR BATHING: A LOT
SUGGESTED CMS G CODE MODIFIER MOBILITY: CL

## 2025-05-28 ASSESSMENT — PAIN DESCRIPTION - PAIN TYPE
TYPE: ACUTE PAIN

## 2025-05-28 ASSESSMENT — PULMONARY FUNCTION TESTS: FVC: 1.1

## 2025-05-28 ASSESSMENT — GAIT ASSESSMENTS: GAIT LEVEL OF ASSIST: UNABLE TO PARTICIPATE

## 2025-05-28 ASSESSMENT — ACTIVITIES OF DAILY LIVING (ADL): TOILETING: INDEPENDENT
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
## 2025-05-28 NOTE — THERAPY
Speech Language Therapy Contact Note    Patient Name: Zeus Glover  Age:  75 y.o., Sex:  male  Medical Record #: [id number]  Today's Date: 5/28/2025    Pt remains intubated, ST to hold

## 2025-05-28 NOTE — CARE PLAN
Problem: Ventilation  Goal: Ability to achieve and maintain unassisted ventilation or tolerate decreased levels of ventilator support  Description: Target End Date:  4 days Document on Vent flowsheet1.  Support and monitor invasive and noninvasive mechanical ventilation2.  Monitor ventilator weaning response3.  Perform ventilator associated pneumonia prevention interventions4.  Manage ventilation therapy by monitoring diagnostic test results  Outcome: Not Met     Ventilator Daily Summary    Vent Day #1  Airway:   7.0@24  CMV R16 370 8 40%  Ventilator settings:   Weaning trials:   Respiratory Procedures:     Plan: Continue current ventilator settings and wean mechanical ventilation as tolerated per physician orders.   Yes

## 2025-05-28 NOTE — CARE PLAN
Problem: Ventilation  Goal: Ability to achieve and maintain unassisted ventilation or tolerate decreased levels of ventilator support  Description: Target End Date:  4 days Document on Vent flowsheet1.  Support and monitor invasive and noninvasive mechanical ventilation2.  Monitor ventilator weaning response3.  Perform ventilator associated pneumonia prevention interventions4.  Manage ventilation therapy by monitoring diagnostic test results  Outcome: Progressing         Ventilator Daily Summary    Vent Day # 2  Airway: 7.0 @ 23    Ventilator settings: apvcmv: 16/370/8/30   Weaning trials: none  Respiratory Procedures: none     Plan: Continue current ventilator settings and wean mechanical ventilation as tolerated per physician orders.

## 2025-05-28 NOTE — CARE PLAN
The patient is Watcher - Medium risk of patient condition declining or worsening    Shift Goals  Clinical Goals: Vent complaince, Hemodynamic stability  Patient Goals: ANDREEA  Family Goals: ANDREEA    Progress made toward(s) clinical / shift goals:    Problem: Pain - Standard  Goal: Alleviation of pain or a reduction in pain to the patient’s comfort goal  Outcome: Progressing     Problem: Knowledge Deficit - Standard  Goal: Patient and family/care givers will demonstrate understanding of plan of care, disease process/condition, diagnostic tests and medications  Outcome: Progressing     Problem: Optimal Care for Alcohol Withdrawal  Goal: Optimal Care for the alcohol withdrawal patient  Outcome: Progressing     Problem: Seizure Precautions  Goal: Implementation of seizure precautions  Outcome: Progressing     Problem: Lifestyle Changes  Goal: Patient's ability to identify lifestyle changes and available resources to help reduce recurrence of condition will improve  Outcome: Progressing     Problem: Psychosocial  Goal: Patient's level of anxiety will decrease  Outcome: Progressing  Goal: Spiritual and cultural needs incorporated into hospitalization  Outcome: Progressing     Problem: Risk for Aspiration  Goal: Patient's risk for aspiration will be absent or decrease  Outcome: Progressing     Problem: Fall Risk  Goal: Patient will remain free from falls  Outcome: Progressing     Problem: Safety - Medical Restraint  Goal: Remains free of injury from restraints (Restraint for Interference with Medical Device)  Outcome: Progressing  Goal: Free from restraint(s) (Restraint for Interference with Medical Device)  Outcome: Progressing     Problem: Skin Integrity  Goal: Skin integrity is maintained or improved  Outcome: Progressing       Patient is not progressing towards the following goals:

## 2025-05-28 NOTE — CARE PLAN
The patient is Watcher - Medium risk of patient condition declining or worsening    Shift Goals  Clinical Goals: hemodynamic stability  Patient Goals: ANDREEA  Family Goals: none present    Progress made toward(s) clinical / shift goals:    Problem: Pain - Standard  Goal: Alleviation of pain or a reduction in pain to the patient’s comfort goal  Outcome: Progressing     Problem: Knowledge Deficit - Standard  Goal: Patient and family/care givers will demonstrate understanding of plan of care, disease process/condition, diagnostic tests and medications  Outcome: Progressing     Problem: Optimal Care for Alcohol Withdrawal  Goal: Optimal Care for the alcohol withdrawal patient  Outcome: Progressing     Problem: Seizure Precautions  Goal: Implementation of seizure precautions  Outcome: Progressing     Problem: Lifestyle Changes  Goal: Patient's ability to identify lifestyle changes and available resources to help reduce recurrence of condition will improve  Outcome: Progressing     Problem: Psychosocial  Goal: Patient's level of anxiety will decrease  Outcome: Progressing  Goal: Spiritual and cultural needs incorporated into hospitalization  Outcome: Progressing     Problem: Risk for Aspiration  Goal: Patient's risk for aspiration will be absent or decrease  Outcome: Progressing     Problem: Fall Risk  Goal: Patient will remain free from falls  Outcome: Progressing     Problem: Safety - Medical Restraint  Goal: Remains free of injury from restraints (Restraint for Interference with Medical Device)  Outcome: Progressing  Goal: Free from restraint(s) (Restraint for Interference with Medical Device)  Outcome: Progressing     Problem: Skin Integrity  Goal: Skin integrity is maintained or improved  Outcome: Progressing       Patient is not progressing towards the following goals:

## 2025-05-28 NOTE — PROGRESS NOTES
Neurosurgery Progress Note    Subjective:  Patient intubated  MMAE completed, however rapid afib yesterday, cardioverted    Exam:  EO to voice.  FCx4. R hip site CDI    BP  Min: 72/51  Max: 208/112  Pulse  Av.5  Min: 60  Max: 123  Resp  Av.9  Min: 8  Max: 30  Monitored Temp 2  Av.9 °C (96.6 °F)  Min: 34.8 °C (94.6 °F)  Max: 37.6 °C (99.7 °F)  SpO2  Av.9 %  Min: 90 %  Max: 100 %    No data recorded    Recent Labs     252 25  1318 25  0453   WBC 8.2 8.6 6.2   RBC 2.76* 2.41* 2.40*   HEMOGLOBIN 8.2* 7.3* 7.3*   HEMATOCRIT 26.1* 23.1* 23.4*   MCV 94.6 95.9 97.5   MCH 29.7 30.3 30.4   MCHC 31.4* 31.6* 31.2*   RDW 59.7* 59.4* 63.9*   PLATELETCT 183 189 175   MPV 10.1 10.0 10.6     Recent Labs     258 253   SODIUM 131* 131* 132*   POTASSIUM 4.8 4.3 4.2   CHLORIDE 98 100 101   CO2 23 20 19*   GLUCOSE 183* 219* 175*   BUN 21 21 20   CREATININE 1.03 0.88 1.10   CALCIUM 7.9* 7.3* 7.3*                   Intake/Output                         25 - 25 - 25 0659      Total  Total                 Intake    I.V.  2077.8  2139.7 4217.5  --  -- --    Magnesium Sulfate Volume 41.1 -- 41.1 -- -- --    Cardene Volume 28.4 -- 28.4 -- -- --    Amiodarone Volume 131.5 197 328.5 -- -- --    Phenylephrine Volume 0.7 -- 0.7 -- -- --    Propofol Volume 46.5 117.4 163.9 -- -- --    Volume (mL) (NS infusion) 1120.7 1469.4 2590.1 -- -- --    Volume (mL) (dextrose 5% infusion) 208.9 355.9 564.9 -- -- --    Volume (mL) (Lactated Ringers) 500 -- 500 -- -- --    Other  --  120 120  --  -- --    Medications (PO/Enteral Liquids) -- 120 120 -- -- --    IV Piggyback  171.3  116.8 288.1  --  -- --    Volume (mL) (sodium phosphate 15 mmol in dextrose 5% 250 mL ivpb) 132.8 116.8 249.6 -- -- --    Volume (mL) (calcium GLUConate 1 g in NaCl IVPB premix) 38.5 -- 38.5 -- -- --    Total Intake 2249.1 2376.4  4625.5 -- -- --       Output    Urine  300  455 755  --  -- --    Output (mL) (Urethral Catheter) 300 455 755 -- -- --    Emesis  --  -- --  --  -- --    Emesis - Number of Times 2 x -- 2 x -- -- --    Stool  --  -- --  --  -- --    Number of Times Stooled 1 x -- 1 x -- -- --    Emesis/NG output  100  400 500  --  -- --    Output (mL) (Enteral Tube 05/27/25 Orogastric 16 Fr. Abdomen) 100 400 500 -- -- --    Total Output  -- -- --       Net I/O     1849.1 1521.4 3370.5 -- -- --              Intake/Output Summary (Last 24 hours) at 5/28/2025 0919  Last data filed at 5/28/2025 0600  Gross per 24 hour   Intake 4625.54 ml   Output 1255 ml   Net 3370.54 ml             amiodarone infusion  0.5 mg/min Continuous    dextrose 5%   Continuous    niCARdipine (Cardene) Standard Infusion 0.1 mg/mL  0-15 mg/hr Continuous    Respiratory Therapy Consult   Continuous RT    propofol  0-80 mcg/kg/min (Ideal) Continuous    famotidine  20 mg BID    Or    famotidine  20 mg BID    NORepinephrine  0-1 mcg/kg/min (Ideal) Continuous    metoprolol tartrate  25 mg BID    levothyroxine  50 mcg AM ES    acetaminophen  650 mg Q6HRS    Followed by    acetaminophen  650 mg Q6HRS PRN    docusate sodium  100 mg BID    levETIRAcetam  500 mg Q12HRS    magnesium hydroxide  30 mL DAILY AT 1800    polyethylene glycol/lytes  1 Packet BID    senna-docusate  1 Tablet Nightly    thiamine  100 mg DAILY    ondansetron  4 mg Q4HRS PRN    Or    ondansetron  4 mg Q4HRS PRN    oxyCODONE immediate-release  2.5 mg Q3HRS PRN    senna-docusate  1 Tablet Q24HRS PRN    phenylephrine infusion  0-5 mcg/kg/min (Ideal) Continuous    NS   Continuous    dexamethasone  4 mg TID    bisacodyl  10 mg Q24HRS PRN    sodium phosphate  1 Enema Once PRN    labetalol  10 mg Q4HRS PRN    insulin lispro  1-6 Units Q6HRS    And    dextrose bolus  25 g Q15 MIN PRN       Assessment and Plan:  Hospital day #6 SDH  POD #na  Prophylactic anticoagulation: no         Start date/time:  never   Brain Compression: Yes Traumatic      Holding DVT prophylaxis and mobilization instead  MMA embolization completed  Keppra 500 twice daily  Dex 4 tid  Every 4 hours neurochecks  CT 5/25- stable
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
## 2025-05-28 NOTE — FLOWSHEET NOTE
Respiratory Update       05/28/25 0743   Spontaneous Breathing Trial (SBT)   Spontaneous breathing trial (SBT) outcome Pt weaned for 1 hour and returned to rest settings per protocol - SBT Pass   Length of Weaning Trial (Hours) 1.25   Weaning Parameters   RR (bpm) 14   $ FVC / Vital Capacity (liters)  1.1   NIF (cm H2O)  -42   Rapid Shallow Breathing Index (RR/VT) 23   Spontaneous VE 8.6   Spontaneous

## 2025-05-29 ENCOUNTER — APPOINTMENT (OUTPATIENT)
Dept: RADIOLOGY | Facility: MEDICAL CENTER | Age: 75
DRG: 023 | End: 2025-05-29
Attending: SURGERY
Payer: OTHER MISCELLANEOUS

## 2025-05-29 ENCOUNTER — APPOINTMENT (OUTPATIENT)
Dept: RADIOLOGY | Facility: MEDICAL CENTER | Age: 75
DRG: 023 | End: 2025-05-29
Attending: STUDENT IN AN ORGANIZED HEALTH CARE EDUCATION/TRAINING PROGRAM
Payer: OTHER MISCELLANEOUS

## 2025-05-29 LAB
ALBUMIN SERPL BCP-MCNC: 3.4 G/DL (ref 3.2–4.9)
ALBUMIN SERPL BCP-MCNC: 3.4 G/DL (ref 3.2–4.9)
ALBUMIN/GLOB SERPL: 1.2 G/DL
ALBUMIN/GLOB SERPL: 1.2 G/DL
ALP SERPL-CCNC: 134 U/L (ref 30–99)
ALP SERPL-CCNC: 134 U/L (ref 30–99)
ALT SERPL-CCNC: 51 U/L (ref 2–50)
ALT SERPL-CCNC: 51 U/L (ref 2–50)
ANION GAP SERPL CALC-SCNC: 11 MMOL/L (ref 7–16)
ANION GAP SERPL CALC-SCNC: 11 MMOL/L (ref 7–16)
AST SERPL-CCNC: 58 U/L (ref 12–45)
AST SERPL-CCNC: 58 U/L (ref 12–45)
BASOPHILS # BLD AUTO: 0 % (ref 0–1.8)
BASOPHILS # BLD AUTO: 0 % (ref 0–1.8)
BASOPHILS # BLD: 0 K/UL (ref 0–0.12)
BASOPHILS # BLD: 0 K/UL (ref 0–0.12)
BILIRUB SERPL-MCNC: 1 MG/DL (ref 0.1–1.5)
BILIRUB SERPL-MCNC: 1 MG/DL (ref 0.1–1.5)
BUN SERPL-MCNC: 17 MG/DL (ref 8–22)
BUN SERPL-MCNC: 17 MG/DL (ref 8–22)
CALCIUM ALBUM COR SERPL-MCNC: 7.8 MG/DL (ref 8.5–10.5)
CALCIUM ALBUM COR SERPL-MCNC: 7.8 MG/DL (ref 8.5–10.5)
CALCIUM SERPL-MCNC: 7.3 MG/DL (ref 8.5–10.5)
CALCIUM SERPL-MCNC: 7.3 MG/DL (ref 8.5–10.5)
CHLORIDE SERPL-SCNC: 100 MMOL/L (ref 96–112)
CHLORIDE SERPL-SCNC: 100 MMOL/L (ref 96–112)
CO2 SERPL-SCNC: 19 MMOL/L (ref 20–33)
CO2 SERPL-SCNC: 19 MMOL/L (ref 20–33)
CREAT SERPL-MCNC: 0.9 MG/DL (ref 0.5–1.4)
CREAT SERPL-MCNC: 0.9 MG/DL (ref 0.5–1.4)
EOSINOPHIL # BLD AUTO: 0 K/UL (ref 0–0.51)
EOSINOPHIL # BLD AUTO: 0 K/UL (ref 0–0.51)
EOSINOPHIL NFR BLD: 0 % (ref 0–6.9)
EOSINOPHIL NFR BLD: 0 % (ref 0–6.9)
ERYTHROCYTE [DISTWIDTH] IN BLOOD BY AUTOMATED COUNT: 60 FL (ref 35.9–50)
ERYTHROCYTE [DISTWIDTH] IN BLOOD BY AUTOMATED COUNT: 60 FL (ref 35.9–50)
GFR SERPLBLD CREATININE-BSD FMLA CKD-EPI: 88 ML/MIN/1.73 M 2
GFR SERPLBLD CREATININE-BSD FMLA CKD-EPI: 88 ML/MIN/1.73 M 2
GLOBULIN SER CALC-MCNC: 2.8 G/DL (ref 1.9–3.5)
GLOBULIN SER CALC-MCNC: 2.8 G/DL (ref 1.9–3.5)
GLUCOSE BLD STRIP.AUTO-MCNC: 134 MG/DL (ref 65–99)
GLUCOSE BLD STRIP.AUTO-MCNC: 134 MG/DL (ref 65–99)
GLUCOSE BLD STRIP.AUTO-MCNC: 144 MG/DL (ref 65–99)
GLUCOSE BLD STRIP.AUTO-MCNC: 144 MG/DL (ref 65–99)
GLUCOSE BLD STRIP.AUTO-MCNC: 169 MG/DL (ref 65–99)
GLUCOSE BLD STRIP.AUTO-MCNC: 169 MG/DL (ref 65–99)
GLUCOSE BLD STRIP.AUTO-MCNC: 171 MG/DL (ref 65–99)
GLUCOSE BLD STRIP.AUTO-MCNC: 171 MG/DL (ref 65–99)
GLUCOSE SERPL-MCNC: 151 MG/DL (ref 65–99)
GLUCOSE SERPL-MCNC: 151 MG/DL (ref 65–99)
HCT VFR BLD AUTO: 24.1 % (ref 42–52)
HCT VFR BLD AUTO: 24.1 % (ref 42–52)
HGB BLD-MCNC: 7.7 G/DL (ref 14–18)
HGB BLD-MCNC: 7.7 G/DL (ref 14–18)
IMM GRANULOCYTES # BLD AUTO: 0.03 K/UL (ref 0–0.11)
IMM GRANULOCYTES # BLD AUTO: 0.03 K/UL (ref 0–0.11)
IMM GRANULOCYTES NFR BLD AUTO: 0.5 % (ref 0–0.9)
IMM GRANULOCYTES NFR BLD AUTO: 0.5 % (ref 0–0.9)
LYMPHOCYTES # BLD AUTO: 0.48 K/UL (ref 1–4.8)
LYMPHOCYTES # BLD AUTO: 0.48 K/UL (ref 1–4.8)
LYMPHOCYTES NFR BLD: 8.5 % (ref 22–41)
LYMPHOCYTES NFR BLD: 8.5 % (ref 22–41)
MAGNESIUM SERPL-MCNC: 1.9 MG/DL (ref 1.5–2.5)
MAGNESIUM SERPL-MCNC: 1.9 MG/DL (ref 1.5–2.5)
MCH RBC QN AUTO: 30 PG (ref 27–33)
MCH RBC QN AUTO: 30 PG (ref 27–33)
MCHC RBC AUTO-ENTMCNC: 32 G/DL (ref 32.3–36.5)
MCHC RBC AUTO-ENTMCNC: 32 G/DL (ref 32.3–36.5)
MCV RBC AUTO: 93.8 FL (ref 81.4–97.8)
MCV RBC AUTO: 93.8 FL (ref 81.4–97.8)
MONOCYTES # BLD AUTO: 0.45 K/UL (ref 0–0.85)
MONOCYTES # BLD AUTO: 0.45 K/UL (ref 0–0.85)
MONOCYTES NFR BLD AUTO: 8 % (ref 0–13.4)
MONOCYTES NFR BLD AUTO: 8 % (ref 0–13.4)
NEUTROPHILS # BLD AUTO: 4.67 K/UL (ref 1.82–7.42)
NEUTROPHILS # BLD AUTO: 4.67 K/UL (ref 1.82–7.42)
NEUTROPHILS NFR BLD: 83 % (ref 44–72)
NEUTROPHILS NFR BLD: 83 % (ref 44–72)
NRBC # BLD AUTO: 0.17 K/UL
NRBC # BLD AUTO: 0.17 K/UL
NRBC BLD-RTO: 3 /100 WBC (ref 0–0.2)
NRBC BLD-RTO: 3 /100 WBC (ref 0–0.2)
PHOSPHATE SERPL-MCNC: 2.5 MG/DL (ref 2.5–4.5)
PHOSPHATE SERPL-MCNC: 2.5 MG/DL (ref 2.5–4.5)
PLATELET # BLD AUTO: 207 K/UL (ref 164–446)
PLATELET # BLD AUTO: 207 K/UL (ref 164–446)
PMV BLD AUTO: 10.6 FL (ref 9–12.9)
PMV BLD AUTO: 10.6 FL (ref 9–12.9)
POTASSIUM SERPL-SCNC: 4.1 MMOL/L (ref 3.6–5.5)
POTASSIUM SERPL-SCNC: 4.1 MMOL/L (ref 3.6–5.5)
PROT SERPL-MCNC: 6.2 G/DL (ref 6–8.2)
PROT SERPL-MCNC: 6.2 G/DL (ref 6–8.2)
RBC # BLD AUTO: 2.57 M/UL (ref 4.7–6.1)
RBC # BLD AUTO: 2.57 M/UL (ref 4.7–6.1)
SODIUM SERPL-SCNC: 130 MMOL/L (ref 135–145)
SODIUM SERPL-SCNC: 130 MMOL/L (ref 135–145)
TRIGL SERPL-MCNC: 86 MG/DL (ref 0–149)
TRIGL SERPL-MCNC: 86 MG/DL (ref 0–149)
WBC # BLD AUTO: 5.6 K/UL (ref 4.8–10.8)
WBC # BLD AUTO: 5.6 K/UL (ref 4.8–10.8)

## 2025-05-29 PROCEDURE — A9270 NON-COVERED ITEM OR SERVICE: HCPCS | Performed by: NURSE PRACTITIONER

## 2025-05-29 PROCEDURE — 700105 HCHG RX REV CODE 258: Performed by: SURGERY

## 2025-05-29 PROCEDURE — 99255 IP/OBS CONSLTJ NEW/EST HI 80: CPT | Performed by: PHYSICAL MEDICINE & REHABILITATION

## 2025-05-29 PROCEDURE — 71045 X-RAY EXAM CHEST 1 VIEW: CPT

## 2025-05-29 PROCEDURE — A9270 NON-COVERED ITEM OR SERVICE: HCPCS | Performed by: SURGERY

## 2025-05-29 PROCEDURE — 700102 HCHG RX REV CODE 250 W/ 637 OVERRIDE(OP): Performed by: INTERNAL MEDICINE

## 2025-05-29 PROCEDURE — 700117 HCHG RX CONTRAST REV CODE 255: Performed by: SURGERY

## 2025-05-29 PROCEDURE — 700111 HCHG RX REV CODE 636 W/ 250 OVERRIDE (IP): Mod: JZ

## 2025-05-29 PROCEDURE — 84100 ASSAY OF PHOSPHORUS: CPT

## 2025-05-29 PROCEDURE — 82962 GLUCOSE BLOOD TEST: CPT | Mod: 91

## 2025-05-29 PROCEDURE — 700102 HCHG RX REV CODE 250 W/ 637 OVERRIDE(OP): Performed by: SURGERY

## 2025-05-29 PROCEDURE — 83735 ASSAY OF MAGNESIUM: CPT

## 2025-05-29 PROCEDURE — 700111 HCHG RX REV CODE 636 W/ 250 OVERRIDE (IP): Mod: JZ | Performed by: NEUROLOGICAL SURGERY

## 2025-05-29 PROCEDURE — 770022 HCHG ROOM/CARE - ICU (200)

## 2025-05-29 PROCEDURE — 700111 HCHG RX REV CODE 636 W/ 250 OVERRIDE (IP): Performed by: SURGERY

## 2025-05-29 PROCEDURE — A9270 NON-COVERED ITEM OR SERVICE: HCPCS | Performed by: INTERNAL MEDICINE

## 2025-05-29 PROCEDURE — A9270 NON-COVERED ITEM OR SERVICE: HCPCS

## 2025-05-29 PROCEDURE — 99232 SBSQ HOSP IP/OBS MODERATE 35: CPT | Performed by: SURGERY

## 2025-05-29 PROCEDURE — 700102 HCHG RX REV CODE 250 W/ 637 OVERRIDE(OP)

## 2025-05-29 PROCEDURE — 85025 COMPLETE CBC W/AUTO DIFF WBC: CPT

## 2025-05-29 PROCEDURE — 700102 HCHG RX REV CODE 250 W/ 637 OVERRIDE(OP): Performed by: NURSE PRACTITIONER

## 2025-05-29 PROCEDURE — 700111 HCHG RX REV CODE 636 W/ 250 OVERRIDE (IP): Mod: JZ | Performed by: NURSE PRACTITIONER

## 2025-05-29 PROCEDURE — 80053 COMPREHEN METABOLIC PANEL: CPT

## 2025-05-29 PROCEDURE — 84478 ASSAY OF TRIGLYCERIDES: CPT

## 2025-05-29 PROCEDURE — 92523 SPEECH SOUND LANG COMPREHEN: CPT

## 2025-05-29 PROCEDURE — 74177 CT ABD & PELVIS W/CONTRAST: CPT

## 2025-05-29 RX ORDER — METOPROLOL TARTRATE 25 MG/1
25 TABLET, FILM COATED ORAL 2 TIMES DAILY
Status: DISCONTINUED | OUTPATIENT
Start: 2025-05-29 | End: 2025-05-30

## 2025-05-29 RX ADMIN — AMIODARONE HYDROCHLORIDE 400 MG: 200 TABLET ORAL at 17:31

## 2025-05-29 RX ADMIN — LEVETIRACETAM 500 MG: 500 TABLET, FILM COATED ORAL at 17:31

## 2025-05-29 RX ADMIN — LABETALOL HYDROCHLORIDE 10 MG: 5 INJECTION, SOLUTION INTRAVENOUS at 21:28

## 2025-05-29 RX ADMIN — SODIUM CHLORIDE: 9 INJECTION, SOLUTION INTRAVENOUS at 13:43

## 2025-05-29 RX ADMIN — ONDANSETRON 4 MG: 2 INJECTION INTRAMUSCULAR; INTRAVENOUS at 06:50

## 2025-05-29 RX ADMIN — ACETAMINOPHEN 650 MG: 325 TABLET ORAL at 12:18

## 2025-05-29 RX ADMIN — Medication 100 MG: at 06:04

## 2025-05-29 RX ADMIN — LEVOTHYROXINE SODIUM 50 MCG: 0.05 TABLET ORAL at 06:03

## 2025-05-29 RX ADMIN — INSULIN LISPRO 1 UNITS: 100 INJECTION, SOLUTION INTRAVENOUS; SUBCUTANEOUS at 01:04

## 2025-05-29 RX ADMIN — OXYCODONE 2.5 MG: 5 TABLET ORAL at 20:38

## 2025-05-29 RX ADMIN — FENTANYL CITRATE 25 MCG: 50 INJECTION, SOLUTION INTRAMUSCULAR; INTRAVENOUS at 17:32

## 2025-05-29 RX ADMIN — FAMOTIDINE 20 MG: 10 INJECTION, SOLUTION INTRAVENOUS at 06:04

## 2025-05-29 RX ADMIN — OXYCODONE 2.5 MG: 5 TABLET ORAL at 08:28

## 2025-05-29 RX ADMIN — IOHEXOL 25 ML: 240 INJECTION, SOLUTION INTRATHECAL; INTRAVASCULAR; INTRAVENOUS; ORAL at 20:30

## 2025-05-29 RX ADMIN — SODIUM CHLORIDE: 9 INJECTION, SOLUTION INTRAVENOUS at 05:34

## 2025-05-29 RX ADMIN — IOHEXOL 100 ML: 350 INJECTION, SOLUTION INTRAVENOUS at 22:40

## 2025-05-29 RX ADMIN — LABETALOL HYDROCHLORIDE 10 MG: 5 INJECTION, SOLUTION INTRAVENOUS at 13:08

## 2025-05-29 RX ADMIN — ONDANSETRON 4 MG: 4 TABLET, ORALLY DISINTEGRATING ORAL at 20:32

## 2025-05-29 RX ADMIN — LOSARTAN POTASSIUM 25 MG: 50 TABLET, FILM COATED ORAL at 17:31

## 2025-05-29 RX ADMIN — FAMOTIDINE 20 MG: 20 TABLET, FILM COATED ORAL at 17:32

## 2025-05-29 RX ADMIN — DEXAMETHASONE SODIUM PHOSPHATE 4 MG: 4 INJECTION, SOLUTION INTRAMUSCULAR; INTRAVENOUS at 15:42

## 2025-05-29 RX ADMIN — DOCUSATE SODIUM 100 MG: 50 LIQUID ORAL at 06:03

## 2025-05-29 RX ADMIN — DEXAMETHASONE SODIUM PHOSPHATE 4 MG: 4 INJECTION, SOLUTION INTRAMUSCULAR; INTRAVENOUS at 20:32

## 2025-05-29 RX ADMIN — LEVETIRACETAM 500 MG: 500 TABLET, FILM COATED ORAL at 06:04

## 2025-05-29 RX ADMIN — INSULIN LISPRO 1 UNITS: 100 INJECTION, SOLUTION INTRAVENOUS; SUBCUTANEOUS at 17:34

## 2025-05-29 RX ADMIN — AMIODARONE HYDROCHLORIDE 400 MG: 200 TABLET ORAL at 06:04

## 2025-05-29 RX ADMIN — IOHEXOL 30 ML: 350 INJECTION, SOLUTION INTRAVENOUS at 22:40

## 2025-05-29 RX ADMIN — METOPROLOL TARTRATE 25 MG: 25 TABLET, FILM COATED ORAL at 06:41

## 2025-05-29 RX ADMIN — DEXAMETHASONE SODIUM PHOSPHATE 4 MG: 4 INJECTION, SOLUTION INTRAMUSCULAR; INTRAVENOUS at 08:29

## 2025-05-29 ASSESSMENT — ENCOUNTER SYMPTOMS
DOUBLE VISION: 0
SHORTNESS OF BREATH: 0
PALPITATIONS: 0
NAUSEA: 0
FEVER: 0
SENSORY CHANGE: 0
WEAKNESS: 0
CHILLS: 0
SPEECH CHANGE: 0
BLURRED VISION: 0
VOMITING: 0
TINGLING: 0
HEADACHES: 0

## 2025-05-29 ASSESSMENT — PAIN DESCRIPTION - PAIN TYPE
TYPE: ACUTE PAIN

## 2025-05-29 NOTE — CARE PLAN
The patient is Stable - Low risk of patient condition declining or worsening    Shift Goals  Clinical Goals: gastric decompression, pain management  Patient Goals: pain management, rest  Family Goals: none present    Progress made toward(s) clinical / shift goals:    Problem: Pain - Standard  Goal: Alleviation of pain or a reduction in pain to the patient’s comfort goal  Outcome: Progressing     Problem: Knowledge Deficit - Standard  Goal: Patient and family/care givers will demonstrate understanding of plan of care, disease process/condition, diagnostic tests and medications  Outcome: Progressing     Problem: Optimal Care for Alcohol Withdrawal  Goal: Optimal Care for the alcohol withdrawal patient  Outcome: Progressing     Problem: Seizure Precautions  Goal: Implementation of seizure precautions  Outcome: Progressing     Problem: Lifestyle Changes  Goal: Patient's ability to identify lifestyle changes and available resources to help reduce recurrence of condition will improve  Outcome: Progressing     Problem: Psychosocial  Goal: Patient's level of anxiety will decrease  Outcome: Progressing  Goal: Spiritual and cultural needs incorporated into hospitalization  Outcome: Progressing     Problem: Risk for Aspiration  Goal: Patient's risk for aspiration will be absent or decrease  Outcome: Progressing     Problem: Fall Risk  Goal: Patient will remain free from falls  Outcome: Progressing     Problem: Safety - Medical Restraint  Goal: Remains free of injury from restraints (Restraint for Interference with Medical Device)  Outcome: Progressing  Goal: Free from restraint(s) (Restraint for Interference with Medical Device)  Outcome: Progressing     Problem: Skin Integrity  Goal: Skin integrity is maintained or improved  Outcome: Progressing       Patient is not progressing towards the following goals:

## 2025-05-29 NOTE — DISCHARGE PLANNING
Pt is  with three children-however they all live in [place] and Pt has no contact information. He currently lives with his sister and her  in a SSE home in [place]. His niece, Traci, is the main point of contact for the family.     Pt is retired and receives $800/mo in Social Security. He is independent with all ADL/IADLs, and continues to drive. He endorses a pint to half pint of Vodka consumption per day. No illicit drugs. No MH Dx.     Therapies are recommending post acute placement, however, Pt has only Medicare Part A. Requested Medicaid Screening and possible Leased Bed   Care Transition Team Assessment    Information Source  Orientation Level: Oriented X4  Information Given By: Patient  Who is responsible for making decisions for patient? : Patient    Readmission Evaluation  Is this a readmission?: No    Elopement Risk  Legal Hold: No  Ambulatory or Self Mobile in Wheelchair: Yes  Disoriented: No  Psychiatric Symptoms: None  History of Wandering: No  Elopement this Admit: No  Vocalizing Wanting to Leave: No  Displays Behaviors, Body Language Wanting to Leave: No-Not at Risk for Elopement  Elopement Risk: Not at Risk for Elopement    Interdisciplinary Discharge Planning  Lives with - Patient's Self Care Capacity: Sibling, Related Adult  Patient or legal guardian wants to designate a caregiver: Yes  Caregiver name: Argelia Campuzano  ([facility] [place]) Authorization for Release of Health Information has been completed: Patient refused to sign  Support Systems: Family Member(s), Friends / Neighbors  Housing / Facility: Mobile Home  Prior Services: Home-Independent    Discharge Preparedness  What is your plan after discharge?: Home with help  What are your discharge supports?: Sibling  Prior Functional Level: Ambulatory, Drives Self, Independent with Activities of Daily Living, Independent with Medication Management  Difficulity with ADLs: None  Difficulity with IADLs: None    Functional Assesment  Prior Functional  Level: Ambulatory, Drives Self, Independent with Activities of Daily Living, Independent with Medication Management    Finances  Financial Barriers to Discharge: No  Prescription Coverage: Yes    Vision / Hearing Impairment  Vision Impairment : No  Hearing Impairment : No      Advance Directive  Advance Directive?: None  Advance Directive offered?: AD Booklet refused    Domestic Abuse  Possible Abuse/Neglect Reported to:: Not Applicable    Psychological Assessment  History of Substance Abuse: Alcohol  Date Last Used - Alcohol: 5/23  Non-compliant with Treatment: No  Newly Diagnosed Illness: Yes    Discharge Risks or Barriers  Discharge risks or barriers?: No PCP, Substance abuse, Uninsured / underinsured, Post-acute placement / services  Patient risk factors: Cognitive / sensory / physical deficit, No PCP, Uninsured or underinsured, Substance abuse    Anticipated Discharge Information  Discharge Disposition: Discharged to home/self care (01)  Discharge Address: Home ([address] [place] [address]   [facility] [address])  Discharge Contact Phone Number: Sister britt ()
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
## 2025-05-29 NOTE — PROGRESS NOTES
Patient complaining of abdominal distension and discomfort as well as multiple attempts to have a BM. Dr. Wheat notified. Order received to insert NG tube and place to low continuous suction.
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
## 2025-05-29 NOTE — DISCHARGE PLANNING
PMR to consult. Patient currently listed as uninsured, please have PFA verify insurance coverage. TCC will follow.

## 2025-05-29 NOTE — DISCHARGE PLANNING
Per PMR patient is a candidate for IPR.    Contacted sister Argelia to verify dc support. Patient lives with sister Argelia, her spouse, and two adult brothers in a [facility] with 4 TACO. Argelia reports if needed they could install a ramp. Argelia is disabled with a walker, but reports she can provide 24/7 supervision and light assist. Patient's brothers can provide heavy physical assistance, but work during the day. Argelia's spouse is available during the day, but is in his 70s and not physically strong. Argelia reports patient only has Medicare A, PAR to verify benefits.     PAR confirmed patient has Medicare A only, patient is not a candidate for IPR at [facility]. TCC will no longer follow.

## 2025-05-29 NOTE — CARE PLAN
Problem: Pain - Standard  Goal: Alleviation of pain or a reduction in pain to the patient’s comfort goal  Description: Target End Date:  Prior to discharge or change in level of careDocument on Vitals flowsheet1.  Document pain using the appropriate pain scale per order or unit policy2.  Educate and implement non-pharmacologic comfort measures (i.e. relaxation, distraction, massage, cold/heat therapy, etc.)3.  Pain management medications as ordered4.  Reassess pain after pain med administration per policy5.  If opiods administered assess patient's response to pain medication is appropriate per POSS sedation scale6.  Follow pain management plan developed in collaboration with patient and interdisciplinary team (including palliative care or pain specialists if applicable)  Outcome: Progressing     Problem: Knowledge Deficit - Standard  Goal: Patient and family/care givers will demonstrate understanding of plan of care, disease process/condition, diagnostic tests and medications  Description: Target End Date:  1-3 days or as soon as patient condition allowsDocument in Patient Education1.  Patient and family/caregiver oriented to unit, equipment, visitation policy and means for communicating concern2.  Complete/review Learning Assessment3.  Assess knowledge level of disease process/condition, treatment plan, diagnostic tests and medications4.  Explain disease process/condition, treatment plan, diagnostic tests and medications  Outcome: Progressing     Problem: Fall Risk  Goal: Patient will remain free from falls  Description: Target End Date:  Prior to discharge or change in level of careDocument interventions on the Basia Gordon Fall Risk Assessment1.  Assess for fall risk factors2.  Implement fall precautions  Outcome: Progressing   The patient is Watcher - Medium risk of patient condition declining or worsening    Shift Goals  Clinical Goals: hemodynamic stability, pulmonary hygiene  Patient Goals: sleep  Family  Goals: n/a
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
## 2025-05-29 NOTE — PROGRESS NOTES
Neurosurgery Progress Note    Subjective:  Extubated, states distended abd    Exam:  Awake conversant  AAOx4- tongue ML, no drift    BP  Min: 117/70  Max: 161/85  Pulse  Av.7  Min: 62  Max: 88  Resp  Av.8  Min: 11  Max: 30  Monitored Temp 2  Av.8 °C (98.3 °F)  Min: 36.2 °C (97.2 °F)  Max: 37.2 °C (99 °F)  SpO2  Av.8 %  Min: 90 %  Max: 100 %    No data recorded    Recent Labs     25  1318 25  0453 25  0432   WBC 8.6 6.2 5.6   RBC 2.41* 2.40* 2.57*   HEMOGLOBIN 7.3* 7.3* 7.7*   HEMATOCRIT 23.1* 23.4* 24.1*   MCV 95.9 97.5 93.8   MCH 30.3 30.4 30.0   MCHC 31.6* 31.2* 32.0*   RDW 59.4* 63.9* 60.0*   PLATELETCT 189 175 207   MPV 10.0 10.6 10.6     Recent Labs     25  1318 25  0453 25  0432   SODIUM 131* 132* 130*   POTASSIUM 4.3 4.2 4.1   CHLORIDE 100 101 100   CO2 20 19* 19*   GLUCOSE 219* 175* 151*   BUN 21 20 17   CREATININE 0.88 1.10 0.90   CALCIUM 7.3* 7.3* 7.3*                   Intake/Output                         25 - 25 0659 25 07 - 25 0659     3078-3749 5770-5887 Total 6358-2134 2943-2921 Total                 Intake    I.V.  2150.3  874.5 3024.8  667  -- 667    Amiodarone Volume 88.1 -- 88.1 -- -- --    Propofol Volume 25.9 -- 25.9 -- -- --    Volume (mL) (NS infusion) 1878.4 874.5 2752.9 667 -- 667    Volume (mL) (dextrose 5% infusion) 157.8 -- 157.8 -- -- --    Other  --  -- --  60  -- 60    Medications (PO/Enteral Liquids) -- -- -- 60 -- 60    Total Intake 2150.3 874.5 3024.8 727 -- 727       Output    Urine  380  655 1035  75  -- 75    Output (mL) (Urethral Catheter)  75 -- 75    Stool  --  -- --  --  -- --    Number of Times Stooled 3 x 1 x 4 x -- -- --    Total Output  75 -- 75       Net I/O     1770.3 219.5 1989.8 652 -- 652              Intake/Output Summary (Last 24 hours) at 2025 1022  Last data filed at 2025 0800  Gross per 24 hour   Intake 3058.22 ml   Output 1010 ml   Net  2048.22 ml             Pharmacy   PHARMACY TO DOSE    metoprolol tartrate  25 mg BID    amiodarone  400 mg TWICE DAILY    [START ON 5/31/2025] amiodarone  400 mg DAILY    [START ON 6/14/2025] amiodarone  200 mg DAILY    losartan  25 mg Q EVENING    niCARdipine (Cardene) Standard Infusion 0.1 mg/mL  0-15 mg/hr Continuous    Respiratory Therapy Consult   Continuous RT    famotidine  20 mg BID    Or    famotidine  20 mg BID    NORepinephrine  0-1 mcg/kg/min (Ideal) Continuous    levothyroxine  50 mcg AM ES    acetaminophen  650 mg Q6HRS PRN    docusate sodium  100 mg BID    levETIRAcetam  500 mg Q12HRS    magnesium hydroxide  30 mL DAILY AT 1800    polyethylene glycol/lytes  1 Packet BID    senna-docusate  1 Tablet Nightly    thiamine  100 mg DAILY    ondansetron  4 mg Q4HRS PRN    Or    ondansetron  4 mg Q4HRS PRN    oxyCODONE immediate-release  2.5 mg Q3HRS PRN    senna-docusate  1 Tablet Q24HRS PRN    phenylephrine infusion  0-5 mcg/kg/min (Ideal) Continuous    NS   Continuous    dexamethasone  4 mg TID    bisacodyl  10 mg Q24HRS PRN    sodium phosphate  1 Enema Once PRN    labetalol  10 mg Q4HRS PRN    insulin lispro  1-6 Units Q6HRS    And    dextrose bolus  25 g Q15 MIN PRN       Assessment and Plan:  Hospital day #8 SDH  POD #na  Prophylactic anticoagulation: no         Start date/time: never   Brain Compression: Yes Traumatic      Holding DVT prophylaxis and mobilization instead  MMA embolization completed  Keppra 500 twice daily  Dex 4 tid  Every 4 hours neurochecks  CT 5/25- stable

## 2025-05-30 ENCOUNTER — APPOINTMENT (OUTPATIENT)
Dept: RADIOLOGY | Facility: MEDICAL CENTER | Age: 75
DRG: 023 | End: 2025-05-30
Payer: OTHER MISCELLANEOUS

## 2025-05-30 ENCOUNTER — APPOINTMENT (OUTPATIENT)
Dept: RADIOLOGY | Facility: MEDICAL CENTER | Age: 75
DRG: 023 | End: 2025-05-30
Attending: STUDENT IN AN ORGANIZED HEALTH CARE EDUCATION/TRAINING PROGRAM
Payer: OTHER MISCELLANEOUS

## 2025-05-30 ENCOUNTER — APPOINTMENT (OUTPATIENT)
Dept: RADIOLOGY | Facility: MEDICAL CENTER | Age: 75
End: 2025-05-30
Payer: MEDICARE

## 2025-05-30 PROBLEM — F10.20 ALCOHOLISM (HCC): Status: RESOLVED | Noted: 2025-05-25 | Resolved: 2025-05-30

## 2025-05-30 PROBLEM — E83.39 HYPOPHOSPHATEMIA: Status: RESOLVED | Noted: 2025-05-27 | Resolved: 2025-05-30

## 2025-05-30 PROBLEM — J90 PLEURAL EFFUSION, LEFT: Status: ACTIVE | Noted: 2025-05-30

## 2025-05-30 PROBLEM — Z91.199 NONCOMPLIANCE: Status: RESOLVED | Noted: 2025-05-27 | Resolved: 2025-05-30

## 2025-05-30 PROBLEM — S80.11XA: Status: ACTIVE | Noted: 2025-05-30

## 2025-05-30 PROBLEM — S60.221A: Status: ACTIVE | Noted: 2025-05-30

## 2025-05-30 PROBLEM — E83.42 HYPOMAGNESEMIA: Status: RESOLVED | Noted: 2025-05-24 | Resolved: 2025-05-30

## 2025-05-30 LAB
ALBUMIN SERPL BCP-MCNC: 3.2 G/DL (ref 3.2–4.9)
ALBUMIN SERPL BCP-MCNC: 3.2 G/DL (ref 3.2–4.9)
ALBUMIN/GLOB SERPL: 1.2 G/DL
ALBUMIN/GLOB SERPL: 1.2 G/DL
ALP SERPL-CCNC: 121 U/L (ref 30–99)
ALP SERPL-CCNC: 121 U/L (ref 30–99)
ALT SERPL-CCNC: 45 U/L (ref 2–50)
ALT SERPL-CCNC: 45 U/L (ref 2–50)
ANION GAP SERPL CALC-SCNC: 11 MMOL/L (ref 7–16)
ANION GAP SERPL CALC-SCNC: 11 MMOL/L (ref 7–16)
AST SERPL-CCNC: 52 U/L (ref 12–45)
AST SERPL-CCNC: 52 U/L (ref 12–45)
BASOPHILS # BLD AUTO: 0 % (ref 0–1.8)
BASOPHILS # BLD AUTO: 0 % (ref 0–1.8)
BASOPHILS # BLD: 0 K/UL (ref 0–0.12)
BASOPHILS # BLD: 0 K/UL (ref 0–0.12)
BILIRUB SERPL-MCNC: 1.1 MG/DL (ref 0.1–1.5)
BILIRUB SERPL-MCNC: 1.1 MG/DL (ref 0.1–1.5)
BUN SERPL-MCNC: 15 MG/DL (ref 8–22)
BUN SERPL-MCNC: 15 MG/DL (ref 8–22)
CALCIUM ALBUM COR SERPL-MCNC: 7.9 MG/DL (ref 8.5–10.5)
CALCIUM ALBUM COR SERPL-MCNC: 7.9 MG/DL (ref 8.5–10.5)
CALCIUM SERPL-MCNC: 7.3 MG/DL (ref 8.5–10.5)
CALCIUM SERPL-MCNC: 7.3 MG/DL (ref 8.5–10.5)
CHLORIDE SERPL-SCNC: 98 MMOL/L (ref 96–112)
CHLORIDE SERPL-SCNC: 98 MMOL/L (ref 96–112)
CO2 SERPL-SCNC: 20 MMOL/L (ref 20–33)
CO2 SERPL-SCNC: 20 MMOL/L (ref 20–33)
CREAT SERPL-MCNC: 0.79 MG/DL (ref 0.5–1.4)
CREAT SERPL-MCNC: 0.79 MG/DL (ref 0.5–1.4)
EOSINOPHIL # BLD AUTO: 0 K/UL (ref 0–0.51)
EOSINOPHIL # BLD AUTO: 0 K/UL (ref 0–0.51)
EOSINOPHIL NFR BLD: 0 % (ref 0–6.9)
EOSINOPHIL NFR BLD: 0 % (ref 0–6.9)
ERYTHROCYTE [DISTWIDTH] IN BLOOD BY AUTOMATED COUNT: 58 FL (ref 35.9–50)
ERYTHROCYTE [DISTWIDTH] IN BLOOD BY AUTOMATED COUNT: 58 FL (ref 35.9–50)
GFR SERPLBLD CREATININE-BSD FMLA CKD-EPI: 92 ML/MIN/1.73 M 2
GFR SERPLBLD CREATININE-BSD FMLA CKD-EPI: 92 ML/MIN/1.73 M 2
GLOBULIN SER CALC-MCNC: 2.7 G/DL (ref 1.9–3.5)
GLOBULIN SER CALC-MCNC: 2.7 G/DL (ref 1.9–3.5)
GLUCOSE BLD STRIP.AUTO-MCNC: 146 MG/DL (ref 65–99)
GLUCOSE BLD STRIP.AUTO-MCNC: 146 MG/DL (ref 65–99)
GLUCOSE BLD STRIP.AUTO-MCNC: 150 MG/DL (ref 65–99)
GLUCOSE BLD STRIP.AUTO-MCNC: 150 MG/DL (ref 65–99)
GLUCOSE BLD STRIP.AUTO-MCNC: 159 MG/DL (ref 65–99)
GLUCOSE BLD STRIP.AUTO-MCNC: 159 MG/DL (ref 65–99)
GLUCOSE BLD STRIP.AUTO-MCNC: 172 MG/DL (ref 65–99)
GLUCOSE BLD STRIP.AUTO-MCNC: 172 MG/DL (ref 65–99)
GLUCOSE SERPL-MCNC: 172 MG/DL (ref 65–99)
GLUCOSE SERPL-MCNC: 172 MG/DL (ref 65–99)
HCT VFR BLD AUTO: 24.7 % (ref 42–52)
HCT VFR BLD AUTO: 24.7 % (ref 42–52)
HGB BLD-MCNC: 7.9 G/DL (ref 14–18)
HGB BLD-MCNC: 7.9 G/DL (ref 14–18)
IMM GRANULOCYTES # BLD AUTO: 0.07 K/UL (ref 0–0.11)
IMM GRANULOCYTES # BLD AUTO: 0.07 K/UL (ref 0–0.11)
IMM GRANULOCYTES NFR BLD AUTO: 1 % (ref 0–0.9)
IMM GRANULOCYTES NFR BLD AUTO: 1 % (ref 0–0.9)
IRON SATN MFR SERPL: 4 % (ref 15–55)
IRON SATN MFR SERPL: 4 % (ref 15–55)
IRON SERPL-MCNC: 14 UG/DL (ref 50–180)
IRON SERPL-MCNC: 14 UG/DL (ref 50–180)
LYMPHOCYTES # BLD AUTO: 0.42 K/UL (ref 1–4.8)
LYMPHOCYTES # BLD AUTO: 0.42 K/UL (ref 1–4.8)
LYMPHOCYTES NFR BLD: 5.8 % (ref 22–41)
LYMPHOCYTES NFR BLD: 5.8 % (ref 22–41)
MCH RBC QN AUTO: 30.2 PG (ref 27–33)
MCH RBC QN AUTO: 30.2 PG (ref 27–33)
MCHC RBC AUTO-ENTMCNC: 32 G/DL (ref 32.3–36.5)
MCHC RBC AUTO-ENTMCNC: 32 G/DL (ref 32.3–36.5)
MCV RBC AUTO: 94.3 FL (ref 81.4–97.8)
MCV RBC AUTO: 94.3 FL (ref 81.4–97.8)
MONOCYTES # BLD AUTO: 0.47 K/UL (ref 0–0.85)
MONOCYTES # BLD AUTO: 0.47 K/UL (ref 0–0.85)
MONOCYTES NFR BLD AUTO: 6.5 % (ref 0–13.4)
MONOCYTES NFR BLD AUTO: 6.5 % (ref 0–13.4)
NEUTROPHILS # BLD AUTO: 6.25 K/UL (ref 1.82–7.42)
NEUTROPHILS # BLD AUTO: 6.25 K/UL (ref 1.82–7.42)
NEUTROPHILS NFR BLD: 86.7 % (ref 44–72)
NEUTROPHILS NFR BLD: 86.7 % (ref 44–72)
NRBC # BLD AUTO: 0.21 K/UL
NRBC # BLD AUTO: 0.21 K/UL
NRBC BLD-RTO: 2.9 /100 WBC (ref 0–0.2)
NRBC BLD-RTO: 2.9 /100 WBC (ref 0–0.2)
PLATELET # BLD AUTO: 226 K/UL (ref 164–446)
PLATELET # BLD AUTO: 226 K/UL (ref 164–446)
PMV BLD AUTO: 10.3 FL (ref 9–12.9)
PMV BLD AUTO: 10.3 FL (ref 9–12.9)
POTASSIUM SERPL-SCNC: 4.3 MMOL/L (ref 3.6–5.5)
POTASSIUM SERPL-SCNC: 4.3 MMOL/L (ref 3.6–5.5)
PROT SERPL-MCNC: 5.9 G/DL (ref 6–8.2)
PROT SERPL-MCNC: 5.9 G/DL (ref 6–8.2)
RBC # BLD AUTO: 2.62 M/UL (ref 4.7–6.1)
RBC # BLD AUTO: 2.62 M/UL (ref 4.7–6.1)
SODIUM SERPL-SCNC: 129 MMOL/L (ref 135–145)
SODIUM SERPL-SCNC: 129 MMOL/L (ref 135–145)
TIBC SERPL-MCNC: 330 UG/DL (ref 250–450)
TIBC SERPL-MCNC: 330 UG/DL (ref 250–450)
UIBC SERPL-MCNC: 316 UG/DL (ref 110–370)
UIBC SERPL-MCNC: 316 UG/DL (ref 110–370)
VIT B12 SERPL-MCNC: 1615 PG/ML (ref 211–911)
VIT B12 SERPL-MCNC: 1615 PG/ML (ref 211–911)
WBC # BLD AUTO: 7.2 K/UL (ref 4.8–10.8)
WBC # BLD AUTO: 7.2 K/UL (ref 4.8–10.8)

## 2025-05-30 PROCEDURE — 700102 HCHG RX REV CODE 250 W/ 637 OVERRIDE(OP): Performed by: NURSE PRACTITIONER

## 2025-05-30 PROCEDURE — 73130 X-RAY EXAM OF HAND: CPT | Mod: RT

## 2025-05-30 PROCEDURE — 700102 HCHG RX REV CODE 250 W/ 637 OVERRIDE(OP): Performed by: SURGERY

## 2025-05-30 PROCEDURE — 700102 HCHG RX REV CODE 250 W/ 637 OVERRIDE(OP)

## 2025-05-30 PROCEDURE — 80053 COMPREHEN METABOLIC PANEL: CPT

## 2025-05-30 PROCEDURE — 83550 IRON BINDING TEST: CPT

## 2025-05-30 PROCEDURE — 97530 THERAPEUTIC ACTIVITIES: CPT

## 2025-05-30 PROCEDURE — A9270 NON-COVERED ITEM OR SERVICE: HCPCS

## 2025-05-30 PROCEDURE — A9270 NON-COVERED ITEM OR SERVICE: HCPCS | Performed by: NURSE PRACTITIONER

## 2025-05-30 PROCEDURE — 770020 HCHG ROOM/CARE - TELE (206)

## 2025-05-30 PROCEDURE — A9270 NON-COVERED ITEM OR SERVICE: HCPCS | Performed by: SURGERY

## 2025-05-30 PROCEDURE — 73562 X-RAY EXAM OF KNEE 3: CPT | Mod: RT

## 2025-05-30 PROCEDURE — 700105 HCHG RX REV CODE 258

## 2025-05-30 PROCEDURE — 82607 VITAMIN B-12: CPT

## 2025-05-30 PROCEDURE — 700111 HCHG RX REV CODE 636 W/ 250 OVERRIDE (IP): Mod: JZ

## 2025-05-30 PROCEDURE — 700105 HCHG RX REV CODE 258: Performed by: SURGERY

## 2025-05-30 PROCEDURE — A9270 NON-COVERED ITEM OR SERVICE: HCPCS | Performed by: INTERNAL MEDICINE

## 2025-05-30 PROCEDURE — 71045 X-RAY EXAM CHEST 1 VIEW: CPT

## 2025-05-30 PROCEDURE — 83540 ASSAY OF IRON: CPT

## 2025-05-30 PROCEDURE — 700102 HCHG RX REV CODE 250 W/ 637 OVERRIDE(OP): Performed by: INTERNAL MEDICINE

## 2025-05-30 PROCEDURE — 700111 HCHG RX REV CODE 636 W/ 250 OVERRIDE (IP): Mod: JZ | Performed by: NEUROLOGICAL SURGERY

## 2025-05-30 PROCEDURE — 82962 GLUCOSE BLOOD TEST: CPT | Mod: 91

## 2025-05-30 PROCEDURE — 85025 COMPLETE CBC W/AUTO DIFF WBC: CPT

## 2025-05-30 PROCEDURE — 97116 GAIT TRAINING THERAPY: CPT

## 2025-05-30 PROCEDURE — 99233 SBSQ HOSP IP/OBS HIGH 50: CPT

## 2025-05-30 PROCEDURE — 73552 X-RAY EXAM OF FEMUR 2/>: CPT | Mod: RT

## 2025-05-30 RX ORDER — AMIODARONE HYDROCHLORIDE 200 MG/1
400 TABLET ORAL TWICE DAILY
Status: COMPLETED | OUTPATIENT
Start: 2025-05-30 | End: 2025-05-30

## 2025-05-30 RX ORDER — AMIODARONE HYDROCHLORIDE 200 MG/1
200 TABLET ORAL DAILY
Status: DISCONTINUED | OUTPATIENT
Start: 2025-06-14 | End: 2025-06-03 | Stop reason: HOSPADM

## 2025-05-30 RX ORDER — GAUZE BANDAGE 2" X 2"
100 BANDAGE TOPICAL DAILY
Status: DISCONTINUED | OUTPATIENT
Start: 2025-05-31 | End: 2025-06-03 | Stop reason: HOSPADM

## 2025-05-30 RX ORDER — FAMOTIDINE 20 MG/1
20 TABLET, FILM COATED ORAL 2 TIMES DAILY
Status: DISCONTINUED | OUTPATIENT
Start: 2025-05-30 | End: 2025-05-30

## 2025-05-30 RX ORDER — FUROSEMIDE 10 MG/ML
20 INJECTION INTRAMUSCULAR; INTRAVENOUS ONCE
Status: COMPLETED | OUTPATIENT
Start: 2025-05-30 | End: 2025-05-30

## 2025-05-30 RX ORDER — LEVOTHYROXINE SODIUM 50 UG/1
50 TABLET ORAL
Status: DISCONTINUED | OUTPATIENT
Start: 2025-05-31 | End: 2025-06-03 | Stop reason: HOSPADM

## 2025-05-30 RX ORDER — ONDANSETRON 4 MG/1
4 TABLET, ORALLY DISINTEGRATING ORAL EVERY 4 HOURS PRN
Status: DISCONTINUED | OUTPATIENT
Start: 2025-05-30 | End: 2025-06-03 | Stop reason: HOSPADM

## 2025-05-30 RX ORDER — OXYCODONE HYDROCHLORIDE 5 MG/1
2.5 TABLET ORAL
Refills: 0 | Status: DISCONTINUED | OUTPATIENT
Start: 2025-05-30 | End: 2025-05-30

## 2025-05-30 RX ORDER — AMIODARONE HYDROCHLORIDE 200 MG/1
400 TABLET ORAL DAILY
Status: DISCONTINUED | OUTPATIENT
Start: 2025-05-31 | End: 2025-06-03 | Stop reason: HOSPADM

## 2025-05-30 RX ORDER — MULTIVITAMIN WITH IRON
1000 TABLET ORAL DAILY
Status: DISCONTINUED | OUTPATIENT
Start: 2025-05-30 | End: 2025-06-03 | Stop reason: HOSPADM

## 2025-05-30 RX ORDER — ONDANSETRON 2 MG/ML
4 INJECTION INTRAMUSCULAR; INTRAVENOUS EVERY 4 HOURS PRN
Status: DISCONTINUED | OUTPATIENT
Start: 2025-05-30 | End: 2025-06-03 | Stop reason: HOSPADM

## 2025-05-30 RX ORDER — LOSARTAN POTASSIUM 50 MG/1
50 TABLET ORAL EVERY EVENING
Status: DISCONTINUED | OUTPATIENT
Start: 2025-05-30 | End: 2025-06-03 | Stop reason: HOSPADM

## 2025-05-30 RX ORDER — AMOXICILLIN 250 MG
1 CAPSULE ORAL NIGHTLY
Status: DISCONTINUED | OUTPATIENT
Start: 2025-05-30 | End: 2025-06-03 | Stop reason: HOSPADM

## 2025-05-30 RX ORDER — OXYCODONE HYDROCHLORIDE 5 MG/1
2.5 TABLET ORAL EVERY 4 HOURS PRN
Refills: 0 | Status: DISCONTINUED | OUTPATIENT
Start: 2025-05-30 | End: 2025-06-03 | Stop reason: HOSPADM

## 2025-05-30 RX ORDER — ACETAMINOPHEN 325 MG/1
650 TABLET ORAL EVERY 6 HOURS PRN
Status: DISCONTINUED | OUTPATIENT
Start: 2025-05-30 | End: 2025-06-03 | Stop reason: HOSPADM

## 2025-05-30 RX ORDER — DOCUSATE SODIUM 50 MG/5ML
100 LIQUID ORAL 2 TIMES DAILY
Status: DISCONTINUED | OUTPATIENT
Start: 2025-05-30 | End: 2025-06-03 | Stop reason: HOSPADM

## 2025-05-30 RX ORDER — METOPROLOL TARTRATE 25 MG/1
25 TABLET, FILM COATED ORAL 2 TIMES DAILY
Status: COMPLETED | OUTPATIENT
Start: 2025-05-30 | End: 2025-05-31

## 2025-05-30 RX ORDER — LOSARTAN POTASSIUM 50 MG/1
25 TABLET ORAL EVERY EVENING
Status: DISCONTINUED | OUTPATIENT
Start: 2025-05-30 | End: 2025-05-30

## 2025-05-30 RX ORDER — POLYETHYLENE GLYCOL 3350 17 G/17G
1 POWDER, FOR SOLUTION ORAL 2 TIMES DAILY
Status: DISCONTINUED | OUTPATIENT
Start: 2025-05-30 | End: 2025-06-03 | Stop reason: HOSPADM

## 2025-05-30 RX ADMIN — ACETAMINOPHEN 650 MG: 325 TABLET ORAL at 11:10

## 2025-05-30 RX ADMIN — SODIUM CHLORIDE: 9 INJECTION, SOLUTION INTRAVENOUS at 00:08

## 2025-05-30 RX ADMIN — LEVETIRACETAM 500 MG: 500 TABLET, FILM COATED ORAL at 04:53

## 2025-05-30 RX ADMIN — OXYCODONE 2.5 MG: 5 TABLET ORAL at 22:38

## 2025-05-30 RX ADMIN — METOPROLOL TARTRATE 25 MG: 25 TABLET, FILM COATED ORAL at 18:05

## 2025-05-30 RX ADMIN — METOPROLOL TARTRATE 25 MG: 25 TABLET, FILM COATED ORAL at 04:53

## 2025-05-30 RX ADMIN — OXYCODONE 2.5 MG: 5 TABLET ORAL at 09:08

## 2025-05-30 RX ADMIN — OXYCODONE 2.5 MG: 5 TABLET ORAL at 18:05

## 2025-05-30 RX ADMIN — FUROSEMIDE 20 MG: 10 INJECTION, SOLUTION INTRAVENOUS at 11:54

## 2025-05-30 RX ADMIN — AMIODARONE HYDROCHLORIDE 400 MG: 200 TABLET ORAL at 04:53

## 2025-05-30 RX ADMIN — Medication 100 MG: at 04:53

## 2025-05-30 RX ADMIN — OXYCODONE 2.5 MG: 5 TABLET ORAL at 02:09

## 2025-05-30 RX ADMIN — INSULIN LISPRO 1 UNITS: 100 INJECTION, SOLUTION INTRAVENOUS; SUBCUTANEOUS at 00:19

## 2025-05-30 RX ADMIN — CYANOCOBALAMIN TAB 500 MCG 1000 MCG: 500 TAB at 13:16

## 2025-05-30 RX ADMIN — INSULIN LISPRO 1 UNITS: 100 INJECTION, SOLUTION INTRAVENOUS; SUBCUTANEOUS at 04:58

## 2025-05-30 RX ADMIN — SODIUM CHLORIDE 250 MG: 9 INJECTION, SOLUTION INTRAVENOUS at 19:08

## 2025-05-30 RX ADMIN — POLYETHYLENE GLYCOL 3350 1 PACKET: 17 POWDER, FOR SOLUTION ORAL at 04:53

## 2025-05-30 RX ADMIN — CALCIUM CHLORIDE 1000 MG: 100 INJECTION, SOLUTION INTRAVENOUS at 11:49

## 2025-05-30 RX ADMIN — DEXAMETHASONE SODIUM PHOSPHATE 4 MG: 4 INJECTION, SOLUTION INTRAMUSCULAR; INTRAVENOUS at 09:09

## 2025-05-30 RX ADMIN — AMIODARONE HYDROCHLORIDE 400 MG: 200 TABLET ORAL at 18:07

## 2025-05-30 RX ADMIN — FAMOTIDINE 20 MG: 20 TABLET, FILM COATED ORAL at 04:53

## 2025-05-30 RX ADMIN — LEVOTHYROXINE SODIUM 50 MCG: 0.05 TABLET ORAL at 04:53

## 2025-05-30 RX ADMIN — DOCUSATE SODIUM 100 MG: 50 LIQUID ORAL at 04:53

## 2025-05-30 RX ADMIN — LOSARTAN POTASSIUM 50 MG: 50 TABLET, FILM COATED ORAL at 18:06

## 2025-05-30 ASSESSMENT — PAIN DESCRIPTION - PAIN TYPE
TYPE: ACUTE PAIN

## 2025-05-30 ASSESSMENT — ENCOUNTER SYMPTOMS
SHORTNESS OF BREATH: 0
PHOTOPHOBIA: 0
TINGLING: 0
ABDOMINAL PAIN: 1
HEADACHES: 0
SENSORY CHANGE: 0
DIAPHORESIS: 0
VOMITING: 0
FEVER: 0
DIZZINESS: 0
CHILLS: 0
MYALGIAS: 1
COUGH: 0
NAUSEA: 0
BLURRED VISION: 0
SPEECH CHANGE: 0
DOUBLE VISION: 0
FOCAL WEAKNESS: 0
PALPITATIONS: 0

## 2025-05-30 ASSESSMENT — COGNITIVE AND FUNCTIONAL STATUS - GENERAL
MOVING TO AND FROM BED TO CHAIR: A LITTLE
STANDING UP FROM CHAIR USING ARMS: A LOT
MOBILITY SCORE: 15
WALKING IN HOSPITAL ROOM: A LOT
TURNING FROM BACK TO SIDE WHILE IN FLAT BAD: A LITTLE
SUGGESTED CMS G CODE MODIFIER MOBILITY: CK
CLIMB 3 TO 5 STEPS WITH RAILING: A LOT
MOVING FROM LYING ON BACK TO SITTING ON SIDE OF FLAT BED: A LITTLE

## 2025-05-30 ASSESSMENT — GAIT ASSESSMENTS
GAIT LEVEL OF ASSIST: MINIMAL ASSIST
DISTANCE (FEET): 25
ASSISTIVE DEVICE: FRONT WHEEL WALKER
DEVIATION: INCREASED BASE OF SUPPORT;BRADYKINETIC

## 2025-05-30 ASSESSMENT — FIBROSIS 4 INDEX: FIB4 SCORE: 2.61

## 2025-05-30 NOTE — ASSESSMENT & PLAN NOTE
Right thigh, knee, & calf ecchymosis, swelling & pain.   Right femur, right knee, & right tib/fib xray negative.   Recommend outpatient MRI if no improvement.

## 2025-05-30 NOTE — CARE PLAN
The patient is Watcher - Medium risk of patient condition declining or worsening    Shift Goals  Clinical Goals: decreased abdominal pain/distention, stable neuro status, hemodynamic stability, pain management  Patient Goals: pain management  Family Goals: unable to assess, no family present at this time    Progress made toward(s) clinical / shift goals:      Problem: Pain - Standard  Goal: Alleviation of pain or a reduction in pain to the patient’s comfort goal  Outcome: Progressing     Problem: Knowledge Deficit - Standard  Goal: Patient and family/care givers will demonstrate understanding of plan of care, disease process/condition, diagnostic tests and medications  Outcome: Progressing     Problem: Optimal Care for Alcohol Withdrawal  Goal: Optimal Care for the alcohol withdrawal patient  Outcome: Progressing     Problem: Seizure Precautions  Goal: Implementation of seizure precautions  Outcome: Progressing     Problem: Psychosocial  Goal: Patient's level of anxiety will decrease  Outcome: Progressing     Problem: Safety - Medical Restraint  Goal: Remains free of injury from restraints (Restraint for Interference with Medical Device)  Outcome: Met  Goal: Free from restraint(s) (Restraint for Interference with Medical Device)  Outcome: Met
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
## 2025-05-30 NOTE — THERAPY
"Physical Therapy   Daily Treatment     Patient Name: Zeus Glover  Age:  75 y.o., Sex:  male  Medical Record #: [id number]  Today's Date: 5/30/2025     Precautions  Precautions: Fall Risk    Assessment    Patient seen for follow up PT treatment session and demos improved balance and mobility.  HE was able to get off the commode with Dian and ambulate int he room with the FWW with Mod cueing for hand placement and posture. He continues to demo some poor insight into his deficits and will benefit from placement for further therapy.     Plan    Treatment Plan Status: Continue Current Treatment Plan  Type of Treatment: Bed Mobility, Equipment, Gait Training, Neuro Re-Education / Balance, Self Care / Home Evaluation, Stair Training, Therapeutic Exercise, Therapeutic Activities, Family / Caregiver Training  Treatment Frequency: 4 Times per Week  Treatment Duration: Until Therapy Goals Met    DC Equipment Recommendations: Unable to determine at this time  Discharge Recommendations: Recommend post-acute placement for additional physical therapy services prior to discharge home      Subjective    \"I'm doing a bit better\"     Objective       05/30/25 0930   Precautions   Precautions Fall Risk   Vitals   Vitals Comments all VSS throughout   Pain 0 - 10 Group   Location Abdomen   Therapist Pain Assessment 3;During Activity;Nurse Notified   Cognition    Cognition / Consciousness X   Ability To Follow Commands 2 Step   Attention Impaired   Comments pleasant and cooperative, improved command following today   Neuro-Muscular Treatments   Neuro-Muscular Treatments Anterior weight shift;Compensatory Strategies;Postural Changes;Verbal Cuing;Weight Shift Right;Weight Shift Left   Comments cueing for upright posture   Other Treatments   Other Treatments Provided educated about safety with mobility and use of FWW, provided assist for personal grooming at the sink post BM on the commode   Balance   Sitting Balance (Static) Fair "   Sitting Balance (Dynamic) Fair   Standing Balance (Static) Fair -   Standing Balance (Dynamic) Poor +   Weight Shift Sitting Fair   Weight Shift Standing Fair   Skilled Intervention Postural Facilitation;Sequencing;Tactile Cuing;Verbal Cuing;Compensatory Strategies   Comments w/FWW   Bed Mobility    Comments foudn on the commode and left in the chair   Gait Analysis   Gait Level Of Assist Minimal Assist   Assistive Device Front Wheel Walker   Distance (Feet) 25   # of Times Distance was Traveled 2   Deviation Increased Base Of Support;Bradykinetic   Comments cueing for upright posture and safe use of FWW   Functional Mobility   Sit to Stand Minimal Assist   Bed, Chair, Wheelchair Transfer Minimal Assist   Toilet Transfers Minimal Assist   Transfer Method Stand Step   Mobility commode>walk to and from sink>chair   Skilled Intervention Tactile Cuing;Verbal Cuing   ICU Target Mobility Level   ICU Mobility - Targeted Level Level 4   6 Clicks Assessment - How much HELP from from another person do you currently need... (If the patient hasn't done an activity recently, how much help from another person do you think he/she would need if he/she tried?)   Turning from your back to your side while in a flat bed without using bedrails? 3   Moving from lying on your back to sitting on the side of a flat bed without using bedrails? 3   Moving to and from a bed to a chair (including a wheelchair)? 3   Standing up from a chair using your arms (e.g., wheelchair, or bedside chair)? 2   Walking in hospital room? 2   Climbing 3-5 steps with a railing? 2   6 clicks Mobility Score 15   Activity Tolerance   Sitting in Chair post session   Standing 10 mi   Patient / Family Goals    Patient / Family Goal #1 to go home   Goal #1 Outcome Goal not met   Short Term Goals    Short Term Goal # 1 in 6 visits patient will demo all bed mobility indep from flat surface for safe DC   Goal Outcome # 1 goal not met   Short Term Goal # 2 in 6 visits  patient will demo all functional transfers with Sup and LRAD for safe DC   Goal Outcome # 2 Progressing as expected   Short Term Goal # 3 in 6 visits patient will ambualte 200' with Sup and LRAD for safe DC   Goal Outcome # 3 Progressing as expected   Short Term Goal # 4 in 6 visits patient will navigate 4 stairs with Sup and LRAD for safe DC   Goal Outcome # 4 Goal not met   Physical Therapy Treatment Plan   Physical Therapy Treatment Plan Continue Current Treatment Plan   Anticipated Discharge Equipment and Recommendations   DC Equipment Recommendations Unable to determine at this time   Discharge Recommendations Recommend post-acute placement for additional physical therapy services prior to discharge home     Kellen Lloyd, PT, DPT, GCS

## 2025-05-30 NOTE — PROGRESS NOTES
4 Eyes Skin Assessment Completed by DAVID Chen and DAVID Rangel    Skin assessment is primarily focused on high risk bony prominences. Pay special attention to skin beneath and around medical devices, high risk bony prominences, skin to skin areas and areas where the patient lacks sensation to feel pain and areas where the patient previously had breakdown.     HIGH RISK PRESSURE POINTS -  Sacrum/Coccyx red, blanching   Right ischial tuberosity (Sit Bones) WDL  Left Ischial Tuberosity (Sit Bones) WDL  Right Heel Scab, Red, and Blanching  Left Heel Red and Blanching      HEAD & NECK DEVICES:  Occipital Region Scab  Right Ear WDL  Left Ear WDL  Chin WDL  Neck WDL  Sternum WDL  NA, Intact with no device in use    RESPIRATORY DEVICES:  Lips WDL  Tongue WDL  Right Cheek WDL  Left Cheek WDL  Bridge of Nose WDL  Respiratory Devices (Wound care): Pulse Ox    FEEDING DEVICES:  Nasal Septum WDL  Feeding Devices (Wound Care): NA    TORSO DEVICES:  Scapula WDL  Spine Red and Blanching  Back Red and Blanching  Abdomen Distended  Right Flank WDL  Left Flank Bruising  Torso Devices (Wound Care): NA    LINES, BP MONITORING DEVICES IN USE:  Right Elbow, Forearm, Wrist, Hand Red, Blanching, and Bruising  Left Elbow, Forearm, Wrist, Hand Red, Blanching, and Bruising  Lines & BP Monitoring Devices (Wound Care): Peripheral IV and Pulse Ox    ORTHOPEDIC DEVICES:  Right Hip WDL  Left Hip WDL  Right Thigh WDL  Left Thigh WDL  Right Leg Bruising, swelling   Left Leg Bruising  Right Achilles swelling   Left Achilles swelling   Right Foot Red, Blanching, and Edema, scabs, dry flaky  Left Foot Red, Blanching, and Edema, scabs, dry flaky   Orthopedic Devices (Wound Care): NA    MISCELLANEOUS:  Axilla WDL  Groin Edema   Perineum Red and Blanching, shiny, edema   Buttocks/Chaparrita-rectal discoloration, red, blanching    Miscellaneous (Wound Care): SCDs      PROTOCOL INTERVENTIONS:   Float Heels with Pillows Already in Place  Q2 Turns with Pillows  Already in Place    WOUND PHOTOS:   N/A no wounds identified    WOUND CONSULT:   N/A, no advanced wound care needs identified

## 2025-05-30 NOTE — PROGRESS NOTES
Neurosurgery Progress Note    Subjective:  No HA, up in chair    Exam:  Awake conversant  AAOx4- tongue ML, no drift, FCx4    BP  Min: 116/71  Max: 174/83  Pulse  Av.9  Min: 57  Max: 87  Resp  Av.2  Min: 11  Max: 34  Temp  Av.2 °C (97.1 °F)  Min: 35.9 °C (96.6 °F)  Max: 36.3 °C (97.4 °F)  Monitored Temp 2  Av.7 °C (98 °F)  Min: 36.6 °C (97.9 °F)  Max: 36.7 °C (98.1 °F)  SpO2  Av %  Min: 91 %  Max: 99 %    No data recorded    Recent Labs     25  0303   WBC 6.2 5.6 7.2   RBC 2.40* 2.57* 2.62*   HEMOGLOBIN 7.3* 7.7* 7.9*   HEMATOCRIT 23.4* 24.1* 24.7*   MCV 97.5 93.8 94.3   MCH 30.4 30.0 30.2   MCHC 31.2* 32.0* 32.0*   RDW 63.9* 60.0* 58.0*   PLATELETCT 175 207 226   MPV 10.6 10.6 10.3     Recent Labs     25  0303   SODIUM 132* 130* 129*   POTASSIUM 4.2 4.1 4.3   CHLORIDE 101 100 98   CO2 19* 19* 20   GLUCOSE 175* 151* 172*   BUN 20 17 15   CREATININE 1.10 0.90 0.79   CALCIUM 7.3* 7.3* 7.3*                   Intake/Output                         25 07 - 25 0659 25 07 - 25 0659      Total [id number] Total                 Intake    P.O.  --  -- --  60  -- 60    P.O. -- -- -- 60 -- 60    I.V.  1690.8  1328.2 3019  270.5  -- 270.5    Volume (mL) (NS infusion) 1690.8 1328.2 3019 270.5 -- 270.5    Other  60  -- 60  --  -- --    Medications (PO/Enteral Liquids) 60 -- 60 -- -- --    Total Intake 1750.8 1328.2 3079 330.5 -- 330.5       Output    Urine  255  900 1155  100  -- 100    Number of Times Voided -- 3 x 3 x 1 x -- 1 x    Number of Times Incontinent of Urine -- 1 x 1 x -- -- --    Urine Void (mL) 80 900 980 100 -- 100    Output (mL) ([REMOVED] Urethral Catheter 25 1100) 175 -- 175 -- -- --    Stool  --  -- --  --  -- --    Number of Times Stooled -- 1 x 1 x 1 x -- 1 x    Total Output  100 -- 100       Net I/O     1495.8 428.2 1924 230.5 -- 230.5               Intake/Output Summary (Last 24 hours) at 5/30/2025 0950  Last data filed at 5/30/2025 0852  Gross per 24 hour   Intake 2682.54 ml   Output 1180 ml   Net 1502.54 ml             famotidine  20 mg BID    Or    famotidine  20 mg BID    [START ON 5/31/2025] levothyroxine  50 mcg AM ES    acetaminophen  650 mg Q6HRS PRN    docusate sodium  100 mg BID    magnesium hydroxide  30 mL DAILY AT 1800    polyethylene glycol/lytes  1 Packet BID    senna-docusate  1 Tablet Nightly    [START ON 5/31/2025] thiamine  100 mg DAILY    ondansetron  4 mg Q4HRS PRN    Or    ondansetron  4 mg Q4HRS PRN    oxyCODONE immediate-release  2.5 mg Q3HRS PRN    amiodarone  400 mg TWICE DAILY    [START ON 5/31/2025] amiodarone  400 mg DAILY    [START ON 6/14/2025] amiodarone  200 mg DAILY    losartan  25 mg Q EVENING    metoprolol tartrate  25 mg BID    niCARdipine (Cardene) Standard Infusion 0.1 mg/mL  0-15 mg/hr Continuous    Respiratory Therapy Consult   Continuous RT    NORepinephrine  0-1 mcg/kg/min (Ideal) Continuous    senna-docusate  1 Tablet Q24HRS PRN    phenylephrine infusion  0-5 mcg/kg/min (Ideal) Continuous    NS   Continuous    dexamethasone  4 mg TID    bisacodyl  10 mg Q24HRS PRN    sodium phosphate  1 Enema Once PRN    labetalol  10 mg Q4HRS PRN    insulin lispro  1-6 Units Q6HRS    And    dextrose bolus  25 g Q15 MIN PRN       Assessment and Plan:  Hospital day #9 SDH  POD #na  Prophylactic anticoagulation: no         Start date/time: never   Brain Compression: Yes Traumatic      Holding DVT prophylaxis and mobilization instead  MMA embolization completed  Keppra 500 twice daily  Dex 4 tid  Every 4 hours neurochecks  CT 5/25- stable

## 2025-05-30 NOTE — CARE PLAN
Problem: Pain - Standard  Goal: Alleviation of pain or a reduction in pain to the patient’s comfort goal  Description: Target End Date:  Prior to discharge or change in level of careDocument on Vitals flowsheet1.  Document pain using the appropriate pain scale per order or unit policy2.  Educate and implement non-pharmacologic comfort measures (i.e. relaxation, distraction, massage, cold/heat therapy, etc.)3.  Pain management medications as ordered4.  Reassess pain after pain med administration per policy5.  If opiods administered assess patient's response to pain medication is appropriate per POSS sedation scale6.  Follow pain management plan developed in collaboration with patient and interdisciplinary team (including palliative care or pain specialists if applicable)  Outcome: Progressing     Problem: Knowledge Deficit - Standard  Goal: Patient and family/care givers will demonstrate understanding of plan of care, disease process/condition, diagnostic tests and medications  Description: Target End Date:  1-3 days or as soon as patient condition allowsDocument in Patient Education1.  Patient and family/caregiver oriented to unit, equipment, visitation policy and means for communicating concern2.  Complete/review Learning Assessment3.  Assess knowledge level of disease process/condition, treatment plan, diagnostic tests and medications4.  Explain disease process/condition, treatment plan, diagnostic tests and medications  Outcome: Progressing     Problem: Fall Risk  Goal: Patient will remain free from falls  Description: Target End Date:  Prior to discharge or change in level of careDocument interventions on the Basia Gordon Fall Risk Assessment1.  Assess for fall risk factors2.  Implement fall precautions  Outcome: Progressing   The patient is Stable - Low risk of patient condition declining or worsening    Shift Goals  Clinical Goals: CT abd, neurovascular checks, hemodynamic stability  Patient Goals: pain  control  Family Goals: n/a       comfortable appearance/cooperative

## 2025-05-30 NOTE — ASSESSMENT & PLAN NOTE
5/30 CXR with moderate left pleural effusion in the setting of heart failure.   - 20 mg Lasix IV once  Aggressive pulmonary hygiene & serial chest imaging.

## 2025-05-30 NOTE — PROGRESS NOTES
Report given via phone to April neuro RN.    Patient transferred to [facility] via wheelchair, on telemetry and oxygen, accompanied by family members and transport. All belongings in patient's possession.

## 2025-05-30 NOTE — ASSESSMENT & PLAN NOTE
Unclear source. Possibly secondary to alcoholism and vitamin B12 deficiency.   Vitamin B12 supplementation.  Check Vit B12  5/30 Iron studies low, replace per pharmacy.  Monitor hgb & transfuse for < 7.0  Recommend PCP establishment for outpatient follow up.

## 2025-05-31 ENCOUNTER — APPOINTMENT (OUTPATIENT)
Dept: RADIOLOGY | Facility: MEDICAL CENTER | Age: 75
DRG: 023 | End: 2025-05-31
Attending: NURSE PRACTITIONER
Payer: OTHER MISCELLANEOUS

## 2025-05-31 ENCOUNTER — APPOINTMENT (OUTPATIENT)
Dept: RADIOLOGY | Facility: MEDICAL CENTER | Age: 75
DRG: 023 | End: 2025-05-31
Attending: REGISTERED NURSE
Payer: OTHER MISCELLANEOUS

## 2025-05-31 VITALS
TEMPERATURE: 97.5 F | SYSTOLIC BLOOD PRESSURE: 149 MMHG | HEIGHT: 65 IN | BODY MASS INDEX: 31.59 KG/M2 | OXYGEN SATURATION: 90 % | WEIGHT: 189.6 LBS | RESPIRATION RATE: 18 BRPM | HEART RATE: 76 BPM | DIASTOLIC BLOOD PRESSURE: 84 MMHG

## 2025-05-31 LAB
EKG IMPRESSION: NORMAL
EKG IMPRESSION: NORMAL
GLUCOSE BLD STRIP.AUTO-MCNC: 127 MG/DL (ref 65–99)
GLUCOSE BLD STRIP.AUTO-MCNC: 127 MG/DL (ref 65–99)
GLUCOSE BLD STRIP.AUTO-MCNC: 135 MG/DL (ref 65–99)
GLUCOSE BLD STRIP.AUTO-MCNC: 135 MG/DL (ref 65–99)
GLUCOSE BLD STRIP.AUTO-MCNC: 145 MG/DL (ref 65–99)
GLUCOSE BLD STRIP.AUTO-MCNC: 145 MG/DL (ref 65–99)
GLUCOSE BLD STRIP.AUTO-MCNC: 153 MG/DL (ref 65–99)
GLUCOSE BLD STRIP.AUTO-MCNC: 153 MG/DL (ref 65–99)

## 2025-05-31 PROCEDURE — 93010 ELECTROCARDIOGRAM REPORT: CPT | Performed by: INTERNAL MEDICINE

## 2025-05-31 PROCEDURE — 700105 HCHG RX REV CODE 258

## 2025-05-31 PROCEDURE — 770020 HCHG ROOM/CARE - TELE (206)

## 2025-05-31 PROCEDURE — 700111 HCHG RX REV CODE 636 W/ 250 OVERRIDE (IP): Mod: JZ

## 2025-05-31 PROCEDURE — 700102 HCHG RX REV CODE 250 W/ 637 OVERRIDE(OP): Performed by: SURGERY

## 2025-05-31 PROCEDURE — 94669 MECHANICAL CHEST WALL OSCILL: CPT

## 2025-05-31 PROCEDURE — 71045 X-RAY EXAM CHEST 1 VIEW: CPT

## 2025-05-31 PROCEDURE — 700102 HCHG RX REV CODE 250 W/ 637 OVERRIDE(OP)

## 2025-05-31 PROCEDURE — A9270 NON-COVERED ITEM OR SERVICE: HCPCS | Performed by: SURGERY

## 2025-05-31 PROCEDURE — A9270 NON-COVERED ITEM OR SERVICE: HCPCS

## 2025-05-31 PROCEDURE — 93005 ELECTROCARDIOGRAM TRACING: CPT | Mod: TC | Performed by: REGISTERED NURSE

## 2025-05-31 PROCEDURE — 82962 GLUCOSE BLOOD TEST: CPT | Mod: 91

## 2025-05-31 PROCEDURE — 99233 SBSQ HOSP IP/OBS HIGH 50: CPT | Performed by: REGISTERED NURSE

## 2025-05-31 PROCEDURE — 93970 EXTREMITY STUDY: CPT

## 2025-05-31 PROCEDURE — 700102 HCHG RX REV CODE 250 W/ 637 OVERRIDE(OP): Performed by: REGISTERED NURSE

## 2025-05-31 PROCEDURE — A9270 NON-COVERED ITEM OR SERVICE: HCPCS | Performed by: REGISTERED NURSE

## 2025-05-31 RX ORDER — METOPROLOL SUCCINATE 25 MG/1
25 TABLET, EXTENDED RELEASE ORAL
Status: DISCONTINUED | OUTPATIENT
Start: 2025-06-01 | End: 2025-06-03 | Stop reason: HOSPADM

## 2025-05-31 RX ADMIN — SODIUM CHLORIDE 250 MG: 9 INJECTION, SOLUTION INTRAVENOUS at 17:44

## 2025-05-31 RX ADMIN — OXYCODONE 2.5 MG: 5 TABLET ORAL at 17:39

## 2025-05-31 RX ADMIN — OXYCODONE 2.5 MG: 5 TABLET ORAL at 08:53

## 2025-05-31 RX ADMIN — MAGNESIUM HYDROXIDE 30 ML: 400 SUSPENSION ORAL at 17:37

## 2025-05-31 RX ADMIN — LABETALOL HYDROCHLORIDE 10 MG: 5 INJECTION, SOLUTION INTRAVENOUS at 08:38

## 2025-05-31 RX ADMIN — AMIODARONE HYDROCHLORIDE 400 MG: 200 TABLET ORAL at 05:10

## 2025-05-31 RX ADMIN — CYANOCOBALAMIN TAB 500 MCG 1000 MCG: 500 TAB at 05:10

## 2025-05-31 RX ADMIN — LEVOTHYROXINE SODIUM 50 MCG: 0.05 TABLET ORAL at 05:10

## 2025-05-31 RX ADMIN — METOPROLOL TARTRATE 25 MG: 25 TABLET, FILM COATED ORAL at 17:38

## 2025-05-31 RX ADMIN — INSULIN LISPRO 1 UNITS: 100 INJECTION, SOLUTION INTRAVENOUS; SUBCUTANEOUS at 17:47

## 2025-05-31 RX ADMIN — Medication 100 MG: at 05:10

## 2025-05-31 RX ADMIN — LOSARTAN POTASSIUM 50 MG: 50 TABLET, FILM COATED ORAL at 17:38

## 2025-05-31 RX ADMIN — METOPROLOL TARTRATE 25 MG: 25 TABLET, FILM COATED ORAL at 05:10

## 2025-05-31 RX ADMIN — SODIUM CHLORIDE 250 MG: 9 INJECTION, SOLUTION INTRAVENOUS at 05:32

## 2025-05-31 ASSESSMENT — COGNITIVE AND FUNCTIONAL STATUS - GENERAL
WALKING IN HOSPITAL ROOM: A LOT
MOBILITY SCORE: 15
SUGGESTED CMS G CODE MODIFIER DAILY ACTIVITY: CK
DAILY ACTIVITIY SCORE: 16
DRESSING REGULAR UPPER BODY CLOTHING: A LITTLE
MOVING TO AND FROM BED TO CHAIR: A LITTLE
TOILETING: A LOT
PERSONAL GROOMING: A LITTLE
TURNING FROM BACK TO SIDE WHILE IN FLAT BAD: A LITTLE
MOVING FROM LYING ON BACK TO SITTING ON SIDE OF FLAT BED: A LITTLE
CLIMB 3 TO 5 STEPS WITH RAILING: A LOT
STANDING UP FROM CHAIR USING ARMS: A LOT
HELP NEEDED FOR BATHING: A LOT
DRESSING REGULAR LOWER BODY CLOTHING: A LOT
SUGGESTED CMS G CODE MODIFIER MOBILITY: CK

## 2025-05-31 ASSESSMENT — ENCOUNTER SYMPTOMS
SENSORY CHANGE: 0
DIAPHORESIS: 0
NAUSEA: 0
MYALGIAS: 1
CHILLS: 0
SHORTNESS OF BREATH: 0
VOMITING: 0
FEVER: 0
HEADACHES: 0
ABDOMINAL PAIN: 1
DOUBLE VISION: 0
TINGLING: 0
FOCAL WEAKNESS: 0
PHOTOPHOBIA: 0
PALPITATIONS: 0
SPEECH CHANGE: 0
DIZZINESS: 0
BLURRED VISION: 0
COUGH: 0

## 2025-05-31 ASSESSMENT — PAIN DESCRIPTION - PAIN TYPE
TYPE: ACUTE PAIN

## 2025-05-31 NOTE — PROGRESS NOTES
Neurosurgery Progress Note    Subjective:  No events overnight  Denies GOMEZ  Has leg pains       Exam:  Awake conversant  AAOx4  CN's grossly intact, no drift  5/5 strength bilat Ue's and LE's  ANDREEA gait      BP  Min: 138/81  Max: 172/94  Pulse  Av.1  Min: 53  Max: 84  Resp  Av.3  Min: 16  Max: 22  Temp  Av.4 °C (97.5 °F)  Min: 36.3 °C (97.3 °F)  Max: 36.7 °C (98 °F)  SpO2  Av.8 %  Min: 92 %  Max: 94 %    No data recorded    Recent Labs     25  0432 25  0303   WBC 5.6 7.2   RBC 2.57* 2.62*   HEMOGLOBIN 7.7* 7.9*   HEMATOCRIT 24.1* 24.7*   MCV 93.8 94.3   MCH 30.0 30.2   MCHC 32.0* 32.0*   RDW 60.0* 58.0*   PLATELETCT 207 226   MPV 10.6 10.3     Recent Labs     25  0432 25  0303   SODIUM 130* 129*   POTASSIUM 4.1 4.3   CHLORIDE 100 98   CO2 19* 20   GLUCOSE 151* 172*   BUN 17 15   CREATININE 0.90 0.79   CALCIUM 7.3* 7.3*                   Intake/Output                         25 0700 - 25 0659 25 07 - 25 0659      Total  Total                 Intake    P.O.  150  440 590  240  -- 240    P.O. 150 440 590 240 -- 240    I.V.  270.5  -- 270.5  --  -- --    Volume (mL) (NS infusion) 270.5 -- 270.5 -- -- --    IV Piggyback  100  -- 100  --  -- --    Volume (mL) (calcium CHLORIDE 10 % 1,000 mg in dextrose 5% 100 mL IVPB) 100 -- 100 -- -- --    Total Intake 520.5 440 960.5 240 -- 240       Output    Urine  1136  729 1862  --  -- --    Number of Times Voided 6 x 5 x 11 x 2 x -- 2 x    Number of Times Incontinent of Urine 1 x -- 1 x -- -- --    Urine Void (mL) 4366 713 8210 -- -- --    Stool  --  -- --  --  -- --    Number of Times Stooled 2 x -- 2 x -- -- --    Total Output 7924 912 4474 -- -- --       Net I/O     -616.5 -285 -901.5 240 -- 240              Intake/Output Summary (Last 24 hours) at 2025 1359  Last data filed at 2025 1030  Gross per 24 hour   Intake 680 ml   Output 1362 ml   Net -682 ml              [START ON 6/1/2025] metoprolol SR  25 mg Q DAY    levothyroxine  50 mcg AM ES    acetaminophen  650 mg Q6HRS PRN    docusate sodium  100 mg BID    magnesium hydroxide  30 mL DAILY AT 1800    polyethylene glycol/lytes  1 Packet BID    senna-docusate  1 Tablet Nightly    thiamine  100 mg DAILY    ondansetron  4 mg Q4HRS PRN    Or    ondansetron  4 mg Q4HRS PRN    amiodarone  400 mg DAILY    [START ON 6/14/2025] amiodarone  200 mg DAILY    metoprolol tartrate  25 mg BID    losartan  50 mg Q EVENING    cyanocobalamin  1,000 mcg DAILY    oxyCODONE immediate-release  2.5 mg Q4HRS PRN    ferric gluconate (Ferrlecit) IV  250 mg BID    Respiratory Therapy Consult   Continuous RT    senna-docusate  1 Tablet Q24HRS PRN    bisacodyl  10 mg Q24HRS PRN    sodium phosphate  1 Enema Once PRN    labetalol  10 mg Q4HRS PRN    insulin lispro  1-6 Units Q6HRS    And    dextrose bolus  25 g Q15 MIN PRN       Assessment and Plan:  Hospital day #10 SDH  POD #na  Prophylactic anticoagulation: no         Start date/time: never   Brain Compression: Yes Traumatic    -Holding DVT prophylaxis and mobilization instead  -MMA embolization 5/27   - neuro stable   - continue Keppra 500 twice daily  - Every 4 hours neurochecks  - CT 5/25 most recent - stable  - will need repeat head CT prior to d/c   - trauma primary team
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
## 2025-05-31 NOTE — CARE PLAN
The patient is Stable - Low risk of patient condition declining or worsening    Shift Goals  Clinical Goals: Stable Neuro Status, Maintain Skin Integrity  Patient Goals: Pain Control  Family Goals: Updates    Progress made toward(s) clinical / shift goals:    Problem: Pain - Standard  Goal: Alleviation of pain or a reduction in pain to the patient’s comfort goal  Outcome: Progressing  Frequent pain assessments performed throughout shift using the 0-10 Numerical Pain Scale. Patient medicated per MAR using available PRN pain medications. Non-pharmacological interventions available and offered to patient.      Problem: Risk for Aspiration  Goal: Patient's risk for aspiration will be absent or decrease  Outcome: Progressing  Patient alert, upright, and midline for oral intake. No signs/symptoms of aspiration. Patient denies difficulty swallowing or chewing. Suction available and within reach.      Problem: Fall Risk  Goal: Patient will remain free from falls  Outcome: Progressing   Patient calls appropriately when requiring assistance to ambulate to the restroom. Bed alarm in place, frame alarm in place. Treaded socks in use when ambulating. Bed locked and in lowest position. Call light and personal belongings within reach.     Problem: Skin Integrity  Goal: Skin integrity is maintained or improved  Outcome: Progressing  Pt turned and repositioned Q2H or as requested with the use of wedges. Barrier cream and mepilexes used to prevent skin breakdown on delicate perineal areas and bony prominences. Linen changes provided as needed to avoid risk of developing pressure ulcers.     Problem: Neuro Status  Goal: Neuro status will remain stable or improve  Outcome: Progressing  Q4 hour neuro checks in place. Patient's neurological status (A/O x 4) remains stable and unchanged throughout shift. Patient is able to move all extremities.  Bed locked and in lowest position. Call light and personal belongings within reach.         Patient is not progressing towards the following goals:

## 2025-05-31 NOTE — CARE PLAN
The patient is Stable - Low risk of patient condition declining or worsening    Shift Goals  Clinical Goals: Stable neuro status, hemodynamic stability  Patient Goals: BP management  Family Goals: ANDREEA    Progress made toward(s) clinical / shift goals:      Problem: Neuro Status  Goal: Neuro status will remain stable or improve  Outcome: Progressing    Q4H neuro checks in place. Pt's neurological status (A/Ox4) remains unchanged throughout shift. Bed alarm is on, call light within reach.     Problem: Hemodynamics  Goal: Patient's hemodynamics, fluid balance and neurologic status will be stable or improve  Outcome: Progressing   Pt's blood pressure closely monitored throughout shift. PRN blood pressure medications available to keep SBP within set parameters.        Patient is not progressing towards the following goals:

## 2025-05-31 NOTE — PROGRESS NOTES
Monitor summary: SR 63-93, IN 0.13, QRS 0.09, QT 0.37 with rare PVCs and couplets, per strip from monitor room.

## 2025-06-01 ENCOUNTER — APPOINTMENT (OUTPATIENT)
Dept: RADIOLOGY | Facility: MEDICAL CENTER | Age: 75
DRG: 023 | End: 2025-06-01
Attending: REGISTERED NURSE
Payer: OTHER MISCELLANEOUS

## 2025-06-01 ENCOUNTER — APPOINTMENT (OUTPATIENT)
Dept: RADIOLOGY | Facility: MEDICAL CENTER | Age: 75
DRG: 023 | End: 2025-06-01
Payer: OTHER MISCELLANEOUS

## 2025-06-01 LAB
ANION GAP SERPL CALC-SCNC: 13 MMOL/L (ref 7–16)
BASOPHILS # BLD AUTO: 0.1 % (ref 0–1.8)
BASOPHILS # BLD: 0.01 K/UL (ref 0–0.12)
BUN SERPL-MCNC: 14 MG/DL (ref 8–22)
CALCIUM SERPL-MCNC: 7.9 MG/DL (ref 8.5–10.5)
CHLORIDE SERPL-SCNC: 94 MMOL/L (ref 96–112)
CO2 SERPL-SCNC: 22 MMOL/L (ref 20–33)
CREAT SERPL-MCNC: 0.74 MG/DL (ref 0.5–1.4)
EOSINOPHIL # BLD AUTO: 0 K/UL (ref 0–0.51)
EOSINOPHIL NFR BLD: 0 % (ref 0–6.9)
ERYTHROCYTE [DISTWIDTH] IN BLOOD BY AUTOMATED COUNT: 52.9 FL (ref 35.9–50)
GFR SERPLBLD CREATININE-BSD FMLA CKD-EPI: 94 ML/MIN/1.73 M 2
GLUCOSE BLD STRIP.AUTO-MCNC: 110 MG/DL (ref 65–99)
GLUCOSE BLD STRIP.AUTO-MCNC: 113 MG/DL (ref 65–99)
GLUCOSE BLD STRIP.AUTO-MCNC: 121 MG/DL (ref 65–99)
GLUCOSE BLD STRIP.AUTO-MCNC: 151 MG/DL (ref 65–99)
GLUCOSE BLD STRIP.AUTO-MCNC: 177 MG/DL (ref 65–99)
GLUCOSE SERPL-MCNC: 120 MG/DL (ref 65–99)
HCT VFR BLD AUTO: 27.9 % (ref 42–52)
HGB BLD-MCNC: 9.4 G/DL (ref 14–18)
IMM GRANULOCYTES # BLD AUTO: 0.19 K/UL (ref 0–0.11)
IMM GRANULOCYTES NFR BLD AUTO: 1.9 % (ref 0–0.9)
LYMPHOCYTES # BLD AUTO: 0.8 K/UL (ref 1–4.8)
LYMPHOCYTES NFR BLD: 8.2 % (ref 22–41)
MCH RBC QN AUTO: 29.9 PG (ref 27–33)
MCHC RBC AUTO-ENTMCNC: 33.7 G/DL (ref 32.3–36.5)
MCV RBC AUTO: 88.9 FL (ref 81.4–97.8)
MONOCYTES # BLD AUTO: 1.32 K/UL (ref 0–0.85)
MONOCYTES NFR BLD AUTO: 13.5 % (ref 0–13.4)
NEUTROPHILS # BLD AUTO: 7.49 K/UL (ref 1.82–7.42)
NEUTROPHILS NFR BLD: 76.3 % (ref 44–72)
NRBC # BLD AUTO: 1.42 K/UL
NRBC BLD-RTO: 14.5 /100 WBC (ref 0–0.2)
NT-PROBNP SERPL IA-MCNC: 817 PG/ML (ref 0–125)
PLATELET # BLD AUTO: 271 K/UL (ref 164–446)
PMV BLD AUTO: 9.5 FL (ref 9–12.9)
POTASSIUM SERPL-SCNC: 4.1 MMOL/L (ref 3.6–5.5)
RBC # BLD AUTO: 3.14 M/UL (ref 4.7–6.1)
SODIUM SERPL-SCNC: 129 MMOL/L (ref 135–145)
WBC # BLD AUTO: 9.8 K/UL (ref 4.8–10.8)

## 2025-06-01 PROCEDURE — A9270 NON-COVERED ITEM OR SERVICE: HCPCS | Performed by: SURGERY

## 2025-06-01 PROCEDURE — 94669 MECHANICAL CHEST WALL OSCILL: CPT

## 2025-06-01 PROCEDURE — A9270 NON-COVERED ITEM OR SERVICE: HCPCS | Performed by: REGISTERED NURSE

## 2025-06-01 PROCEDURE — 82962 GLUCOSE BLOOD TEST: CPT | Mod: 91

## 2025-06-01 PROCEDURE — 700102 HCHG RX REV CODE 250 W/ 637 OVERRIDE(OP): Performed by: REGISTERED NURSE

## 2025-06-01 PROCEDURE — 700105 HCHG RX REV CODE 258

## 2025-06-01 PROCEDURE — 700111 HCHG RX REV CODE 636 W/ 250 OVERRIDE (IP): Mod: JZ

## 2025-06-01 PROCEDURE — 83880 ASSAY OF NATRIURETIC PEPTIDE: CPT

## 2025-06-01 PROCEDURE — 80048 BASIC METABOLIC PNL TOTAL CA: CPT

## 2025-06-01 PROCEDURE — 770020 HCHG ROOM/CARE - TELE (206)

## 2025-06-01 PROCEDURE — 70450 CT HEAD/BRAIN W/O DYE: CPT

## 2025-06-01 PROCEDURE — 99233 SBSQ HOSP IP/OBS HIGH 50: CPT | Performed by: REGISTERED NURSE

## 2025-06-01 PROCEDURE — 700102 HCHG RX REV CODE 250 W/ 637 OVERRIDE(OP): Performed by: SURGERY

## 2025-06-01 PROCEDURE — 85025 COMPLETE CBC W/AUTO DIFF WBC: CPT

## 2025-06-01 PROCEDURE — 94760 N-INVAS EAR/PLS OXIMETRY 1: CPT

## 2025-06-01 PROCEDURE — A9270 NON-COVERED ITEM OR SERVICE: HCPCS

## 2025-06-01 PROCEDURE — 700111 HCHG RX REV CODE 636 W/ 250 OVERRIDE (IP): Mod: JZ | Performed by: SURGERY

## 2025-06-01 PROCEDURE — 71045 X-RAY EXAM CHEST 1 VIEW: CPT

## 2025-06-01 PROCEDURE — 700102 HCHG RX REV CODE 250 W/ 637 OVERRIDE(OP)

## 2025-06-01 PROCEDURE — 36415 COLL VENOUS BLD VENIPUNCTURE: CPT

## 2025-06-01 RX ORDER — GABAPENTIN 100 MG/1
200 CAPSULE ORAL 3 TIMES DAILY
Status: DISCONTINUED | OUTPATIENT
Start: 2025-06-01 | End: 2025-06-03 | Stop reason: HOSPADM

## 2025-06-01 RX ORDER — FUROSEMIDE 20 MG/1
20 TABLET ORAL
Status: DISCONTINUED | OUTPATIENT
Start: 2025-06-01 | End: 2025-06-03 | Stop reason: HOSPADM

## 2025-06-01 RX ORDER — INSULIN LISPRO 100 [IU]/ML
1-6 INJECTION, SOLUTION INTRAVENOUS; SUBCUTANEOUS
Status: DISCONTINUED | OUTPATIENT
Start: 2025-06-01 | End: 2025-06-03 | Stop reason: HOSPADM

## 2025-06-01 RX ORDER — DEXTROSE MONOHYDRATE 25 G/50ML
25 INJECTION, SOLUTION INTRAVENOUS
Status: DISCONTINUED | OUTPATIENT
Start: 2025-06-01 | End: 2025-06-03 | Stop reason: HOSPADM

## 2025-06-01 RX ADMIN — INSULIN LISPRO 1 UNITS: 100 INJECTION, SOLUTION INTRAVENOUS; SUBCUTANEOUS at 14:25

## 2025-06-01 RX ADMIN — LABETALOL HYDROCHLORIDE 10 MG: 5 INJECTION, SOLUTION INTRAVENOUS at 01:09

## 2025-06-01 RX ADMIN — DOCUSATE SODIUM 50 MG AND SENNOSIDES 8.6 MG 1 TABLET: 8.6; 5 TABLET, FILM COATED ORAL at 21:31

## 2025-06-01 RX ADMIN — OXYCODONE 2.5 MG: 5 TABLET ORAL at 01:08

## 2025-06-01 RX ADMIN — LEVOTHYROXINE SODIUM 50 MCG: 0.05 TABLET ORAL at 05:12

## 2025-06-01 RX ADMIN — OXYCODONE 2.5 MG: 5 TABLET ORAL at 21:32

## 2025-06-01 RX ADMIN — FUROSEMIDE 20 MG: 20 TABLET ORAL at 17:37

## 2025-06-01 RX ADMIN — METOPROLOL SUCCINATE 25 MG: 25 TABLET, EXTENDED RELEASE ORAL at 05:12

## 2025-06-01 RX ADMIN — SODIUM CHLORIDE 250 MG: 9 INJECTION, SOLUTION INTRAVENOUS at 06:05

## 2025-06-01 RX ADMIN — INSULIN LISPRO 1 UNITS: 100 INJECTION, SOLUTION INTRAVENOUS; SUBCUTANEOUS at 21:34

## 2025-06-01 RX ADMIN — ONDANSETRON 4 MG: 2 INJECTION INTRAMUSCULAR; INTRAVENOUS at 11:04

## 2025-06-01 RX ADMIN — Medication 100 MG: at 05:12

## 2025-06-01 RX ADMIN — AMIODARONE HYDROCHLORIDE 400 MG: 200 TABLET ORAL at 05:12

## 2025-06-01 RX ADMIN — OXYCODONE 2.5 MG: 5 TABLET ORAL at 17:39

## 2025-06-01 RX ADMIN — DOCUSATE SODIUM 100 MG: 50 LIQUID ORAL at 05:12

## 2025-06-01 RX ADMIN — LOSARTAN POTASSIUM 50 MG: 50 TABLET, FILM COATED ORAL at 17:37

## 2025-06-01 RX ADMIN — POLYETHYLENE GLYCOL 3350 1 PACKET: 17 POWDER, FOR SOLUTION ORAL at 17:32

## 2025-06-01 RX ADMIN — DOCUSATE SODIUM 100 MG: 50 LIQUID ORAL at 17:31

## 2025-06-01 RX ADMIN — GABAPENTIN 200 MG: 100 CAPSULE ORAL at 17:34

## 2025-06-01 RX ADMIN — OXYCODONE 2.5 MG: 5 TABLET ORAL at 11:02

## 2025-06-01 RX ADMIN — CYANOCOBALAMIN TAB 500 MCG 1000 MCG: 500 TAB at 05:12

## 2025-06-01 RX ADMIN — SENNOSIDES AND DOCUSATE SODIUM 1 TABLET: 50; 8.6 TABLET ORAL at 21:00

## 2025-06-01 ASSESSMENT — ENCOUNTER SYMPTOMS
FEVER: 0
VOMITING: 0
DIAPHORESIS: 0
TINGLING: 0
CHILLS: 0
PHOTOPHOBIA: 0
HEADACHES: 0
NAUSEA: 0
DOUBLE VISION: 0
MYALGIAS: 1
ABDOMINAL PAIN: 1
SENSORY CHANGE: 0
COUGH: 0
FOCAL WEAKNESS: 0
SHORTNESS OF BREATH: 0
BLURRED VISION: 0
DIZZINESS: 0
SPEECH CHANGE: 0
PALPITATIONS: 0

## 2025-06-01 ASSESSMENT — FIBROSIS 4 INDEX: FIB4 SCORE: 2.17

## 2025-06-01 ASSESSMENT — PAIN DESCRIPTION - PAIN TYPE
TYPE: ACUTE PAIN

## 2025-06-01 NOTE — PROGRESS NOTES
Monitor summary: SR 70-88, PA -0.13, QRS -0.05, QT -0.43, with (R) PVCs per strip from the monitor room.

## 2025-06-01 NOTE — PROGRESS NOTES
Called to bedside, patient wanting to leave AMA. Long discussion with patient regarding plan of care and new medical diagnosis. I pad  was used. Patient unable to illicit reasons for being in the hospital, he is unable to tell me why he is hospitalized, or what his injuries are. After explaining his medical diagnosis and plan of care, he was still not able to repeat it back. His plan for leaving the hospital is inconsistent, stating he wants to go home [facility] Additionally, the patient is inconsistent in his perception of his medical condition as evident by stating we are doing nothing for him except applying lotion then saying we are doing too many blood draws and imaging studies.He also keeps referring back to his atrial fibrillation when I am discussion his congestive heart failure.  At this time, the patient is not capacitated to make medical decisions. Informed charge RN and bedside RN.,
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
## 2025-06-01 NOTE — CARE PLAN
The patient is Stable - Low risk of patient condition declining or worsening    Shift Goals  Clinical Goals: Stable neuro checks  Patient Goals: (P) Brush teeth  Family Goals: VERO    Progress made toward(s) clinical / shift goals:    Problem: Pain - Standard  Goal: Alleviation of pain or a reduction in pain to the patient’s comfort goal  Outcome: Progressing     Problem: Risk for Aspiration  Goal: Patient's risk for aspiration will be absent or decrease  Outcome: Progressing     Problem: Fall Risk  Goal: Patient will remain free from falls  Outcome: Progressing     Problem: Skin Integrity  Goal: Skin integrity is maintained or improved  Outcome: Progressing     Problem: Neuro Status  Goal: Neuro status will remain stable or improve  Outcome: Progressing     Problem: Urinary Elimination  Goal: Establish and maintain regular urinary output  Outcome: Progressing     Problem: Mobility  Goal: Patient's capacity to carry out activities will improve  Outcome: Progressing    Patient is not progressing towards the following goals:

## 2025-06-01 NOTE — CARE PLAN
Problem: Hyperinflation  Goal: Prevent or improve atelectasis  Description: Target End Date:  3 to 4 days1. Instruct incentive spirometry usage2.  Perform hyperinflation therapy as indicated  Outcome: Progressing     Patient doing well with PEP therapy    Problem: Ventilation  Goal: Ability to achieve and maintain unassisted ventilation or tolerate decreased levels of ventilator support  Description: Target End Date:  4 days Document on Vent flowsheet1.  Support and monitor invasive and noninvasive mechanical ventilation2.  Monitor ventilator weaning response3.  Perform ventilator associated pneumonia prevention interventions4.  Manage ventilation therapy by monitoring diagnostic test results  Outcome: Met     Patient off the ventilator
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
## 2025-06-02 ENCOUNTER — APPOINTMENT (OUTPATIENT)
Dept: RADIOLOGY | Facility: MEDICAL CENTER | Age: 75
DRG: 023 | End: 2025-06-02
Attending: REGISTERED NURSE
Payer: OTHER MISCELLANEOUS

## 2025-06-02 LAB
ANION GAP SERPL CALC-SCNC: 8 MMOL/L (ref 7–16)
BUN SERPL-MCNC: 11 MG/DL (ref 8–22)
CALCIUM SERPL-MCNC: 8.1 MG/DL (ref 8.5–10.5)
CHLORIDE SERPL-SCNC: 96 MMOL/L (ref 96–112)
CO2 SERPL-SCNC: 28 MMOL/L (ref 20–33)
CREAT SERPL-MCNC: 0.85 MG/DL (ref 0.5–1.4)
ERYTHROCYTE [DISTWIDTH] IN BLOOD BY AUTOMATED COUNT: 57.2 FL (ref 35.9–50)
GFR SERPLBLD CREATININE-BSD FMLA CKD-EPI: 90 ML/MIN/1.73 M 2
GLUCOSE BLD STRIP.AUTO-MCNC: 102 MG/DL (ref 65–99)
GLUCOSE BLD STRIP.AUTO-MCNC: 128 MG/DL (ref 65–99)
GLUCOSE BLD STRIP.AUTO-MCNC: 129 MG/DL (ref 65–99)
GLUCOSE BLD STRIP.AUTO-MCNC: 210 MG/DL (ref 65–99)
GLUCOSE SERPL-MCNC: 102 MG/DL (ref 65–99)
HCT VFR BLD AUTO: 26.3 % (ref 42–52)
HGB BLD-MCNC: 8.2 G/DL (ref 14–18)
MAGNESIUM SERPL-MCNC: 2.1 MG/DL (ref 1.5–2.5)
MCH RBC QN AUTO: 29.3 PG (ref 27–33)
MCHC RBC AUTO-ENTMCNC: 31.2 G/DL (ref 32.3–36.5)
MCV RBC AUTO: 93.9 FL (ref 81.4–97.8)
NT-PROBNP SERPL IA-MCNC: 1166 PG/ML (ref 0–125)
PHOSPHATE SERPL-MCNC: 2.2 MG/DL (ref 2.5–4.5)
PLATELET # BLD AUTO: 213 K/UL (ref 164–446)
PMV BLD AUTO: 9.8 FL (ref 9–12.9)
POTASSIUM SERPL-SCNC: 3.5 MMOL/L (ref 3.6–5.5)
RBC # BLD AUTO: 2.8 M/UL (ref 4.7–6.1)
SODIUM SERPL-SCNC: 132 MMOL/L (ref 135–145)
WBC # BLD AUTO: 10 K/UL (ref 4.8–10.8)

## 2025-06-02 PROCEDURE — 700102 HCHG RX REV CODE 250 W/ 637 OVERRIDE(OP)

## 2025-06-02 PROCEDURE — A9270 NON-COVERED ITEM OR SERVICE: HCPCS | Performed by: REGISTERED NURSE

## 2025-06-02 PROCEDURE — A9270 NON-COVERED ITEM OR SERVICE: HCPCS | Performed by: SURGERY

## 2025-06-02 PROCEDURE — 82962 GLUCOSE BLOOD TEST: CPT | Mod: 91

## 2025-06-02 PROCEDURE — A9270 NON-COVERED ITEM OR SERVICE: HCPCS

## 2025-06-02 PROCEDURE — 99233 SBSQ HOSP IP/OBS HIGH 50: CPT | Performed by: REGISTERED NURSE

## 2025-06-02 PROCEDURE — A9270 NON-COVERED ITEM OR SERVICE: HCPCS | Performed by: NURSE PRACTITIONER

## 2025-06-02 PROCEDURE — 84100 ASSAY OF PHOSPHORUS: CPT

## 2025-06-02 PROCEDURE — 770006 HCHG ROOM/CARE - MED/SURG/GYN SEMI*

## 2025-06-02 PROCEDURE — 700102 HCHG RX REV CODE 250 W/ 637 OVERRIDE(OP): Performed by: NURSE PRACTITIONER

## 2025-06-02 PROCEDURE — 700102 HCHG RX REV CODE 250 W/ 637 OVERRIDE(OP): Performed by: SURGERY

## 2025-06-02 PROCEDURE — 36415 COLL VENOUS BLD VENIPUNCTURE: CPT

## 2025-06-02 PROCEDURE — 97116 GAIT TRAINING THERAPY: CPT

## 2025-06-02 PROCEDURE — 85027 COMPLETE CBC AUTOMATED: CPT

## 2025-06-02 PROCEDURE — 83880 ASSAY OF NATRIURETIC PEPTIDE: CPT

## 2025-06-02 PROCEDURE — 99255 IP/OBS CONSLTJ NEW/EST HI 80: CPT | Performed by: HOSPITALIST

## 2025-06-02 PROCEDURE — 71045 X-RAY EXAM CHEST 1 VIEW: CPT

## 2025-06-02 PROCEDURE — 700101 HCHG RX REV CODE 250: Performed by: REGISTERED NURSE

## 2025-06-02 PROCEDURE — 700105 HCHG RX REV CODE 258: Performed by: REGISTERED NURSE

## 2025-06-02 PROCEDURE — 83735 ASSAY OF MAGNESIUM: CPT

## 2025-06-02 PROCEDURE — 97530 THERAPEUTIC ACTIVITIES: CPT

## 2025-06-02 PROCEDURE — 700102 HCHG RX REV CODE 250 W/ 637 OVERRIDE(OP): Performed by: REGISTERED NURSE

## 2025-06-02 PROCEDURE — 80048 BASIC METABOLIC PNL TOTAL CA: CPT

## 2025-06-02 RX ORDER — DEXAMETHASONE 4 MG/1
4 TABLET ORAL EVERY 12 HOURS
Status: DISCONTINUED | OUTPATIENT
Start: 2025-06-02 | End: 2025-06-02

## 2025-06-02 RX ORDER — FAMOTIDINE 20 MG/1
20 TABLET, FILM COATED ORAL 2 TIMES DAILY
Status: DISCONTINUED | OUTPATIENT
Start: 2025-06-02 | End: 2025-06-03 | Stop reason: HOSPADM

## 2025-06-02 RX ORDER — DEXAMETHASONE 4 MG/1
4 TABLET ORAL 3 TIMES DAILY
Status: DISCONTINUED | OUTPATIENT
Start: 2025-06-02 | End: 2025-06-03 | Stop reason: HOSPADM

## 2025-06-02 RX ORDER — BISACODYL 10 MG
10 SUPPOSITORY, RECTAL RECTAL ONCE
Status: COMPLETED | OUTPATIENT
Start: 2025-06-02 | End: 2025-06-02

## 2025-06-02 RX ORDER — DEXAMETHASONE 4 MG/1
2 TABLET ORAL EVERY 12 HOURS
Status: DISCONTINUED | OUTPATIENT
Start: 2025-06-08 | End: 2025-06-02

## 2025-06-02 RX ORDER — POTASSIUM CHLORIDE 1500 MG/1
10 TABLET, EXTENDED RELEASE ORAL DAILY
Status: DISCONTINUED | OUTPATIENT
Start: 2025-06-03 | End: 2025-06-02

## 2025-06-02 RX ORDER — POTASSIUM CHLORIDE 1500 MG/1
10 TABLET, EXTENDED RELEASE ORAL DAILY
Status: DISCONTINUED | OUTPATIENT
Start: 2025-06-03 | End: 2025-06-03 | Stop reason: HOSPADM

## 2025-06-02 RX ORDER — DEXAMETHASONE 4 MG/1
2 TABLET ORAL DAILY
Status: DISCONTINUED | OUTPATIENT
Start: 2025-06-12 | End: 2025-06-02

## 2025-06-02 RX ORDER — DEXAMETHASONE 4 MG/1
2 TABLET ORAL EVERY 8 HOURS
Status: DISCONTINUED | OUTPATIENT
Start: 2025-06-05 | End: 2025-06-02

## 2025-06-02 RX ADMIN — POTASSIUM PHOSPHATE, MONOBASIC POTASSIUM PHOSPHATE, DIBASIC INJECTION, 30 MMOL: 236; 224 SOLUTION, CONCENTRATE INTRAVENOUS at 10:54

## 2025-06-02 RX ADMIN — LABETALOL HYDROCHLORIDE 10 MG: 5 INJECTION, SOLUTION INTRAVENOUS at 08:24

## 2025-06-02 RX ADMIN — GABAPENTIN 200 MG: 100 CAPSULE ORAL at 04:10

## 2025-06-02 RX ADMIN — CYANOCOBALAMIN TAB 500 MCG 1000 MCG: 500 TAB at 04:09

## 2025-06-02 RX ADMIN — DOCUSATE SODIUM 100 MG: 50 LIQUID ORAL at 17:18

## 2025-06-02 RX ADMIN — METOPROLOL SUCCINATE 25 MG: 25 TABLET, EXTENDED RELEASE ORAL at 04:10

## 2025-06-02 RX ADMIN — LOSARTAN POTASSIUM 50 MG: 50 TABLET, FILM COATED ORAL at 17:18

## 2025-06-02 RX ADMIN — ACETAMINOPHEN 650 MG: 325 TABLET ORAL at 12:32

## 2025-06-02 RX ADMIN — POLYETHYLENE GLYCOL 3350 1 PACKET: 17 POWDER, FOR SOLUTION ORAL at 04:10

## 2025-06-02 RX ADMIN — FUROSEMIDE 20 MG: 20 TABLET ORAL at 04:09

## 2025-06-02 RX ADMIN — GABAPENTIN 200 MG: 100 CAPSULE ORAL at 12:32

## 2025-06-02 RX ADMIN — Medication 100 MG: at 04:09

## 2025-06-02 RX ADMIN — FAMOTIDINE 20 MG: 20 TABLET, FILM COATED ORAL at 17:18

## 2025-06-02 RX ADMIN — INSULIN LISPRO 2 UNITS: 100 INJECTION, SOLUTION INTRAVENOUS; SUBCUTANEOUS at 20:14

## 2025-06-02 RX ADMIN — AMIODARONE HYDROCHLORIDE 400 MG: 200 TABLET ORAL at 04:10

## 2025-06-02 RX ADMIN — LEVOTHYROXINE SODIUM 50 MCG: 0.05 TABLET ORAL at 04:10

## 2025-06-02 RX ADMIN — MAGNESIUM HYDROXIDE 30 ML: 400 SUSPENSION ORAL at 17:18

## 2025-06-02 RX ADMIN — DEXAMETHASONE 4 MG: 4 TABLET ORAL at 15:38

## 2025-06-02 RX ADMIN — DEXAMETHASONE 4 MG: 4 TABLET ORAL at 17:18

## 2025-06-02 RX ADMIN — DOCUSATE SODIUM 100 MG: 50 LIQUID ORAL at 04:09

## 2025-06-02 RX ADMIN — BISACODYL 10 MG: 10 SUPPOSITORY RECTAL at 17:18

## 2025-06-02 RX ADMIN — POLYETHYLENE GLYCOL 3350 1 PACKET: 17 POWDER, FOR SOLUTION ORAL at 17:18

## 2025-06-02 RX ADMIN — GABAPENTIN 200 MG: 100 CAPSULE ORAL at 17:18

## 2025-06-02 ASSESSMENT — ENCOUNTER SYMPTOMS
SPEECH CHANGE: 0
ABDOMINAL PAIN: 0
PHOTOPHOBIA: 0
TINGLING: 0
FOCAL WEAKNESS: 0
PALPITATIONS: 0
FEVER: 0
BACK PAIN: 0
HEADACHES: 1
LOSS OF CONSCIOUSNESS: 0
HEADACHES: 0
SENSORY CHANGE: 0
ABDOMINAL PAIN: 1
SHORTNESS OF BREATH: 0
DIAPHORESIS: 0
MYALGIAS: 1
NAUSEA: 0
DIARRHEA: 0
CHILLS: 0
BLURRED VISION: 0
DOUBLE VISION: 0
DIZZINESS: 0
VOMITING: 0
COUGH: 0

## 2025-06-02 ASSESSMENT — GAIT ASSESSMENTS
DISTANCE (FEET): 30
GAIT LEVEL OF ASSIST: MINIMAL ASSIST
DEVIATION: BRADYKINETIC;SHUFFLED GAIT;DECREASED BASE OF SUPPORT
ASSISTIVE DEVICE: FRONT WHEEL WALKER

## 2025-06-02 ASSESSMENT — PAIN DESCRIPTION - PAIN TYPE
TYPE: ACUTE PAIN
TYPE: ACUTE PAIN

## 2025-06-02 ASSESSMENT — COGNITIVE AND FUNCTIONAL STATUS - GENERAL
WALKING IN HOSPITAL ROOM: A LITTLE
MOBILITY SCORE: 19
SUGGESTED CMS G CODE MODIFIER MOBILITY: CK
MOVING TO AND FROM BED TO CHAIR: A LITTLE
STANDING UP FROM CHAIR USING ARMS: A LITTLE
CLIMB 3 TO 5 STEPS WITH RAILING: A LOT

## 2025-06-02 ASSESSMENT — FIBROSIS 4 INDEX: FIB4 SCORE: 2.77

## 2025-06-02 NOTE — PROGRESS NOTES
Monitor Summary: SR 76-87, MI 0.15, QRS 0.08, QT 0.38, with rare PACs per strip from monitor room.
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
## 2025-06-02 NOTE — CARE PLAN
The patient is Stable - Low risk of patient condition declining or worsening    Shift Goals  Clinical Goals: Pain Management  Patient Goals: to go home  Family Goals: VERO    Progress made toward(s) clinical / shift goals:      Participated with therapies. Is currently sitting up in recliner.     Problem: Pain - Standard  Goal: Alleviation of pain or a reduction in pain to the patient’s comfort goal  Outcome: Progressing  Note: Patient reports no pain this AM. Able to participate with therapies.        Problem: Neuro Status  Goal: Neuro status will remain stable or improve  Outcome: Progressing  Note: Patient remains A&OX4. Stable neuro exams.

## 2025-06-02 NOTE — THERAPY
Physical Therapy   Daily Treatment     Patient Name: Jaime Rangel  Age:  75 y.o., Sex:  male  Medical Record #: [id number]  Today's Date: 6/2/2025          Assessment    Rec'd pt alert, in bed, requesting to use the BR.  He is able to move to the eob w/o assist.  Min assist to stand and to ambulate w/ the fww.  He is unable to remain centered behind the fww,despite multi modal cues.  He leaves the walker several feet prior to reaching his destination and attempts to walk w/o it.      Plan    Treatment Plan Status: Continue Current Treatment Plan  Type of Treatment: Bed Mobility, Equipment, Gait Training, Neuro Re-Education / Balance, Self Care / Home Evaluation, Stair Training, Therapeutic Exercise, Therapeutic Activities, Family / Caregiver Training  Treatment Frequency: 4 Times per Week  Treatment Duration: Until Therapy Goals Met    DC Equipment Recommendations: Unable to determine at this time  Discharge Recommendations: Recommend post-acute placement for additional physical therapy services prior to discharge home         Objective       06/02/25 1034   Balance   Sitting Balance (Static) Fair   Sitting Balance (Dynamic) Fair   Standing Balance (Static) Fair -   Standing Balance (Dynamic) Fair -   Weight Shift Sitting Fair   Weight Shift Standing Fair   Skilled Intervention Verbal Cuing;Tactile Cuing;Facilitation   Comments w/ fww   Bed Mobility    Supine to Sit Standby Assist   Skilled Intervention Verbal Cuing   Gait Analysis   Gait Level Of Assist Minimal Assist   Assistive Device Front Wheel Walker   Distance (Feet) 30   Deviation Bradykinetic;Shuffled Gait;Decreased Base Of Support  (unable to remain centered behind the fww)   Skilled Intervention Verbal Cuing;Tactile Cuing;Facilitation   Functional Mobility   Sit to Stand Minimal Assist   Toilet Transfers Minimal Assist   Skilled Intervention Verbal Cuing;Tactile Cuing;Facilitation   Short Term Goals    Short Term Goal # 1 in 6 visits patient will demo  all bed mobility indep from flat surface for safe DC   Goal Outcome # 1 goal not met   Short Term Goal # 2 in 6 visits patient will demo all functional transfers with Sup and LRAD for safe DC   Goal Outcome # 2 Goal not met   Short Term Goal # 3 in 6 visits patient will ambualte 200' with Sup and LRAD for safe DC   Goal Outcome # 3 Goal not met   Short Term Goal # 4 in 6 visits patient will navigate 4 stairs with Sup and LRAD for safe DC   Goal Outcome # 4 Goal not met   Physical Therapy Treatment Plan   Physical Therapy Treatment Plan Continue Current Treatment Plan   Anticipated Discharge Equipment and Recommendations   DC Equipment Recommendations Unable to determine at this time   Discharge Recommendations Recommend post-acute placement for additional physical therapy services prior to discharge home

## 2025-06-02 NOTE — DISCHARGE PLANNING
Case Management Discharge Planning    Admission Date: 5/23/2025  GMLOS: 7.3  ALOS: 10    6-Clicks ADL Score: 16  6-Clicks Mobility Score: 15  PT and/or OT Eval ordered: Yes  Post-acute Referrals Ordered: Yes  Post-acute Choice Obtained: No  Has referral(s) been sent to post-acute provider:  Yes      Anticipated Discharge Dispo: Discharge Disposition: Discharged to home/self care (01)  Discharge Address: Home ([address] [place] [address]   [facility] [address])  Discharge Contact Phone Number: Sister [email or web address] [phone]    DME Needed: No    Action(s) Taken: Updated Provider/Nurse on Discharge Plan  RN CM met with Pt at bedside, Pt prefers to go home once medically cleared. Pt owns FWW. Per Pt, his sister will provide transport home. Pt is anticipated to be medically cleared tomorrow.     Escalations Completed: Provider    Medically Clear: No    Next Steps: RN CM to continue to assist in Pt's discharge needs.     Barriers to Discharge: Medical clearance    Is the patient up for discharge tomorrow: Possible
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
## 2025-06-02 NOTE — CARE PLAN
The patient is Stable - Low risk of patient condition declining or worsening    Shift Goals  Clinical Goals: Stable Neuro, Mobility, SBP <160  Patient Goals: Eat Dinner, Pain Control, Ice pack  Family Goals: VERO    Progress made toward(s) clinical / shift goals:    Problem: Pain - Standard  Goal: Alleviation of pain or a reduction in pain to the patient’s comfort goal  Outcome: Progressing  Frequent pain assessments performed throughout shift using the 0-10 Numerical Pain Scale. Patient medicated per MAR using available PRN pain medications. Non-pharmacological interventions available and offered to patient. Patient states pain relief obtained with administration of PRN pain medications, and able to sleep comfortably.     Problem: Fall Risk  Goal: Patient will remain free from falls  Outcome: Progressing  Patient calls appropriately when requiring assistance from staff. Patient provided with appropriate assistive device when needing to ambulate to restroom. Treaded socks in use when ambulating or out of bed. Yellow fall risk armband in place. Frame alarm in place.      Problem: Skin Integrity  Goal: Skin integrity is maintained or improved  Outcome: Progressing  Patient is able to reposition self independently in bed with standby assistance from staff. Barrier cream and mepilexes used to prevent skin breakdown on delicate perineal areas and bony prominences. Linen changes provided as needed to avoid risk of developing pressure ulcers. Low air loss mattress in use. Oral care provided.     Problem: Neuro Status  Goal: Neuro status will remain stable or improve  Outcome: Progressing  Q4 hour neuro checks in place. Patient's neurological status (A/O x 4) remains stable and unchanged throughout shift. Patient denies experiencing any numbness or tingling, and is able to move all extremities.  Bed locked and in lowest position. Call light and personal belongings within reach.      Problem: Hemodynamics  Goal: Patient's  hemodynamics, fluid balance and neurologic status will be stable or improve  Outcome: Progressing  Goal of SBP<160 in place. Patient's blood pressure remains within established parameters throughout shift. PRN blood pressure medications available per MAR for SBP>160.      Patient is not progressing towards the following goals:

## 2025-06-02 NOTE — ASSESSMENT & PLAN NOTE
Patient has traumatic subdural hematoma.  Neurosurgery has requested no anticoagulation until CT imaging demonstrates resolution of the hematoma.

## 2025-06-02 NOTE — CARE PLAN
The patient is Stable - Low risk of patient condition declining or worsening    Shift Goals  Clinical Goals: neuro exam  Patient Goals: updates  Family Goals: kahlil    Progress made toward(s) clinical / shift goals:    Problem: Pain - Standard  Goal: Alleviation of pain or a reduction in pain to the patient’s comfort goal  Description: Target End Date:  Prior to discharge or change in level of careDocument on Vitals flowsheet1.  Document pain using the appropriate pain scale per order or unit policy2.  Educate and implement non-pharmacologic comfort measures (i.e. relaxation, distraction, massage, cold/heat therapy, etc.)3.  Pain management medications as ordered4.  Reassess pain after pain med administration per policy5.  If opiods administered assess patient's response to pain medication is appropriate per POSS sedation scale6.  Follow pain management plan developed in collaboration with patient and interdisciplinary team (including palliative care or pain specialists if applicable)  Outcome: Progressing     Problem: Neuro Status  Goal: Neuro status will remain stable or improve  Description: Target End Date:  Prior to discharge or change in level of careDocument on Neuro assessment in the Assessment flowsheet1.  Assess and monitor neurologic status per provider order/protocol/unit policy2.  Assess level of consciousness and orientation3.  Assess for speech, dysarthria, dysphagia, facial symmetry4.  Assess visual field, eye movements, gaze preference, pupil reaction and size5.  Assess muscle strength and motor response in all four extremities6.  Assess for sensation (numbness and tingling)7.  Assess basic neuro reflexes (cough, gag, corneal)8.  Identify changes in neuro status and report to provider for testing/treatment orders  Outcome: Progressing       Patient is not progressing towards the following goals:

## 2025-06-02 NOTE — THERAPY
Occupational Therapy Contact Note    Patient Name: Jaime Rangel  Age:  75 y.o., Sex:  male  Medical Record #: [id number]  Today's Date: 6/2/2025 06/02/25 1433   Interdisciplinary Plan of Care Collaboration   IDT Collaboration with  Nursing   Collaboration Comments Attempted to see patient for OT tx session, pt politely refused stating wanting to rest and does not feel like getting up at this time. will re-attempt as able/appropriate.
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
## 2025-06-03 ENCOUNTER — PHARMACY VISIT (OUTPATIENT)
Dept: PHARMACY | Facility: MEDICAL CENTER | Age: 75
End: 2025-06-03
Payer: COMMERCIAL

## 2025-06-03 ENCOUNTER — APPOINTMENT (OUTPATIENT)
Dept: RADIOLOGY | Facility: MEDICAL CENTER | Age: 75
DRG: 023 | End: 2025-06-03
Attending: REGISTERED NURSE
Payer: OTHER MISCELLANEOUS

## 2025-06-03 ENCOUNTER — PATIENT OUTREACH (OUTPATIENT)
Dept: SCHEDULING | Facility: IMAGING CENTER | Age: 75
End: 2025-06-03

## 2025-06-03 VITALS
HEIGHT: 65 IN | HEART RATE: 77 BPM | DIASTOLIC BLOOD PRESSURE: 67 MMHG | OXYGEN SATURATION: 94 % | SYSTOLIC BLOOD PRESSURE: 138 MMHG | WEIGHT: 171.96 LBS | TEMPERATURE: 97.5 F | RESPIRATION RATE: 18 BRPM | BODY MASS INDEX: 28.65 KG/M2

## 2025-06-03 LAB
GLUCOSE BLD STRIP.AUTO-MCNC: 144 MG/DL (ref 65–99)
GLUCOSE BLD STRIP.AUTO-MCNC: 152 MG/DL (ref 65–99)
GLUCOSE BLD STRIP.AUTO-MCNC: 175 MG/DL (ref 65–99)

## 2025-06-03 PROCEDURE — 700102 HCHG RX REV CODE 250 W/ 637 OVERRIDE(OP): Performed by: SURGERY

## 2025-06-03 PROCEDURE — 71045 X-RAY EXAM CHEST 1 VIEW: CPT

## 2025-06-03 PROCEDURE — A9270 NON-COVERED ITEM OR SERVICE: HCPCS | Performed by: NURSE PRACTITIONER

## 2025-06-03 PROCEDURE — 97530 THERAPEUTIC ACTIVITIES: CPT

## 2025-06-03 PROCEDURE — 700102 HCHG RX REV CODE 250 W/ 637 OVERRIDE(OP): Performed by: REGISTERED NURSE

## 2025-06-03 PROCEDURE — A9270 NON-COVERED ITEM OR SERVICE: HCPCS | Performed by: REGISTERED NURSE

## 2025-06-03 PROCEDURE — 97535 SELF CARE MNGMENT TRAINING: CPT

## 2025-06-03 PROCEDURE — 700102 HCHG RX REV CODE 250 W/ 637 OVERRIDE(OP)

## 2025-06-03 PROCEDURE — A9270 NON-COVERED ITEM OR SERVICE: HCPCS

## 2025-06-03 PROCEDURE — A9270 NON-COVERED ITEM OR SERVICE: HCPCS | Performed by: SURGERY

## 2025-06-03 PROCEDURE — 700102 HCHG RX REV CODE 250 W/ 637 OVERRIDE(OP): Performed by: NURSE PRACTITIONER

## 2025-06-03 PROCEDURE — 99239 HOSP IP/OBS DSCHRG MGMT >30: CPT | Performed by: STUDENT IN AN ORGANIZED HEALTH CARE EDUCATION/TRAINING PROGRAM

## 2025-06-03 PROCEDURE — RXMED WILLOW AMBULATORY MEDICATION CHARGE: Performed by: STUDENT IN AN ORGANIZED HEALTH CARE EDUCATION/TRAINING PROGRAM

## 2025-06-03 PROCEDURE — 82962 GLUCOSE BLOOD TEST: CPT | Mod: 91

## 2025-06-03 RX ORDER — LEVOTHYROXINE SODIUM 50 UG/1
50 TABLET ORAL
Qty: 30 TABLET | Refills: 0 | Status: SHIPPED | OUTPATIENT
Start: 2025-06-04

## 2025-06-03 RX ORDER — OXYCODONE HYDROCHLORIDE 5 MG/1
2.5 TABLET ORAL EVERY 6 HOURS PRN
Qty: 6 TABLET | Refills: 0 | Status: ON HOLD | OUTPATIENT
Start: 2025-06-03 | End: 2025-06-06

## 2025-06-03 RX ORDER — POTASSIUM CHLORIDE 750 MG/1
10 TABLET, EXTENDED RELEASE ORAL DAILY
Qty: 30 EACH | Refills: 0 | Status: ON HOLD | OUTPATIENT
Start: 2025-06-04 | End: 2025-06-08

## 2025-06-03 RX ORDER — METOPROLOL SUCCINATE 25 MG/1
25 TABLET, EXTENDED RELEASE ORAL DAILY
Qty: 30 TABLET | Refills: 0 | Status: ON HOLD | OUTPATIENT
Start: 2025-06-04 | End: 2025-06-08

## 2025-06-03 RX ORDER — LOSARTAN POTASSIUM 50 MG/1
50 TABLET ORAL EVERY EVENING
Qty: 100 TABLET | Refills: 3 | Status: ON HOLD | OUTPATIENT
Start: 2025-06-03 | End: 2025-06-06

## 2025-06-03 RX ORDER — FUROSEMIDE 20 MG/1
20 TABLET ORAL DAILY
Qty: 30 TABLET | Refills: 0 | Status: ON HOLD | OUTPATIENT
Start: 2025-06-04 | End: 2025-06-08

## 2025-06-03 RX ORDER — FAMOTIDINE 20 MG/1
20 TABLET, FILM COATED ORAL 2 TIMES DAILY
Qty: 60 TABLET | Refills: 0 | Status: SHIPPED | OUTPATIENT
Start: 2025-06-03

## 2025-06-03 RX ORDER — AMIODARONE HYDROCHLORIDE 200 MG/1
TABLET ORAL
Qty: 50 TABLET | Refills: 0 | Status: SHIPPED | OUTPATIENT
Start: 2025-06-04 | End: 2025-07-14

## 2025-06-03 RX ORDER — GABAPENTIN 100 MG/1
200 CAPSULE ORAL 3 TIMES DAILY
Qty: 90 CAPSULE | Refills: 0 | Status: SHIPPED | OUTPATIENT
Start: 2025-06-03

## 2025-06-03 RX ORDER — DEXAMETHASONE 4 MG/1
4 TABLET ORAL 2 TIMES DAILY
Qty: 28 TABLET | Refills: 0 | Status: SHIPPED | OUTPATIENT
Start: 2025-06-03 | End: 2025-06-17

## 2025-06-03 RX ADMIN — CYANOCOBALAMIN TAB 500 MCG 1000 MCG: 500 TAB at 04:46

## 2025-06-03 RX ADMIN — METOPROLOL SUCCINATE 25 MG: 25 TABLET, EXTENDED RELEASE ORAL at 04:45

## 2025-06-03 RX ADMIN — GABAPENTIN 200 MG: 100 CAPSULE ORAL at 13:39

## 2025-06-03 RX ADMIN — LEVOTHYROXINE SODIUM 50 MCG: 0.05 TABLET ORAL at 04:46

## 2025-06-03 RX ADMIN — DEXAMETHASONE 4 MG: 4 TABLET ORAL at 04:45

## 2025-06-03 RX ADMIN — GABAPENTIN 200 MG: 100 CAPSULE ORAL at 04:46

## 2025-06-03 RX ADMIN — FAMOTIDINE 20 MG: 20 TABLET, FILM COATED ORAL at 17:11

## 2025-06-03 RX ADMIN — POTASSIUM CHLORIDE 10 MEQ: 1500 TABLET, EXTENDED RELEASE ORAL at 05:10

## 2025-06-03 RX ADMIN — LOSARTAN POTASSIUM 50 MG: 50 TABLET, FILM COATED ORAL at 17:10

## 2025-06-03 RX ADMIN — Medication 100 MG: at 04:45

## 2025-06-03 RX ADMIN — DEXAMETHASONE 4 MG: 4 TABLET ORAL at 17:12

## 2025-06-03 RX ADMIN — DEXAMETHASONE 4 MG: 4 TABLET ORAL at 13:39

## 2025-06-03 RX ADMIN — FAMOTIDINE 20 MG: 20 TABLET, FILM COATED ORAL at 04:45

## 2025-06-03 RX ADMIN — INSULIN LISPRO 1 UNITS: 100 INJECTION, SOLUTION INTRAVENOUS; SUBCUTANEOUS at 13:44

## 2025-06-03 RX ADMIN — FUROSEMIDE 20 MG: 20 TABLET ORAL at 04:45

## 2025-06-03 RX ADMIN — ACETAMINOPHEN 650 MG: 325 TABLET ORAL at 05:10

## 2025-06-03 RX ADMIN — INSULIN LISPRO 1 UNITS: 100 INJECTION, SOLUTION INTRAVENOUS; SUBCUTANEOUS at 17:19

## 2025-06-03 RX ADMIN — GABAPENTIN 200 MG: 100 CAPSULE ORAL at 17:11

## 2025-06-03 RX ADMIN — AMIODARONE HYDROCHLORIDE 400 MG: 200 TABLET ORAL at 04:44

## 2025-06-03 ASSESSMENT — GAIT ASSESSMENTS: DISTANCE (FEET): 15

## 2025-06-03 ASSESSMENT — COGNITIVE AND FUNCTIONAL STATUS - GENERAL
TOILETING: A LITTLE
DAILY ACTIVITIY SCORE: 21
HELP NEEDED FOR BATHING: A LITTLE
SUGGESTED CMS G CODE MODIFIER DAILY ACTIVITY: CJ
DRESSING REGULAR LOWER BODY CLOTHING: A LITTLE

## 2025-06-03 ASSESSMENT — PAIN DESCRIPTION - PAIN TYPE
TYPE: ACUTE PAIN

## 2025-06-03 ASSESSMENT — FIBROSIS 4 INDEX: FIB4 SCORE: 2.77

## 2025-06-03 NOTE — PROGRESS NOTES
Neurosurgery Progress Note    Subjective:  No events overnight  Denies HA      Exam:  Awake conversant  AAOx4  TOngue ML, no drift, fcx4      BP  Min: 131/64  Max: 150/73  Pulse  Av.3  Min: 65  Max: 83  Resp  Av  Min: 18  Max: 18  Temp  Av.6 °C (97.8 °F)  Min: 36.4 °C (97.5 °F)  Max: 36.7 °C (98.1 °F)  SpO2  Av.9 %  Min: 9 %  Max: 95 %    No data recorded    Recent Labs     25  0032 25  0031   WBC 9.8 10.0   RBC 3.14* 2.80*   HEMOGLOBIN 9.4* 8.2*   HEMATOCRIT 27.9* 26.3*   MCV 88.9 93.9   MCH 29.9 29.3   MCHC 33.7 31.2*   RDW 52.9* 57.2*   PLATELETCT 271 213   MPV 9.5 9.8     Recent Labs     252 25  0031   SODIUM 129* 132*   POTASSIUM 4.1 3.5*   CHLORIDE 94* 96   CO2 22 28   GLUCOSE 120* 102*   BUN 14 11   CREATININE 0.74 0.85   CALCIUM 7.9* 8.1*                   Intake/Output                         25 - 2559 25 - 25 0659     5221-4628 0369-0896 Total  8905-0010 Total                 Intake    P.O.  --  598 598  --  -- --    P.O. -- 598 598 -- -- --    Total Intake -- 598 598 -- -- --       Output    Urine  925  600 1525  400  -- 400    Number of Times Voided 2 x 3 x 5 x 1 x -- 1 x    Urine Void (mL)  400 -- 400    Stool  --  -- --  --  -- --    Number of Times Stooled -- 2 x 2 x -- -- --    Total Output  400 -- 400       Net I/O     -925 -2 -7 -400 -- -400              Intake/Output Summary (Last 24 hours) at 6/3/2025 1319  Last data filed at 6/3/2025 0820  Gross per 24 hour   Intake 598 ml   Output 1000 ml   Net -402 ml             potassium chloride SA  10 mEq DAILY    famotidine  20 mg BID    dexamethasone  4 mg TID    furosemide  20 mg Q DAY    gabapentin  200 mg TID    insulin lispro  1-6 Units 4X/DAY [facility]    And    dextrose bolus  25 g Q15 MIN PRN    metoprolol SR  25 mg Q DAY    levothyroxine  50 mcg AM ES    acetaminophen  650 mg Q6HRS PRN    docusate sodium  100 mg BID    magnesium  hydroxide  30 mL DAILY AT 1800    polyethylene glycol/lytes  1 Packet BID    senna-docusate  1 Tablet Nightly    thiamine  100 mg DAILY    ondansetron  4 mg Q4HRS PRN    Or    ondansetron  4 mg Q4HRS PRN    amiodarone  400 mg DAILY    [START ON 6/14/2025] amiodarone  200 mg DAILY    losartan  50 mg Q EVENING    cyanocobalamin  1,000 mcg DAILY    oxyCODONE immediate-release  2.5 mg Q4HRS PRN    Respiratory Therapy Consult   Continuous RT    senna-docusate  1 Tablet Q24HRS PRN    bisacodyl  10 mg Q24HRS PRN    sodium phosphate  1 Enema Once PRN       Assessment and Plan:  Hospital day #12 SDH  POD #na  Prophylactic anticoagulation: no         Start date/time: never   Brain Compression: Yes Traumatic    - Na: 132; rec eunatremic goals   -Holding DVT prophylaxis and mobilization instead- no anticoagulation until cleared by us  -MMA embolization 5/27   - continue Keppra 500 twice daily for seizure proph; per trauma team for further indication   - q 4 hours neurochecks  - CT 6/1 most recent - stable  Dex reordered 4 TID until dc, the 4 BID until follow up CT in two weeks. Willtaper further depending on results  Our office to arrange CT two weeks

## 2025-06-03 NOTE — DISCHARGE INSTRUCTIONS
Discharge Instructions per Bibi Singleton M.D.  For your heart failure and atrial flutter: please take amiodarone (starting 6/4) 400mg daily for 10 days followed by 200mg daily thereafter. Take Losartan and Toprol as prescribed. Please take Lasix and potassium supplements  Follow up with cardiology as outpatient  NO alcohol intake  Continue steroid decadron 4mg twice a day and follow with [facility] (llamar [phone] para abby zeb) and repeat head CT in 2 weeks.  You are noted to have hypothyroidism, please take levothyroxine 50mcg every morning with empty stomach  Please follow-up with PCP as outpatient.    Return to ER in the event of new or worsening symptoms. Please note importance of compliance and the patient has agreed to proceed with all medical recommendations and follow up plan indicated above. All medications come with benefits and risks. Risks may include permanent injury or death and these risks can be minimized with close reassessment and monitoring. Please make it to your scheduled follow ups with PCP, cardiology and neurosurgery     Llamar [phone] para erica abby zeb de cardiaco(cardiology) y primario (primary care doctor PCP).   Llamar [phone] para abby zeb por [facility].  Chekar flo presiones de ramiro en la casa.  No bebas alcohol.  No tomes anticoagulantes malina alieve, aspirin, ibuprofen, motrin.  Demetrio las medicamentes prescritas.  Si tienes sintomas neuvas, regresar a Emergency room.       Kenji Dow M.D.  Internal Medicine Clinical Cardiac Electrophysiology Interventional Cardiology Cardiovascular Disease (Cardiology) [phone]  [facility] [address] [place] [address]      Next Steps: Follow up in 2 week(s)    [facility]   [phone]  [address] [place] [address]     Next Steps: Go on 6/10/2025  Instructions: Please arrive at 8:00am for ongoing heart failure treatment. This is a walk-in clinic and you may go  anytime between 8:00am and 4:00pm Monday-Friday and closed for lunch from 1:00pm-2:00pm. Patients are seen on a first come, first served basis, wait times may vary. This clinic offers a sliding-fee scale. Thank you.

## 2025-06-03 NOTE — THERAPY
"Occupational Therapy  Daily Treatment     Patient Name:  Jaime Rangel  Age:  75 y.o., Sex:  male  Medical Record #:  [id number]  Today's Date:  6/3/2025    Medical: Fall Risk    Assessment    Pt seen for OT tx to include: transfer training, LB dressing, standing grooming. See grid below for details. Pt's BLE very edematous, negatively impacting LB dressing. Pt required assist with socks; reports family can assist if needed at home. Pt used FWW fairly well today, remained close to device with only occasional cues (improved from PT session yesterday). Educated pt on fall reduction strategies. Pt is progressing well towards OT goals and is appropriate to DC home with family support. Will continue to follow to maximize functional independence and safety.     Plan    Treatment Plan Status: Continue Current Treatment Plan  Type of Treatment: Self Care / Activities of Daily Living, Adaptive Equipment, Cognitive Skill Development, Neuro Re-Education / Balance, Therapeutic Exercises, Therapeutic Activity, Family / Caregiver Training  Treatment Frequency: 3 Times per Week  Treatment Duration: Until Therapy Goals Met    DC Equipment Recommendations: None  Discharge Recommendations: Recommend home health for continued occupational therapy services (if eligible; not a barrier to DC if ineligible)    Subjective    \"My family can help me.\"     Objective       06/03/25 1226   Time In/Time Out   Therapy Start Time 1202   Therapy End Time 1226   Total Therapy Time 24   Treatment Charges   Charges Yes   OT Self Care / ADL (Units) 1   OT Therapy Activity (Units) 1   Total Time Spent   OT Time Spent Yes   OT Therapy Activity (Minutes) 10   OT Evaluation (Minutes) 14   OT Total Time Spent (Calculated) 24    Services   Is patient using  services for this encounter? No   Precautions   Medical Fall Risk   Vitals   Patient BP Position Sitting   Blood Pressure  131/64   Pulse Oximetry 100 %   O2 (LPM) 0   O2 Delivery " Device None - Room Air   Pain   Pain Scales 0 to 10 Scale    Pain 0 - 10 Group   Therapist Pain Assessment Post Activity Pain Same as Prior to Activity;Nurse Notified  (no c/o pain this session)   Cognition    Cognition / Consciousness WDL   Comments no overt deficits noted this session   Other Treatments   Other Treatments Provided Extensive education on fall reduction strategies such as shower chair use with supv and urinal use at night   Balance   Sitting Balance (Static) Good   Sitting Balance (Dynamic) Fair +   Standing Balance (Static) Fair   Standing Balance (Dynamic) Fair   Weight Shift Sitting Good   Weight Shift Standing Fair   Skilled Intervention Verbal Cuing;Compensatory Strategies   Comments FWW for standing   Bed Mobility    Scooting Standby Assist  (seated)   Comments up to chair pre and post   Activities of Daily Living   Grooming Standby Assist;Standing  (oral care, hand hygiene at sink)   Lower Body Dressing Minimal Assist  (doff/don B socks, don hospital pants)   Toileting   (declined need; reports using urinal without assist)   Skilled Intervention Compensatory Strategies;Postural Facilitation;Verbal Cuing   How much help from another person does the patient currently need...   Putting on and taking off regular lower body clothing? 3   Bathing (including washing, rinsing, and drying)? 3   Toileting, which includes using a toilet, bedpan, or urinal? 3   Putting on and taking off regular upper body clothing? 4   Taking care of personal grooming such as brushing teeth? 4   Eating meals? 4   6 Clicks Daily Activity Score 21   Functional Mobility   Sit to Stand Standby Assist   Bed, Chair, Wheelchair Transfer Standby Assist   Transfer Method Stand Step  (FWW)   Mobility short gait and transfers with FWW   Distance (Feet) 15   # of Times Distance was Traveled 2   Skilled Intervention Compensatory Strategies;Verbal Cuing   Visual Perception   Comments declines deficits; reports uses readers at baseline    Activity Tolerance   Sitting in Chair up pre and post   Standing 10 min total   Comments functional tolerance   Patient / Family Goals   Patient / Family Goal #1 To go home   Short Term Goals   Short Term Goal # 1 Pt will be CGA for ADL transfers   Goal Outcome # 1 Goal met   Short Term Goal # 2 Pt will dress LB with CGA   Goal Outcome # 2 Progressing as expected   Short Term Goal # 3 Pt will stand at the sink to groom with CGA   Goal Outcome # 3 Goal met   Education Group   Education Provided Role of Occupational Therapist;Use of Call Light;Home Safety   Home Safety Patient Response Patient;Acceptance;Explanation;Verbal Demonstration  (use of shower chair and urinal at night)   Use of Call Light Patient Response Patient;Acceptance;Explanation;Demonstration;Verbal Demonstration;Action Demonstration   Interdisciplinary Plan of Care Collaboration   IDT Collaboration with  Nursing   Patient Position at End of Therapy Seated;Chair Alarm On;Call Light within Reach;Tray Table within Reach   Collaboration Comments OT results and recs   Session Information   Date / Session Number  6/3 #2 (2/3, 6/4)  (attempted 6/2)

## 2025-06-03 NOTE — CARE PLAN
The patient is Stable - Low risk of patient condition declining or worsening    Shift Goals  Clinical Goals: stable neuro checks, rest  Patient Goals: go home  Family Goals: VERO    Progress made toward(s) clinical / shift goals: pt remains aox4, stable neuro status, up to chair for meals, bed locked and low, bed/chair alarm in place, call light within reach.   Problem: Pain - Standard  Goal: Alleviation of pain or a reduction in pain to the patient’s comfort goal  Outcome: Progressing     Problem: Knowledge Deficit - Standard  Goal: Patient and family/care givers will demonstrate understanding of plan of care, disease process/condition, diagnostic tests and medications  Outcome: Progressing     Problem: Lifestyle Changes  Goal: Patient's ability to identify lifestyle changes and available resources to help reduce recurrence of condition will improve  Outcome: Progressing     Problem: Psychosocial  Goal: Patient's level of anxiety will decrease  Outcome: Progressing     Problem: Risk for Aspiration  Goal: Patient's risk for aspiration will be absent or decrease  Outcome: Progressing     Problem: Fall Risk  Goal: Patient will remain free from falls  Outcome: Progressing     Problem: Skin Integrity  Goal: Skin integrity is maintained or improved  Outcome: Progressing     Problem: Neuro Status  Goal: Neuro status will remain stable or improve  Outcome: Progressing     Problem: Mobility  Goal: Patient's capacity to carry out activities will improve  Outcome: Progressing     Problem: Hemodynamics  Goal: Patient's hemodynamics, fluid balance and neurologic status will be stable or improve  Outcome: Progressing       Patient is not progressing towards the following goals:
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
## 2025-06-05 ENCOUNTER — HOSPITAL ENCOUNTER (INPATIENT)
Facility: MEDICAL CENTER | Age: 75
LOS: 3 days | DRG: 193 | End: 2025-06-08
Attending: STUDENT IN AN ORGANIZED HEALTH CARE EDUCATION/TRAINING PROGRAM | Admitting: STUDENT IN AN ORGANIZED HEALTH CARE EDUCATION/TRAINING PROGRAM
Payer: MEDICARE

## 2025-06-05 ENCOUNTER — APPOINTMENT (OUTPATIENT)
Dept: RADIOLOGY | Facility: MEDICAL CENTER | Age: 75
DRG: 193 | End: 2025-06-05
Attending: STUDENT IN AN ORGANIZED HEALTH CARE EDUCATION/TRAINING PROGRAM
Payer: MEDICARE

## 2025-06-05 DIAGNOSIS — S80.11XA TRAUMATIC ECCHYMOSIS OF RIGHT LOWER LEG, INITIAL ENCOUNTER: ICD-10-CM

## 2025-06-05 DIAGNOSIS — S93.334A: ICD-10-CM

## 2025-06-05 DIAGNOSIS — K21.00 GASTROESOPHAGEAL REFLUX DISEASE WITH ESOPHAGITIS, UNSPECIFIED WHETHER HEMORRHAGE: ICD-10-CM

## 2025-06-05 DIAGNOSIS — J96.01 ACUTE RESPIRATORY FAILURE WITH HYPOXIA (HCC): ICD-10-CM

## 2025-06-05 DIAGNOSIS — I50.20 HEART FAILURE WITH REDUCED EJECTION FRACTION (HCC): ICD-10-CM

## 2025-06-05 DIAGNOSIS — J18.9 MULTIFOCAL PNEUMONIA: Primary | ICD-10-CM

## 2025-06-05 DIAGNOSIS — R79.89 ELEVATED BRAIN NATRIURETIC PEPTIDE (BNP) LEVEL: ICD-10-CM

## 2025-06-05 DIAGNOSIS — R04.2 HEMOPTYSIS: ICD-10-CM

## 2025-06-05 PROBLEM — D72.829 LEUKOCYTOSIS: Status: ACTIVE | Noted: 2025-06-05

## 2025-06-05 PROBLEM — K21.9 GERD (GASTROESOPHAGEAL REFLUX DISEASE): Status: ACTIVE | Noted: 2025-06-05

## 2025-06-05 PROBLEM — R60.0 LEG EDEMA, RIGHT: Status: ACTIVE | Noted: 2025-06-05

## 2025-06-05 LAB
ALBUMIN SERPL BCP-MCNC: 3.8 G/DL (ref 3.2–4.9)
ALBUMIN/GLOB SERPL: 1.2 G/DL
ALP SERPL-CCNC: 220 U/L (ref 30–99)
ALT SERPL-CCNC: 46 U/L (ref 2–50)
AMMONIA PLAS-SCNC: 37 UMOL/L (ref 11–45)
AMPHET UR QL SCN: NEGATIVE
ANION GAP SERPL CALC-SCNC: 11 MMOL/L (ref 7–16)
APPEARANCE UR: ABNORMAL
AST SERPL-CCNC: 46 U/L (ref 12–45)
B PARAP IS1001 DNA NPH QL NAA+NON-PROBE: NOT DETECTED
B PERT.PT PRMT NPH QL NAA+NON-PROBE: NOT DETECTED
BACTERIA #/AREA URNS HPF: NORMAL /HPF
BARBITURATES UR QL SCN: NEGATIVE
BASOPHILS # BLD AUTO: 0.1 % (ref 0–1.8)
BASOPHILS # BLD: 0.02 K/UL (ref 0–0.12)
BENZODIAZ UR QL SCN: NEGATIVE
BILIRUB SERPL-MCNC: 1.1 MG/DL (ref 0.1–1.5)
BILIRUB UR QL STRIP.AUTO: NEGATIVE
BUN SERPL-MCNC: 19 MG/DL (ref 8–22)
BZE UR QL SCN: NEGATIVE
C PNEUM DNA NPH QL NAA+NON-PROBE: NOT DETECTED
CALCIUM ALBUM COR SERPL-MCNC: 8.7 MG/DL (ref 8.5–10.5)
CALCIUM SERPL-MCNC: 8.5 MG/DL (ref 8.5–10.5)
CANNABINOIDS UR QL SCN: NEGATIVE
CASTS URNS QL MICRO: NORMAL /LPF (ref 0–2)
CHLORIDE SERPL-SCNC: 95 MMOL/L (ref 96–112)
CK SERPL-CCNC: 104 U/L (ref 0–154)
CO2 SERPL-SCNC: 25 MMOL/L (ref 20–33)
COLOR UR: YELLOW
CORTIS SERPL-MCNC: 1.9 UG/DL (ref 0–23)
CREAT SERPL-MCNC: 0.85 MG/DL (ref 0.5–1.4)
EOSINOPHIL # BLD AUTO: 0 K/UL (ref 0–0.51)
EOSINOPHIL NFR BLD: 0 % (ref 0–6.9)
EPITHELIAL CELLS 1715: NORMAL /HPF (ref 0–5)
ERYTHROCYTE [DISTWIDTH] IN BLOOD BY AUTOMATED COUNT: 64.1 FL (ref 35.9–50)
EST. AVERAGE GLUCOSE BLD GHB EST-MCNC: 123 MG/DL
ETHANOL BLD-MCNC: <10.1 MG/DL
FENTANYL UR QL: NEGATIVE
FLUAV RNA NPH QL NAA+NON-PROBE: NOT DETECTED
FLUBV RNA NPH QL NAA+NON-PROBE: NOT DETECTED
GFR SERPLBLD CREATININE-BSD FMLA CKD-EPI: 90 ML/MIN/1.73 M 2
GLOBULIN SER CALC-MCNC: 3.1 G/DL (ref 1.9–3.5)
GLUCOSE SERPL-MCNC: 181 MG/DL (ref 65–99)
GLUCOSE UR STRIP.AUTO-MCNC: NEGATIVE MG/DL
HADV DNA NPH QL NAA+NON-PROBE: NOT DETECTED
HBA1C MFR BLD: 5.9 % (ref 4–5.6)
HCOV 229E RNA NPH QL NAA+NON-PROBE: NOT DETECTED
HCOV HKU1 RNA NPH QL NAA+NON-PROBE: NOT DETECTED
HCOV NL63 RNA NPH QL NAA+NON-PROBE: NOT DETECTED
HCOV OC43 RNA NPH QL NAA+NON-PROBE: NOT DETECTED
HCT VFR BLD AUTO: 26.9 % (ref 42–52)
HGB BLD-MCNC: 8.8 G/DL (ref 14–18)
HMPV RNA NPH QL NAA+NON-PROBE: NOT DETECTED
HPIV1 RNA NPH QL NAA+NON-PROBE: NOT DETECTED
HPIV2 RNA NPH QL NAA+NON-PROBE: NOT DETECTED
HPIV3 RNA NPH QL NAA+NON-PROBE: NOT DETECTED
HPIV4 RNA NPH QL NAA+NON-PROBE: NOT DETECTED
IMM GRANULOCYTES # BLD AUTO: 0.13 K/UL (ref 0–0.11)
IMM GRANULOCYTES NFR BLD AUTO: 0.9 % (ref 0–0.9)
INR PPP: 1.03 (ref 0.87–1.13)
KETONES UR STRIP.AUTO-MCNC: NEGATIVE MG/DL
LACTATE SERPL-SCNC: 1.7 MMOL/L (ref 0.5–2)
LEUKOCYTE ESTERASE UR QL STRIP.AUTO: NEGATIVE
LIPASE SERPL-CCNC: 69 U/L (ref 11–82)
LYMPHOCYTES # BLD AUTO: 0.62 K/UL (ref 1–4.8)
LYMPHOCYTES NFR BLD: 4.4 % (ref 22–41)
M PNEUMO DNA NPH QL NAA+NON-PROBE: NOT DETECTED
MCH RBC QN AUTO: 30.6 PG (ref 27–33)
MCHC RBC AUTO-ENTMCNC: 32.7 G/DL (ref 32.3–36.5)
MCV RBC AUTO: 93.4 FL (ref 81.4–97.8)
METHADONE UR QL SCN: NEGATIVE
MICRO URNS: ABNORMAL
MONOCYTES # BLD AUTO: 1.1 K/UL (ref 0–0.85)
MONOCYTES NFR BLD AUTO: 7.8 % (ref 0–13.4)
NEUTROPHILS # BLD AUTO: 12.17 K/UL (ref 1.82–7.42)
NEUTROPHILS NFR BLD: 86.8 % (ref 44–72)
NITRITE UR QL STRIP.AUTO: NEGATIVE
NRBC # BLD AUTO: 0.05 K/UL
NRBC BLD-RTO: 0.4 /100 WBC (ref 0–0.2)
NT-PROBNP SERPL IA-MCNC: 3307 PG/ML (ref 0–125)
OPIATES UR QL SCN: NEGATIVE
OXYCODONE UR QL SCN: NEGATIVE
PCP UR QL SCN: NEGATIVE
PH UR STRIP.AUTO: 8 [PH] (ref 5–8)
PLATELET # BLD AUTO: 211 K/UL (ref 164–446)
PMV BLD AUTO: 9 FL (ref 9–12.9)
POTASSIUM SERPL-SCNC: 4.2 MMOL/L (ref 3.6–5.5)
PROCALCITONIN SERPL-MCNC: 0.09 NG/ML
PROPOXYPH UR QL SCN: NEGATIVE
PROT SERPL-MCNC: 6.9 G/DL (ref 6–8.2)
PROT UR QL STRIP: NEGATIVE MG/DL
PROTHROMBIN TIME: 13.5 SEC (ref 12–14.6)
RBC # BLD AUTO: 2.88 M/UL (ref 4.7–6.1)
RBC # URNS HPF: NORMAL /HPF (ref 0–2)
RBC UR QL AUTO: NEGATIVE
RSV RNA NPH QL NAA+NON-PROBE: NOT DETECTED
RV+EV RNA NPH QL NAA+NON-PROBE: NOT DETECTED
SARS-COV-2 RNA NPH QL NAA+NON-PROBE: NOTDETECTED
SCCMEC + MECA PNL NOSE NAA+PROBE: NEGATIVE
SODIUM SERPL-SCNC: 131 MMOL/L (ref 135–145)
SP GR UR STRIP.AUTO: 1.01
UROBILINOGEN UR STRIP.AUTO-MCNC: 1 EU/DL
WBC # BLD AUTO: 14 K/UL (ref 4.8–10.8)
WBC #/AREA URNS HPF: NORMAL /HPF

## 2025-06-05 PROCEDURE — 99223 1ST HOSP IP/OBS HIGH 75: CPT | Performed by: STUDENT IN AN ORGANIZED HEALTH CARE EDUCATION/TRAINING PROGRAM

## 2025-06-05 PROCEDURE — 71045 X-RAY EXAM CHEST 1 VIEW: CPT

## 2025-06-05 PROCEDURE — 96365 THER/PROPH/DIAG IV INF INIT: CPT

## 2025-06-05 PROCEDURE — 87205 SMEAR GRAM STAIN: CPT

## 2025-06-05 PROCEDURE — 93971 EXTREMITY STUDY: CPT | Mod: 26,RT | Performed by: INTERNAL MEDICINE

## 2025-06-05 PROCEDURE — 36415 COLL VENOUS BLD VENIPUNCTURE: CPT

## 2025-06-05 PROCEDURE — 96375 TX/PRO/DX INJ NEW DRUG ADDON: CPT

## 2025-06-05 PROCEDURE — 700102 HCHG RX REV CODE 250 W/ 637 OVERRIDE(OP): Performed by: STUDENT IN AN ORGANIZED HEALTH CARE EDUCATION/TRAINING PROGRAM

## 2025-06-05 PROCEDURE — 83880 ASSAY OF NATRIURETIC PEPTIDE: CPT

## 2025-06-05 PROCEDURE — 96367 TX/PROPH/DG ADDL SEQ IV INF: CPT

## 2025-06-05 PROCEDURE — 85025 COMPLETE CBC W/AUTO DIFF WBC: CPT

## 2025-06-05 PROCEDURE — 82533 TOTAL CORTISOL: CPT

## 2025-06-05 PROCEDURE — 82077 ASSAY SPEC XCP UR&BREATH IA: CPT

## 2025-06-05 PROCEDURE — 82550 ASSAY OF CK (CPK): CPT

## 2025-06-05 PROCEDURE — 93971 EXTREMITY STUDY: CPT | Mod: RT

## 2025-06-05 PROCEDURE — 87641 MR-STAPH DNA AMP PROBE: CPT

## 2025-06-05 PROCEDURE — 85610 PROTHROMBIN TIME: CPT

## 2025-06-05 PROCEDURE — 73590 X-RAY EXAM OF LOWER LEG: CPT | Mod: RT

## 2025-06-05 PROCEDURE — 83690 ASSAY OF LIPASE: CPT

## 2025-06-05 PROCEDURE — A9270 NON-COVERED ITEM OR SERVICE: HCPCS | Performed by: STUDENT IN AN ORGANIZED HEALTH CARE EDUCATION/TRAINING PROGRAM

## 2025-06-05 PROCEDURE — 83036 HEMOGLOBIN GLYCOSYLATED A1C: CPT

## 2025-06-05 PROCEDURE — 96366 THER/PROPH/DIAG IV INF ADDON: CPT

## 2025-06-05 PROCEDURE — 83605 ASSAY OF LACTIC ACID: CPT

## 2025-06-05 PROCEDURE — 84145 PROCALCITONIN (PCT): CPT

## 2025-06-05 PROCEDURE — 87040 BLOOD CULTURE FOR BACTERIA: CPT

## 2025-06-05 PROCEDURE — 71275 CT ANGIOGRAPHY CHEST: CPT

## 2025-06-05 PROCEDURE — 80307 DRUG TEST PRSMV CHEM ANLYZR: CPT

## 2025-06-05 PROCEDURE — 700117 HCHG RX CONTRAST REV CODE 255: Performed by: STUDENT IN AN ORGANIZED HEALTH CARE EDUCATION/TRAINING PROGRAM

## 2025-06-05 PROCEDURE — 0202U NFCT DS 22 TRGT SARS-COV-2: CPT

## 2025-06-05 PROCEDURE — 700111 HCHG RX REV CODE 636 W/ 250 OVERRIDE (IP): Mod: JZ | Performed by: STUDENT IN AN ORGANIZED HEALTH CARE EDUCATION/TRAINING PROGRAM

## 2025-06-05 PROCEDURE — 87899 AGENT NOS ASSAY W/OPTIC: CPT

## 2025-06-05 PROCEDURE — 82140 ASSAY OF AMMONIA: CPT

## 2025-06-05 PROCEDURE — 80053 COMPREHEN METABOLIC PANEL: CPT

## 2025-06-05 PROCEDURE — 87086 URINE CULTURE/COLONY COUNT: CPT

## 2025-06-05 PROCEDURE — 770004 HCHG ROOM/CARE - ONCOLOGY PRIVATE *

## 2025-06-05 PROCEDURE — 700105 HCHG RX REV CODE 258: Performed by: STUDENT IN AN ORGANIZED HEALTH CARE EDUCATION/TRAINING PROGRAM

## 2025-06-05 PROCEDURE — 99285 EMERGENCY DEPT VISIT HI MDM: CPT

## 2025-06-05 PROCEDURE — 87449 NOS EACH ORGANISM AG IA: CPT

## 2025-06-05 PROCEDURE — 87070 CULTURE OTHR SPECIMN AEROBIC: CPT

## 2025-06-05 PROCEDURE — 81001 URINALYSIS AUTO W/SCOPE: CPT

## 2025-06-05 PROCEDURE — 700111 HCHG RX REV CODE 636 W/ 250 OVERRIDE (IP): Performed by: STUDENT IN AN ORGANIZED HEALTH CARE EDUCATION/TRAINING PROGRAM

## 2025-06-05 RX ORDER — CEFAZOLIN 2 G/1
2 INJECTION, POWDER, FOR SOLUTION INTRAMUSCULAR; INTRAVENOUS ONCE
Status: DISCONTINUED | OUTPATIENT
Start: 2025-06-05 | End: 2025-06-05

## 2025-06-05 RX ORDER — SODIUM CHLORIDE, SODIUM LACTATE, POTASSIUM CHLORIDE, AND CALCIUM CHLORIDE .6; .31; .03; .02 G/100ML; G/100ML; G/100ML; G/100ML
500 INJECTION, SOLUTION INTRAVENOUS
Status: DISCONTINUED | OUTPATIENT
Start: 2025-06-05 | End: 2025-06-08 | Stop reason: HOSPADM

## 2025-06-05 RX ORDER — THIAMINE HYDROCHLORIDE 100 MG/ML
200 INJECTION, SOLUTION INTRAMUSCULAR; INTRAVENOUS 2 TIMES DAILY
Status: COMPLETED | OUTPATIENT
Start: 2025-06-05 | End: 2025-06-08

## 2025-06-05 RX ORDER — METOPROLOL SUCCINATE 25 MG/1
25 TABLET, EXTENDED RELEASE ORAL DAILY
Status: DISCONTINUED | OUTPATIENT
Start: 2025-06-06 | End: 2025-06-07

## 2025-06-05 RX ORDER — AMIODARONE HYDROCHLORIDE 200 MG/1
200 TABLET ORAL DAILY
Status: DISCONTINUED | OUTPATIENT
Start: 2025-06-14 | End: 2025-06-08 | Stop reason: HOSPADM

## 2025-06-05 RX ORDER — FUROSEMIDE 10 MG/ML
40 INJECTION INTRAMUSCULAR; INTRAVENOUS 2 TIMES DAILY
Status: COMPLETED | OUTPATIENT
Start: 2025-06-05 | End: 2025-06-07

## 2025-06-05 RX ORDER — ONDANSETRON 4 MG/1
4 TABLET, ORALLY DISINTEGRATING ORAL EVERY 4 HOURS PRN
Status: DISCONTINUED | OUTPATIENT
Start: 2025-06-05 | End: 2025-06-06

## 2025-06-05 RX ORDER — FOLIC ACID 1 MG/1
1 TABLET ORAL DAILY
Status: DISCONTINUED | OUTPATIENT
Start: 2025-06-06 | End: 2025-06-08 | Stop reason: HOSPADM

## 2025-06-05 RX ORDER — IPRATROPIUM BROMIDE AND ALBUTEROL SULFATE 2.5; .5 MG/3ML; MG/3ML
3 SOLUTION RESPIRATORY (INHALATION)
Status: DISCONTINUED | OUTPATIENT
Start: 2025-06-05 | End: 2025-06-08 | Stop reason: HOSPADM

## 2025-06-05 RX ORDER — GAUZE BANDAGE 2" X 2"
100 BANDAGE TOPICAL DAILY
Status: DISCONTINUED | OUTPATIENT
Start: 2025-06-09 | End: 2025-06-08 | Stop reason: HOSPADM

## 2025-06-05 RX ORDER — LABETALOL HYDROCHLORIDE 5 MG/ML
10 INJECTION, SOLUTION INTRAVENOUS EVERY 4 HOURS PRN
Status: DISCONTINUED | OUTPATIENT
Start: 2025-06-05 | End: 2025-06-08 | Stop reason: HOSPADM

## 2025-06-05 RX ORDER — LOSARTAN POTASSIUM 50 MG/1
50 TABLET ORAL
Status: DISCONTINUED | OUTPATIENT
Start: 2025-06-06 | End: 2025-06-08 | Stop reason: HOSPADM

## 2025-06-05 RX ORDER — AZITHROMYCIN 250 MG/1
500 TABLET, FILM COATED ORAL ONCE
Status: COMPLETED | OUTPATIENT
Start: 2025-06-05 | End: 2025-06-05

## 2025-06-05 RX ORDER — AMIODARONE HYDROCHLORIDE 200 MG/1
400 TABLET ORAL DAILY
Status: DISCONTINUED | OUTPATIENT
Start: 2025-06-06 | End: 2025-06-08 | Stop reason: HOSPADM

## 2025-06-05 RX ORDER — LEVOTHYROXINE SODIUM 50 UG/1
50 TABLET ORAL
Status: DISCONTINUED | OUTPATIENT
Start: 2025-06-06 | End: 2025-06-08 | Stop reason: HOSPADM

## 2025-06-05 RX ORDER — GABAPENTIN 100 MG/1
200 CAPSULE ORAL 3 TIMES DAILY
Status: DISCONTINUED | OUTPATIENT
Start: 2025-06-06 | End: 2025-06-08 | Stop reason: HOSPADM

## 2025-06-05 RX ORDER — GUAIFENESIN/DEXTROMETHORPHAN 100-10MG/5
10 SYRUP ORAL EVERY 6 HOURS PRN
Status: DISCONTINUED | OUTPATIENT
Start: 2025-06-05 | End: 2025-06-08 | Stop reason: HOSPADM

## 2025-06-05 RX ORDER — ACETAMINOPHEN 325 MG/1
650 TABLET ORAL EVERY 6 HOURS PRN
Status: DISCONTINUED | OUTPATIENT
Start: 2025-06-05 | End: 2025-06-08 | Stop reason: HOSPADM

## 2025-06-05 RX ORDER — DEXAMETHASONE 4 MG/1
4 TABLET ORAL 2 TIMES DAILY
Status: DISCONTINUED | OUTPATIENT
Start: 2025-06-05 | End: 2025-06-08 | Stop reason: HOSPADM

## 2025-06-05 RX ORDER — AZITHROMYCIN 500 MG/5ML
500 INJECTION, POWDER, LYOPHILIZED, FOR SOLUTION INTRAVENOUS EVERY 24 HOURS
Status: COMPLETED | OUTPATIENT
Start: 2025-06-06 | End: 2025-06-07

## 2025-06-05 RX ORDER — PANTOPRAZOLE SODIUM 40 MG/10ML
40 INJECTION, POWDER, LYOPHILIZED, FOR SOLUTION INTRAVENOUS 2 TIMES DAILY
Status: DISCONTINUED | OUTPATIENT
Start: 2025-06-05 | End: 2025-06-06

## 2025-06-05 RX ORDER — ONDANSETRON 2 MG/ML
4 INJECTION INTRAMUSCULAR; INTRAVENOUS EVERY 4 HOURS PRN
Status: DISCONTINUED | OUTPATIENT
Start: 2025-06-05 | End: 2025-06-06

## 2025-06-05 RX ADMIN — AMPICILLIN AND SULBACTAM 3 G: 1; 2 INJECTION, POWDER, FOR SOLUTION INTRAMUSCULAR; INTRAVENOUS at 20:12

## 2025-06-05 RX ADMIN — IOHEXOL 50 ML: 350 INJECTION, SOLUTION INTRAVENOUS at 20:00

## 2025-06-05 RX ADMIN — DEXAMETHASONE 4 MG: 4 TABLET ORAL at 21:04

## 2025-06-05 RX ADMIN — PANTOPRAZOLE SODIUM 40 MG: 40 INJECTION, POWDER, FOR SOLUTION INTRAVENOUS at 20:45

## 2025-06-05 RX ADMIN — FUROSEMIDE 40 MG: 10 INJECTION, SOLUTION INTRAVENOUS at 21:04

## 2025-06-05 RX ADMIN — AZITHROMYCIN DIHYDRATE 500 MG: 250 TABLET ORAL at 20:12

## 2025-06-05 RX ADMIN — VANCOMYCIN HYDROCHLORIDE 2000 MG: 5 INJECTION, POWDER, LYOPHILIZED, FOR SOLUTION INTRAVENOUS at 21:18

## 2025-06-05 RX ADMIN — THIAMINE HYDROCHLORIDE 200 MG: 100 INJECTION, SOLUTION INTRAMUSCULAR; INTRAVENOUS at 21:04

## 2025-06-05 ASSESSMENT — ENCOUNTER SYMPTOMS
SHORTNESS OF BREATH: 1
VOMITING: 0
COUGH: 1
DIZZINESS: 0
FOCAL WEAKNESS: 0
WEAKNESS: 1
DOUBLE VISION: 0
FEVER: 0
BLURRED VISION: 0
MYALGIAS: 0
BRUISES/BLEEDS EASILY: 1
HEADACHES: 0
HEMOPTYSIS: 1
HEARTBURN: 1
PALPITATIONS: 0
DEPRESSION: 0
NAUSEA: 0
CHILLS: 0
ABDOMINAL PAIN: 0

## 2025-06-05 ASSESSMENT — PAIN DESCRIPTION - PAIN TYPE: TYPE: ACUTE PAIN

## 2025-06-05 ASSESSMENT — FIBROSIS 4 INDEX: FIB4 SCORE: 2.77

## 2025-06-05 ASSESSMENT — LIFESTYLE VARIABLES: SUBSTANCE_ABUSE: 0

## 2025-06-05 NOTE — ED TRIAGE NOTES
Chief Complaint   Patient presents with    Blood in Sputum        Oziel [id number]    Pt reports coughing bloody sputum x 2 days. Recent diagnosis of liver cirrhosis per family and pt he has not been drinking. Pt also reports abdominal pain as well as right leg pain. Redness and bruising noted to right leg that is painful.    BP (!) 166/81   Pulse 81   Temp 37.4 °C (99.3 °F) (Temporal)   Resp 14   Wt 77.6 kg (171 lb)   SpO2 93%   Pt informed of wait times. Educated on triage process.  Asked to return to triage RN for any new or worsening of symptoms. Thanked for patience.

## 2025-06-06 LAB
ALBUMIN SERPL BCP-MCNC: 3.7 G/DL (ref 3.2–4.9)
ALBUMIN/GLOB SERPL: 1.2 G/DL
ALP SERPL-CCNC: 203 U/L (ref 30–99)
ALT SERPL-CCNC: 43 U/L (ref 2–50)
ANION GAP SERPL CALC-SCNC: 13 MMOL/L (ref 7–16)
AST SERPL-CCNC: 41 U/L (ref 12–45)
BASOPHILS # BLD AUTO: 0.1 % (ref 0–1.8)
BASOPHILS # BLD: 0.01 K/UL (ref 0–0.12)
BILIRUB SERPL-MCNC: 1.1 MG/DL (ref 0.1–1.5)
BUN SERPL-MCNC: 21 MG/DL (ref 8–22)
CALCIUM ALBUM COR SERPL-MCNC: 8.5 MG/DL (ref 8.5–10.5)
CALCIUM SERPL-MCNC: 8.3 MG/DL (ref 8.5–10.5)
CHLORIDE SERPL-SCNC: 95 MMOL/L (ref 96–112)
CO2 SERPL-SCNC: 23 MMOL/L (ref 20–33)
CREAT SERPL-MCNC: 0.78 MG/DL (ref 0.5–1.4)
EOSINOPHIL # BLD AUTO: 0 K/UL (ref 0–0.51)
EOSINOPHIL NFR BLD: 0 % (ref 0–6.9)
ERYTHROCYTE [DISTWIDTH] IN BLOOD BY AUTOMATED COUNT: 62.9 FL (ref 35.9–50)
GFR SERPLBLD CREATININE-BSD FMLA CKD-EPI: 92 ML/MIN/1.73 M 2
GLOBULIN SER CALC-MCNC: 3.2 G/DL (ref 1.9–3.5)
GLUCOSE SERPL-MCNC: 168 MG/DL (ref 65–99)
GRAM STN SPEC: NORMAL
HCT VFR BLD AUTO: 29.5 % (ref 42–52)
HGB BLD-MCNC: 9.5 G/DL (ref 14–18)
IMM GRANULOCYTES # BLD AUTO: 0.12 K/UL (ref 0–0.11)
IMM GRANULOCYTES NFR BLD AUTO: 0.8 % (ref 0–0.9)
LYMPHOCYTES # BLD AUTO: 0.56 K/UL (ref 1–4.8)
LYMPHOCYTES NFR BLD: 3.9 % (ref 22–41)
MAGNESIUM SERPL-MCNC: 1.7 MG/DL (ref 1.5–2.5)
MCH RBC QN AUTO: 29.5 PG (ref 27–33)
MCHC RBC AUTO-ENTMCNC: 32.2 G/DL (ref 32.3–36.5)
MCV RBC AUTO: 91.6 FL (ref 81.4–97.8)
MONOCYTES # BLD AUTO: 0.76 K/UL (ref 0–0.85)
MONOCYTES NFR BLD AUTO: 5.3 % (ref 0–13.4)
NEUTROPHILS # BLD AUTO: 12.79 K/UL (ref 1.82–7.42)
NEUTROPHILS NFR BLD: 89.9 % (ref 44–72)
NRBC # BLD AUTO: 0.06 K/UL
NRBC BLD-RTO: 0.4 /100 WBC (ref 0–0.2)
PHOSPHATE SERPL-MCNC: 2.4 MG/DL (ref 2.5–4.5)
PLATELET # BLD AUTO: 221 K/UL (ref 164–446)
PMV BLD AUTO: 9.3 FL (ref 9–12.9)
POTASSIUM SERPL-SCNC: 3.6 MMOL/L (ref 3.6–5.5)
PROT SERPL-MCNC: 6.9 G/DL (ref 6–8.2)
RBC # BLD AUTO: 3.22 M/UL (ref 4.7–6.1)
SIGNIFICANT IND 70042: NORMAL
SITE SITE: NORMAL
SODIUM SERPL-SCNC: 131 MMOL/L (ref 135–145)
SOURCE SOURCE: NORMAL
WBC # BLD AUTO: 14.2 K/UL (ref 4.8–10.8)

## 2025-06-06 PROCEDURE — 36415 COLL VENOUS BLD VENIPUNCTURE: CPT

## 2025-06-06 PROCEDURE — 700101 HCHG RX REV CODE 250: Performed by: STUDENT IN AN ORGANIZED HEALTH CARE EDUCATION/TRAINING PROGRAM

## 2025-06-06 PROCEDURE — 700102 HCHG RX REV CODE 250 W/ 637 OVERRIDE(OP): Performed by: STUDENT IN AN ORGANIZED HEALTH CARE EDUCATION/TRAINING PROGRAM

## 2025-06-06 PROCEDURE — 700105 HCHG RX REV CODE 258: Performed by: STUDENT IN AN ORGANIZED HEALTH CARE EDUCATION/TRAINING PROGRAM

## 2025-06-06 PROCEDURE — 80053 COMPREHEN METABOLIC PANEL: CPT

## 2025-06-06 PROCEDURE — A9270 NON-COVERED ITEM OR SERVICE: HCPCS | Performed by: STUDENT IN AN ORGANIZED HEALTH CARE EDUCATION/TRAINING PROGRAM

## 2025-06-06 PROCEDURE — 700111 HCHG RX REV CODE 636 W/ 250 OVERRIDE (IP): Performed by: STUDENT IN AN ORGANIZED HEALTH CARE EDUCATION/TRAINING PROGRAM

## 2025-06-06 PROCEDURE — 92610 EVALUATE SWALLOWING FUNCTION: CPT

## 2025-06-06 PROCEDURE — 85025 COMPLETE CBC W/AUTO DIFF WBC: CPT

## 2025-06-06 PROCEDURE — 99233 SBSQ HOSP IP/OBS HIGH 50: CPT | Performed by: STUDENT IN AN ORGANIZED HEALTH CARE EDUCATION/TRAINING PROGRAM

## 2025-06-06 PROCEDURE — 770004 HCHG ROOM/CARE - ONCOLOGY PRIVATE *

## 2025-06-06 PROCEDURE — 83735 ASSAY OF MAGNESIUM: CPT

## 2025-06-06 PROCEDURE — 84100 ASSAY OF PHOSPHORUS: CPT

## 2025-06-06 PROCEDURE — RXMED WILLOW AMBULATORY MEDICATION CHARGE: Performed by: STUDENT IN AN ORGANIZED HEALTH CARE EDUCATION/TRAINING PROGRAM

## 2025-06-06 RX ORDER — SPIRONOLACTONE 25 MG/1
25 TABLET ORAL
Status: DISCONTINUED | OUTPATIENT
Start: 2025-06-06 | End: 2025-06-06

## 2025-06-06 RX ORDER — LORAZEPAM 0.5 MG/1
0.5 TABLET ORAL EVERY 4 HOURS PRN
Status: DISCONTINUED | OUTPATIENT
Start: 2025-06-06 | End: 2025-06-06

## 2025-06-06 RX ORDER — LOSARTAN POTASSIUM 50 MG/1
50 TABLET ORAL DAILY
Qty: 100 TABLET | Refills: 3 | Status: SHIPPED | OUTPATIENT
Start: 2025-06-06 | End: 2026-07-11

## 2025-06-06 RX ORDER — OMEPRAZOLE 20 MG/1
20 CAPSULE, DELAYED RELEASE ORAL 2 TIMES DAILY
Status: DISCONTINUED | OUTPATIENT
Start: 2025-06-06 | End: 2025-06-08 | Stop reason: HOSPADM

## 2025-06-06 RX ORDER — LORAZEPAM 2 MG/1
4 TABLET ORAL
Status: DISCONTINUED | OUTPATIENT
Start: 2025-06-06 | End: 2025-06-06

## 2025-06-06 RX ORDER — DAPAGLIFLOZIN 10 MG/1
10 TABLET, FILM COATED ORAL DAILY
Status: DISCONTINUED | OUTPATIENT
Start: 2025-06-06 | End: 2025-06-08 | Stop reason: HOSPADM

## 2025-06-06 RX ORDER — DAPAGLIFLOZIN 10 MG/1
10 TABLET, FILM COATED ORAL DAILY
Qty: 100 TABLET | Refills: 3 | Status: SHIPPED | OUTPATIENT
Start: 2025-06-07 | End: 2026-07-12

## 2025-06-06 RX ORDER — MAGNESIUM SULFATE HEPTAHYDRATE 40 MG/ML
2 INJECTION, SOLUTION INTRAVENOUS ONCE
Status: COMPLETED | OUTPATIENT
Start: 2025-06-06 | End: 2025-06-06

## 2025-06-06 RX ORDER — TRAMADOL HYDROCHLORIDE 50 MG/1
50 TABLET ORAL ONCE
Status: DISCONTINUED | OUTPATIENT
Start: 2025-06-06 | End: 2025-06-07

## 2025-06-06 RX ORDER — OMEPRAZOLE 20 MG/1
20 CAPSULE, DELAYED RELEASE ORAL 2 TIMES DAILY
Qty: 120 CAPSULE | Refills: 0 | Status: SHIPPED | OUTPATIENT
Start: 2025-06-06 | End: 2025-08-07

## 2025-06-06 RX ORDER — POTASSIUM CHLORIDE 1500 MG/1
40 TABLET, EXTENDED RELEASE ORAL ONCE
Status: COMPLETED | OUTPATIENT
Start: 2025-06-06 | End: 2025-06-06

## 2025-06-06 RX ORDER — LORAZEPAM 2 MG/1
2 TABLET ORAL
Status: DISCONTINUED | OUTPATIENT
Start: 2025-06-06 | End: 2025-06-06

## 2025-06-06 RX ORDER — LORAZEPAM 1 MG/1
1 TABLET ORAL EVERY 4 HOURS PRN
Status: DISCONTINUED | OUTPATIENT
Start: 2025-06-06 | End: 2025-06-06

## 2025-06-06 RX ORDER — AZITHROMYCIN 250 MG/1
500 TABLET, FILM COATED ORAL DAILY
Status: DISCONTINUED | OUTPATIENT
Start: 2025-06-07 | End: 2025-06-06

## 2025-06-06 RX ORDER — DIAZEPAM 10 MG/2ML
10 INJECTION, SOLUTION INTRAMUSCULAR; INTRAVENOUS
Status: DISCONTINUED | OUTPATIENT
Start: 2025-06-06 | End: 2025-06-06

## 2025-06-06 RX ADMIN — FUROSEMIDE 40 MG: 10 INJECTION, SOLUTION INTRAVENOUS at 16:42

## 2025-06-06 RX ADMIN — THERA TABS 1 TABLET: TAB at 05:33

## 2025-06-06 RX ADMIN — THIAMINE HYDROCHLORIDE 200 MG: 100 INJECTION, SOLUTION INTRAMUSCULAR; INTRAVENOUS at 17:13

## 2025-06-06 RX ADMIN — DAPAGLIFLOZIN 10 MG: 10 TABLET, FILM COATED ORAL at 13:00

## 2025-06-06 RX ADMIN — GABAPENTIN 200 MG: 100 CAPSULE ORAL at 16:41

## 2025-06-06 RX ADMIN — POTASSIUM CHLORIDE 40 MEQ: 1500 TABLET, EXTENDED RELEASE ORAL at 11:30

## 2025-06-06 RX ADMIN — LEVOTHYROXINE SODIUM 50 MCG: 0.05 TABLET ORAL at 05:34

## 2025-06-06 RX ADMIN — ACETAMINOPHEN 650 MG: 325 TABLET ORAL at 17:18

## 2025-06-06 RX ADMIN — AMPICILLIN AND SULBACTAM 3 G: 1; 2 INJECTION, POWDER, FOR SOLUTION INTRAMUSCULAR; INTRAVENOUS at 16:44

## 2025-06-06 RX ADMIN — THIAMINE HYDROCHLORIDE 200 MG: 100 INJECTION, SOLUTION INTRAMUSCULAR; INTRAVENOUS at 06:26

## 2025-06-06 RX ADMIN — GABAPENTIN 200 MG: 100 CAPSULE ORAL at 11:30

## 2025-06-06 RX ADMIN — FUROSEMIDE 40 MG: 10 INJECTION, SOLUTION INTRAVENOUS at 05:32

## 2025-06-06 RX ADMIN — DEXAMETHASONE 4 MG: 4 TABLET ORAL at 16:42

## 2025-06-06 RX ADMIN — FOLIC ACID 1 MG: 1 TABLET ORAL at 05:33

## 2025-06-06 RX ADMIN — AMIODARONE HYDROCHLORIDE 400 MG: 200 TABLET ORAL at 05:33

## 2025-06-06 RX ADMIN — MAGNESIUM SULFATE HEPTAHYDRATE 2 G: 2 INJECTION, SOLUTION INTRAVENOUS at 13:01

## 2025-06-06 RX ADMIN — PANTOPRAZOLE SODIUM 40 MG: 40 INJECTION, POWDER, FOR SOLUTION INTRAVENOUS at 05:32

## 2025-06-06 RX ADMIN — DIBASIC SODIUM PHOSPHATE, MONOBASIC POTASSIUM PHOSPHATE AND MONOBASIC SODIUM PHOSPHATE 500 MG: 852; 155; 130 TABLET ORAL at 16:41

## 2025-06-06 RX ADMIN — AMPICILLIN AND SULBACTAM 3 G: 1; 2 INJECTION, POWDER, FOR SOLUTION INTRAMUSCULAR; INTRAVENOUS at 11:30

## 2025-06-06 RX ADMIN — OMEPRAZOLE 20 MG: 20 CAPSULE, DELAYED RELEASE ORAL at 17:12

## 2025-06-06 RX ADMIN — AZITHROMYCIN 500 MG: 500 INJECTION, POWDER, LYOPHILIZED, FOR SOLUTION INTRAVENOUS at 06:29

## 2025-06-06 RX ADMIN — GABAPENTIN 200 MG: 100 CAPSULE ORAL at 05:33

## 2025-06-06 RX ADMIN — METOPROLOL SUCCINATE 25 MG: 25 TABLET, EXTENDED RELEASE ORAL at 05:33

## 2025-06-06 RX ADMIN — AMPICILLIN AND SULBACTAM 3 G: 1; 2 INJECTION, POWDER, FOR SOLUTION INTRAMUSCULAR; INTRAVENOUS at 23:46

## 2025-06-06 RX ADMIN — DIBASIC SODIUM PHOSPHATE, MONOBASIC POTASSIUM PHOSPHATE AND MONOBASIC SODIUM PHOSPHATE 500 MG: 852; 155; 130 TABLET ORAL at 11:30

## 2025-06-06 RX ADMIN — DEXAMETHASONE 4 MG: 4 TABLET ORAL at 05:33

## 2025-06-06 RX ADMIN — AMPICILLIN AND SULBACTAM 3 G: 1; 2 INJECTION, POWDER, FOR SOLUTION INTRAMUSCULAR; INTRAVENOUS at 05:34

## 2025-06-06 ASSESSMENT — LIFESTYLE VARIABLES
PAROXYSMAL SWEATS: NO SWEAT VISIBLE
AUDITORY DISTURBANCES: NOT PRESENT
ORIENTATION AND CLOUDING OF SENSORIUM: ORIENTED AND CAN DO SERIAL ADDITIONS
AUDITORY DISTURBANCES: NOT PRESENT
AGITATION: NORMAL ACTIVITY
VISUAL DISTURBANCES: NOT PRESENT
HEADACHE, FULLNESS IN HEAD: NOT PRESENT
HEADACHE, FULLNESS IN HEAD: NOT PRESENT
AGITATION: NORMAL ACTIVITY
NAUSEA AND VOMITING: NO NAUSEA AND NO VOMITING
ANXIETY: NO ANXIETY (AT EASE)
TREMOR: NO TREMOR
ALCOHOL_USE: NO
TOTAL SCORE: 0
TOTAL SCORE: 0
PAROXYSMAL SWEATS: NO SWEAT VISIBLE
VISUAL DISTURBANCES: NOT PRESENT
ORIENTATION AND CLOUDING OF SENSORIUM: ORIENTED AND CAN DO SERIAL ADDITIONS
ANXIETY: NO ANXIETY (AT EASE)
NAUSEA AND VOMITING: NO NAUSEA AND NO VOMITING
TREMOR: NO TREMOR

## 2025-06-06 ASSESSMENT — PAIN DESCRIPTION - PAIN TYPE
TYPE: ACUTE PAIN
TYPE: ACUTE PAIN

## 2025-06-06 ASSESSMENT — COGNITIVE AND FUNCTIONAL STATUS - GENERAL
MOBILITY SCORE: 24
SUGGESTED CMS G CODE MODIFIER MOBILITY: CH

## 2025-06-06 NOTE — ED NOTES
Bedside report received from off going RN/tech: Cheryl, assumed care of patient.  POC discussed with patient. Call light within reach, all needs addressed at this time.       Fall risk interventions in place: Move the patient closer to the nurse's station and Patient's personal possessions are with in their safe reach (all applicable per [place] Fall risk assessment)   Continuous monitoring: Pulse Ox or Blood Pressure  IVF/IV medications: Not Applicable   Oxygen: Room Air  Bedside sitter: Not Applicable   Isolation: Not Applicable

## 2025-06-06 NOTE — ED NOTES
Patient back to bed, hooked up to monitor and vancomycin infusion. Urinal emptied. Patient resting comfortably with calm, unlabored breathing. No further needs at this time.

## 2025-06-06 NOTE — PROGRESS NOTES
4 Eyes Skin Assessment Completed by DANIEL Funes and DANIEL Arredondo.    Skin assessment is primarily focused on high risk bony prominences. Pay special attention to skin beneath and around medical devices, high risk bony prominences, skin to skin areas and areas where the patient lacks sensation to feel pain and areas where the patient previously had breakdown.     Head (Occipital):  WDL   Ears (Under Medical Devices): WDL   Nose (Under Medical Devices): WDL   Mouth:  WDL   Neck: WDL   Breast/Chest:  WDL   Shoulder Blades:  WDL   Spine:   WDL   (R) Arm/Elbow/Hand: Bruising   (L) Arm/Elbow/Hand: Bruising   Abdomen: WDL   Pannus/Groin:  WDL   Sacrum/Coccyx:   WDL   (R) Ischial Tuberosity (Sit Bones):  WDL   (L) Ischial Tuberosity (Sit Bones):  WDL   (R) Leg:  Purple/maroon, Bruising, and Edema   (L) Leg:  Bruising and Edema   (R) Heel:  Blanching   (R) Foot/Toe: Scab and Blanching   (L) Heel: Blanching   (L) Foot/Toe:  Scab       DEVICES IN USE:   Respiratory Devices:  NA, patient on room air  Feeding Devices:  N/A   Lines & BP Monitoring Devices:  Peripheral IV    Orthopedic Devices:  N/A  Miscellaneous Devices:  N/A    PROTOCOL INTERVENTIONS:   Standard/Trauma Bed:  Already in place  Float Heels with Pillows:  Already in place    WOUND PHOTOS:   Completed and in EPIC     WOUND CONSULT:   N/A, no advanced wound care needs identified
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
## 2025-06-06 NOTE — ASSESSMENT & PLAN NOTE
Ecchymosis and swelling of RLE  No DVT seen on US venous RLE doppler  CTA of the right lower extremity ordered for worsening purpleish skin change on anterior lateral right lower leg  Monitor for now
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
## 2025-06-06 NOTE — ASSESSMENT & PLAN NOTE
Currently in sinus rhythm  Continue amiodarone and metoprolol  Unable to anticoagulate due to recent SAH/SDH

## 2025-06-06 NOTE — ED NOTES
Medication history reviewed with patient at bedside with rx bottles provided. Med rec is complete  Allergies reviewed.     Patient has not had any outpatient antibiotics in the last 30 days.   Anticoagulants: No.   Dispense history available in EPIC: Yes    Casa Galo
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
## 2025-06-06 NOTE — CARE PLAN
The patient is Watcher - Medium risk of patient condition declining or worsening         Progress made toward(s) clinical / shift goals:        Problem: Knowledge Deficit - Standard  Goal: Patient and family/care givers will demonstrate understanding of plan of care, disease process/condition, diagnostic tests and medications  Outcome: Progressing     Problem: Optimal Care for Alcohol Withdrawal  Goal: Optimal Care for the alcohol withdrawal patient  Outcome: Progressing   Note: Pt states they have not drank in 1 month.     Patient is not progressing towards the following goals:

## 2025-06-06 NOTE — CARE PLAN
Problem: Knowledge Deficit - Standard  Goal: Patient and family/care givers will demonstrate understanding of plan of care, disease process/condition, diagnostic tests and medications  Outcome: Progressing   Pt updated about POC- IV abx.    Problem: Optimal Care for Alcohol Withdrawal  Goal: Optimal Care for the alcohol withdrawal patient  Outcome: Progressing   CIWA score 0.    Problem: Seizure Precautions  Goal: Implementation of seizure precautions  Outcome: Progressing     Problem: Lifestyle Changes  Goal: Patient's ability to identify lifestyle changes and available resources to help reduce recurrence of condition will improve  Outcome: Progressing     Problem: Psychosocial  Goal: Patient's level of anxiety will decrease  Outcome: Progressing  Goal: Spiritual and cultural needs incorporated into hospitalization  Outcome: Progressing     Problem: Hemodynamics  Goal: Patient's hemodynamics, fluid balance and neurologic status will be stable or improve  Outcome: Progressing     Problem: Fluid Volume  Goal: Fluid volume balance will be maintained  Outcome: Progressing     Problem: Urinary - Renal Perfusion  Goal: Ability to achieve and maintain adequate renal perfusion and functioning will improve  Outcome: Progressing     Problem: Respiratory  Goal: Patient will achieve/maintain optimum respiratory ventilation and gas exchange  Outcome: Progressing     Problem: Fall Risk  Goal: Patient will remain free from falls  Outcome: Progressing  Bed alarm on, pt  calls appropriately.      The patient is Stable - Low risk of patient condition declining or worsening    Shift Goals  Clinical Goals: 02 saturatng above 90%    Progress made toward(s) clinical / shift goals:      Patient is not progressing towards the following goals:

## 2025-06-06 NOTE — ASSESSMENT & PLAN NOTE
Recent SAH/SDH s/p b/l MMA  Avoid anticoagulation    Completed 7 days of Keppra prophylaxis  Continue taper Decadron

## 2025-06-06 NOTE — ASSESSMENT & PLAN NOTE
On abx  He has been on steroid with decadron  Concurrent multifocal pneumonia  ?  Cellulitis versus worsening ecchymosis of the right lower extremity  Cultures pending  Trend
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
## 2025-06-06 NOTE — ED NOTES
Patient transport at bedside, patient to floor with all personal belongings. Medications continued to floor.
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
## 2025-06-07 ENCOUNTER — APPOINTMENT (OUTPATIENT)
Dept: RADIOLOGY | Facility: MEDICAL CENTER | Age: 75
DRG: 193 | End: 2025-06-07
Attending: STUDENT IN AN ORGANIZED HEALTH CARE EDUCATION/TRAINING PROGRAM
Payer: MEDICARE

## 2025-06-07 LAB
ANION GAP SERPL CALC-SCNC: 14 MMOL/L (ref 7–16)
BACTERIA SPEC RESP CULT: NORMAL
BACTERIA UR CULT: NORMAL
BUN SERPL-MCNC: 29 MG/DL (ref 8–22)
CALCIUM SERPL-MCNC: 8.1 MG/DL (ref 8.5–10.5)
CHLORIDE SERPL-SCNC: 96 MMOL/L (ref 96–112)
CO2 SERPL-SCNC: 23 MMOL/L (ref 20–33)
CREAT SERPL-MCNC: 1.01 MG/DL (ref 0.5–1.4)
ERYTHROCYTE [DISTWIDTH] IN BLOOD BY AUTOMATED COUNT: 68.1 FL (ref 35.9–50)
GFR SERPLBLD CREATININE-BSD FMLA CKD-EPI: 77 ML/MIN/1.73 M 2
GLUCOSE BLD STRIP.AUTO-MCNC: 144 MG/DL (ref 65–99)
GLUCOSE BLD STRIP.AUTO-MCNC: 165 MG/DL (ref 65–99)
GLUCOSE BLD STRIP.AUTO-MCNC: 188 MG/DL (ref 65–99)
GLUCOSE BLD STRIP.AUTO-MCNC: 215 MG/DL (ref 65–99)
GLUCOSE SERPL-MCNC: 232 MG/DL (ref 65–99)
GRAM STN SPEC: NORMAL
HCT VFR BLD AUTO: 31 % (ref 42–52)
HGB BLD-MCNC: 10.1 G/DL (ref 14–18)
L PNEUMO AG UR QL IA: NEGATIVE
MAGNESIUM SERPL-MCNC: 2.3 MG/DL (ref 1.5–2.5)
MCH RBC QN AUTO: 30.2 PG (ref 27–33)
MCHC RBC AUTO-ENTMCNC: 32.6 G/DL (ref 32.3–36.5)
MCV RBC AUTO: 92.8 FL (ref 81.4–97.8)
PHOSPHATE SERPL-MCNC: 3.1 MG/DL (ref 2.5–4.5)
PLATELET # BLD AUTO: 239 K/UL (ref 164–446)
PMV BLD AUTO: 9.7 FL (ref 9–12.9)
POTASSIUM SERPL-SCNC: 4 MMOL/L (ref 3.6–5.5)
RBC # BLD AUTO: 3.34 M/UL (ref 4.7–6.1)
S PNEUM AG UR QL: NEGATIVE
SIGNIFICANT IND 70042: NORMAL
SIGNIFICANT IND 70042: NORMAL
SITE SITE: NORMAL
SITE SITE: NORMAL
SODIUM SERPL-SCNC: 133 MMOL/L (ref 135–145)
SOURCE SOURCE: NORMAL
SOURCE SOURCE: NORMAL
WBC # BLD AUTO: 13.7 K/UL (ref 4.8–10.8)

## 2025-06-07 PROCEDURE — 74177 CT ABD & PELVIS W/CONTRAST: CPT

## 2025-06-07 PROCEDURE — A9270 NON-COVERED ITEM OR SERVICE: HCPCS | Performed by: NURSE PRACTITIONER

## 2025-06-07 PROCEDURE — 700101 HCHG RX REV CODE 250: Performed by: STUDENT IN AN ORGANIZED HEALTH CARE EDUCATION/TRAINING PROGRAM

## 2025-06-07 PROCEDURE — A9270 NON-COVERED ITEM OR SERVICE: HCPCS | Performed by: STUDENT IN AN ORGANIZED HEALTH CARE EDUCATION/TRAINING PROGRAM

## 2025-06-07 PROCEDURE — 80048 BASIC METABOLIC PNL TOTAL CA: CPT

## 2025-06-07 PROCEDURE — 73706 CT ANGIO LWR EXTR W/O&W/DYE: CPT | Mod: RT

## 2025-06-07 PROCEDURE — 36415 COLL VENOUS BLD VENIPUNCTURE: CPT

## 2025-06-07 PROCEDURE — 83735 ASSAY OF MAGNESIUM: CPT

## 2025-06-07 PROCEDURE — 700102 HCHG RX REV CODE 250 W/ 637 OVERRIDE(OP): Performed by: STUDENT IN AN ORGANIZED HEALTH CARE EDUCATION/TRAINING PROGRAM

## 2025-06-07 PROCEDURE — 99233 SBSQ HOSP IP/OBS HIGH 50: CPT | Performed by: STUDENT IN AN ORGANIZED HEALTH CARE EDUCATION/TRAINING PROGRAM

## 2025-06-07 PROCEDURE — 770004 HCHG ROOM/CARE - ONCOLOGY PRIVATE *

## 2025-06-07 PROCEDURE — 700102 HCHG RX REV CODE 250 W/ 637 OVERRIDE(OP): Performed by: NURSE PRACTITIONER

## 2025-06-07 PROCEDURE — 84100 ASSAY OF PHOSPHORUS: CPT

## 2025-06-07 PROCEDURE — 700105 HCHG RX REV CODE 258: Performed by: STUDENT IN AN ORGANIZED HEALTH CARE EDUCATION/TRAINING PROGRAM

## 2025-06-07 PROCEDURE — 85027 COMPLETE CBC AUTOMATED: CPT

## 2025-06-07 PROCEDURE — 700117 HCHG RX CONTRAST REV CODE 255: Performed by: STUDENT IN AN ORGANIZED HEALTH CARE EDUCATION/TRAINING PROGRAM

## 2025-06-07 PROCEDURE — 700111 HCHG RX REV CODE 636 W/ 250 OVERRIDE (IP): Mod: JZ | Performed by: STUDENT IN AN ORGANIZED HEALTH CARE EDUCATION/TRAINING PROGRAM

## 2025-06-07 PROCEDURE — 82962 GLUCOSE BLOOD TEST: CPT | Mod: 91

## 2025-06-07 RX ORDER — TRAMADOL HYDROCHLORIDE 50 MG/1
50 TABLET ORAL ONCE
Status: COMPLETED | OUTPATIENT
Start: 2025-06-07 | End: 2025-06-07

## 2025-06-07 RX ORDER — DEXTROSE MONOHYDRATE 25 G/50ML
25 INJECTION, SOLUTION INTRAVENOUS
Status: DISCONTINUED | OUTPATIENT
Start: 2025-06-07 | End: 2025-06-08 | Stop reason: HOSPADM

## 2025-06-07 RX ORDER — METOPROLOL SUCCINATE 25 MG/1
25 TABLET, EXTENDED RELEASE ORAL DAILY
Status: DISCONTINUED | OUTPATIENT
Start: 2025-06-07 | End: 2025-06-08

## 2025-06-07 RX ORDER — OMEPRAZOLE 20 MG/1
20 CAPSULE, DELAYED RELEASE ORAL 2 TIMES DAILY
Status: DISCONTINUED | OUTPATIENT
Start: 2025-06-07 | End: 2025-06-07

## 2025-06-07 RX ORDER — CALCIUM CARBONATE 500 MG/1
500 TABLET, CHEWABLE ORAL
Status: DISCONTINUED | OUTPATIENT
Start: 2025-06-07 | End: 2025-06-08 | Stop reason: HOSPADM

## 2025-06-07 RX ORDER — SPIRONOLACTONE 25 MG/1
25 TABLET ORAL
Status: DISCONTINUED | OUTPATIENT
Start: 2025-06-07 | End: 2025-06-08 | Stop reason: HOSPADM

## 2025-06-07 RX ORDER — INSULIN LISPRO 100 [IU]/ML
1-6 INJECTION, SOLUTION INTRAVENOUS; SUBCUTANEOUS
Status: DISCONTINUED | OUTPATIENT
Start: 2025-06-07 | End: 2025-06-08 | Stop reason: HOSPADM

## 2025-06-07 RX ADMIN — ACETAMINOPHEN 650 MG: 325 TABLET ORAL at 18:15

## 2025-06-07 RX ADMIN — TRAMADOL HYDROCHLORIDE 50 MG: 50 TABLET, COATED ORAL at 21:28

## 2025-06-07 RX ADMIN — LEVOTHYROXINE SODIUM 50 MCG: 0.05 TABLET ORAL at 05:08

## 2025-06-07 RX ADMIN — IOHEXOL 75 ML: 350 INJECTION, SOLUTION INTRAVENOUS at 20:15

## 2025-06-07 RX ADMIN — AZITHROMYCIN 500 MG: 500 INJECTION, POWDER, LYOPHILIZED, FOR SOLUTION INTRAVENOUS at 06:09

## 2025-06-07 RX ADMIN — ACETAMINOPHEN 650 MG: 325 TABLET ORAL at 07:56

## 2025-06-07 RX ADMIN — INSULIN LISPRO 1 UNITS: 100 INJECTION, SOLUTION INTRAVENOUS; SUBCUTANEOUS at 12:11

## 2025-06-07 RX ADMIN — OMEPRAZOLE 20 MG: 20 CAPSULE, DELAYED RELEASE ORAL at 05:08

## 2025-06-07 RX ADMIN — AMPICILLIN AND SULBACTAM 3 G: 1; 2 INJECTION, POWDER, FOR SOLUTION INTRAMUSCULAR; INTRAVENOUS at 17:29

## 2025-06-07 RX ADMIN — INSULIN LISPRO 1 UNITS: 100 INJECTION, SOLUTION INTRAVENOUS; SUBCUTANEOUS at 08:00

## 2025-06-07 RX ADMIN — METOPROLOL SUCCINATE 25 MG: 25 TABLET, EXTENDED RELEASE ORAL at 07:56

## 2025-06-07 RX ADMIN — INSULIN LISPRO 2 UNITS: 100 INJECTION, SOLUTION INTRAVENOUS; SUBCUTANEOUS at 20:54

## 2025-06-07 RX ADMIN — THIAMINE HYDROCHLORIDE 200 MG: 100 INJECTION, SOLUTION INTRAMUSCULAR; INTRAVENOUS at 05:07

## 2025-06-07 RX ADMIN — AMPICILLIN AND SULBACTAM 3 G: 1; 2 INJECTION, POWDER, FOR SOLUTION INTRAMUSCULAR; INTRAVENOUS at 12:15

## 2025-06-07 RX ADMIN — DEXAMETHASONE 4 MG: 4 TABLET ORAL at 17:35

## 2025-06-07 RX ADMIN — FUROSEMIDE 40 MG: 10 INJECTION, SOLUTION INTRAVENOUS at 17:30

## 2025-06-07 RX ADMIN — AMIODARONE HYDROCHLORIDE 400 MG: 200 TABLET ORAL at 05:08

## 2025-06-07 RX ADMIN — FOLIC ACID 1 MG: 1 TABLET ORAL at 05:09

## 2025-06-07 RX ADMIN — THERA TABS 1 TABLET: TAB at 05:08

## 2025-06-07 RX ADMIN — GABAPENTIN 200 MG: 100 CAPSULE ORAL at 05:09

## 2025-06-07 RX ADMIN — GABAPENTIN 200 MG: 100 CAPSULE ORAL at 12:12

## 2025-06-07 RX ADMIN — DAPAGLIFLOZIN 10 MG: 10 TABLET, FILM COATED ORAL at 05:09

## 2025-06-07 RX ADMIN — ANTACID TABLETS 500 MG: 500 TABLET, CHEWABLE ORAL at 17:35

## 2025-06-07 RX ADMIN — LOSARTAN POTASSIUM 50 MG: 50 TABLET, FILM COATED ORAL at 05:07

## 2025-06-07 RX ADMIN — THIAMINE HYDROCHLORIDE 200 MG: 100 INJECTION, SOLUTION INTRAMUSCULAR; INTRAVENOUS at 17:27

## 2025-06-07 RX ADMIN — OMEPRAZOLE 20 MG: 20 CAPSULE, DELAYED RELEASE ORAL at 17:35

## 2025-06-07 RX ADMIN — SPIRONOLACTONE 25 MG: 25 TABLET ORAL at 16:01

## 2025-06-07 RX ADMIN — FUROSEMIDE 40 MG: 10 INJECTION, SOLUTION INTRAVENOUS at 05:07

## 2025-06-07 RX ADMIN — AMPICILLIN AND SULBACTAM 3 G: 1; 2 INJECTION, POWDER, FOR SOLUTION INTRAMUSCULAR; INTRAVENOUS at 05:10

## 2025-06-07 RX ADMIN — GABAPENTIN 200 MG: 100 CAPSULE ORAL at 17:35

## 2025-06-07 RX ADMIN — DEXAMETHASONE 4 MG: 4 TABLET ORAL at 05:09

## 2025-06-07 ASSESSMENT — PAIN DESCRIPTION - PAIN TYPE
TYPE: ACUTE PAIN

## 2025-06-07 ASSESSMENT — ENCOUNTER SYMPTOMS
HEMOPTYSIS: 1
COUGH: 1
SHORTNESS OF BREATH: 0

## 2025-06-07 NOTE — PROGRESS NOTES
Assumed care at 1900. Received report from DANIEL Horan. Pt is resting in bed. Assessment completed. A&O X 4. VSS. No complaints of pain, chest pain, SOB, or N/V. Lung sounds clear. No cardiac murmurs noted. Normoactive bowel sounds in all quadrants. Pt reports 0/10 pain at this time. Pt is on RA. Pt is SBA. Educated on POC and verbalizes understanding. Call light and belongings are within reach. Chair alarm on. No further needs at this time.

## 2025-06-07 NOTE — DOCUMENTATION QUERY
[facility]                                                                       Query Response Note      PATIENT:               JENNIFER PAINTING  ACCT #:                  [id number]  MRN:                     [id number]  :                      1950  ADMIT DATE:       2025 1:53 PM  DISCH DATE:        6/3/2025 6:26 PM  RESPONDING  PROVIDER #:        [id number]           QUERY TEXT:    Elevated troponins since arrival, 41 on arrival and 194 the following day.  EKG Atrial flutter. Treated with IV Amiodarone x1 and IV/PO Metoprolol.       troponin 74.   EKG Abnormal anterior T waves, consider ischemia.  Cardioverted with several IV Amiodarone 360 mg infusions.    Please clarify the clinical/diagnostic relevance for the documented findings.    Note: If you agree with a diagnosis listed, please remember to include it in your concurrent daily documentation and onto the Discharge Summary.    The patient's clinical indicators include:   Troponin T: 41    Troponin T:194 -> 39    Troponin T: 74     EKG Atrial flutter   EKG Abnormal anterior T waves, consider ischemia   Echo Indeterminate diastolic function due to stunned left atrium, s/p cardioversion with atrial flutter    Treatment:   - IV Metoprolol tartrate 5mg x2; PO Metoprolol tartrate 12.5 mg x1   - IV Amiodarone 150 mg x1; IV Metoprolol tartrate 5mg x1; PO Metoprolol tartrate 50 mg x2; PO Metoprolol tartrate 25 mg x3   - IV Metoprolol tartrate 5mg x2; PO Metoprolol tartrate 50 mg x1; PO Metoprolol tartrate 25 mg x2   - Cardioversion; IV Amiodarone 150 mg push dosex1; IV Amiodarone 360 mg infusion x7;  PO Metoprolol tartrate 50 mg x1;   - IV Amiodarone 360 mg infusion x7; PO Amiodarone 400 mg    Risk factors: Atrial flutter; chronic systolic heart failure; cardiomyopathy    Thank you,  Pippa LAY  Clinical Documentation  Specialist  Connect via [employer] Messenger  Options provided:   -- Myocardial infarction type 2 related to atrial flutter, present on admission, is clinically significant and ruled in   -- Myocardial infarction type 2 related to atrial flutter, after admission, is clinically significant and ruled in   -- Findings of no clinical significance   -- Other explanation, (Please specify the other explanation)   -- Unable to determine      Query created by: Pippa Allan on 6/4/2025 7:44 AM    RESPONSE TEXT:    Myocardial infarction type 2 related to atrial flutter, after admission, is clinically significant and ruled in          Electronically signed by:  MAYANK BLAIR MD 6/7/2025 4:00 PM

## 2025-06-07 NOTE — CARE PLAN
Problem: Knowledge Deficit - Standard  Goal: Patient and family/care givers will demonstrate understanding of plan of care, disease process/condition, diagnostic tests and medications  Outcome: Progressing     Problem: Optimal Care for Alcohol Withdrawal  Goal: Optimal Care for the alcohol withdrawal patient  Outcome: Progressing     Problem: Seizure Precautions  Goal: Implementation of seizure precautions  Outcome: Progressing     Problem: Lifestyle Changes  Goal: Patient's ability to identify lifestyle changes and available resources to help reduce recurrence of condition will improve  Outcome: Progressing     Problem: Psychosocial  Goal: Patient's level of anxiety will decrease  Outcome: Progressing     Problem: Hemodynamics  Goal: Patient's hemodynamics, fluid balance and neurologic status will be stable or improve  Outcome: Progressing     Problem: Fluid Volume  Goal: Fluid volume balance will be maintained    Outcome: Progressing     Problem: Urinary - Renal Perfusion  Goal: Ability to achieve and maintain adequate renal perfusion and functioning will improve  Outcome: Progressing     Problem: Respiratory  Goal: Patient will achieve/maintain optimum respiratory ventilation and gas exchange  Outcome: Progressing   Pt on RA saturating above 90%.    Problem: Physical Regulation  Goal: Diagnostic test results will improve  Outcome: Progressing  Goal: Signs and symptoms of infection will decrease  Outcome: Progressing   Pt afebrile. WBC trending down.    Problem: Fall Risk  Goal: Patient will remain free from falls  Outcome: Progressing   Bed alarm on. Pt calls appropriately.     Problem: Pain - Standard  Goal: Alleviation of pain or a reduction in pain to the patient’s comfort goal  Outcome: Progressing  Pt rates pain 3/10. Tylenol given.      The patient is Stable - Low risk of patient condition declining or worsening    Shift Goals  Clinical Goals: Pt Vs remain stable  Patient Goals: sleep  Family Goals:  VERO    Progress made toward(s) clinical / shift goals:      Patient is not progressing towards the following goals:    Pt AOx4. Tylenol given for pain ( RLE and abd). Pt is still has blood while is coughing. BM today. PIV patent running IV abx. Pt up with standby assist.

## 2025-06-07 NOTE — DISCHARGE SUMMARY
Discharge Summary    CHIEF COMPLAINT ON ADMISSION  Chief Complaint   Patient presents with    Blood in Sputum       Reason for Admission  coughing blood     Admission Date  6/5/2025    CODE STATUS  Full Code    HPI & HOSPITAL COURSE  The patient was evaluated in the emergency department for cough with sputum and some bloody streaks.  He underwent CT imaging which was negative for pulmonary embolism.  However, the patient was found to have multifocal pneumonia.  Patient was then sedated on broad-spectrum antibiotics.  The patient remained on room air throughout the duration of his hospitalization.  Blood cultures were taken and negative to date.  A urine culture was negative.  Respiratory viral panel was negative.  Sputum culture with usual respiratory matthias and gram-positive cocci.    The patient was found to have marked swelling and ecchymosis present in his right lower extremity.  He underwent DVT ultrasound which was negative.    This patient's case were discussed with neurosurgery who reinforced the importance of avoiding DVT prophylaxis and anticoagulation in the setting of recent subdural hematoma/subarachnoid hemorrhage with bilateral MMA.  Orders reviewed to ensure that the patient did not receive DVT prophylaxis nor anticoagulation.    While hospitalized, the attempt was made by the primary service to optimize GDMT.  His renal function and electrolytes are closely monitored while initiating medications for heart failure.  His blood pressure tolerated the addition of multiple agents.    Therefore, he is discharged in good and stable condition to home with close outpatient follow-up.    The patient met 2-midnight criteria for an inpatient stay at the time of discharge.    Discharge Date  06/07/25    FOLLOW UP ITEMS POST DISCHARGE  Patient is follow-up with his primary care physician    DISCHARGE DIAGNOSES  Principal Problem:    Multifocal pneumonia (POA: Yes)  Active Problems:    Atrial flutter (HCC) (POA:  Yes)    Alcohol abuse (POA: Yes)    Contraindication to deep vein thrombosis (DVT) prophylaxis (POA: Yes)    Traumatic subdural hematoma without loss of consciousness (HCC) (POA: Yes)    Heart failure with reduced ejection fraction (HCC) (POA: Yes)    Hypothyroidism (POA: Yes)    Pleural effusion (POA: Yes)    GERD (gastroesophageal reflux disease) (POA: Unknown)    Leg edema, right (POA: Unknown)    Leukocytosis (POA: Unknown)  Resolved Problems:    * No resolved hospital problems. *      FOLLOW UP    [facility]  [address]  [place] [address]  [phone]  Follow up        MEDICATIONS ON DISCHARGE     Medication List        START taking these medications        Instructions   dapagliflozin propanediol 10 MG Tabs  Commonly known as: Farxiga   Take 1 Tablet by mouth every day.  Dose: 10 mg     omeprazole 20 MG delayed-release capsule  Commonly known as: PriLOSEC   Take 1 Capsule by mouth 2 times a day for 60 days.  Dose: 20 mg            CHANGE how you take these medications        Instructions   gabapentin 100 MG Caps  What changed: additional instructions  Commonly known as: Neurontin   Take 2 Capsules by mouth 3 times a day.  Dose: 200 mg            CONTINUE taking these medications        Instructions   amiodarone 200 MG Tabs  Start taking on: June 4, 2025  Commonly known as: Cordarone   Take 2 Tablets by mouth every day for 10 days, THEN 1 Tablet every day for 30 days.     dexamethasone 4 MG Tabs  Commonly known as: Decadron   Doctor's comments:    Take 1 Tablet by mouth 2 times a day for 14 days.  Dose: 4 mg     famotidine 20 MG Tabs  Commonly known as: Pepcid   Take 1 Tablet by mouth 2 times a day.  Dose: 20 mg     furosemide 20 MG Tabs  Commonly known as: Lasix   Take 1 Tablet by mouth every day.  Dose: 20 mg     levothyroxine 50 MCG Tabs  Commonly known as: Synthroid   Take 1 Tablet by mouth every morning on an empty stomach.  Dose: 50 mcg     losartan 50 MG  Tabs  Commonly known as: Cozaar   Take 1 Tablet by mouth every day.  Dose: 50 mg     metoprolol SR 25 MG Tb24  Commonly known as: Toprol XL   Take 1 Tablet by mouth every day.  Dose: 25 mg     potassium chloride SA 10 MEQ Tbcr  Commonly known as: K-Dur   Take 1 Tablet by mouth every day for 30 days.  Dose: 10 mEq            STOP taking these medications      oxyCODONE immediate-release 5 MG Tabs  Commonly known as: Roxicodone              Allergies  Allergies[1]    DIET  Orders Placed This Encounter   Procedures    Diet Order Diet: Regular     Standing Status:   Standing     Number of Occurrences:   1     Diet::   Regular [1]       ACTIVITY  As tolerated.  Weight bearing as tolerated    CONSULTATIONS  None    PROCEDURES  None    LABORATORY  Lab Results   Component Value Date    SODIUM 133 (L) 06/07/2025    POTASSIUM 4.0 06/07/2025    CHLORIDE 96 06/07/2025    CO2 23 06/07/2025    GLUCOSE 232 (H) 06/07/2025    BUN 29 (H) 06/07/2025    CREATININE 1.01 06/07/2025        Lab Results   Component Value Date    WBC 13.7 (H) 06/07/2025    HEMOGLOBIN 10.1 (L) 06/07/2025    HEMATOCRIT 31.0 (L) 06/07/2025    PLATELETCT 239 06/07/2025      US-EXTREMITY VENOUS LOWER UNILAT RIGHT   Final Result      CT-CTA CHEST PULMONARY ARTERY W/ RECONS   Final Result      1.  No evidence of pulmonary embolism.   2.  New multifocal pneumonia.   3.  Small bilateral pleural effusions.   4.  Moderate hiatal hernia. There is distended fluid-filled esophagus which could represent reflux.         DX-TIBIA AND FIBULA RIGHT   Final Result      1.  No fracture of the RIGHT lower leg.   2.  Diffuse soft tissue edema.      DX-CHEST-PORTABLE (1 VIEW)   Final Result      1.  Hypoinflation with probable pulmonary edema and small bilateral pleural effusions.   2.  Hazy bilateral upper lung opacities may indicate edema or developing pneumonia.   3.  No pneumothorax.            Total time of the discharge process exceeds 32 minutes.       [1] No Known  Allergies

## 2025-06-07 NOTE — CARE PLAN
The patient is Stable - Low risk of patient condition declining or worsening    Shift Goals  Clinical Goals: Pt will remain free from falls  Patient Goals: sleep  Family Goals: VERO    Progress made toward(s) clinical / shift goals:  Patient and family educated on plan of care and verbalized understanding, being able to identify barriers for discharge. Patient has remained free of falls this shift with appropriate fall precautions in place throughout. Patient skin integrity maintained throughout shift with skin interventions and checks. Pt rounded on hourly and all needs met.     Problem: Knowledge Deficit - Standard  Goal: Patient and family/care givers will demonstrate understanding of plan of care, disease process/condition, diagnostic tests and medications  6/6/2025 2255 by Azul Guerrero RHyunN.  Outcome: Progressing  6/6/2025 2255 by Azul Guerrero RHyunN.  Outcome: Progressing     Problem: Psychosocial  Goal: Patient's level of anxiety will decrease  Outcome: Progressing  Goal: Spiritual and cultural needs incorporated into hospitalization  Outcome: Progressing     Problem: Respiratory  Goal: Patient will achieve/maintain optimum respiratory ventilation and gas exchange  Outcome: Progressing     Problem: Fall Risk  Goal: Patient will remain free from falls  Outcome: Progressing       Patient is not progressing towards the following goals:

## 2025-06-07 NOTE — DISCHARGE SUMMARY
Discharge Summary    CHIEF COMPLAINT ON ADMISSION  Chief Complaint   Patient presents with    Blood in Sputum       Reason for Admission  coughing blood     Admission Date  6/5/2025    CODE STATUS  Full Code    HPI & HOSPITAL COURSE  The patient was evaluated in the emergency department for cough with sputum and some bloody streaks.  He underwent CT imaging which was negative for pulmonary embolism.  However, the patient was found to have multifocal pneumonia.  Patient was then initiated on broad-spectrum antibiotics prior to being transition to Augmentin and discharged.  Of note, the patient remained on room air throughout the duration of his hospitalization.  His respiratory viral panel, blood cultures, sputum cultures, and urine cultures were negative to date.  Additionally, the patient's leukocytosis remained stable throughout his hospitalization.  Seeing as the prevention cultures were negative and the patient appeared clinically well, the persistent leukocytosis was attributed to the Decadron that he is being administered by neurosurgery as an outpatient.    The patient had reported generalized abdominal pain on physical examination.  He underwent CT imaging which revealed no evidence of bowel obstruction or inflammatory change.  There is moderate constipation with fatty liver and a moderate-sized hiatal hernia.  Patient was provided PPI for his acid reflux symptoms and an outpatient referral for gastroenterology evaluation.  Additionally, the patient did have a bowel movement prior to discharge.    The patient was found to have marked swelling and ecchymosis present in his right lower extremity.  He underwent DVT ultrasound which was negative.  He was also found to have pain in his lower extremities and he perceived to have worsening bruise-like appearance in his right lower extremity.  The patient underwent CTA of his lower extremities that were negative for acute bleeding.  However, there was evidence of  hematoma in his left leg.  His H&H remained stable throughout the duration of the hospitalization.    This patient's case were discussed with neurosurgery who reinforced the importance of avoiding DVT prophylaxis and anticoagulation in the setting of recent subdural hematoma/subarachnoid hemorrhage with bilateral MMA.  Orders reviewed to ensure that the patient did not receive DVT prophylaxis nor anticoagulation.    While hospitalized, the attempt was made by the primary service to optimize GDMT.  His renal function and electrolytes are closely monitored while initiating medications for heart failure.  His blood pressure and kidney function tolerated the addition of multiple agents.    Therefore, he is discharged in good and stable condition to home with close outpatient follow-up.    The patient met 2-midnight criteria for an inpatient stay at the time of discharge.    Discharge Date  06/08/25    FOLLOW UP ITEMS POST DISCHARGE  Patient is follow-up with his primary care physician    Patient is to follow-up with neurosurgery as an outpatient as previously agreed upon.    Patient has follow-up with GI for his hiatal hernia    DISCHARGE DIAGNOSES  Principal Problem:    Multifocal pneumonia (POA: Yes)  Active Problems:    Atrial flutter (HCC) (POA: Yes)    Alcohol abuse (POA: Yes)    Contraindication to deep vein thrombosis (DVT) prophylaxis (POA: Yes)    Traumatic subdural hematoma without loss of consciousness (HCC) (POA: Yes)    Heart failure with reduced ejection fraction (HCC) (POA: Yes)    Hypothyroidism (POA: Yes)    Pleural effusion (POA: Yes)    GERD (gastroesophageal reflux disease) (POA: Unknown)    Leg edema, right (POA: Unknown)    Leukocytosis (POA: Unknown)  Resolved Problems:    * No resolved hospital problems. *      FOLLOW UP    [facility]  [address]  [place] [facility] [address]  [phone]  Follow up        MEDICATIONS ON DISCHARGE     Medication List         START taking these medications        Instructions   amoxicillin-clavulanate 875-125 MG Tabs  Commonly known as: Augmentin   Take 1 Tablet by mouth 2 times a day for 2 days.  Dose: 1 Tablet     dapagliflozin propanediol 10 MG Tabs  Commonly known as: Farxiga   Take 1 Tablet by mouth every day.  Dose: 10 mg     omeprazole 20 MG delayed-release capsule  Commonly known as: PriLOSEC   Take 1 Capsule by mouth 2 times a day for 60 days.  Dose: 20 mg     spironolactone 25 MG Tabs  Start taking on: June 9, 2025  Commonly known as: Aldactone   Take 1 Tablet by mouth every day.  Dose: 25 mg            CHANGE how you take these medications        Instructions   furosemide 40 MG Tabs  What changed:   medication strength  how much to take  Commonly known as: Lasix   Take 1 Tablet by mouth every day for 90 days.  Dose: 40 mg     gabapentin 100 MG Caps  What changed: additional instructions  Commonly known as: Neurontin   Take 2 Capsules by mouth 3 times a day.  Dose: 200 mg     metoprolol SR 50 MG Tb24  Start taking on: June 9, 2025  What changed:   medication strength  how much to take  Commonly known as: Toprol XL   Take 1 Tablet by mouth every day for 90 days.  Dose: 50 mg     oxyCODONE immediate-release 5 MG Tabs  What changed: how much to take  Commonly known as: Roxicodone   Take 1 Tablet by mouth every 6 hours as needed for Severe Pain for up to 5 days.  Dose: 5 mg            CONTINUE taking these medications        Instructions   amiodarone 200 MG Tabs  Start taking on: June 4, 2025  Commonly known as: Cordarone   Take 2 Tablets by mouth every day for 10 days, THEN 1 Tablet every day for 30 days.     dexamethasone 4 MG Tabs  Commonly known as: Decadron   Doctor's comments:    Take 1 Tablet by mouth 2 times a day for 14 days.  Dose: 4 mg     famotidine 20 MG Tabs  Commonly known as: Pepcid   Take 1 Tablet by mouth 2 times a day.  Dose: 20 mg     levothyroxine 50 MCG Tabs  Commonly known as: Synthroid   Take 1 Tablet by  mouth every morning on an empty stomach.  Dose: 50 mcg     losartan 50 MG Tabs  Commonly known as: Cozaar   Take 1 Tablet by mouth every day.  Dose: 50 mg            STOP taking these medications      potassium chloride SA 10 MEQ Tbcr  Commonly known as: K-Dur              Allergies  Allergies[1]    DIET  Orders Placed This Encounter   Procedures    Diet Order Diet: Regular     Standing Status:   Standing     Number of Occurrences:   1     Diet::   Regular [1]       ACTIVITY  As tolerated.  Weight bearing as tolerated    CONSULTATIONS  None    PROCEDURES  None    LABORATORY  Lab Results   Component Value Date    SODIUM 132 (L) 06/08/2025    POTASSIUM 4.3 06/08/2025    CHLORIDE 95 (L) 06/08/2025    CO2 22 06/08/2025    GLUCOSE 224 (H) 06/08/2025    BUN 29 (H) 06/08/2025    CREATININE 0.91 06/08/2025        Lab Results   Component Value Date    WBC 13.4 (H) 06/08/2025    HEMOGLOBIN 10.0 (L) 06/08/2025    HEMATOCRIT 31.8 (L) 06/08/2025    PLATELETCT 244 06/08/2025      CT-ABDOMEN-PELVIS WITH   Final Result      1.  No evidence of bowel obstruction or focal inflammatory change.      2.  Moderate constipation.      3.  Fatty liver.      4.  Bibasilar atelectasis with small bilateral pleural effusions.      5.  Moderate sized hiatal hernia.      6.  Small pericardial effusion.      7.  2 mm left lower pole renal calyceal stone.         CT-CTA LOWER EXT WITH & W/O-POST PROCESS RIGHT   Final Result      1.  Large hematoma in the lateral subcutaneous soft tissues of the LEFT lower leg.   2.  No evidence for arterial injury.   3.  Diminished enhancement of the distal peroneal artery likely secondary to atherosclerotic disease.   4.  Two-vessel runoff at the calf.         US-EXTREMITY VENOUS LOWER UNILAT RIGHT   Final Result      CT-CTA CHEST PULMONARY ARTERY W/ RECONS   Final Result      1.  No evidence of pulmonary embolism.   2.  New multifocal pneumonia.   3.  Small bilateral pleural effusions.   4.  Moderate hiatal  hernia. There is distended fluid-filled esophagus which could represent reflux.         DX-TIBIA AND FIBULA RIGHT   Final Result      1.  No fracture of the RIGHT lower leg.   2.  Diffuse soft tissue edema.      DX-CHEST-PORTABLE (1 VIEW)   Final Result      1.  Hypoinflation with probable pulmonary edema and small bilateral pleural effusions.   2.  Hazy bilateral upper lung opacities may indicate edema or developing pneumonia.   3.  No pneumothorax.            Total time of the discharge process exceeds 32 minutes.         [1] No Known Allergies

## 2025-06-07 NOTE — PROGRESS NOTES
Monitor Summary  Rhythm: SR  Rate: 67-73  Ectopy: (R)PAC, (R) PVC  Measurements: .11/.07/.55
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
## 2025-06-08 ENCOUNTER — PHARMACY VISIT (OUTPATIENT)
Dept: PHARMACY | Facility: MEDICAL CENTER | Age: 75
End: 2025-06-08
Payer: COMMERCIAL

## 2025-06-08 VITALS
WEIGHT: 171 LBS | HEART RATE: 68 BPM | TEMPERATURE: 97.7 F | DIASTOLIC BLOOD PRESSURE: 73 MMHG | BODY MASS INDEX: 28.46 KG/M2 | OXYGEN SATURATION: 95 % | RESPIRATION RATE: 15 BRPM | SYSTOLIC BLOOD PRESSURE: 159 MMHG

## 2025-06-08 DIAGNOSIS — K21.00 GASTROESOPHAGEAL REFLUX DISEASE WITH ESOPHAGITIS, UNSPECIFIED WHETHER HEMORRHAGE: Primary | ICD-10-CM

## 2025-06-08 DIAGNOSIS — K44.9 HIATAL HERNIA: ICD-10-CM

## 2025-06-08 LAB
ANION GAP SERPL CALC-SCNC: 15 MMOL/L (ref 7–16)
BUN SERPL-MCNC: 29 MG/DL (ref 8–22)
CALCIUM SERPL-MCNC: 8.4 MG/DL (ref 8.5–10.5)
CHLORIDE SERPL-SCNC: 95 MMOL/L (ref 96–112)
CO2 SERPL-SCNC: 22 MMOL/L (ref 20–33)
CREAT SERPL-MCNC: 0.91 MG/DL (ref 0.5–1.4)
ERYTHROCYTE [DISTWIDTH] IN BLOOD BY AUTOMATED COUNT: 68.4 FL (ref 35.9–50)
GFR SERPLBLD CREATININE-BSD FMLA CKD-EPI: 87 ML/MIN/1.73 M 2
GLUCOSE BLD STRIP.AUTO-MCNC: 212 MG/DL (ref 65–99)
GLUCOSE SERPL-MCNC: 224 MG/DL (ref 65–99)
HCT VFR BLD AUTO: 31.8 % (ref 42–52)
HGB BLD-MCNC: 10 G/DL (ref 14–18)
MCH RBC QN AUTO: 30 PG (ref 27–33)
MCHC RBC AUTO-ENTMCNC: 31.4 G/DL (ref 32.3–36.5)
MCV RBC AUTO: 95.5 FL (ref 81.4–97.8)
PLATELET # BLD AUTO: 244 K/UL (ref 164–446)
PMV BLD AUTO: 9.7 FL (ref 9–12.9)
POTASSIUM SERPL-SCNC: 4.3 MMOL/L (ref 3.6–5.5)
RBC # BLD AUTO: 3.33 M/UL (ref 4.7–6.1)
SODIUM SERPL-SCNC: 132 MMOL/L (ref 135–145)
WBC # BLD AUTO: 13.4 K/UL (ref 4.8–10.8)

## 2025-06-08 PROCEDURE — 700102 HCHG RX REV CODE 250 W/ 637 OVERRIDE(OP): Performed by: STUDENT IN AN ORGANIZED HEALTH CARE EDUCATION/TRAINING PROGRAM

## 2025-06-08 PROCEDURE — 700105 HCHG RX REV CODE 258: Performed by: STUDENT IN AN ORGANIZED HEALTH CARE EDUCATION/TRAINING PROGRAM

## 2025-06-08 PROCEDURE — 85027 COMPLETE CBC AUTOMATED: CPT

## 2025-06-08 PROCEDURE — 700111 HCHG RX REV CODE 636 W/ 250 OVERRIDE (IP): Mod: JZ | Performed by: STUDENT IN AN ORGANIZED HEALTH CARE EDUCATION/TRAINING PROGRAM

## 2025-06-08 PROCEDURE — 99239 HOSP IP/OBS DSCHRG MGMT >30: CPT | Performed by: STUDENT IN AN ORGANIZED HEALTH CARE EDUCATION/TRAINING PROGRAM

## 2025-06-08 PROCEDURE — 36415 COLL VENOUS BLD VENIPUNCTURE: CPT

## 2025-06-08 PROCEDURE — RXMED WILLOW AMBULATORY MEDICATION CHARGE: Performed by: STUDENT IN AN ORGANIZED HEALTH CARE EDUCATION/TRAINING PROGRAM

## 2025-06-08 PROCEDURE — 80048 BASIC METABOLIC PNL TOTAL CA: CPT

## 2025-06-08 PROCEDURE — A9270 NON-COVERED ITEM OR SERVICE: HCPCS | Performed by: STUDENT IN AN ORGANIZED HEALTH CARE EDUCATION/TRAINING PROGRAM

## 2025-06-08 PROCEDURE — 82962 GLUCOSE BLOOD TEST: CPT

## 2025-06-08 RX ORDER — METOPROLOL SUCCINATE 50 MG/1
50 TABLET, EXTENDED RELEASE ORAL DAILY
Qty: 90 TABLET | Refills: 0 | Status: SHIPPED | OUTPATIENT
Start: 2025-06-09 | End: 2025-09-07

## 2025-06-08 RX ORDER — OXYCODONE HYDROCHLORIDE 5 MG/1
5 TABLET ORAL EVERY 6 HOURS PRN
Qty: 20 TABLET | Refills: 0 | Status: SHIPPED | OUTPATIENT
Start: 2025-06-08 | End: 2025-06-13

## 2025-06-08 RX ORDER — FUROSEMIDE 40 MG/1
40 TABLET ORAL
Status: DISCONTINUED | OUTPATIENT
Start: 2025-06-08 | End: 2025-06-08 | Stop reason: HOSPADM

## 2025-06-08 RX ORDER — OXYCODONE HYDROCHLORIDE 5 MG/1
5 TABLET ORAL EVERY 4 HOURS PRN
Refills: 0 | Status: DISCONTINUED | OUTPATIENT
Start: 2025-06-08 | End: 2025-06-08 | Stop reason: HOSPADM

## 2025-06-08 RX ORDER — METOPROLOL SUCCINATE 50 MG/1
50 TABLET, EXTENDED RELEASE ORAL DAILY
Status: DISCONTINUED | OUTPATIENT
Start: 2025-06-09 | End: 2025-06-08 | Stop reason: HOSPADM

## 2025-06-08 RX ORDER — OXYCODONE HYDROCHLORIDE 10 MG/1
10 TABLET ORAL EVERY 4 HOURS PRN
Refills: 0 | Status: DISCONTINUED | OUTPATIENT
Start: 2025-06-08 | End: 2025-06-08 | Stop reason: HOSPADM

## 2025-06-08 RX ORDER — FUROSEMIDE 20 MG/1
20 TABLET ORAL DAILY
Qty: 90 TABLET | Refills: 0 | Status: SHIPPED | OUTPATIENT
Start: 2025-06-08 | End: 2025-06-08

## 2025-06-08 RX ORDER — FUROSEMIDE 40 MG/1
40 TABLET ORAL DAILY
Qty: 30 TABLET | Refills: 2 | Status: SHIPPED | OUTPATIENT
Start: 2025-06-08 | End: 2025-09-06

## 2025-06-08 RX ORDER — METOPROLOL SUCCINATE 25 MG/1
25 TABLET, EXTENDED RELEASE ORAL ONCE
Status: COMPLETED | OUTPATIENT
Start: 2025-06-08 | End: 2025-06-08

## 2025-06-08 RX ORDER — SPIRONOLACTONE 25 MG/1
25 TABLET ORAL DAILY
Qty: 30 TABLET | Refills: 3 | Status: SHIPPED | OUTPATIENT
Start: 2025-06-09

## 2025-06-08 RX ADMIN — INSULIN LISPRO 2 UNITS: 100 INJECTION, SOLUTION INTRAVENOUS; SUBCUTANEOUS at 08:06

## 2025-06-08 RX ADMIN — DEXAMETHASONE 4 MG: 4 TABLET ORAL at 06:21

## 2025-06-08 RX ADMIN — FOLIC ACID 1 MG: 1 TABLET ORAL at 06:19

## 2025-06-08 RX ADMIN — METOPROLOL SUCCINATE 25 MG: 25 TABLET, EXTENDED RELEASE ORAL at 06:22

## 2025-06-08 RX ADMIN — AMIODARONE HYDROCHLORIDE 400 MG: 200 TABLET ORAL at 06:19

## 2025-06-08 RX ADMIN — METOPROLOL SUCCINATE 25 MG: 25 TABLET, EXTENDED RELEASE ORAL at 08:16

## 2025-06-08 RX ADMIN — AMPICILLIN AND SULBACTAM 3 G: 1; 2 INJECTION, POWDER, FOR SOLUTION INTRAMUSCULAR; INTRAVENOUS at 06:16

## 2025-06-08 RX ADMIN — ANTACID TABLETS 500 MG: 500 TABLET, CHEWABLE ORAL at 08:16

## 2025-06-08 RX ADMIN — OXYCODONE HYDROCHLORIDE 10 MG: 10 TABLET ORAL at 08:17

## 2025-06-08 RX ADMIN — FUROSEMIDE 40 MG: 40 TABLET ORAL at 10:07

## 2025-06-08 RX ADMIN — OMEPRAZOLE 20 MG: 20 CAPSULE, DELAYED RELEASE ORAL at 06:21

## 2025-06-08 RX ADMIN — GABAPENTIN 200 MG: 100 CAPSULE ORAL at 06:21

## 2025-06-08 RX ADMIN — DAPAGLIFLOZIN 10 MG: 10 TABLET, FILM COATED ORAL at 06:20

## 2025-06-08 RX ADMIN — LEVOTHYROXINE SODIUM 50 MCG: 0.05 TABLET ORAL at 06:21

## 2025-06-08 RX ADMIN — AMPICILLIN AND SULBACTAM 3 G: 1; 2 INJECTION, POWDER, FOR SOLUTION INTRAMUSCULAR; INTRAVENOUS at 00:18

## 2025-06-08 RX ADMIN — THIAMINE HYDROCHLORIDE 200 MG: 100 INJECTION, SOLUTION INTRAMUSCULAR; INTRAVENOUS at 06:20

## 2025-06-08 RX ADMIN — THERA TABS 1 TABLET: TAB at 06:20

## 2025-06-08 RX ADMIN — LOSARTAN POTASSIUM 50 MG: 50 TABLET, FILM COATED ORAL at 06:19

## 2025-06-08 RX ADMIN — SPIRONOLACTONE 25 MG: 25 TABLET ORAL at 06:19

## 2025-06-08 ASSESSMENT — SOCIAL DETERMINANTS OF HEALTH (SDOH)
WITHIN THE LAST YEAR, HAVE YOU BEEN HUMILIATED OR EMOTIONALLY ABUSED IN OTHER WAYS BY YOUR PARTNER OR EX-PARTNER?: NO
WITHIN THE LAST YEAR, HAVE YOU BEEN KICKED, HIT, SLAPPED, OR OTHERWISE PHYSICALLY HURT BY YOUR PARTNER OR EX-PARTNER?: NO
WITHIN THE PAST 12 MONTHS, THE FOOD YOU BOUGHT JUST DIDN'T LAST AND YOU DIDN'T HAVE MONEY TO GET MORE: NEVER TRUE
WITHIN THE LAST YEAR, HAVE YOU BEEN AFRAID OF YOUR PARTNER OR EX-PARTNER?: NO
WITHIN THE LAST YEAR, HAVE TO BEEN RAPED OR FORCED TO HAVE ANY KIND OF SEXUAL ACTIVITY BY YOUR PARTNER OR EX-PARTNER?: NO
WITHIN THE PAST 12 MONTHS, YOU WORRIED THAT YOUR FOOD WOULD RUN OUT BEFORE YOU GOT THE MONEY TO BUY MORE: NEVER TRUE
IN THE PAST 12 MONTHS, HAS THE ELECTRIC, GAS, OIL, OR WATER COMPANY THREATENED TO SHUT OFF SERVICE IN YOUR HOME?: NO

## 2025-06-08 ASSESSMENT — PAIN DESCRIPTION - PAIN TYPE: TYPE: ACUTE PAIN

## 2025-06-08 NOTE — CARE PLAN
The patient is Watcher - Medium risk of patient condition declining or worsening    Shift Goals  Clinical Goals: Skin integrity, pain control  Patient Goals: sleep  Family Goals: VERO    Progress made toward(s) clinical / shift goals:        Problem: Knowledge Deficit - Standard  Goal: Patient and family/care givers will demonstrate understanding of plan of care, disease process/condition, diagnostic tests and medications  Outcome: Progressing     Problem: Urinary - Renal Perfusion  Goal: Ability to achieve and maintain adequate renal perfusion and functioning will improve  Outcome: Progressing     Problem: Fall Risk  Goal: Patient will remain free from falls  Outcome: Progressing     Problem: Pain - Standard  Goal: Alleviation of pain or a reduction in pain to the patient’s comfort goal  Outcome: Progressing       Patient is not progressing towards the following goals:N/A

## 2025-06-08 NOTE — PROGRESS NOTES
Pt. AxO4, on room air, SBA, discharged with discharge instructions, all patients belongings and meds 2 beds, from pharmacy with Maile, patients relative, via wheelchair to patients relatives car on [address] parking garage.

## 2025-06-16 ENCOUNTER — HOSPITAL ENCOUNTER (OUTPATIENT)
Dept: RADIOLOGY | Facility: MEDICAL CENTER | Age: 75
End: 2025-06-16
Attending: NEUROLOGICAL SURGERY

## 2025-06-16 DIAGNOSIS — S06.5XAA SUBDURAL HEMATOMA (HCC): ICD-10-CM

## 2025-06-16 PROCEDURE — 70450 CT HEAD/BRAIN W/O DYE: CPT

## 2025-06-17 ENCOUNTER — TELEPHONE (OUTPATIENT)
Dept: HEALTH INFORMATION MANAGEMENT | Facility: OTHER | Age: 75
End: 2025-06-17

## 2025-06-18 ENCOUNTER — OFFICE VISIT (OUTPATIENT)
Dept: URGENT CARE | Facility: CLINIC | Age: 75
End: 2025-06-18

## 2025-06-18 ENCOUNTER — APPOINTMENT (OUTPATIENT)
Dept: RADIOLOGY | Facility: IMAGING CENTER | Age: 75
End: 2025-06-18
Attending: STUDENT IN AN ORGANIZED HEALTH CARE EDUCATION/TRAINING PROGRAM

## 2025-06-18 VITALS
SYSTOLIC BLOOD PRESSURE: 144 MMHG | HEART RATE: 74 BPM | RESPIRATION RATE: 18 BRPM | WEIGHT: 171 LBS | DIASTOLIC BLOOD PRESSURE: 84 MMHG | BODY MASS INDEX: 28.46 KG/M2 | TEMPERATURE: 97.6 F | OXYGEN SATURATION: 98 %

## 2025-06-18 DIAGNOSIS — R06.02 SHORTNESS OF BREATH: Primary | ICD-10-CM

## 2025-06-18 DIAGNOSIS — R06.02 SHORTNESS OF BREATH: ICD-10-CM

## 2025-06-18 PROCEDURE — 99204 OFFICE O/P NEW MOD 45 MIN: CPT | Performed by: STUDENT IN AN ORGANIZED HEALTH CARE EDUCATION/TRAINING PROGRAM

## 2025-06-18 PROCEDURE — 3079F DIAST BP 80-89 MM HG: CPT | Performed by: STUDENT IN AN ORGANIZED HEALTH CARE EDUCATION/TRAINING PROGRAM

## 2025-06-18 PROCEDURE — 3077F SYST BP >= 140 MM HG: CPT | Performed by: STUDENT IN AN ORGANIZED HEALTH CARE EDUCATION/TRAINING PROGRAM

## 2025-06-18 PROCEDURE — 71046 X-RAY EXAM CHEST 2 VIEWS: CPT | Mod: TC | Performed by: RADIOLOGY

## 2025-06-18 RX ORDER — ALBUTEROL SULFATE 90 UG/1
2 INHALANT RESPIRATORY (INHALATION) EVERY 6 HOURS PRN
Qty: 8.5 G | Refills: 0 | Status: SHIPPED | OUTPATIENT
Start: 2025-06-18 | End: 2025-06-18

## 2025-06-18 RX ORDER — ALBUTEROL SULFATE 90 UG/1
2 INHALANT RESPIRATORY (INHALATION) EVERY 6 HOURS PRN
Qty: 8.5 G | Refills: 0 | Status: SHIPPED
Start: 2025-06-18

## 2025-06-18 ASSESSMENT — FIBROSIS 4 INDEX: FIB4 SCORE: 1.95

## 2025-06-18 NOTE — PROGRESS NOTES
Urgent Care Visit Note  [facility]    06/18/25    Jaime Rangel     [address] [place] [address]     PCP: Pcp Pt States None     Subjective:     Chief Complaint   Patient presents with    Shortness of Breath     X 2 week  troubles breathing, has to take a deep breath . Patient did just have pneumonia        HPI:  Jaime Rangel is a 75 y.o. male who presents for shortness of breath.  Reports that he has felt short of breath off and on for 2 weeks.  He reports that he was hospitalized roughly 10 days ago for bacterial pneumonia and is still on antibiotics.  He reports that the shortness of breath is coming and going and he reports that he will have episodes where it feels very severe for him and then goes away and he returns to baseline.  Had an episode here at urgent care today where he felt very short of breath for a few seconds and then returned to baseline right afterwards.    Denies any continued fever or chills.  Reports a cough that currently nonproductive.    ROS:  ROS     CURRENT MEDICATIONS:  Encounter Medications with Dispense Information[1]    ALLERGIES:   Allergies[2]    PROBLEM LIST:    does not have any pertinent problems on file.    Allergies, Medications, & Tobacco/Substance Use were reconciled by the Medical Assistant and reviewed by myself.     Objective:     BP (!) 144/84   Pulse 74   Temp 36.4 °C (97.6 °F) (Temporal)   Resp 18   Wt 77.6 kg (171 lb)   SpO2 98%   BMI 28.46 kg/m²     Physical Exam  Constitutional:       Appearance: Normal appearance.   HENT:      Head: Normocephalic and atraumatic.      Right Ear: External ear normal.      Left Ear: External ear normal.      Nose: Nose normal.      Mouth/Throat:      Pharynx: No oropharyngeal exudate or posterior oropharyngeal erythema.   Eyes:      Extraocular Movements: Extraocular movements intact.      Pupils: Pupils are equal, round, and reactive to light.   Cardiovascular:      Rate and Rhythm: Normal rate and  regular rhythm.      Pulses: Normal pulses.   Pulmonary:      Effort: Pulmonary effort is normal. No respiratory distress.      Breath sounds: Normal breath sounds. No stridor. No wheezing, rhonchi or rales.   Abdominal:      General: Abdomen is flat.   Lymphadenopathy:      Cervical: No cervical adenopathy.   Skin:     General: Skin is warm.      Capillary Refill: Capillary refill takes less than 2 seconds.   Neurological:      Mental Status: He is alert and oriented to person, place, and time.      Gait: Gait normal.   Psychiatric:         Mood and Affect: Mood normal.         Behavior: Behavior normal.       Assessment/Plan:     Patient's history and physical exam consistent with:    Assessment & Plan  Shortness of breath    Orders:    DX-CHEST-2 VIEWS; Future    albuterol 108 (90 Base) MCG/ACT Aero Soln inhalation aerosol; Inhale 2 Puffs every 6 hours as needed for Shortness of Breath.      Patient presents for intermittent shortness of breath.  Patient not short of breath constantly but will get episodes that last a few seconds at a time where he feels intensely short of breath.  Almost feels like a choking sensation in his description.  Reports a mild residual cough from recent pneumonia but denies any productive cough.  Denies any fever or chills.    During his time in urgent care today, he maintained a good oxygen saturation of 98% or above he was ambulated around urgent care to see if his oxygen saturation would drop and it remained at 98%.  Chest x-ray was done which did not show any active cardiopulmonary disease.  His lung exam was also generally reassuring.    What he is describing almost sounds like an intermittent bronchospasm, possibly from lung irritability from recent bacterial pneumonia.  As he is still being treated for pneumonia on antibiotics, I will avoid steroids but will try on as needed albuterol inhaler to help when he gets these episodes.  He was educated on supportive care otherwise.  He  was given return precautions and ER precautions regarding any new or worsening symptoms.  Recommend patient follows up with primary care doctor within 1 week or return to urgent care within 1 week for reevaluation, especially if symptoms or not improving.    Discussed differential diagnosis, management options, risks/benefits, and alternatives to planned treatment. Pt expressed understanding and the treatment plan was agreed upon. Questions were encouraged and answered. Pt encouraged to return to urgent care as needed if new or worsening symptoms or if there is no improvement in condition. Pt educated in red flags and indications to immediately call 911 or present to the Emergency Department. Advised the patient to follow-up with the primary care physician for recheck, reevaluation, and further management.    Of note, the patient was mildly hypertensive today with a systolic blood pressure of 144.  The patient is asymptomatic in this regard.  The elevation in blood pressure is likely related to being acutely ill and recent pneumonia.  Do not recommend any intervention of this blood pressure at this time.  Needs to establish with primary care provider so that blood pressure can be managed long-term.    I personally reviewed prior external notes and test results pertinent to today's visit. I have independently reviewed and interpreted all diagnostics ordered during this visit.    Please note that this dictation was created using voice recognition software. I have made a reasonable attempt to correct obvious errors, but I expect that there are errors of grammar and possibly content that I did not discover before finalizing the note.    This note was electronically signed by Jame Samano MD               [1]   Current Outpatient Medications   Medication Sig Refill Last Dispense    albuterol 108 (90 Base) MCG/ACT Aero Soln inhalation aerosol Inhale 2 Puffs every 6 hours as needed for Shortness of Breath. 0 Unknown  (outside pharmacy)    amiodarone (CORDARONE) 200 MG Tab Take 2 Tablets by mouth every day for 10 days, THEN 1 Tablet every day for 30 days. 0 6/4/2025    dapagliflozin propanediol (FARXIGA) 10 MG Tab Take 1 Tablet by mouth every day. 3 6/8/2025    famotidine (PEPCID) 20 MG Tab Take 1 Tablet by mouth 2 times a day. 0 6/4/2025    furosemide (LASIX) 40 MG Tab Take 1 Tablet by mouth every day for 90 days. 2 6/8/2025    gabapentin (NEURONTIN) 100 MG Cap Take 2 Capsules by mouth 3 times a day. 0 6/4/2025    levothyroxine (SYNTHROID) 50 MCG Tab Take 1 Tablet by mouth every morning on an empty stomach. 0 6/4/2025    losartan (COZAAR) 50 MG Tab Take 1 Tablet by mouth every day. 3 Never dispensed    metoprolol SR (TOPROL XL) 50 MG TABLET SR 24 HR Take 1 Tablet by mouth every day for 90 days. 0 6/8/2025    omeprazole (PRILOSEC) 20 MG delayed-release capsule Take 1 Capsule by mouth 2 times a day for 60 days. 0 6/8/2025    spironolactone (ALDACTONE) 25 MG Tab Take 1 Tablet by mouth every day. 3 6/8/2025   [2] No Known Allergies

## 2025-06-19 ENCOUNTER — TELEPHONE (OUTPATIENT)
Dept: URGENT CARE | Facility: CLINIC | Age: 75
End: 2025-06-19

## 2025-06-19 DIAGNOSIS — R06.02 SHORTNESS OF BREATH: Primary | ICD-10-CM

## 2025-06-19 PROCEDURE — RXMED WILLOW AMBULATORY MEDICATION CHARGE: Performed by: NURSE PRACTITIONER

## 2025-06-19 RX ORDER — ALBUTEROL SULFATE 90 UG/1
2 INHALANT RESPIRATORY (INHALATION) EVERY 6 HOURS PRN
Qty: 8.5 G | Refills: 0 | Status: SHIPPED | OUTPATIENT
Start: 2025-06-19

## 2025-06-22 ENCOUNTER — PHARMACY VISIT (OUTPATIENT)
Dept: PHARMACY | Facility: MEDICAL CENTER | Age: 75
End: 2025-06-22
Payer: COMMERCIAL

## 2025-07-16 ENCOUNTER — HOSPITAL ENCOUNTER (OUTPATIENT)
Dept: RADIOLOGY | Facility: MEDICAL CENTER | Age: 75
End: 2025-07-16
Attending: NEUROLOGICAL SURGERY

## 2025-07-25 ENCOUNTER — APPOINTMENT (OUTPATIENT)
Dept: RADIOLOGY | Facility: MEDICAL CENTER | Age: 75
End: 2025-07-25
Attending: NEUROLOGICAL SURGERY

## 2025-08-01 ENCOUNTER — HOSPITAL ENCOUNTER (OUTPATIENT)
Dept: RADIOLOGY | Facility: MEDICAL CENTER | Age: 75
End: 2025-08-01
Attending: NEUROLOGICAL SURGERY

## 2025-08-01 DIAGNOSIS — S06.5XAA TRAUMATIC SUBDURAL HEMATOMA, INITIAL ENCOUNTER (HCC): ICD-10-CM

## 2025-08-01 PROCEDURE — 70450 CT HEAD/BRAIN W/O DYE: CPT

## 2025-08-29 ENCOUNTER — HOSPITAL ENCOUNTER (INPATIENT)
Facility: MEDICAL CENTER | Age: 75
End: 2025-08-29
Attending: STUDENT IN AN ORGANIZED HEALTH CARE EDUCATION/TRAINING PROGRAM | Admitting: STUDENT IN AN ORGANIZED HEALTH CARE EDUCATION/TRAINING PROGRAM
Payer: MEDICARE

## 2025-08-29 DIAGNOSIS — R79.89 ELEVATED TROPONIN: ICD-10-CM

## 2025-08-29 DIAGNOSIS — E87.6 HYPOKALEMIA: ICD-10-CM

## 2025-08-29 DIAGNOSIS — R94.31 PROLONGED Q-T INTERVAL ON ECG: ICD-10-CM

## 2025-08-29 DIAGNOSIS — R60.1 ANASARCA: ICD-10-CM

## 2025-08-29 DIAGNOSIS — E87.3 METABOLIC ALKALOSIS: ICD-10-CM

## 2025-08-29 DIAGNOSIS — K70.9 ALCOHOLIC LIVER DISEASE (HCC): ICD-10-CM

## 2025-08-29 DIAGNOSIS — I50.9 ACUTE ON CHRONIC CONGESTIVE HEART FAILURE, UNSPECIFIED HEART FAILURE TYPE (HCC): Primary | ICD-10-CM

## 2025-08-29 LAB
ALBUMIN SERPL BCP-MCNC: 3.9 G/DL (ref 3.2–4.9)
ALBUMIN/GLOB SERPL: 1.4 G/DL
ALP SERPL-CCNC: 194 U/L (ref 30–99)
ALT SERPL-CCNC: 82 U/L (ref 2–50)
ANION GAP SERPL CALC-SCNC: 20 MMOL/L (ref 7–16)
AST SERPL-CCNC: 190 U/L (ref 12–45)
BASOPHILS # BLD AUTO: 1 % (ref 0–1.8)
BASOPHILS # BLD: 0.04 K/UL (ref 0–0.12)
BILIRUB SERPL-MCNC: 1.5 MG/DL (ref 0.1–1.5)
BUN SERPL-MCNC: 8 MG/DL (ref 8–22)
CALCIUM ALBUM COR SERPL-MCNC: 8.5 MG/DL (ref 8.5–10.5)
CALCIUM SERPL-MCNC: 8.4 MG/DL (ref 8.5–10.5)
CHLORIDE SERPL-SCNC: 83 MMOL/L (ref 96–112)
CO2 SERPL-SCNC: 35 MMOL/L (ref 20–33)
CREAT SERPL-MCNC: 0.88 MG/DL (ref 0.5–1.4)
EKG IMPRESSION: NORMAL
EKG IMPRESSION: NORMAL
EOSINOPHIL # BLD AUTO: 0.02 K/UL (ref 0–0.51)
EOSINOPHIL NFR BLD: 0.5 % (ref 0–6.9)
ERYTHROCYTE [DISTWIDTH] IN BLOOD BY AUTOMATED COUNT: 53.4 FL (ref 35.9–50)
GFR SERPLBLD CREATININE-BSD FMLA CKD-EPI: 89 ML/MIN/1.73 M 2
GLOBULIN SER CALC-MCNC: 2.8 G/DL (ref 1.9–3.5)
GLUCOSE SERPL-MCNC: 100 MG/DL (ref 65–99)
HCT VFR BLD AUTO: 34.4 % (ref 42–52)
HGB BLD-MCNC: 11.3 G/DL (ref 14–18)
IMM GRANULOCYTES # BLD AUTO: 0.01 K/UL (ref 0–0.11)
IMM GRANULOCYTES NFR BLD AUTO: 0.2 % (ref 0–0.9)
LIPASE SERPL-CCNC: 55 U/L (ref 11–82)
LYMPHOCYTES # BLD AUTO: 1.44 K/UL (ref 1–4.8)
LYMPHOCYTES NFR BLD: 34.2 % (ref 22–41)
MCH RBC QN AUTO: 31.9 PG (ref 27–33)
MCHC RBC AUTO-ENTMCNC: 32.8 G/DL (ref 32.3–36.5)
MCV RBC AUTO: 97.2 FL (ref 81.4–97.8)
MONOCYTES # BLD AUTO: 0.64 K/UL (ref 0–0.85)
MONOCYTES NFR BLD AUTO: 15.2 % (ref 0–13.4)
NEUTROPHILS # BLD AUTO: 2.06 K/UL (ref 1.82–7.42)
NEUTROPHILS NFR BLD: 48.9 % (ref 44–72)
NRBC # BLD AUTO: 0 K/UL
NRBC BLD-RTO: 0 /100 WBC (ref 0–0.2)
NT-PROBNP SERPL IA-MCNC: 573 PG/ML (ref 0–125)
PLATELET # BLD AUTO: 124 K/UL (ref 164–446)
PMV BLD AUTO: 10.3 FL (ref 9–12.9)
POTASSIUM SERPL-SCNC: 2.3 MMOL/L (ref 3.6–5.5)
PROT SERPL-MCNC: 6.7 G/DL (ref 6–8.2)
RBC # BLD AUTO: 3.54 M/UL (ref 4.7–6.1)
SODIUM SERPL-SCNC: 138 MMOL/L (ref 135–145)
TROPONIN T SERPL-MCNC: 171 NG/L (ref 6–19)
WBC # BLD AUTO: 4.2 K/UL (ref 4.8–10.8)

## 2025-08-29 PROCEDURE — 96366 THER/PROPH/DIAG IV INF ADDON: CPT

## 2025-08-29 PROCEDURE — 93005 ELECTROCARDIOGRAM TRACING: CPT | Mod: TC

## 2025-08-29 PROCEDURE — 83880 ASSAY OF NATRIURETIC PEPTIDE: CPT

## 2025-08-29 PROCEDURE — 99285 EMERGENCY DEPT VISIT HI MDM: CPT

## 2025-08-29 PROCEDURE — 93005 ELECTROCARDIOGRAM TRACING: CPT | Mod: TC | Performed by: STUDENT IN AN ORGANIZED HEALTH CARE EDUCATION/TRAINING PROGRAM

## 2025-08-29 PROCEDURE — 83690 ASSAY OF LIPASE: CPT

## 2025-08-29 PROCEDURE — 80053 COMPREHEN METABOLIC PANEL: CPT

## 2025-08-29 PROCEDURE — 84484 ASSAY OF TROPONIN QUANT: CPT

## 2025-08-29 PROCEDURE — 85025 COMPLETE CBC W/AUTO DIFF WBC: CPT

## 2025-08-29 PROCEDURE — 96367 TX/PROPH/DG ADDL SEQ IV INF: CPT

## 2025-08-29 PROCEDURE — 81001 URINALYSIS AUTO W/SCOPE: CPT

## 2025-08-29 RX ORDER — POTASSIUM CHLORIDE 1500 MG/1
40 TABLET, EXTENDED RELEASE ORAL ONCE
Status: COMPLETED | OUTPATIENT
Start: 2025-08-30 | End: 2025-08-30

## 2025-08-29 RX ORDER — POTASSIUM CHLORIDE 7.45 MG/ML
10 INJECTION INTRAVENOUS ONCE
Status: COMPLETED | OUTPATIENT
Start: 2025-08-30 | End: 2025-08-30

## 2025-08-29 RX ORDER — MAGNESIUM SULFATE HEPTAHYDRATE 40 MG/ML
2 INJECTION, SOLUTION INTRAVENOUS ONCE
Status: COMPLETED | OUTPATIENT
Start: 2025-08-30 | End: 2025-08-30

## 2025-08-29 ASSESSMENT — LIFESTYLE VARIABLES
HOW OFTEN DO YOU HAVE A DRINK CONTAINING ALCOHOL: 4 OR MORE TIMES A WEEK
HOW OFTEN DO YOU HAVE SIX OR MORE DRINKS ON ONE OCCASION: DAILY OR ALMOST DAILY
SKIP TO QUESTIONS 9-10: 0
AUDIT-C TOTAL SCORE: 9
HOW MANY STANDARD DRINKS CONTAINING ALCOHOL DO YOU HAVE ON A TYPICAL DAY: 3 OR 4

## 2025-08-29 ASSESSMENT — FIBROSIS 4 INDEX: FIB4 SCORE: 1.95

## 2025-08-29 ASSESSMENT — PAIN DESCRIPTION - PAIN TYPE: TYPE: ACUTE PAIN

## 2025-08-30 PROBLEM — I26.99 OTHER PULMONARY EMBOLISM WITHOUT ACUTE COR PULMONALE (HCC): Status: ACTIVE | Noted: 2025-08-30

## 2025-08-30 PROBLEM — E66.9 OBESITY WITH SERIOUS COMORBIDITY: Status: ACTIVE | Noted: 2025-08-30

## 2025-08-30 PROBLEM — E87.3 RESPIRATORY ALKALOSIS: Status: ACTIVE | Noted: 2025-08-30

## 2025-08-30 PROBLEM — K70.10 ALCOHOLIC HEPATITIS: Status: ACTIVE | Noted: 2025-08-30

## 2025-08-30 PROBLEM — F10.90 ALCOHOL USE DISORDER: Status: ACTIVE | Noted: 2024-04-05

## 2025-08-30 PROBLEM — I50.23 ACUTE ON CHRONIC SYSTOLIC HEART FAILURE (HCC): Status: ACTIVE | Noted: 2025-05-25

## 2025-08-30 LAB
ALBUMIN SERPL BCP-MCNC: 3.5 G/DL (ref 3.2–4.9)
ALBUMIN SERPL BCP-MCNC: 3.7 G/DL (ref 3.2–4.9)
ALBUMIN/GLOB SERPL: 1.4 G/DL
ALP SERPL-CCNC: 192 U/L (ref 30–99)
ALT SERPL-CCNC: 83 U/L (ref 2–50)
ANION GAP SERPL CALC-SCNC: 15 MMOL/L (ref 7–16)
ANION GAP SERPL CALC-SCNC: 20 MMOL/L (ref 7–16)
APPEARANCE UR: CLEAR
APTT PPP: 28.1 SEC (ref 24.7–36)
APTT PPP: >240 SEC (ref 24.7–36)
AST SERPL-CCNC: 225 U/L (ref 12–45)
BACTERIA #/AREA URNS HPF: ABNORMAL /HPF
BASE EXCESS BLDV CALC-SCNC: 23 MMOL/L (ref -2–3)
BILIRUB SERPL-MCNC: 1.6 MG/DL (ref 0.1–1.5)
BILIRUB UR QL STRIP.AUTO: NEGATIVE
BODY TEMPERATURE: 36.8 CENTIGRADE
BUN SERPL-MCNC: 7 MG/DL (ref 8–22)
BUN SERPL-MCNC: 7 MG/DL (ref 8–22)
CALCIUM ALBUM COR SERPL-MCNC: 8.2 MG/DL (ref 8.5–10.5)
CALCIUM ALBUM COR SERPL-MCNC: 8.3 MG/DL (ref 8.5–10.5)
CALCIUM SERPL-MCNC: 7.9 MG/DL (ref 8.5–10.5)
CALCIUM SERPL-MCNC: 8 MG/DL (ref 8.5–10.5)
CASTS URNS QL MICRO: ABNORMAL /LPF (ref 0–2)
CHLORIDE SERPL-SCNC: 85 MMOL/L (ref 96–112)
CHLORIDE SERPL-SCNC: 86 MMOL/L (ref 96–112)
CO2 SERPL-SCNC: 29 MMOL/L (ref 20–33)
CO2 SERPL-SCNC: 31 MMOL/L (ref 20–33)
COLOR UR: ABNORMAL
CREAT SERPL-MCNC: 0.78 MG/DL (ref 0.5–1.4)
CREAT SERPL-MCNC: 0.79 MG/DL (ref 0.5–1.4)
EPITHELIAL CELLS 1715: ABNORMAL /HPF (ref 0–5)
ETHANOL BLD-MCNC: <10.1 MG/DL
GFR SERPLBLD CREATININE-BSD FMLA CKD-EPI: 92 ML/MIN/1.73 M 2
GFR SERPLBLD CREATININE-BSD FMLA CKD-EPI: 92 ML/MIN/1.73 M 2
GLOBULIN SER CALC-MCNC: 2.7 G/DL (ref 1.9–3.5)
GLUCOSE SERPL-MCNC: 133 MG/DL (ref 65–99)
GLUCOSE SERPL-MCNC: 77 MG/DL (ref 65–99)
GLUCOSE UR STRIP.AUTO-MCNC: NEGATIVE MG/DL
HCO3 BLDV-SCNC: 48 MMOL/L (ref 22–29)
INR PPP: 0.98 (ref 0.87–1.13)
INR PPP: 1.06 (ref 0.87–1.13)
KETONES UR STRIP.AUTO-MCNC: 15 MG/DL
LEUKOCYTE ESTERASE UR QL STRIP.AUTO: NEGATIVE
MAGNESIUM SERPL-MCNC: 1.9 MG/DL (ref 1.5–2.5)
MAGNESIUM SERPL-MCNC: >9.7 MG/DL (ref 1.5–2.5)
MICRO URNS: ABNORMAL
NITRITE UR QL STRIP.AUTO: NEGATIVE
PCO2 BLDV: 53.3 MMHG (ref 38–54)
PCO2 TEMP ADJ BLDV: 52.8 MMHG (ref 38–54)
PH BLDV: 7.56 [PH] (ref 7.31–7.45)
PH TEMP ADJ BLDV: 7.56 [PH] (ref 7.31–7.45)
PH UR STRIP.AUTO: >=9 [PH] (ref 5–8)
PHOSPHATE SERPL-MCNC: 2.1 MG/DL (ref 2.5–4.5)
PHOSPHATE SERPL-MCNC: 2.4 MG/DL (ref 2.5–4.5)
PO2 BLDV: 31.7 MMHG (ref 23–48)
PO2 TEMP ADJ BLDV: 31.3 MMHG (ref 23–48)
POTASSIUM SERPL-SCNC: 2.5 MMOL/L (ref 3.6–5.5)
POTASSIUM SERPL-SCNC: 2.8 MMOL/L (ref 3.6–5.5)
PROT SERPL-MCNC: 6.4 G/DL (ref 6–8.2)
PROT UR QL STRIP: 100 MG/DL
PROTHROMBIN TIME: 13.1 SEC (ref 12–14.6)
PROTHROMBIN TIME: 13.9 SEC (ref 12–14.6)
RBC # URNS HPF: ABNORMAL /HPF (ref 0–2)
RBC UR QL AUTO: NEGATIVE
SAO2 % BLDV: 32 % (ref 60–85)
SODIUM SERPL-SCNC: 130 MMOL/L (ref 135–145)
SODIUM SERPL-SCNC: 136 MMOL/L (ref 135–145)
SP GR UR STRIP.AUTO: 1.02
TROPONIN T SERPL-MCNC: 131 NG/L (ref 6–19)
TSH SERPL-ACNC: 64.9 UIU/ML (ref 0.38–5.33)
UFH PPP CHRO-ACNC: 0.47 IU/ML
UFH PPP CHRO-ACNC: 0.5 IU/ML
UFH PPP CHRO-ACNC: 0.61 IU/ML
UFH PPP CHRO-ACNC: <0.1 IU/ML
UROBILINOGEN UR STRIP.AUTO-MCNC: 1 EU/DL
WBC #/AREA URNS HPF: ABNORMAL /HPF

## 2025-08-30 PROCEDURE — 770020 HCHG ROOM/CARE - TELE (206)

## 2025-08-30 PROCEDURE — 80053 COMPREHEN METABOLIC PANEL: CPT

## 2025-08-30 PROCEDURE — A9270 NON-COVERED ITEM OR SERVICE: HCPCS | Performed by: STUDENT IN AN ORGANIZED HEALTH CARE EDUCATION/TRAINING PROGRAM

## 2025-08-30 PROCEDURE — 84100 ASSAY OF PHOSPHORUS: CPT

## 2025-08-30 PROCEDURE — 36415 COLL VENOUS BLD VENIPUNCTURE: CPT

## 2025-08-30 PROCEDURE — 700102 HCHG RX REV CODE 250 W/ 637 OVERRIDE(OP)

## 2025-08-30 PROCEDURE — 700102 HCHG RX REV CODE 250 W/ 637 OVERRIDE(OP): Performed by: STUDENT IN AN ORGANIZED HEALTH CARE EDUCATION/TRAINING PROGRAM

## 2025-08-30 PROCEDURE — 85610 PROTHROMBIN TIME: CPT

## 2025-08-30 PROCEDURE — 85520 HEPARIN ASSAY: CPT | Mod: 91

## 2025-08-30 PROCEDURE — 700111 HCHG RX REV CODE 636 W/ 250 OVERRIDE (IP)

## 2025-08-30 PROCEDURE — 84484 ASSAY OF TROPONIN QUANT: CPT

## 2025-08-30 PROCEDURE — 82077 ASSAY SPEC XCP UR&BREATH IA: CPT

## 2025-08-30 PROCEDURE — 83735 ASSAY OF MAGNESIUM: CPT | Mod: 91

## 2025-08-30 PROCEDURE — 700117 HCHG RX CONTRAST REV CODE 255: Performed by: STUDENT IN AN ORGANIZED HEALTH CARE EDUCATION/TRAINING PROGRAM

## 2025-08-30 PROCEDURE — 80069 RENAL FUNCTION PANEL: CPT

## 2025-08-30 PROCEDURE — 84443 ASSAY THYROID STIM HORMONE: CPT

## 2025-08-30 PROCEDURE — 82803 BLOOD GASES ANY COMBINATION: CPT

## 2025-08-30 PROCEDURE — 85730 THROMBOPLASTIN TIME PARTIAL: CPT

## 2025-08-30 PROCEDURE — 700102 HCHG RX REV CODE 250 W/ 637 OVERRIDE(OP): Performed by: INTERNAL MEDICINE

## 2025-08-30 PROCEDURE — A9270 NON-COVERED ITEM OR SERVICE: HCPCS

## 2025-08-30 PROCEDURE — A9270 NON-COVERED ITEM OR SERVICE: HCPCS | Performed by: INTERNAL MEDICINE

## 2025-08-30 PROCEDURE — 700111 HCHG RX REV CODE 636 W/ 250 OVERRIDE (IP): Performed by: STUDENT IN AN ORGANIZED HEALTH CARE EDUCATION/TRAINING PROGRAM

## 2025-08-30 PROCEDURE — 700111 HCHG RX REV CODE 636 W/ 250 OVERRIDE (IP): Mod: JZ | Performed by: INTERNAL MEDICINE

## 2025-08-30 RX ORDER — LORAZEPAM 2 MG/1
4 TABLET ORAL
Status: ACTIVE | OUTPATIENT
Start: 2025-08-30

## 2025-08-30 RX ORDER — FOLIC ACID 1 MG/1
1 TABLET ORAL DAILY
Status: DISPENSED | OUTPATIENT
Start: 2025-08-30 | End: 2025-09-03

## 2025-08-30 RX ORDER — DAPAGLIFLOZIN 10 MG/1
10 TABLET, FILM COATED ORAL DAILY
Status: DISPENSED | OUTPATIENT
Start: 2025-08-30

## 2025-08-30 RX ORDER — POTASSIUM CHLORIDE 1500 MG/1
40 TABLET, EXTENDED RELEASE ORAL 3 TIMES DAILY
Status: DISPENSED | OUTPATIENT
Start: 2025-08-30

## 2025-08-30 RX ORDER — HEPARIN SODIUM 5000 [USP'U]/100ML
0-30 INJECTION, SOLUTION INTRAVENOUS CONTINUOUS
Status: DISCONTINUED | OUTPATIENT
Start: 2025-08-30 | End: 2025-08-30

## 2025-08-30 RX ORDER — FAMOTIDINE 20 MG/1
20 TABLET, FILM COATED ORAL 2 TIMES DAILY
Status: DISPENSED | OUTPATIENT
Start: 2025-08-30

## 2025-08-30 RX ORDER — SPIRONOLACTONE 25 MG/1
25 TABLET ORAL DAILY
Status: DISPENSED | OUTPATIENT
Start: 2025-08-30

## 2025-08-30 RX ORDER — MAGNESIUM SULFATE 1 G/100ML
1 INJECTION INTRAVENOUS ONCE
Status: COMPLETED | OUTPATIENT
Start: 2025-08-30 | End: 2025-08-30

## 2025-08-30 RX ORDER — LORAZEPAM 2 MG/1
2 TABLET ORAL
Status: ACTIVE | OUTPATIENT
Start: 2025-08-30

## 2025-08-30 RX ORDER — HEPARIN SODIUM 5000 [USP'U]/100ML
0-30 INJECTION, SOLUTION INTRAVENOUS CONTINUOUS
Status: DISCONTINUED | OUTPATIENT
Start: 2025-08-30 | End: 2025-08-31

## 2025-08-30 RX ORDER — ALBUTEROL SULFATE 90 UG/1
2 INHALANT RESPIRATORY (INHALATION) EVERY 6 HOURS PRN
Status: DISCONTINUED | OUTPATIENT
Start: 2025-08-30 | End: 2025-08-30

## 2025-08-30 RX ORDER — HEPARIN SODIUM 1000 [USP'U]/ML
40 INJECTION, SOLUTION INTRAVENOUS; SUBCUTANEOUS PRN
Status: DISCONTINUED | OUTPATIENT
Start: 2025-08-30 | End: 2025-08-30

## 2025-08-30 RX ORDER — LORAZEPAM 1 MG/1
1 TABLET ORAL EVERY 4 HOURS PRN
Status: ACTIVE | OUTPATIENT
Start: 2025-08-30

## 2025-08-30 RX ORDER — LEVOTHYROXINE SODIUM 50 UG/1
50 TABLET ORAL
Status: DISPENSED | OUTPATIENT
Start: 2025-08-30

## 2025-08-30 RX ORDER — FUROSEMIDE 40 MG/1
40 TABLET ORAL DAILY
Status: DISCONTINUED | OUTPATIENT
Start: 2025-08-30 | End: 2025-08-30

## 2025-08-30 RX ORDER — LOSARTAN POTASSIUM 50 MG/1
50 TABLET ORAL DAILY
Status: DISPENSED | OUTPATIENT
Start: 2025-08-30

## 2025-08-30 RX ORDER — ALBUTEROL SULFATE 90 UG/1
2 INHALANT RESPIRATORY (INHALATION) EVERY 6 HOURS PRN
Status: ACTIVE | OUTPATIENT
Start: 2025-08-30

## 2025-08-30 RX ORDER — POTASSIUM CHLORIDE 7.45 MG/ML
10 INJECTION INTRAVENOUS
Status: COMPLETED | OUTPATIENT
Start: 2025-08-30 | End: 2025-08-30

## 2025-08-30 RX ORDER — OMEPRAZOLE 20 MG/1
20 CAPSULE, DELAYED RELEASE ORAL DAILY
Status: DISPENSED | OUTPATIENT
Start: 2025-08-30

## 2025-08-30 RX ORDER — LORAZEPAM 0.5 MG/1
0.5 TABLET ORAL EVERY 4 HOURS PRN
Status: ACTIVE | OUTPATIENT
Start: 2025-08-30

## 2025-08-30 RX ORDER — DIAZEPAM 10 MG/2ML
10 INJECTION, SOLUTION INTRAMUSCULAR; INTRAVENOUS
Status: ACTIVE | OUTPATIENT
Start: 2025-08-30

## 2025-08-30 RX ORDER — POLYETHYLENE GLYCOL 3350 17 G/17G
1 POWDER, FOR SOLUTION ORAL
Status: ACTIVE | OUTPATIENT
Start: 2025-08-30

## 2025-08-30 RX ORDER — GAUZE BANDAGE 2" X 2"
100 BANDAGE TOPICAL DAILY
Status: DISPENSED | OUTPATIENT
Start: 2025-08-30 | End: 2025-09-03

## 2025-08-30 RX ORDER — ACETAMINOPHEN 325 MG/1
650 TABLET ORAL EVERY 6 HOURS PRN
Status: DISPENSED | OUTPATIENT
Start: 2025-08-30

## 2025-08-30 RX ORDER — AMOXICILLIN 250 MG
2 CAPSULE ORAL EVERY EVENING
Status: DISPENSED | OUTPATIENT
Start: 2025-08-30

## 2025-08-30 RX ORDER — GABAPENTIN 100 MG/1
200 CAPSULE ORAL 3 TIMES DAILY
Status: DISPENSED | OUTPATIENT
Start: 2025-08-30

## 2025-08-30 RX ORDER — METOPROLOL SUCCINATE 50 MG/1
50 TABLET, EXTENDED RELEASE ORAL DAILY
Status: DISPENSED | OUTPATIENT
Start: 2025-08-30

## 2025-08-30 RX ORDER — FUROSEMIDE 10 MG/ML
40 INJECTION INTRAMUSCULAR; INTRAVENOUS 2 TIMES DAILY
Status: DISCONTINUED | OUTPATIENT
Start: 2025-08-30 | End: 2025-08-31

## 2025-08-30 RX ORDER — HEPARIN SODIUM 1000 [USP'U]/ML
80 INJECTION, SOLUTION INTRAVENOUS; SUBCUTANEOUS ONCE
Status: COMPLETED | OUTPATIENT
Start: 2025-08-30 | End: 2025-08-30

## 2025-08-30 RX ORDER — HEPARIN SODIUM 5000 [USP'U]/100ML
INJECTION, SOLUTION INTRAVENOUS CONTINUOUS
Status: DISCONTINUED | OUTPATIENT
Start: 2025-08-30 | End: 2025-08-30

## 2025-08-30 RX ADMIN — OMEPRAZOLE 20 MG: 20 CAPSULE, DELAYED RELEASE ORAL at 05:46

## 2025-08-30 RX ADMIN — POTASSIUM CHLORIDE 10 MEQ: 7.46 INJECTION, SOLUTION INTRAVENOUS at 04:08

## 2025-08-30 RX ADMIN — SENNOSIDES, DOCUSATE SODIUM 2 TABLET: 50; 8.6 TABLET, FILM COATED ORAL at 17:19

## 2025-08-30 RX ADMIN — POTASSIUM CHLORIDE 40 MEQ: 1500 TABLET, EXTENDED RELEASE ORAL at 17:20

## 2025-08-30 RX ADMIN — LEVOTHYROXINE SODIUM 50 MCG: 0.05 TABLET ORAL at 05:47

## 2025-08-30 RX ADMIN — FAMOTIDINE 20 MG: 20 TABLET, FILM COATED ORAL at 17:20

## 2025-08-30 RX ADMIN — MAGNESIUM SULFATE IN DEXTROSE 1 G: 10 INJECTION, SOLUTION INTRAVENOUS at 17:16

## 2025-08-30 RX ADMIN — IOHEXOL 45 ML: 350 INJECTION, SOLUTION INTRAVENOUS at 02:15

## 2025-08-30 RX ADMIN — POTASSIUM CHLORIDE 40 MEQ: 1500 TABLET, EXTENDED RELEASE ORAL at 14:06

## 2025-08-30 RX ADMIN — LOSARTAN POTASSIUM 50 MG: 50 TABLET, FILM COATED ORAL at 05:47

## 2025-08-30 RX ADMIN — POTASSIUM CHLORIDE 40 MEQ: 1500 TABLET, EXTENDED RELEASE ORAL at 00:48

## 2025-08-30 RX ADMIN — GABAPENTIN 200 MG: 100 CAPSULE ORAL at 11:25

## 2025-08-30 RX ADMIN — THERA TABS 1 TABLET: TAB at 05:47

## 2025-08-30 RX ADMIN — DAPAGLIFLOZIN 10 MG: 10 TABLET, FILM COATED ORAL at 05:49

## 2025-08-30 RX ADMIN — GABAPENTIN 200 MG: 100 CAPSULE ORAL at 17:20

## 2025-08-30 RX ADMIN — DIBASIC SODIUM PHOSPHATE, MONOBASIC POTASSIUM PHOSPHATE AND MONOBASIC SODIUM PHOSPHATE 500 MG: 852; 155; 130 TABLET ORAL at 17:19

## 2025-08-30 RX ADMIN — GABAPENTIN 200 MG: 100 CAPSULE ORAL at 05:48

## 2025-08-30 RX ADMIN — Medication 100 MG: at 05:46

## 2025-08-30 RX ADMIN — SPIRONOLACTONE 25 MG: 25 TABLET ORAL at 05:47

## 2025-08-30 RX ADMIN — FOLIC ACID 1 MG: 1 TABLET ORAL at 05:47

## 2025-08-30 RX ADMIN — FAMOTIDINE 20 MG: 20 TABLET, FILM COATED ORAL at 05:46

## 2025-08-30 RX ADMIN — POTASSIUM CHLORIDE 10 MEQ: 7.46 INJECTION, SOLUTION INTRAVENOUS at 06:37

## 2025-08-30 RX ADMIN — FUROSEMIDE 40 MG: 10 INJECTION, SOLUTION INTRAVENOUS at 17:20

## 2025-08-30 RX ADMIN — ACETAMINOPHEN 650 MG: 325 TABLET ORAL at 11:35

## 2025-08-30 RX ADMIN — POTASSIUM CHLORIDE 10 MEQ: 7.46 INJECTION, SOLUTION INTRAVENOUS at 05:40

## 2025-08-30 RX ADMIN — POTASSIUM CHLORIDE 10 MEQ: 7.46 INJECTION, SOLUTION INTRAVENOUS at 00:58

## 2025-08-30 RX ADMIN — METOPROLOL SUCCINATE 50 MG: 50 TABLET, EXTENDED RELEASE ORAL at 05:46

## 2025-08-30 RX ADMIN — MAGNESIUM SULFATE HEPTAHYDRATE 2 G: 2 INJECTION, SOLUTION INTRAVENOUS at 00:53

## 2025-08-30 RX ADMIN — HEPARIN SODIUM 18 UNITS/KG/HR: 5000 INJECTION, SOLUTION INTRAVENOUS at 05:45

## 2025-08-30 RX ADMIN — HEPARIN SODIUM 18 UNITS/KG/HR: 5000 INJECTION, SOLUTION INTRAVENOUS at 03:23

## 2025-08-30 RX ADMIN — POTASSIUM CHLORIDE 10 MEQ: 7.46 INJECTION, SOLUTION INTRAVENOUS at 07:49

## 2025-08-30 RX ADMIN — HEPARIN SODIUM 4700 UNITS: 1000 INJECTION, SOLUTION INTRAVENOUS; SUBCUTANEOUS at 03:16

## 2025-08-30 ASSESSMENT — LIFESTYLE VARIABLES
DOES PATIENT WANT TO STOP DRINKING: YES
PAROXYSMAL SWEATS: NO SWEAT VISIBLE
ANXIETY: NO ANXIETY (AT EASE)
ORIENTATION AND CLOUDING OF SENSORIUM: ORIENTED AND CAN DO SERIAL ADDITIONS
ANXIETY: NO ANXIETY (AT EASE)
VISUAL DISTURBANCES: NOT PRESENT
TOTAL SCORE: 0
ORIENTATION AND CLOUDING OF SENSORIUM: ORIENTED AND CAN DO SERIAL ADDITIONS
ANXIETY: NO ANXIETY (AT EASE)
HOW MANY TIMES IN THE PAST YEAR HAVE YOU HAD 5 OR MORE DRINKS IN A DAY: 30
TREMOR: NO TREMOR
AVERAGE NUMBER OF DAYS PER WEEK YOU HAVE A DRINK CONTAINING ALCOHOL: 7
EVER FELT BAD OR GUILTY ABOUT YOUR DRINKING: NO
HEADACHE, FULLNESS IN HEAD: NOT PRESENT
TOTAL SCORE: 0
AGITATION: NORMAL ACTIVITY
PAROXYSMAL SWEATS: NO SWEAT VISIBLE
HEADACHE, FULLNESS IN HEAD: MILD
ON A TYPICAL DAY WHEN YOU DRINK ALCOHOL HOW MANY DRINKS DO YOU HAVE: 3
TOTAL SCORE: 0
VISUAL DISTURBANCES: NOT PRESENT
AUDITORY DISTURBANCES: NOT PRESENT
TREMOR: NO TREMOR
TREMOR: NO TREMOR
AGITATION: NORMAL ACTIVITY
TOTAL SCORE: 0
EVER HAD A DRINK FIRST THING IN THE MORNING TO STEADY YOUR NERVES TO GET RID OF A HANGOVER: NO
NAUSEA AND VOMITING: NO NAUSEA AND NO VOMITING
AUDITORY DISTURBANCES: NOT PRESENT
AGITATION: NORMAL ACTIVITY
ANXIETY: NO ANXIETY (AT EASE)
HEADACHE, FULLNESS IN HEAD: NOT PRESENT
TOTAL SCORE: 0
TREMOR: NO TREMOR
TREMOR: NO TREMOR
ANXIETY: NO ANXIETY (AT EASE)
NAUSEA AND VOMITING: NO NAUSEA AND NO VOMITING
ORIENTATION AND CLOUDING OF SENSORIUM: ORIENTED AND CAN DO SERIAL ADDITIONS
PAROXYSMAL SWEATS: NO SWEAT VISIBLE
ORIENTATION AND CLOUDING OF SENSORIUM: ORIENTED AND CAN DO SERIAL ADDITIONS
PAROXYSMAL SWEATS: NO SWEAT VISIBLE
CONSUMPTION TOTAL: POSITIVE
PAROXYSMAL SWEATS: NO SWEAT VISIBLE
DOES PATIENT WANT TO TALK TO SOMEONE ABOUT QUITTING: YES
NAUSEA AND VOMITING: NO NAUSEA AND NO VOMITING
ALCOHOL_USE: YES
VISUAL DISTURBANCES: NOT PRESENT
HAVE PEOPLE ANNOYED YOU BY CRITICIZING YOUR DRINKING: NO
AUDITORY DISTURBANCES: NOT PRESENT
TOTAL SCORE: 0
VISUAL DISTURBANCES: NOT PRESENT
NAUSEA AND VOMITING: NO NAUSEA AND NO VOMITING
AGITATION: NORMAL ACTIVITY
HAVE YOU EVER FELT YOU SHOULD CUT DOWN ON YOUR DRINKING: NO
TOTAL SCORE: 2
AGITATION: NORMAL ACTIVITY
NAUSEA AND VOMITING: NO NAUSEA AND NO VOMITING
HEADACHE, FULLNESS IN HEAD: NOT PRESENT
HEADACHE, FULLNESS IN HEAD: NOT PRESENT
ORIENTATION AND CLOUDING OF SENSORIUM: ORIENTED AND CAN DO SERIAL ADDITIONS
VISUAL DISTURBANCES: NOT PRESENT
AUDITORY DISTURBANCES: NOT PRESENT
AUDITORY DISTURBANCES: NOT PRESENT
TOTAL SCORE: 0

## 2025-08-30 ASSESSMENT — COGNITIVE AND FUNCTIONAL STATUS - GENERAL
MOBILITY SCORE: 22
SUGGESTED CMS G CODE MODIFIER MOBILITY: CJ
WALKING IN HOSPITAL ROOM: A LITTLE
DAILY ACTIVITIY SCORE: 23
CLIMB 3 TO 5 STEPS WITH RAILING: A LITTLE
SUGGESTED CMS G CODE MODIFIER DAILY ACTIVITY: CI
DRESSING REGULAR LOWER BODY CLOTHING: A LITTLE

## 2025-08-30 ASSESSMENT — PAIN DESCRIPTION - PAIN TYPE
TYPE: ACUTE PAIN

## 2025-08-30 ASSESSMENT — FIBROSIS 4 INDEX
FIB4 SCORE: 12.86
FIB4 SCORE: 12.86

## 2025-08-30 ASSESSMENT — ENCOUNTER SYMPTOMS
INSOMNIA: 1
WEIGHT LOSS: 0
TINGLING: 0
DIARRHEA: 0
DEPRESSION: 0
PALPITATIONS: 0
SHORTNESS OF BREATH: 1
NAUSEA: 0
VOMITING: 0
ORTHOPNEA: 1
CHILLS: 0
ABDOMINAL PAIN: 0
HEADACHES: 0
SPUTUM PRODUCTION: 0
HALLUCINATIONS: 0
MYALGIAS: 0
MYALGIAS: 1
DIZZINESS: 0
FEVER: 0
ORTHOPNEA: 0

## 2025-08-31 VITALS
OXYGEN SATURATION: 94 % | RESPIRATION RATE: 16 BRPM | HEIGHT: 60 IN | TEMPERATURE: 97.5 F | SYSTOLIC BLOOD PRESSURE: 100 MMHG | BODY MASS INDEX: 31.9 KG/M2 | WEIGHT: 162.48 LBS | DIASTOLIC BLOOD PRESSURE: 60 MMHG | HEART RATE: 79 BPM

## 2025-08-31 LAB
ALBUMIN SERPL BCP-MCNC: 3.3 G/DL (ref 3.2–4.9)
ALBUMIN SERPL BCP-MCNC: 3.4 G/DL (ref 3.2–4.9)
ALBUMIN/GLOB SERPL: 1.2 G/DL
ALP SERPL-CCNC: 190 U/L (ref 30–99)
ALT SERPL-CCNC: 73 U/L (ref 2–50)
ANION GAP SERPL CALC-SCNC: 12 MMOL/L (ref 7–16)
ANION GAP SERPL CALC-SCNC: 13 MMOL/L (ref 7–16)
AST SERPL-CCNC: 156 U/L (ref 12–45)
BILIRUB SERPL-MCNC: 1.1 MG/DL (ref 0.1–1.5)
BUN SERPL-MCNC: 6 MG/DL (ref 8–22)
BUN SERPL-MCNC: 8 MG/DL (ref 8–22)
CALCIUM ALBUM COR SERPL-MCNC: 8.6 MG/DL (ref 8.5–10.5)
CALCIUM ALBUM COR SERPL-MCNC: 8.9 MG/DL (ref 8.5–10.5)
CALCIUM SERPL-MCNC: 8.1 MG/DL (ref 8.5–10.5)
CALCIUM SERPL-MCNC: 8.3 MG/DL (ref 8.5–10.5)
CHLORIDE SERPL-SCNC: 89 MMOL/L (ref 96–112)
CHLORIDE SERPL-SCNC: 93 MMOL/L (ref 96–112)
CO2 SERPL-SCNC: 29 MMOL/L (ref 20–33)
CO2 SERPL-SCNC: 32 MMOL/L (ref 20–33)
CREAT SERPL-MCNC: 0.96 MG/DL (ref 0.5–1.4)
CREAT SERPL-MCNC: 1.38 MG/DL (ref 0.5–1.4)
ERYTHROCYTE [DISTWIDTH] IN BLOOD BY AUTOMATED COUNT: 56.6 FL (ref 35.9–50)
GFR SERPLBLD CREATININE-BSD FMLA CKD-EPI: 53 ML/MIN/1.73 M 2
GFR SERPLBLD CREATININE-BSD FMLA CKD-EPI: 82 ML/MIN/1.73 M 2
GLOBULIN SER CALC-MCNC: 2.8 G/DL (ref 1.9–3.5)
GLUCOSE SERPL-MCNC: 130 MG/DL (ref 65–99)
GLUCOSE SERPL-MCNC: 174 MG/DL (ref 65–99)
HCT VFR BLD AUTO: 37.6 % (ref 42–52)
HGB BLD-MCNC: 12.5 G/DL (ref 14–18)
MAGNESIUM SERPL-MCNC: 1.8 MG/DL (ref 1.5–2.5)
MCH RBC QN AUTO: 33.2 PG (ref 27–33)
MCHC RBC AUTO-ENTMCNC: 33.2 G/DL (ref 32.3–36.5)
MCV RBC AUTO: 100 FL (ref 81.4–97.8)
PHOSPHATE SERPL-MCNC: 2.5 MG/DL (ref 2.5–4.5)
PHOSPHATE SERPL-MCNC: 4 MG/DL (ref 2.5–4.5)
PLATELET # BLD AUTO: 132 K/UL (ref 164–446)
PMV BLD AUTO: 9.7 FL (ref 9–12.9)
POTASSIUM SERPL-SCNC: 3.3 MMOL/L (ref 3.6–5.5)
POTASSIUM SERPL-SCNC: 4.3 MMOL/L (ref 3.6–5.5)
PROT SERPL-MCNC: 6.2 G/DL (ref 6–8.2)
RBC # BLD AUTO: 3.76 M/UL (ref 4.7–6.1)
SODIUM SERPL-SCNC: 133 MMOL/L (ref 135–145)
SODIUM SERPL-SCNC: 135 MMOL/L (ref 135–145)
UFH PPP CHRO-ACNC: 0.56 IU/ML
WBC # BLD AUTO: 5.8 K/UL (ref 4.8–10.8)

## 2025-08-31 PROCEDURE — 700111 HCHG RX REV CODE 636 W/ 250 OVERRIDE (IP)

## 2025-08-31 PROCEDURE — 700102 HCHG RX REV CODE 250 W/ 637 OVERRIDE(OP): Performed by: INTERNAL MEDICINE

## 2025-08-31 PROCEDURE — 97535 SELF CARE MNGMENT TRAINING: CPT

## 2025-08-31 PROCEDURE — A9270 NON-COVERED ITEM OR SERVICE: HCPCS

## 2025-08-31 PROCEDURE — 770020 HCHG ROOM/CARE - TELE (206)

## 2025-08-31 PROCEDURE — 700102 HCHG RX REV CODE 250 W/ 637 OVERRIDE(OP)

## 2025-08-31 PROCEDURE — A9270 NON-COVERED ITEM OR SERVICE: HCPCS | Performed by: INTERNAL MEDICINE

## 2025-08-31 PROCEDURE — 700111 HCHG RX REV CODE 636 W/ 250 OVERRIDE (IP): Mod: JZ | Performed by: INTERNAL MEDICINE

## 2025-08-31 PROCEDURE — 97162 PT EVAL MOD COMPLEX 30 MIN: CPT

## 2025-08-31 RX ORDER — MAGNESIUM SULFATE HEPTAHYDRATE 40 MG/ML
2 INJECTION, SOLUTION INTRAVENOUS ONCE
Status: COMPLETED | OUTPATIENT
Start: 2025-08-31 | End: 2025-08-31

## 2025-08-31 RX ORDER — FUROSEMIDE 10 MG/ML
40 INJECTION INTRAMUSCULAR; INTRAVENOUS
Status: DISPENSED | OUTPATIENT
Start: 2025-08-31

## 2025-08-31 RX ADMIN — LOSARTAN POTASSIUM 50 MG: 50 TABLET, FILM COATED ORAL at 05:37

## 2025-08-31 RX ADMIN — DAPAGLIFLOZIN 10 MG: 10 TABLET, FILM COATED ORAL at 05:37

## 2025-08-31 RX ADMIN — LEVOTHYROXINE SODIUM 50 MCG: 0.05 TABLET ORAL at 05:37

## 2025-08-31 RX ADMIN — SENNOSIDES, DOCUSATE SODIUM 2 TABLET: 50; 8.6 TABLET, FILM COATED ORAL at 16:33

## 2025-08-31 RX ADMIN — Medication 100 MG: at 05:37

## 2025-08-31 RX ADMIN — OMEPRAZOLE 20 MG: 20 CAPSULE, DELAYED RELEASE ORAL at 05:37

## 2025-08-31 RX ADMIN — GABAPENTIN 200 MG: 100 CAPSULE ORAL at 16:33

## 2025-08-31 RX ADMIN — FOLIC ACID 1 MG: 1 TABLET ORAL at 05:36

## 2025-08-31 RX ADMIN — DIBASIC SODIUM PHOSPHATE, MONOBASIC POTASSIUM PHOSPHATE AND MONOBASIC SODIUM PHOSPHATE 500 MG: 852; 155; 130 TABLET ORAL at 05:36

## 2025-08-31 RX ADMIN — FUROSEMIDE 40 MG: 10 INJECTION, SOLUTION INTRAVENOUS at 05:36

## 2025-08-31 RX ADMIN — GABAPENTIN 200 MG: 100 CAPSULE ORAL at 05:37

## 2025-08-31 RX ADMIN — THERA TABS 1 TABLET: TAB at 05:37

## 2025-08-31 RX ADMIN — METOPROLOL SUCCINATE 50 MG: 50 TABLET, EXTENDED RELEASE ORAL at 05:37

## 2025-08-31 RX ADMIN — SPIRONOLACTONE 25 MG: 25 TABLET ORAL at 05:37

## 2025-08-31 RX ADMIN — DIBASIC SODIUM PHOSPHATE, MONOBASIC POTASSIUM PHOSPHATE AND MONOBASIC SODIUM PHOSPHATE 500 MG: 852; 155; 130 TABLET ORAL at 16:33

## 2025-08-31 RX ADMIN — ACETAMINOPHEN 650 MG: 325 TABLET ORAL at 21:23

## 2025-08-31 RX ADMIN — FAMOTIDINE 20 MG: 20 TABLET, FILM COATED ORAL at 16:33

## 2025-08-31 RX ADMIN — GABAPENTIN 200 MG: 100 CAPSULE ORAL at 12:18

## 2025-08-31 RX ADMIN — ACETAMINOPHEN 650 MG: 325 TABLET ORAL at 08:31

## 2025-08-31 RX ADMIN — FUROSEMIDE 40 MG: 10 INJECTION, SOLUTION INTRAVENOUS at 13:13

## 2025-08-31 RX ADMIN — APIXABAN 10 MG: 5 TABLET, FILM COATED ORAL at 16:33

## 2025-08-31 RX ADMIN — HEPARIN SODIUM 18 UNITS/KG/HR: 5000 INJECTION, SOLUTION INTRAVENOUS at 03:04

## 2025-08-31 RX ADMIN — POTASSIUM CHLORIDE 40 MEQ: 1500 TABLET, EXTENDED RELEASE ORAL at 12:15

## 2025-08-31 RX ADMIN — MAGNESIUM SULFATE HEPTAHYDRATE 2 G: 2 INJECTION, SOLUTION INTRAVENOUS at 08:35

## 2025-08-31 RX ADMIN — POTASSIUM CHLORIDE 40 MEQ: 1500 TABLET, EXTENDED RELEASE ORAL at 05:37

## 2025-08-31 RX ADMIN — FAMOTIDINE 20 MG: 20 TABLET, FILM COATED ORAL at 05:37

## 2025-08-31 RX ADMIN — POTASSIUM CHLORIDE 40 MEQ: 1500 TABLET, EXTENDED RELEASE ORAL at 16:33

## 2025-08-31 RX ADMIN — APIXABAN 10 MG: 5 TABLET, FILM COATED ORAL at 08:20

## 2025-08-31 ASSESSMENT — LIFESTYLE VARIABLES
TREMOR: NO TREMOR
TOTAL SCORE: 3
AUDITORY DISTURBANCES: NOT PRESENT
AUDITORY DISTURBANCES: NOT PRESENT
TREMOR: NO TREMOR
AUDITORY DISTURBANCES: NOT PRESENT
NAUSEA AND VOMITING: NO NAUSEA AND NO VOMITING
HEADACHE, FULLNESS IN HEAD: MODERATE
NAUSEA AND VOMITING: NO NAUSEA AND NO VOMITING
VISUAL DISTURBANCES: NOT PRESENT
AGITATION: NORMAL ACTIVITY
AGITATION: NORMAL ACTIVITY
TOTAL SCORE: 3
VISUAL DISTURBANCES: NOT PRESENT
PAROXYSMAL SWEATS: NO SWEAT VISIBLE
TREMOR: NO TREMOR
VISUAL DISTURBANCES: NOT PRESENT
PAROXYSMAL SWEATS: NO SWEAT VISIBLE
ORIENTATION AND CLOUDING OF SENSORIUM: ORIENTED AND CAN DO SERIAL ADDITIONS
ANXIETY: NO ANXIETY (AT EASE)
ANXIETY: NO ANXIETY (AT EASE)
TOTAL SCORE: 3
AGITATION: NORMAL ACTIVITY
ANXIETY: NO ANXIETY (AT EASE)
NAUSEA AND VOMITING: NO NAUSEA AND NO VOMITING
PAROXYSMAL SWEATS: NO SWEAT VISIBLE
HEADACHE, FULLNESS IN HEAD: MODERATE
HEADACHE, FULLNESS IN HEAD: MODERATE

## 2025-08-31 ASSESSMENT — ENCOUNTER SYMPTOMS
SHORTNESS OF BREATH: 0
FEVER: 0
VOMITING: 0
CHILLS: 0
DEPRESSION: 0
HEADACHES: 0
INSOMNIA: 1
ABDOMINAL PAIN: 0
DIZZINESS: 0
NAUSEA: 0
MYALGIAS: 0

## 2025-08-31 ASSESSMENT — GAIT ASSESSMENTS
DEVIATION: BRADYKINETIC;SHUFFLED GAIT
DISTANCE (FEET): 200
GAIT LEVEL OF ASSIST: STANDBY ASSIST

## 2025-08-31 ASSESSMENT — PAIN DESCRIPTION - PAIN TYPE
TYPE: ACUTE PAIN

## 2025-08-31 ASSESSMENT — FIBROSIS 4 INDEX: FIB4 SCORE: 15.14

## 2025-08-31 ASSESSMENT — COGNITIVE AND FUNCTIONAL STATUS - GENERAL
MOVING TO AND FROM BED TO CHAIR: A LITTLE
STANDING UP FROM CHAIR USING ARMS: A LITTLE
CLIMB 3 TO 5 STEPS WITH RAILING: A LITTLE
MOBILITY SCORE: 20
SUGGESTED CMS G CODE MODIFIER MOBILITY: CJ
WALKING IN HOSPITAL ROOM: A LITTLE